# Patient Record
Sex: MALE | Race: BLACK OR AFRICAN AMERICAN | Employment: OTHER | URBAN - METROPOLITAN AREA
[De-identification: names, ages, dates, MRNs, and addresses within clinical notes are randomized per-mention and may not be internally consistent; named-entity substitution may affect disease eponyms.]

---

## 2019-09-12 ENCOUNTER — HOSPITAL ENCOUNTER (EMERGENCY)
Facility: HOSPITAL | Age: 69
Discharge: HOME | End: 2019-09-12
Attending: EMERGENCY MEDICINE
Payer: MEDICARE

## 2019-09-12 ENCOUNTER — APPOINTMENT (OUTPATIENT)
Dept: CT IMAGING | Facility: HOSPITAL | Age: 69
End: 2019-09-12
Attending: EMERGENCY MEDICINE
Payer: MEDICARE

## 2019-09-12 VITALS
SYSTOLIC BLOOD PRESSURE: 178 MMHG | OXYGEN SATURATION: 98 % | TEMPERATURE: 98 F | RESPIRATION RATE: 20 BRPM | HEART RATE: 60 BPM | BODY MASS INDEX: 32.47 KG/M2 | HEIGHT: 77 IN | WEIGHT: 275 LBS | DIASTOLIC BLOOD PRESSURE: 78 MMHG

## 2019-09-12 DIAGNOSIS — N20.0 KIDNEY STONE: Primary | ICD-10-CM

## 2019-09-12 LAB
ALBUMIN SERPL-MCNC: 3.4 G/DL (ref 3.4–5)
ALBUMIN/GLOB SERPL: 0.8 {RATIO} (ref 1–2)
ALP LIVER SERPL-CCNC: 81 U/L (ref 45–117)
ALT SERPL-CCNC: 19 U/L (ref 16–61)
ANION GAP SERPL CALC-SCNC: 7 MMOL/L (ref 0–18)
AST SERPL-CCNC: 14 U/L (ref 15–37)
BASOPHILS # BLD AUTO: 0.04 X10(3) UL (ref 0–0.2)
BASOPHILS NFR BLD AUTO: 0.7 %
BILIRUB SERPL-MCNC: 0.4 MG/DL (ref 0.1–2)
BILIRUB UR QL: NEGATIVE
BUN BLD-MCNC: 44 MG/DL (ref 7–18)
BUN/CREAT SERPL: 12.2 (ref 10–20)
CALCIUM BLD-MCNC: 8.8 MG/DL (ref 8.5–10.1)
CHLORIDE SERPL-SCNC: 113 MMOL/L (ref 98–112)
CLARITY UR: CLEAR
CO2 SERPL-SCNC: 22 MMOL/L (ref 21–32)
COLOR UR: YELLOW
CREAT BLD-MCNC: 3.6 MG/DL (ref 0.7–1.3)
DEPRECATED RDW RBC AUTO: 42.7 FL (ref 35.1–46.3)
EOSINOPHIL # BLD AUTO: 0.13 X10(3) UL (ref 0–0.7)
EOSINOPHIL NFR BLD AUTO: 2.2 %
ERYTHROCYTE [DISTWIDTH] IN BLOOD BY AUTOMATED COUNT: 13.9 % (ref 11–15)
GLOBULIN PLAS-MCNC: 4.3 G/DL (ref 2.8–4.4)
GLUCOSE BLD-MCNC: 162 MG/DL (ref 70–99)
GLUCOSE UR-MCNC: 50 MG/DL
HCT VFR BLD AUTO: 33.2 % (ref 39–53)
HGB BLD-MCNC: 10.7 G/DL (ref 13–17.5)
IMM GRANULOCYTES # BLD AUTO: 0.04 X10(3) UL (ref 0–1)
IMM GRANULOCYTES NFR BLD: 0.7 %
KETONES UR-MCNC: NEGATIVE MG/DL
LEUKOCYTE ESTERASE UR QL STRIP.AUTO: NEGATIVE
LYMPHOCYTES # BLD AUTO: 1.68 X10(3) UL (ref 1–4)
LYMPHOCYTES NFR BLD AUTO: 27.9 %
M PROTEIN MFR SERPL ELPH: 7.7 G/DL (ref 6.4–8.2)
MCH RBC QN AUTO: 27.5 PG (ref 26–34)
MCHC RBC AUTO-ENTMCNC: 32.2 G/DL (ref 31–37)
MCV RBC AUTO: 85.3 FL (ref 80–100)
MONOCYTES # BLD AUTO: 0.49 X10(3) UL (ref 0.1–1)
MONOCYTES NFR BLD AUTO: 8.1 %
NEUTROPHILS # BLD AUTO: 3.65 X10 (3) UL (ref 1.5–7.7)
NEUTROPHILS # BLD AUTO: 3.65 X10(3) UL (ref 1.5–7.7)
NEUTROPHILS NFR BLD AUTO: 60.4 %
NITRITE UR QL STRIP.AUTO: NEGATIVE
OSMOLALITY SERPL CALC.SUM OF ELEC: 309 MOSM/KG (ref 275–295)
PH UR: 5 [PH] (ref 5–8)
PLATELET # BLD AUTO: 228 10(3)UL (ref 150–450)
POTASSIUM SERPL-SCNC: 4.7 MMOL/L (ref 3.5–5.1)
PROT UR-MCNC: 100 MG/DL
RBC # BLD AUTO: 3.89 X10(6)UL (ref 3.8–5.8)
RBC #/AREA URNS AUTO: 1 /HPF
SODIUM SERPL-SCNC: 142 MMOL/L (ref 136–145)
SP GR UR STRIP: 1.01 (ref 1–1.03)
UROBILINOGEN UR STRIP-ACNC: <2
VIT C UR-MCNC: NEGATIVE MG/DL
WBC # BLD AUTO: 6 X10(3) UL (ref 4–11)
WBC #/AREA URNS AUTO: 1 /HPF

## 2019-09-12 PROCEDURE — 85025 COMPLETE CBC W/AUTO DIFF WBC: CPT | Performed by: EMERGENCY MEDICINE

## 2019-09-12 PROCEDURE — 74176 CT ABD & PELVIS W/O CONTRAST: CPT | Performed by: EMERGENCY MEDICINE

## 2019-09-12 PROCEDURE — 96361 HYDRATE IV INFUSION ADD-ON: CPT

## 2019-09-12 PROCEDURE — 80053 COMPREHEN METABOLIC PANEL: CPT | Performed by: EMERGENCY MEDICINE

## 2019-09-12 PROCEDURE — 81001 URINALYSIS AUTO W/SCOPE: CPT | Performed by: EMERGENCY MEDICINE

## 2019-09-12 PROCEDURE — 96374 THER/PROPH/DIAG INJ IV PUSH: CPT

## 2019-09-12 PROCEDURE — 99284 EMERGENCY DEPT VISIT MOD MDM: CPT

## 2019-09-12 PROCEDURE — 96375 TX/PRO/DX INJ NEW DRUG ADDON: CPT

## 2019-09-12 RX ORDER — ONDANSETRON 4 MG/1
4 TABLET, ORALLY DISINTEGRATING ORAL EVERY 4 HOURS PRN
Qty: 10 TABLET | Refills: 0 | Status: SHIPPED | OUTPATIENT
Start: 2019-09-12 | End: 2019-09-19

## 2019-09-12 RX ORDER — HYDROCODONE BITARTRATE AND ACETAMINOPHEN 5; 325 MG/1; MG/1
1 TABLET ORAL EVERY 6 HOURS PRN
Qty: 20 TABLET | Refills: 0 | Status: SHIPPED | OUTPATIENT
Start: 2019-09-12 | End: 2019-09-19

## 2019-09-12 RX ORDER — MORPHINE SULFATE 4 MG/ML
4 INJECTION, SOLUTION INTRAMUSCULAR; INTRAVENOUS ONCE
Status: COMPLETED | OUTPATIENT
Start: 2019-09-12 | End: 2019-09-12

## 2019-09-12 RX ORDER — KETOROLAC TROMETHAMINE 15 MG/ML
15 INJECTION, SOLUTION INTRAMUSCULAR; INTRAVENOUS ONCE
Status: COMPLETED | OUTPATIENT
Start: 2019-09-12 | End: 2019-09-12

## 2019-09-12 RX ORDER — TAMSULOSIN HYDROCHLORIDE 0.4 MG/1
0.4 CAPSULE ORAL DAILY
Qty: 7 CAPSULE | Refills: 0 | Status: SHIPPED | OUTPATIENT
Start: 2019-09-12 | End: 2019-09-19

## 2019-09-12 RX ORDER — ONDANSETRON 2 MG/ML
4 INJECTION INTRAMUSCULAR; INTRAVENOUS ONCE
Status: COMPLETED | OUTPATIENT
Start: 2019-09-12 | End: 2019-09-12

## 2019-09-12 NOTE — ED PROVIDER NOTES
Patient Seen in: [facility] AND [facility] Emergency Department    History   Patient presents with:  Flank Pain    Stated Complaint: flank pain     HPI    32-year-old male with past medical history significant for diabetes, high blood pressure, chronic kidney di pulses  Pulmonary/Chest: CTA b/l with no rales, wheezes. No chest wall tenderness  Abdominal: Nontender. Nondistended. Soft. Bowel sounds are normal.   Back: Positive left CVA tenderness to percussion  :   Musculoskeletal: Normal range of motion.  No de suggested at the left ureterovesical junction with resultant left-sided hydroureteronephrosis and asymmetric left renal edema. 2. Mild left urothelial thickening is appreciated.  There is moderate left perinephric and periureteric fat stranding, which may reassessment of your blood pressure. Medications Prescribed:  Current Discharge Medication List    START taking these medications    HYDROcodone-acetaminophen 5-325 MG Oral Tab  Take 1 tablet by mouth every 6 (six) hours as needed for Pain.   Qty: 20 tab

## 2019-09-12 NOTE — ED NOTES
Emani Melgar woke up with left flank/lower back pain and nausea. He rates pain 10/10 at this time. History of previous kidney stones and states this feels similar.

## 2020-01-30 ENCOUNTER — HOSPITAL ENCOUNTER (OUTPATIENT)
Dept: GENERAL RADIOLOGY | Facility: HOSPITAL | Age: 70
Discharge: HOME | End: 2020-01-30
Attending: INTERNAL MEDICINE
Payer: MEDICARE

## 2020-01-30 ENCOUNTER — OFFICE VISIT (OUTPATIENT)
Dept: INTERNAL MEDICINE CLINIC | Facility: CLINIC | Age: 70
End: 2020-01-30
Payer: MEDICARE

## 2020-01-30 VITALS
TEMPERATURE: 98 F | HEIGHT: 77 IN | BODY MASS INDEX: 32.12 KG/M2 | OXYGEN SATURATION: 99 % | WEIGHT: 272 LBS | DIASTOLIC BLOOD PRESSURE: 76 MMHG | SYSTOLIC BLOOD PRESSURE: 164 MMHG | RESPIRATION RATE: 20 BRPM | HEART RATE: 98 BPM

## 2020-01-30 DIAGNOSIS — E11.22 TYPE 2 DIABETES MELLITUS WITH STAGE 3 CHRONIC KIDNEY DISEASE, WITHOUT LONG-TERM CURRENT USE OF INSULIN (HCC): ICD-10-CM

## 2020-01-30 DIAGNOSIS — R10.9 ACUTE FLANK PAIN: Primary | ICD-10-CM

## 2020-01-30 DIAGNOSIS — R10.9 ACUTE FLANK PAIN: ICD-10-CM

## 2020-01-30 DIAGNOSIS — N18.30 TYPE 2 DIABETES MELLITUS WITH STAGE 3 CHRONIC KIDNEY DISEASE, WITHOUT LONG-TERM CURRENT USE OF INSULIN (HCC): ICD-10-CM

## 2020-01-30 DIAGNOSIS — Z87.442 HISTORY OF KIDNEY STONES: ICD-10-CM

## 2020-01-30 DIAGNOSIS — N18.30 STAGE 3 CHRONIC KIDNEY DISEASE (HCC): ICD-10-CM

## 2020-01-30 DIAGNOSIS — I10 ESSENTIAL HYPERTENSION: ICD-10-CM

## 2020-01-30 PROCEDURE — 74018 RADEX ABDOMEN 1 VIEW: CPT | Performed by: INTERNAL MEDICINE

## 2020-01-30 PROCEDURE — 99204 OFFICE O/P NEW MOD 45 MIN: CPT | Performed by: INTERNAL MEDICINE

## 2020-01-30 RX ORDER — MELATONIN
DAILY
COMMUNITY
Start: 2019-11-12

## 2020-01-30 RX ORDER — AMLODIPINE BESYLATE AND BENAZEPRIL HYDROCHLORIDE 10; 40 MG/1; MG/1
1 CAPSULE ORAL
COMMUNITY
Start: 2019-11-12 | End: 2020-02-17

## 2020-01-30 NOTE — PROGRESS NOTES
HPI:    Patient ID: Minnie Desai is a 81year old male.     HPI  Patient comes in today for first time to establish care and also complaining of left flank pain but he says today he feels much better the pain is gone but it for last few days or so he was i tablet every day by oral route. • amLODIPine Besy-Benazepril HCl 10-40 MG Oral Cap Take 1 capsule by mouth.      • SITagliptin-metFORMIN HCl -1000 MG Oral Tablet 24 Hr Janumet  mg-1,000 mg tablet,extended release     • Vitamin B-12 1000 MCG stones every few months  History of kidney stones more fluids as above  Essential hypertension elevated patient will restart taking his medication at home will see him for Medicare annual  Type 2 diabetes mellitus with stage 3 chronic kidney disease, witho

## 2020-02-03 ENCOUNTER — OFFICE VISIT (OUTPATIENT)
Dept: INTERNAL MEDICINE CLINIC | Facility: CLINIC | Age: 70
End: 2020-02-03
Payer: MEDICARE

## 2020-02-03 VITALS
HEIGHT: 77 IN | WEIGHT: 271 LBS | DIASTOLIC BLOOD PRESSURE: 72 MMHG | SYSTOLIC BLOOD PRESSURE: 181 MMHG | HEART RATE: 79 BPM | OXYGEN SATURATION: 98 % | BODY MASS INDEX: 32 KG/M2 | RESPIRATION RATE: 18 BRPM | TEMPERATURE: 98 F

## 2020-02-03 DIAGNOSIS — Z00.00 ENCOUNTER FOR ANNUAL HEALTH EXAMINATION: ICD-10-CM

## 2020-02-03 DIAGNOSIS — Z23 NEED FOR VACCINATION: ICD-10-CM

## 2020-02-03 DIAGNOSIS — N18.30 STAGE 3 CHRONIC KIDNEY DISEASE (HCC): ICD-10-CM

## 2020-02-03 DIAGNOSIS — I10 ESSENTIAL HYPERTENSION: ICD-10-CM

## 2020-02-03 DIAGNOSIS — Z13.6 SCREENING FOR CARDIOVASCULAR CONDITION: ICD-10-CM

## 2020-02-03 DIAGNOSIS — Z12.5 ENCOUNTER FOR SCREENING FOR MALIGNANT NEOPLASM OF PROSTATE: ICD-10-CM

## 2020-02-03 DIAGNOSIS — Z00.00 ENCOUNTER FOR MEDICARE ANNUAL WELLNESS EXAM: ICD-10-CM

## 2020-02-03 DIAGNOSIS — R94.39 ABNORMAL STRESS TEST: ICD-10-CM

## 2020-02-03 DIAGNOSIS — Z11.59 NEED FOR HEPATITIS C SCREENING TEST: ICD-10-CM

## 2020-02-03 DIAGNOSIS — Z00.00 MEDICARE ANNUAL WELLNESS VISIT, SUBSEQUENT: Primary | ICD-10-CM

## 2020-02-03 DIAGNOSIS — Z12.11 SCREENING FOR COLON CANCER: ICD-10-CM

## 2020-02-03 DIAGNOSIS — M89.9 BONE DISORDER: ICD-10-CM

## 2020-02-03 DIAGNOSIS — Z00.00 PREVENTATIVE HEALTH CARE: ICD-10-CM

## 2020-02-03 DIAGNOSIS — E11.22 TYPE 2 DIABETES MELLITUS WITH STAGE 3 CHRONIC KIDNEY DISEASE, WITHOUT LONG-TERM CURRENT USE OF INSULIN (HCC): ICD-10-CM

## 2020-02-03 DIAGNOSIS — Z11.4 SCREENING FOR HIV (HUMAN IMMUNODEFICIENCY VIRUS): ICD-10-CM

## 2020-02-03 DIAGNOSIS — N18.30 TYPE 2 DIABETES MELLITUS WITH STAGE 3 CHRONIC KIDNEY DISEASE, WITHOUT LONG-TERM CURRENT USE OF INSULIN (HCC): ICD-10-CM

## 2020-02-03 DIAGNOSIS — R06.02 SOB (SHORTNESS OF BREATH) ON EXERTION: ICD-10-CM

## 2020-02-03 DIAGNOSIS — Z13.1 SCREENING FOR DIABETES MELLITUS (DM): ICD-10-CM

## 2020-02-03 DIAGNOSIS — Z23 FLU VACCINE NEED: ICD-10-CM

## 2020-02-03 DIAGNOSIS — Z87.442 HISTORY OF KIDNEY STONES: ICD-10-CM

## 2020-02-03 LAB
ALBUMIN SERPL-MCNC: 3.5 G/DL (ref 3.4–5)
ALBUMIN/GLOB SERPL: 0.8 {RATIO} (ref 1–2)
ALP LIVER SERPL-CCNC: 86 U/L (ref 45–117)
ALT SERPL-CCNC: 21 U/L (ref 16–61)
ANION GAP SERPL CALC-SCNC: 9 MMOL/L (ref 0–18)
AST SERPL-CCNC: 20 U/L (ref 15–37)
BASOPHILS # BLD AUTO: 0.05 X10(3) UL (ref 0–0.2)
BASOPHILS NFR BLD AUTO: 0.8 %
BILIRUB SERPL-MCNC: 0.4 MG/DL (ref 0.1–2)
BUN BLD-MCNC: 62 MG/DL (ref 7–18)
BUN/CREAT SERPL: 11.8 (ref 10–20)
CALCIUM BLD-MCNC: 9.1 MG/DL (ref 8.5–10.1)
CHLORIDE SERPL-SCNC: 111 MMOL/L (ref 98–112)
CHOLEST SMN-MCNC: 198 MG/DL (ref ?–200)
CO2 SERPL-SCNC: 23 MMOL/L (ref 21–32)
COMPLEXED PSA SERPL-MCNC: 0.74 NG/ML (ref ?–4)
CREAT BLD-MCNC: 5.24 MG/DL (ref 0.7–1.3)
CREAT UR-SCNC: 179 MG/DL
DEPRECATED RDW RBC AUTO: 40.4 FL (ref 35.1–46.3)
EOSINOPHIL # BLD AUTO: 0.17 X10(3) UL (ref 0–0.7)
EOSINOPHIL NFR BLD AUTO: 2.7 %
ERYTHROCYTE [DISTWIDTH] IN BLOOD BY AUTOMATED COUNT: 13.2 % (ref 11–15)
EST. AVERAGE GLUCOSE BLD GHB EST-MCNC: 171 MG/DL (ref 68–126)
GLOBULIN PLAS-MCNC: 4.3 G/DL (ref 2.8–4.4)
GLUCOSE BLD-MCNC: 122 MG/DL (ref 70–99)
HBA1C MFR BLD HPLC: 7.6 % (ref ?–5.7)
HCT VFR BLD AUTO: 34.9 % (ref 39–53)
HCV AB SERPL QL IA: NONREACTIVE
HDLC SERPL-MCNC: 46 MG/DL (ref 40–59)
HGB BLD-MCNC: 11.1 G/DL (ref 13–17.5)
IMM GRANULOCYTES # BLD AUTO: 0.02 X10(3) UL (ref 0–1)
IMM GRANULOCYTES NFR BLD: 0.3 %
LDLC SERPL CALC-MCNC: 120 MG/DL (ref ?–100)
LYMPHOCYTES # BLD AUTO: 1.81 X10(3) UL (ref 1–4)
LYMPHOCYTES NFR BLD AUTO: 28.3 %
M PROTEIN MFR SERPL ELPH: 7.8 G/DL (ref 6.4–8.2)
MCH RBC QN AUTO: 26.7 PG (ref 26–34)
MCHC RBC AUTO-ENTMCNC: 31.8 G/DL (ref 31–37)
MCV RBC AUTO: 84.1 FL (ref 80–100)
MICROALBUMIN UR-MCNC: 238 MG/DL
MICROALBUMIN/CREAT 24H UR-RTO: 1329.6 UG/MG (ref ?–30)
MONOCYTES # BLD AUTO: 0.48 X10(3) UL (ref 0.1–1)
MONOCYTES NFR BLD AUTO: 7.5 %
NEUTROPHILS # BLD AUTO: 3.86 X10 (3) UL (ref 1.5–7.7)
NEUTROPHILS # BLD AUTO: 3.86 X10(3) UL (ref 1.5–7.7)
NEUTROPHILS NFR BLD AUTO: 60.4 %
NONHDLC SERPL-MCNC: 152 MG/DL (ref ?–130)
OSMOLALITY SERPL CALC.SUM OF ELEC: 315 MOSM/KG (ref 275–295)
PATIENT FASTING Y/N/NP: YES
PATIENT FASTING Y/N/NP: YES
PLATELET # BLD AUTO: 251 10(3)UL (ref 150–450)
POTASSIUM SERPL-SCNC: 5 MMOL/L (ref 3.5–5.1)
RBC # BLD AUTO: 4.15 X10(6)UL (ref 3.8–5.8)
SODIUM SERPL-SCNC: 143 MMOL/L (ref 136–145)
TRIGL SERPL-MCNC: 161 MG/DL (ref 30–149)
TSI SER-ACNC: 0.88 MIU/ML (ref 0.36–3.74)
VLDLC SERPL CALC-MCNC: 32 MG/DL (ref 0–30)
WBC # BLD AUTO: 6.4 X10(3) UL (ref 4–11)

## 2020-02-03 PROCEDURE — G0439 PPPS, SUBSEQ VISIT: HCPCS | Performed by: INTERNAL MEDICINE

## 2020-02-03 PROCEDURE — 36415 COLL VENOUS BLD VENIPUNCTURE: CPT | Performed by: INTERNAL MEDICINE

## 2020-02-03 PROCEDURE — 90662 IIV NO PRSV INCREASED AG IM: CPT | Performed by: INTERNAL MEDICINE

## 2020-02-03 PROCEDURE — G0008 ADMIN INFLUENZA VIRUS VAC: HCPCS | Performed by: INTERNAL MEDICINE

## 2020-02-03 RX ORDER — METOPROLOL SUCCINATE 50 MG/1
50 TABLET, EXTENDED RELEASE ORAL DAILY
Qty: 90 TABLET | Refills: 1 | Status: SHIPPED | OUTPATIENT
Start: 2020-02-03 | End: 2020-02-17

## 2020-02-04 ENCOUNTER — TELEPHONE (OUTPATIENT)
Dept: INTERNAL MEDICINE CLINIC | Facility: CLINIC | Age: 70
End: 2020-02-04

## 2020-02-04 NOTE — TELEPHONE ENCOUNTER
Nephrology RN: Patient needing to see nephrology for a creatinine of 5. Scheduling indicates soonest available appointment is 2-25-20. Can you please see if patient can be seen sooner.

## 2020-02-04 NOTE — PATIENT INSTRUCTIONS
Luisito Diop's SCREENING SCHEDULE   Tests on this list are recommended by your physician but may not be covered, or covered at this frequency, by your insurer. Please check with your insurance carrier before scheduling to verify coverage.     ADAL results found for this or any previous visit. No flowsheet data found. Fecal Occult Blood   Covered Annually No results found for: FOB, OCCULTSTOOL No flowsheet data found.      Barium Enema-   uncomfortable but covered  Covered but uncomfortable   Glau this or any previous visit. This may be covered with your pharmacy  prescription benefits     Recommended Websites for Advanced Directives    [email or web address]  An information packet, including necessary form from

## 2020-02-04 NOTE — PROGRESS NOTES
HPI:   Josselyn Bruner is a 81year old male who presents for a Medicare Subsequent Annual Wellness visit (Pt already had Initial Annual Wellness).     Patient comes in for Medicare annual physical was recently seen for first time needed refill on his med screening   Mayelin Lunch was screened for Alcohol abuse and had a score of 0 so is at low risk.      Patient Care Team: Patient Care Team:  Jo-Ann Henson MD as PCP - General (Internal Medicine)    Patient Active Problem List:     History of kidney ston EXAM:   BP (!) 191/72 (BP Location: Left arm, Patient Position: Sitting, Cuff Size: large)   Pulse 79   Temp 97.5 °F (36.4 °C) (Oral)   Resp 18   Ht 6' 5\" (1.956 m)   Wt 271 lb (122.9 kg)   SpO2 98%   BMI 32.14 kg/m²   Estimated body mass index is 32. Head: Normocephalic and atraumatic.    Right Ear: Tympanic membrane and ear canal normal.   Left Ear: Tympanic membrane and ear canal normal.   Nose: Nose normal.   Mouth/Throat: Oropharynx is clear and moist.   Eyes: Pupils are equal, round, and reactive IM  -     GASTRO - INTERNAL  -     PSA SCREEN; Future  -     LIPID PANEL; Future  -     HCV ANTIBODY; Future  -     HIV AG AB COMBO; Future  -     COMP METABOLIC PANEL (14);  Future  -     HEMOGLOBIN A1C; Future  -     TSH W REFLEX TO FREE T4; Future  - doctor  -     FLU VACC HIGH DOSE PRSV FREE  -     PNEUMOCOCCAL VACC, 13 MARIA ALEJANDRA IM  -     GASTRO - INTERNAL  -     PSA SCREEN; Future  -     LIPID PANEL; Future  -     HCV ANTIBODY; Future  -     HIV AG AB COMBO; Future  -     COMP METABOLIC PANEL (14);  Future RATIO, RANDOM URINE; Future        Screening for colon cancer  -     FLU VACC HIGH DOSE PRSV FREE  -     PNEUMOCOCCAL VACC, 13 MARIA ALEJANDRA IM  -     GASTRO - INTERNAL  -     PSA SCREEN; Future  -     LIPID PANEL; Future  -     HCV ANTIBODY;  Future  -     HIV AG AB medically necessary Electrocardiogram date    Colorectal Cancer Screening      Colonoscopy Screen every 10 years Colonoscopy due on 04/08/2002 Update Health Maintenance if applicable    Flex Sigmoidoscopy Screen every 10 years No results found for this or 09/12/2019 4.7    No flowsheet data found. Creatinine  Annually Creatinine (mg/dL)   Date Value   09/12/2019 3.60 (H)    No flowsheet data found. Drug Serum Conc  Annually No results found for: DIGOXIN, DIG, VALP No flowsheet data found.        Diab

## 2020-02-05 NOTE — TELEPHONE ENCOUNTER
Spoke to nephrology office to schedule sooner appt. 2/19/20 @10am w/ Dr Stephanie Moser, this is the soonest appt they had. Took appt. Spoke to patient to inform him that appt was moved up a bit to the 19th @10am w/ Dr Stephanie Moser.  Pt states understanding, toni

## 2020-02-05 NOTE — TELEPHONE ENCOUNTER
Contacted pt and offered appt with Dr. Governor Mckinney for Friday 2/7/20. Pt politely declined and states Fridays are the only days he cannot come in.  He thanked me for the call to get him in sooner and to let him know if there's anything else that opens up that's n

## 2020-02-05 NOTE — TELEPHONE ENCOUNTER
Dr. [facility], would you be able to see this consult on Friday 2/7/2020 at 4:20 PM or Monday 2/10/2020 at 4:20 PM?     Component      Latest Ref Rng & Units 2/3/2020 9/12/2019   BUN      7 - 18 mg/dL 62 (H) 44 (H)   CREATININE      0.70 - 1.30 mg/dL 5.24 (H) 3

## 2020-02-10 ENCOUNTER — OFFICE VISIT (OUTPATIENT)
Dept: NEPHROLOGY | Facility: CLINIC | Age: 70
End: 2020-02-10
Payer: MEDICARE

## 2020-02-10 VITALS
HEART RATE: 64 BPM | BODY MASS INDEX: 32.35 KG/M2 | DIASTOLIC BLOOD PRESSURE: 81 MMHG | HEIGHT: 77 IN | SYSTOLIC BLOOD PRESSURE: 158 MMHG | WEIGHT: 274 LBS

## 2020-02-10 DIAGNOSIS — N18.30 STAGE 3 CHRONIC KIDNEY DISEASE (HCC): ICD-10-CM

## 2020-02-10 DIAGNOSIS — R06.02 SOB (SHORTNESS OF BREATH) ON EXERTION: Primary | ICD-10-CM

## 2020-02-10 DIAGNOSIS — E11.22 TYPE 2 DIABETES MELLITUS WITH STAGE 3 CHRONIC KIDNEY DISEASE, WITHOUT LONG-TERM CURRENT USE OF INSULIN (HCC): ICD-10-CM

## 2020-02-10 DIAGNOSIS — I10 ESSENTIAL HYPERTENSION: ICD-10-CM

## 2020-02-10 DIAGNOSIS — N18.30 TYPE 2 DIABETES MELLITUS WITH STAGE 3 CHRONIC KIDNEY DISEASE, WITHOUT LONG-TERM CURRENT USE OF INSULIN (HCC): ICD-10-CM

## 2020-02-10 PROCEDURE — 99205 OFFICE O/P NEW HI 60 MIN: CPT | Performed by: INTERNAL MEDICINE

## 2020-02-10 PROCEDURE — G0463 HOSPITAL OUTPT CLINIC VISIT: HCPCS | Performed by: INTERNAL MEDICINE

## 2020-02-10 RX ORDER — AMLODIPINE BESYLATE 10 MG/1
10 TABLET ORAL DAILY
Qty: 30 TABLET | Refills: 3 | Status: SHIPPED | OUTPATIENT
Start: 2020-02-10 | End: 2021-03-24

## 2020-02-10 NOTE — PATIENT INSTRUCTIONS
DO ULTRASOUND OF KIDNEYS THIS WEEK    STOP LISINOPRIL AMLODIPINE    TAKE AMLODIPINE ONLY 10MG AT NIGHT    STOP METFORMIN    MONITOR SUGARS,,BLOOD PRESSURE    PLEASE DO LABS ON Thursday    SEE ME NEXT Monday    NICE TO MEET YOU Cynthia Shepard.

## 2020-02-13 ENCOUNTER — TELEPHONE (OUTPATIENT)
Dept: PULMONOLOGY | Facility: CLINIC | Age: 70
End: 2020-02-13

## 2020-02-13 ENCOUNTER — HOSPITAL ENCOUNTER (OUTPATIENT)
Dept: ULTRASOUND IMAGING | Facility: HOSPITAL | Age: 70
Discharge: HOME | End: 2020-02-13
Attending: INTERNAL MEDICINE
Payer: MEDICARE

## 2020-02-13 DIAGNOSIS — E11.22 TYPE 2 DIABETES MELLITUS WITH STAGE 3 CHRONIC KIDNEY DISEASE, WITHOUT LONG-TERM CURRENT USE OF INSULIN (HCC): ICD-10-CM

## 2020-02-13 DIAGNOSIS — I10 ESSENTIAL HYPERTENSION: ICD-10-CM

## 2020-02-13 DIAGNOSIS — N18.30 TYPE 2 DIABETES MELLITUS WITH STAGE 3 CHRONIC KIDNEY DISEASE, WITHOUT LONG-TERM CURRENT USE OF INSULIN (HCC): ICD-10-CM

## 2020-02-13 DIAGNOSIS — R06.02 SOB (SHORTNESS OF BREATH) ON EXERTION: ICD-10-CM

## 2020-02-13 DIAGNOSIS — N18.30 STAGE 3 CHRONIC KIDNEY DISEASE (HCC): ICD-10-CM

## 2020-02-13 PROCEDURE — 76770 US EXAM ABDO BACK WALL COMP: CPT | Performed by: INTERNAL MEDICINE

## 2020-02-14 ENCOUNTER — LAB ENCOUNTER (OUTPATIENT)
Dept: LAB | Facility: HOSPITAL | Age: 70
End: 2020-02-14
Attending: INTERNAL MEDICINE
Payer: MEDICARE

## 2020-02-14 DIAGNOSIS — R06.02 SOB (SHORTNESS OF BREATH) ON EXERTION: ICD-10-CM

## 2020-02-14 DIAGNOSIS — N18.30 STAGE 3 CHRONIC KIDNEY DISEASE (HCC): ICD-10-CM

## 2020-02-14 DIAGNOSIS — N18.30 TYPE 2 DIABETES MELLITUS WITH STAGE 3 CHRONIC KIDNEY DISEASE, WITHOUT LONG-TERM CURRENT USE OF INSULIN (HCC): ICD-10-CM

## 2020-02-14 DIAGNOSIS — E11.22 TYPE 2 DIABETES MELLITUS WITH STAGE 3 CHRONIC KIDNEY DISEASE, WITHOUT LONG-TERM CURRENT USE OF INSULIN (HCC): ICD-10-CM

## 2020-02-14 DIAGNOSIS — I10 ESSENTIAL HYPERTENSION: ICD-10-CM

## 2020-02-14 LAB
ALBUMIN SERPL-MCNC: 3.3 G/DL (ref 3.4–5)
ANION GAP SERPL CALC-SCNC: 5 MMOL/L (ref 0–18)
BACTERIA UR QL AUTO: NEGATIVE /HPF
BILIRUB UR QL: NEGATIVE
BUN BLD-MCNC: 45 MG/DL (ref 7–18)
BUN/CREAT SERPL: 10.5 (ref 10–20)
CALCIUM BLD-MCNC: 8.4 MG/DL (ref 8.5–10.1)
CHLORIDE SERPL-SCNC: 113 MMOL/L (ref 98–112)
CLARITY UR: CLEAR
CO2 SERPL-SCNC: 23 MMOL/L (ref 21–32)
COLOR UR: YELLOW
CREAT BLD-MCNC: 4.27 MG/DL (ref 0.7–1.3)
GLUCOSE BLD-MCNC: 195 MG/DL (ref 70–99)
GLUCOSE UR-MCNC: 50 MG/DL
HGB UR QL STRIP.AUTO: NEGATIVE
IRON SATURATION: 16 % (ref 20–50)
IRON SERPL-MCNC: 45 UG/DL (ref 65–175)
KETONES UR-MCNC: NEGATIVE MG/DL
LEUKOCYTE ESTERASE UR QL STRIP.AUTO: NEGATIVE
NITRITE UR QL STRIP.AUTO: NEGATIVE
OSMOLALITY SERPL CALC.SUM OF ELEC: 309 MOSM/KG (ref 275–295)
PH UR: 5 [PH] (ref 5–8)
PHOSPHATE SERPL-MCNC: 4.2 MG/DL (ref 2.5–4.9)
POTASSIUM SERPL-SCNC: 4.8 MMOL/L (ref 3.5–5.1)
PROT UR-MCNC: 100 MG/DL
PROT UR-MCNC: 219.5 MG/DL
PTH-INTACT SERPL-MCNC: 243 PG/ML (ref 18.5–88)
RBC #/AREA URNS AUTO: <1 /HPF
SODIUM SERPL-SCNC: 141 MMOL/L (ref 136–145)
SP GR UR STRIP: 1.01 (ref 1–1.03)
TOTAL IRON BINDING CAPACITY: 279 UG/DL (ref 240–450)
TRANSFERRIN SERPL-MCNC: 187 MG/DL (ref 200–360)
UROBILINOGEN UR STRIP-ACNC: <2
WBC #/AREA URNS AUTO: 2 /HPF

## 2020-02-14 PROCEDURE — 84165 PROTEIN E-PHORESIS SERUM: CPT

## 2020-02-14 PROCEDURE — 83540 ASSAY OF IRON: CPT

## 2020-02-14 PROCEDURE — 83970 ASSAY OF PARATHORMONE: CPT

## 2020-02-14 PROCEDURE — 36415 COLL VENOUS BLD VENIPUNCTURE: CPT

## 2020-02-14 PROCEDURE — 84166 PROTEIN E-PHORESIS/URINE/CSF: CPT

## 2020-02-14 PROCEDURE — 86335 IMMUNFIX E-PHORSIS/URINE/CSF: CPT

## 2020-02-14 PROCEDURE — 84466 ASSAY OF TRANSFERRIN: CPT

## 2020-02-14 PROCEDURE — 81001 URINALYSIS AUTO W/SCOPE: CPT

## 2020-02-14 PROCEDURE — 80069 RENAL FUNCTION PANEL: CPT

## 2020-02-17 ENCOUNTER — OFFICE VISIT (OUTPATIENT)
Dept: NEPHROLOGY | Facility: CLINIC | Age: 70
End: 2020-02-17
Payer: MEDICARE

## 2020-02-17 VITALS
BODY MASS INDEX: 32.82 KG/M2 | SYSTOLIC BLOOD PRESSURE: 164 MMHG | HEIGHT: 77 IN | DIASTOLIC BLOOD PRESSURE: 74 MMHG | HEART RATE: 60 BPM | WEIGHT: 278 LBS

## 2020-02-17 DIAGNOSIS — N18.30 TYPE 2 DIABETES MELLITUS WITH STAGE 3 CHRONIC KIDNEY DISEASE, WITHOUT LONG-TERM CURRENT USE OF INSULIN (HCC): Primary | ICD-10-CM

## 2020-02-17 DIAGNOSIS — Z87.442 HISTORY OF KIDNEY STONES: ICD-10-CM

## 2020-02-17 DIAGNOSIS — I10 ESSENTIAL HYPERTENSION: ICD-10-CM

## 2020-02-17 DIAGNOSIS — E11.22 TYPE 2 DIABETES MELLITUS WITH STAGE 3 CHRONIC KIDNEY DISEASE, WITHOUT LONG-TERM CURRENT USE OF INSULIN (HCC): Primary | ICD-10-CM

## 2020-02-17 DIAGNOSIS — R06.02 SOB (SHORTNESS OF BREATH) ON EXERTION: ICD-10-CM

## 2020-02-17 DIAGNOSIS — N18.30 STAGE 3 CHRONIC KIDNEY DISEASE (HCC): ICD-10-CM

## 2020-02-17 PROCEDURE — G0463 HOSPITAL OUTPT CLINIC VISIT: HCPCS | Performed by: INTERNAL MEDICINE

## 2020-02-17 PROCEDURE — 99214 OFFICE O/P EST MOD 30 MIN: CPT | Performed by: INTERNAL MEDICINE

## 2020-02-17 RX ORDER — GLIMEPIRIDE 2 MG/1
2 TABLET ORAL
Qty: 1 TABLET | Refills: 2 | Status: SHIPPED | OUTPATIENT
Start: 2020-02-17 | End: 2020-02-18

## 2020-02-17 RX ORDER — METOPROLOL SUCCINATE 50 MG/1
50 TABLET, EXTENDED RELEASE ORAL DAILY
Qty: 90 TABLET | Refills: 3 | Status: SHIPPED | OUTPATIENT
Start: 2020-02-17 | End: 2020-05-17

## 2020-02-18 ENCOUNTER — TELEPHONE (OUTPATIENT)
Dept: NEPHROLOGY | Facility: CLINIC | Age: 70
End: 2020-02-18

## 2020-02-18 LAB
ALBUMIN SERPL ELPH-MCNC: 3.72 G/DL (ref 3.75–5.21)
ALBUMIN/GLOB SERPL: 1.17 {RATIO} (ref 1–2)
ALPHA1 GLOB SERPL ELPH-MCNC: 0.21 G/DL (ref 0.19–0.46)
ALPHA2 GLOB SERPL ELPH-MCNC: 0.81 G/DL (ref 0.48–1.05)
B-GLOBULIN SERPL ELPH-MCNC: 0.69 G/DL (ref 0.68–1.23)
GAMMA GLOB SERPL ELPH-MCNC: 1.47 G/DL (ref 0.62–1.7)
M PROTEIN MFR SERPL ELPH: 6.9 G/DL (ref 6.4–8.2)

## 2020-02-18 RX ORDER — GLIMEPIRIDE 2 MG/1
2 TABLET ORAL
Qty: 30 TABLET | Refills: 2 | Status: SHIPPED | OUTPATIENT
Start: 2020-02-18 | End: 2020-03-19

## 2020-02-18 NOTE — PROGRESS NOTES
Dear Juliocesar Berg  I saw Jurgen Malagon in follow up today  As you know I saw him about a week ago you were concerned because his creatinine had skyrocketed to over 5.   He has diabetes and hypertension has not really been checking the much lately but has been checking h for his shortness of breath most likely needs an echo and/or stress test    Thank you very much,  Reuben Adames

## 2020-02-18 NOTE — PATIENT INSTRUCTIONS
See cardiologist     Call [address]  To make appt    Add glimipride 2mg each am     Take sitagliptin in evening    For bp  Metoprolol in am    Amlodipine in eveing      Check blood sugar and blood pressure before breakfast and dinner and marked down

## 2020-02-18 NOTE — TELEPHONE ENCOUNTER
Pharmacy calling to clarify instructions for medication. Pharmacy states they received a script stating one tablet of medication.  Please call [phone]        Current Outpatient Medications:     •  glimepiride 2 MG Oral Tab, Take 1 tablet (2 mg total) b

## 2020-02-20 ENCOUNTER — TELEPHONE (OUTPATIENT)
Dept: NEPHROLOGY | Facility: CLINIC | Age: 70
End: 2020-02-20

## 2020-02-21 NOTE — TELEPHONE ENCOUNTER
Keith Villanueva  Please note my consult and follow-up appointment with . Job Rodriguez has been ruled out he is off his ACE inhibitor  Creatinine is coming down he will see cardiology for shortness of breath and we will keep a very close eye on things he will

## 2020-02-21 NOTE — PROGRESS NOTES
Dear Maddison Talbert   thanks for the referral of Eliecer Lakesha is a complicated [id number] -American male who has a longstanding history of hypertension.   He also has diabetes he says his sugars run in the 1 30-1 80 range she says his blood pressu cell  Microalbumin 1.3 g    Ultrasound the kidney right kidney 10 cm left kidney 9.7 cm increased echogenicity  No obstruction small calculus in the left kidney nonobstructing    Impression and plan #1 significantly worsening renal function no obstruction

## 2020-03-02 ENCOUNTER — APPOINTMENT (OUTPATIENT)
Dept: GENERAL RADIOLOGY | Facility: HOSPITAL | Age: 70
DRG: 287 | End: 2020-03-02
Payer: MEDICARE

## 2020-03-02 ENCOUNTER — OFFICE VISIT (OUTPATIENT)
Dept: INTERNAL MEDICINE CLINIC | Facility: CLINIC | Age: 70
End: 2020-03-02
Payer: MEDICARE

## 2020-03-02 ENCOUNTER — HOSPITAL ENCOUNTER (INPATIENT)
Facility: HOSPITAL | Age: 70
LOS: 2 days | Discharge: HOME | DRG: 287 | End: 2020-03-05
Attending: EMERGENCY MEDICINE | Admitting: INTERNAL MEDICINE
Payer: MEDICARE

## 2020-03-02 VITALS
TEMPERATURE: 98 F | HEART RATE: 87 BPM | RESPIRATION RATE: 19 BRPM | OXYGEN SATURATION: 99 % | DIASTOLIC BLOOD PRESSURE: 80 MMHG | BODY MASS INDEX: 33.18 KG/M2 | HEIGHT: 77 IN | SYSTOLIC BLOOD PRESSURE: 154 MMHG | WEIGHT: 281 LBS

## 2020-03-02 DIAGNOSIS — R06.02 SOB (SHORTNESS OF BREATH) ON EXERTION: Primary | ICD-10-CM

## 2020-03-02 DIAGNOSIS — R94.39 ABNORMAL STRESS TEST: ICD-10-CM

## 2020-03-02 DIAGNOSIS — I10 ESSENTIAL HYPERTENSION: ICD-10-CM

## 2020-03-02 DIAGNOSIS — N18.9 CHRONIC KIDNEY DISEASE, UNSPECIFIED CKD STAGE: ICD-10-CM

## 2020-03-02 DIAGNOSIS — R07.9 ACUTE CHEST PAIN: ICD-10-CM

## 2020-03-02 DIAGNOSIS — R06.00 DYSPNEA ON EXERTION: Primary | ICD-10-CM

## 2020-03-02 PROBLEM — R06.09 DYSPNEA ON EXERTION: Status: ACTIVE | Noted: 2020-03-02

## 2020-03-02 LAB
ANION GAP SERPL CALC-SCNC: 8 MMOL/L (ref 0–18)
BASOPHILS # BLD AUTO: 0.06 X10(3) UL (ref 0–0.2)
BASOPHILS NFR BLD AUTO: 0.8 %
BUN BLD-MCNC: 58 MG/DL (ref 7–18)
BUN/CREAT SERPL: 12.1 (ref 10–20)
CALCIUM BLD-MCNC: 9.1 MG/DL (ref 8.5–10.1)
CHLORIDE SERPL-SCNC: 113 MMOL/L (ref 98–112)
CO2 SERPL-SCNC: 20 MMOL/L (ref 21–32)
CREAT BLD-MCNC: 4.78 MG/DL (ref 0.7–1.3)
DEPRECATED RDW RBC AUTO: 42.1 FL (ref 35.1–46.3)
EOSINOPHIL # BLD AUTO: 0.28 X10(3) UL (ref 0–0.7)
EOSINOPHIL NFR BLD AUTO: 3.8 %
ERYTHROCYTE [DISTWIDTH] IN BLOOD BY AUTOMATED COUNT: 13.7 % (ref 11–15)
GLUCOSE BLD-MCNC: 93 MG/DL (ref 70–99)
HCT VFR BLD AUTO: 31.8 % (ref 39–53)
HGB BLD-MCNC: 10.5 G/DL (ref 13–17.5)
IMM GRANULOCYTES # BLD AUTO: 0.03 X10(3) UL (ref 0–1)
IMM GRANULOCYTES NFR BLD: 0.4 %
LYMPHOCYTES # BLD AUTO: 2.92 X10(3) UL (ref 1–4)
LYMPHOCYTES NFR BLD AUTO: 39.8 %
MCH RBC QN AUTO: 27.9 PG (ref 26–34)
MCHC RBC AUTO-ENTMCNC: 33 G/DL (ref 31–37)
MCV RBC AUTO: 84.4 FL (ref 80–100)
MONOCYTES # BLD AUTO: 0.72 X10(3) UL (ref 0.1–1)
MONOCYTES NFR BLD AUTO: 9.8 %
NEUTROPHILS # BLD AUTO: 3.33 X10 (3) UL (ref 1.5–7.7)
NEUTROPHILS # BLD AUTO: 3.33 X10(3) UL (ref 1.5–7.7)
NEUTROPHILS NFR BLD AUTO: 45.4 %
OSMOLALITY SERPL CALC.SUM OF ELEC: 308 MOSM/KG (ref 275–295)
PLATELET # BLD AUTO: 259 10(3)UL (ref 150–450)
POTASSIUM SERPL-SCNC: 4.5 MMOL/L (ref 3.5–5.1)
RBC # BLD AUTO: 3.77 X10(6)UL (ref 3.8–5.8)
SODIUM SERPL-SCNC: 141 MMOL/L (ref 136–145)
TROPONIN I SERPL-MCNC: <0.045 NG/ML (ref ?–0.04)
WBC # BLD AUTO: 7.3 X10(3) UL (ref 4–11)

## 2020-03-02 PROCEDURE — 71045 X-RAY EXAM CHEST 1 VIEW: CPT | Performed by: EMERGENCY MEDICINE

## 2020-03-02 PROCEDURE — 99214 OFFICE O/P EST MOD 30 MIN: CPT | Performed by: INTERNAL MEDICINE

## 2020-03-03 ENCOUNTER — APPOINTMENT (OUTPATIENT)
Dept: CV DIAGNOSTICS | Facility: HOSPITAL | Age: 70
DRG: 287 | End: 2020-03-03
Attending: INTERNAL MEDICINE
Payer: MEDICARE

## 2020-03-03 PROBLEM — N18.9 CHRONIC KIDNEY DISEASE, UNSPECIFIED CKD STAGE: Status: ACTIVE | Noted: 2020-03-03

## 2020-03-03 PROBLEM — N18.4 TYPE 2 DIABETES MELLITUS WITH STAGE 4 CHRONIC KIDNEY DISEASE, WITHOUT LONG-TERM CURRENT USE OF INSULIN (HCC): Status: ACTIVE | Noted: 2020-01-30

## 2020-03-03 PROBLEM — E11.22 TYPE 2 DIABETES MELLITUS WITH STAGE 3 CHRONIC KIDNEY DISEASE, WITHOUT LONG-TERM CURRENT USE OF INSULIN (HCC): Status: RESOLVED | Noted: 2020-01-30 | Resolved: 2020-03-03

## 2020-03-03 PROBLEM — E11.22 TYPE 2 DIABETES MELLITUS WITH STAGE 4 CHRONIC KIDNEY DISEASE, WITHOUT LONG-TERM CURRENT USE OF INSULIN (HCC): Status: ACTIVE | Noted: 2020-01-30

## 2020-03-03 PROBLEM — R07.9 ACUTE CHEST PAIN: Status: ACTIVE | Noted: 2020-03-03

## 2020-03-03 PROBLEM — N18.5 CKD (CHRONIC KIDNEY DISEASE) STAGE 5, GFR LESS THAN 15 ML/MIN (HCC): Status: ACTIVE | Noted: 2020-03-03

## 2020-03-03 PROBLEM — N18.4 STAGE 4 CHRONIC KIDNEY DISEASE (HCC): Status: ACTIVE | Noted: 2020-01-30

## 2020-03-03 PROBLEM — N18.30 TYPE 2 DIABETES MELLITUS WITH STAGE 3 CHRONIC KIDNEY DISEASE, WITHOUT LONG-TERM CURRENT USE OF INSULIN (HCC): Status: RESOLVED | Noted: 2020-01-30 | Resolved: 2020-03-03

## 2020-03-03 LAB
ANION GAP SERPL CALC-SCNC: 6 MMOL/L (ref 0–18)
BACTERIA UR QL AUTO: NEGATIVE /HPF
BASOPHILS # BLD AUTO: 0.03 X10(3) UL (ref 0–0.2)
BASOPHILS NFR BLD AUTO: 0.5 %
BILIRUB UR QL: NEGATIVE
BUN BLD-MCNC: 55 MG/DL (ref 7–18)
BUN/CREAT SERPL: 12.1 (ref 10–20)
CALCIUM BLD-MCNC: 9 MG/DL (ref 8.5–10.1)
CHLORIDE SERPL-SCNC: 113 MMOL/L (ref 98–112)
CHOLEST SERPL-MCNC: 174 MG/DL
CHOLEST SMN-MCNC: 174 MG/DL (ref ?–200)
CHOLEST/HDLC SERPL: NORMAL {RATIO}
CLARITY UR: CLEAR
CO2 SERPL-SCNC: 22 MMOL/L (ref 21–32)
COLOR UR: YELLOW
CREAT BLD-MCNC: 4.54 MG/DL (ref 0.7–1.3)
CREAT UR-SCNC: 90.1 MG/DL
DEPRECATED HBV CORE AB SER IA-ACNC: 212.9 NG/ML (ref 30–530)
DEPRECATED RDW RBC AUTO: 40.8 FL (ref 35.1–46.3)
EOSINOPHIL # BLD AUTO: 0.22 X10(3) UL (ref 0–0.7)
EOSINOPHIL NFR BLD AUTO: 3.9 %
ERYTHROCYTE [DISTWIDTH] IN BLOOD BY AUTOMATED COUNT: 13.7 % (ref 11–15)
GLUCOSE BLD-MCNC: 93 MG/DL (ref 70–99)
GLUCOSE BLDC GLUCOMTR-MCNC: 115 MG/DL (ref 70–99)
GLUCOSE BLDC GLUCOMTR-MCNC: 118 MG/DL (ref 70–99)
GLUCOSE BLDC GLUCOMTR-MCNC: 152 MG/DL (ref 70–99)
GLUCOSE BLDC GLUCOMTR-MCNC: 155 MG/DL (ref 70–99)
GLUCOSE BLDC GLUCOMTR-MCNC: 86 MG/DL (ref 70–99)
GLUCOSE UR-MCNC: 50 MG/DL
HCT VFR BLD AUTO: 29 % (ref 39–53)
HDLC SERPL-MCNC: 48 MG/DL
HDLC SERPL-MCNC: 48 MG/DL (ref 40–59)
HGB BLD-MCNC: 9.6 G/DL (ref 13–17.5)
HGB UR QL STRIP.AUTO: NEGATIVE
IMM GRANULOCYTES # BLD AUTO: 0.03 X10(3) UL (ref 0–1)
IMM GRANULOCYTES NFR BLD: 0.5 %
IRON SATURATION: 18 % (ref 20–50)
IRON SERPL-MCNC: 49 UG/DL (ref 65–175)
KETONES UR-MCNC: NEGATIVE MG/DL
LDLC SERPL CALC-MCNC: 101 MG/DL
LDLC SERPL CALC-MCNC: 101 MG/DL (ref ?–100)
LENGTH OF FAST TIME PATIENT: NORMAL H
LEUKOCYTE ESTERASE UR QL STRIP.AUTO: NEGATIVE
LYMPHOCYTES # BLD AUTO: 2.33 X10(3) UL (ref 1–4)
LYMPHOCYTES NFR BLD AUTO: 40.9 %
MCH RBC QN AUTO: 27.4 PG (ref 26–34)
MCHC RBC AUTO-ENTMCNC: 33.1 G/DL (ref 31–37)
MCV RBC AUTO: 82.9 FL (ref 80–100)
MICROALBUMIN UR-MCNC: 123 MG/DL
MICROALBUMIN/CREAT 24H UR-RTO: 1365.1 UG/MG (ref ?–30)
MONOCYTES # BLD AUTO: 0.64 X10(3) UL (ref 0.1–1)
MONOCYTES NFR BLD AUTO: 11.2 %
NEUTROPHILS # BLD AUTO: 2.44 X10 (3) UL (ref 1.5–7.7)
NEUTROPHILS # BLD AUTO: 2.44 X10(3) UL (ref 1.5–7.7)
NEUTROPHILS NFR BLD AUTO: 43 %
NITRITE UR QL STRIP.AUTO: NEGATIVE
NONHDLC SERPL-MCNC: 126 MG/DL
NONHDLC SERPL-MCNC: 126 MG/DL (ref ?–130)
OSMOLALITY SERPL CALC.SUM OF ELEC: 307 MOSM/KG (ref 275–295)
PH UR: 5 [PH] (ref 5–8)
PLATELET # BLD AUTO: 221 10(3)UL (ref 150–450)
POTASSIUM SERPL-SCNC: 4.7 MMOL/L (ref 3.5–5.1)
PROT UR-MCNC: 100 MG/DL
RBC # BLD AUTO: 3.5 X10(6)UL (ref 3.8–5.8)
RBC #/AREA URNS AUTO: 1 /HPF
SODIUM SERPL-SCNC: 141 MMOL/L (ref 136–145)
SP GR UR STRIP: 1.01 (ref 1–1.03)
TOTAL IRON BINDING CAPACITY: 274 UG/DL (ref 240–450)
TRANSFERRIN SERPL-MCNC: 184 MG/DL (ref 200–360)
TRIGL SERPL-MCNC: 123 MG/DL
TRIGL SERPL-MCNC: 123 MG/DL (ref 30–149)
UROBILINOGEN UR STRIP-ACNC: <2
VLDLC SERPL CALC-MCNC: 25 MG/DL
VLDLC SERPL CALC-MCNC: 25 MG/DL (ref 0–30)
WBC # BLD AUTO: 5.7 X10(3) UL (ref 4–11)
WBC #/AREA URNS AUTO: 1 /HPF

## 2020-03-03 PROCEDURE — 99222 1ST HOSP IP/OBS MODERATE 55: CPT | Performed by: INTERNAL MEDICINE

## 2020-03-03 PROCEDURE — 99223 1ST HOSP IP/OBS HIGH 75: CPT | Performed by: INTERNAL MEDICINE

## 2020-03-03 PROCEDURE — 93306 TTE W/DOPPLER COMPLETE: CPT | Performed by: INTERNAL MEDICINE

## 2020-03-03 RX ORDER — GLIMEPIRIDE 4 MG/1
2 TABLET ORAL
Status: DISCONTINUED | OUTPATIENT
Start: 2020-03-03 | End: 2020-03-03

## 2020-03-03 RX ORDER — AMLODIPINE BESYLATE 10 MG/1
10 TABLET ORAL DAILY
Status: DISCONTINUED | OUTPATIENT
Start: 2020-03-03 | End: 2020-03-03

## 2020-03-03 RX ORDER — SODIUM CHLORIDE 9 MG/ML
INJECTION, SOLUTION INTRAVENOUS
Status: ACTIVE | OUTPATIENT
Start: 2020-03-04 | End: 2020-03-04

## 2020-03-03 RX ORDER — CHOLECALCIFEROL (VITAMIN D3) 125 MCG
1000 CAPSULE ORAL DAILY
Status: DISCONTINUED | OUTPATIENT
Start: 2020-03-03 | End: 2020-03-05

## 2020-03-03 RX ORDER — ACETAMINOPHEN 325 MG/1
650 TABLET ORAL EVERY 6 HOURS PRN
Status: DISCONTINUED | OUTPATIENT
Start: 2020-03-03 | End: 2020-03-05

## 2020-03-03 RX ORDER — SODIUM CHLORIDE 9 MG/ML
INJECTION, SOLUTION INTRAVENOUS CONTINUOUS
Status: DISCONTINUED | OUTPATIENT
Start: 2020-03-03 | End: 2020-03-05

## 2020-03-03 RX ORDER — HYDRALAZINE HYDROCHLORIDE 20 MG/ML
10 INJECTION INTRAMUSCULAR; INTRAVENOUS EVERY 6 HOURS PRN
Status: DISCONTINUED | OUTPATIENT
Start: 2020-03-03 | End: 2020-03-05

## 2020-03-03 RX ORDER — AMLODIPINE BESYLATE 10 MG/1
10 TABLET ORAL NIGHTLY
Status: DISCONTINUED | OUTPATIENT
Start: 2020-03-03 | End: 2020-03-05

## 2020-03-03 RX ORDER — HEPARIN SODIUM 5000 [USP'U]/ML
5000 INJECTION, SOLUTION INTRAVENOUS; SUBCUTANEOUS EVERY 8 HOURS SCHEDULED
Status: DISCONTINUED | OUTPATIENT
Start: 2020-03-03 | End: 2020-03-05

## 2020-03-03 RX ORDER — ATORVASTATIN CALCIUM 20 MG/1
20 TABLET, FILM COATED ORAL NIGHTLY
Status: DISCONTINUED | OUTPATIENT
Start: 2020-03-03 | End: 2020-03-05

## 2020-03-03 RX ORDER — ASPIRIN 81 MG/1
81 TABLET, CHEWABLE ORAL DAILY
Status: DISCONTINUED | OUTPATIENT
Start: 2020-03-03 | End: 2020-03-05

## 2020-03-03 RX ORDER — CHLORHEXIDINE GLUCONATE 4 G/100ML
30 SOLUTION TOPICAL
Status: COMPLETED | OUTPATIENT
Start: 2020-03-04 | End: 2020-03-04

## 2020-03-03 RX ORDER — METOPROLOL SUCCINATE 50 MG/1
50 TABLET, EXTENDED RELEASE ORAL DAILY
Status: DISCONTINUED | OUTPATIENT
Start: 2020-03-03 | End: 2020-03-05

## 2020-03-03 RX ORDER — DEXTROSE MONOHYDRATE 25 G/50ML
50 INJECTION, SOLUTION INTRAVENOUS
Status: DISCONTINUED | OUTPATIENT
Start: 2020-03-03 | End: 2020-03-05

## 2020-03-03 RX ORDER — ASPIRIN 81 MG/1
324 TABLET, CHEWABLE ORAL ONCE
Status: COMPLETED | OUTPATIENT
Start: 2020-03-03 | End: 2020-03-04

## 2020-03-03 NOTE — PROGRESS NOTES
[facility] [place]    Cardiology Progress Note  [facility]    Adrien Victor Patient Status:  Inpatient    1950 MRN [id number]   Location [facility] 3W/SW Attending Kashmir Harrison MD   Hosp Day # 0 PCP Rich amLODIPine Besylate  10 mg Oral Nightly       Continuous Infusions:     Results:     Lab Results   Component Value Date    WBC 5.7 03/03/2020    HGB 9.6 (L) 03/03/2020    HCT 29.0 (L) 03/03/2020    .0 03/03/2020    CREATSERUM 4.54 (H) 03/03/2020 testing from [facility] which was about 5 months ago. He had a normal echo. He a nuclear stress test with a small reversible inferior defect. Never saw a cardiologist.  His major symptoms are exertional dyspnea.     He does need cardiac catheterizat

## 2020-03-03 NOTE — PLAN OF CARE
Discussed left heart cardiac catheterization procedure with the patient and his wife. The risks and benefits of the procedure were explained to the patient.   This includes but is not limited to a 1-3% risk of stroke, death, heart attack, coronary dissectio

## 2020-03-03 NOTE — ED INITIAL ASSESSMENT (HPI)
Patient aox3 ambulatory to ed pt co intermittent chest pain with sob x few months patient went to pcp for follow up visit and was told to come to ed patient rate pain 5/10 at this time.  Sob especially with exertion

## 2020-03-03 NOTE — CONSULTS
[facility]    Report of Consultation    Cooper Woodward Patient Status:  Inpatient    1950 MRN [id number]   Location [facility] 3W/SW Attending Juan Amezcua MD   Hosp Day # 0 PCP Shahab Barba MD     Date of Admission:  3/ liquid 15 g, 15 g, Oral, Q15 Min PRN **OR** Glucose-Vitamin C (DEX-4) chewable tab 4 tablet, 4 tablet, Oral, Q15 Min PRN **OR** dextrose 50 % injection 50 mL, 50 mL, Intravenous, Q15 Min PRN **OR** glucose (DEX4) oral liquid 30 g, 30 g, Oral, Q15 Min PRN *

## 2020-03-03 NOTE — ED PROVIDER NOTES
Patient Seen in: [facility] AND [facility] Emergency Department      History   Patient presents with:  Dyspnea VICTORIANO SOB    Stated Complaint: chest pain    HPI    47-year-old male with past medical history significant for diabetes, high blood pressure, chronic kid Constitutional: AAOx3, well nourished, NAD  Head: Normocephalic and atraumatic. Ears: TM's clear b/l  Nose: Nose normal.   Mouth/Throat: MMM, post OP clear with no exudates  Eyes: Conjunctivae and EOM are normal. PERRLA  Neck: Normal range of motion.  [place]anyon Model Rhythm  Reading: Sinus bradycardia with occasional PVCs, no acute ST changes, normal axis and intervals              Xr Chest Ap Portable  (cpt=71045)    Result Date: 3/2/2020  CONCLUSION:   Negative for radiographically evident acute intrathoracic process Dyspnea on exertion R06.09 3/2/2020 Unknown

## 2020-03-03 NOTE — PROGRESS NOTES
HPI:    Patient ID: Melanie Watson is a 81year old male.     HPI    Patient comes in today for follow-up last time he was seen about a month ago for first time for annual physical we discussed his abnormal stress test which was done several months ago and tablet,delayed release   Take 1 tablet every day by oral route.      • Vitamin B-12 1000 MCG Oral Tab        Allergies:No Known Allergies    HISTORY:  Past Medical History:   Diagnosis Date   • Diabetes ([facility] [address]    • Essential hypertension       No past surgic away  Essential hypertension-we will monitor in hospital  Abnormal stress test-patient recently had stress test which was abnormal he was supposed to follow-up but he never did with high risk factors abnormal and above symptoms will be sent to ER and proba

## 2020-03-03 NOTE — PLAN OF CARE
Problem: Diabetes/Glucose Control  Goal: Glucose maintained within prescribed range  Description  INTERVENTIONS:  - Monitor Blood Glucose as ordered  - Assess for signs and symptoms of hyperglycemia and hypoglycemia  - Administer ordered medications to m on oxygen saturation or ABGs  - Provide Smoking Cessation handout, if applicable  - Encourage broncho-pulmonary hygiene including cough, deep breathe, Incentive Spirometry  - Assess the need for suctioning and perform as needed  - Assess and instruct to re

## 2020-03-03 NOTE — H&P
[facility] - [facility]    History and Physical    Chikis Carissa Patient Status:  Inpatient    1950 MRN [id number]   Location [facility] 3W/SW Attending Yariel Alfred MD   Hosp Day # 0 PCP Dorian Stanley MD     Date:  3/3/2020  Date of A of Systems:     Constitutional: Positive for fatigue. HENT: Negative. Eyes: Negative. Respiratory: Positive for shortness of breath. Cardiovascular: Positive for chest pain. Gastrointestinal: Negative. Endocrine: Negative.     Genitourinary: Bert Peralta MD on 3/02/2020 at 8:22 PM          Ekg 12-lead    Result Date: 3/2/2020  ECG Report  Interpretation  --------------------------       Assessment/Plan:     Dyspnea on exertion-this is been getting worse lately as per patient even with minimal exer

## 2020-03-03 NOTE — PROGRESS NOTES
Kaleb Rodriguez and Brenton Hurt discussed timing of cardiac catheterization. Plan would be to hydrate overnight and schedule case for tomorrow. I've discussed with the patient and he would like to have his wife here when he decides.  She's expected after 4 PM today

## 2020-03-03 NOTE — CONSULTS
[facility]  Report of Consultation    Governor Lanes Patient Status:  Emergency    1950 MRN [id number]   Location [address] Attending Robbin Solitario MD   Hosp Day # 0 PCP Pietro Kaye MD     [facility] for [facility] SERVICES, [facility] ([facility]) : 278 lb      Telemetry: SR  General: Alert and oriented in no apparent distress. HEENT: EOMI, no scleral icterus, mucosa moist  Neck: Supple, carotids 2+   Cardiac: Regular rate and rhythm  Lungs: Clear  Abdomen: Soft, non-tender.    Extremities: Without

## 2020-03-04 ENCOUNTER — APPOINTMENT (OUTPATIENT)
Dept: INTERVENTIONAL RADIOLOGY/VASCULAR | Facility: HOSPITAL | Age: 70
DRG: 287 | End: 2020-03-04
Attending: NURSE PRACTITIONER
Payer: MEDICARE

## 2020-03-04 LAB
ALBUMIN SERPL-MCNC: 2.9 G/DL
ALBUMIN SERPL-MCNC: 2.9 G/DL (ref 3.4–5)
ALBUMIN/GLOB SERPL: 0.8 {RATIO} (ref 1–2)
ALBUMIN/GLOB SERPL: NORMAL {RATIO}
ALP LIVER SERPL-CCNC: 65 U/L (ref 45–117)
ALP SERPL-CCNC: 65 U/L
ALT SERPL-CCNC: 18 U/L
ALT SERPL-CCNC: 18 U/L (ref 16–61)
ANION GAP SERPL CALC-SCNC: 7 MMOL/L
ANION GAP SERPL CALC-SCNC: 7 MMOL/L (ref 0–18)
AST SERPL-CCNC: 14 U/L
AST SERPL-CCNC: 14 U/L (ref 15–37)
BASOPHILS # BLD AUTO: 0.05 X10(3) UL (ref 0–0.2)
BASOPHILS NFR BLD AUTO: 0.9 %
BILIRUB SERPL-MCNC: 0.3 MG/DL (ref 0.1–2)
BILIRUB SERPL-MCNC: NORMAL MG/DL
BUN BLD-MCNC: 56 MG/DL (ref 7–18)
BUN SERPL-MCNC: 56 MG/DL
BUN/CREAT SERPL: 13.3 (ref 10–20)
BUN/CREAT SERPL: NORMAL
CALCIUM BLD-MCNC: 8.7 MG/DL (ref 8.5–10.1)
CALCIUM SERPL-MCNC: 8.7 MG/DL
CHLORIDE SERPL-SCNC: 116 MMOL/L
CHLORIDE SERPL-SCNC: 116 MMOL/L (ref 98–112)
CO2 SERPL-SCNC: 18 MMOL/L
CO2 SERPL-SCNC: 18 MMOL/L (ref 21–32)
CREAT BLD-MCNC: 4.2 MG/DL (ref 0.7–1.3)
CREAT SERPL-MCNC: 4.2 MG/DL
DEPRECATED RDW RBC AUTO: 41.3 FL (ref 35.1–46.3)
EOSINOPHIL # BLD AUTO: 0.22 X10(3) UL (ref 0–0.7)
EOSINOPHIL NFR BLD AUTO: 4.1 %
ERYTHROCYTE [DISTWIDTH] IN BLOOD BY AUTOMATED COUNT: 13.8 % (ref 11–15)
GLOBULIN PLAS-MCNC: 3.6 G/DL (ref 2.8–4.4)
GLOBULIN SER-MCNC: 3.6 G/DL
GLUCOSE BLD-MCNC: 98 MG/DL (ref 70–99)
GLUCOSE BLDC GLUCOMTR-MCNC: 100 MG/DL (ref 70–99)
GLUCOSE BLDC GLUCOMTR-MCNC: 136 MG/DL (ref 70–99)
GLUCOSE BLDC GLUCOMTR-MCNC: 137 MG/DL (ref 70–99)
GLUCOSE BLDC GLUCOMTR-MCNC: 87 MG/DL (ref 70–99)
GLUCOSE SERPL-MCNC: 98 MG/DL
HAV IGM SER QL: 2.1 MG/DL (ref 1.6–2.6)
HCT VFR BLD AUTO: 29.3 % (ref 39–53)
HGB BLD-MCNC: 9.7 G/DL (ref 13–17.5)
IMM GRANULOCYTES # BLD AUTO: 0.03 X10(3) UL (ref 0–1)
IMM GRANULOCYTES NFR BLD: 0.6 %
LENGTH OF FAST TIME PATIENT: NORMAL H
LYMPHOCYTES # BLD AUTO: 2.29 X10(3) UL (ref 1–4)
LYMPHOCYTES NFR BLD AUTO: 42.9 %
M PROTEIN MFR SERPL ELPH: 6.5 G/DL (ref 6.4–8.2)
MCH RBC QN AUTO: 27.6 PG (ref 26–34)
MCHC RBC AUTO-ENTMCNC: 33.1 G/DL (ref 31–37)
MCV RBC AUTO: 83.2 FL (ref 80–100)
MONOCYTES # BLD AUTO: 0.64 X10(3) UL (ref 0.1–1)
MONOCYTES NFR BLD AUTO: 12 %
NEUTROPHILS # BLD AUTO: 2.11 X10 (3) UL (ref 1.5–7.7)
NEUTROPHILS # BLD AUTO: 2.11 X10(3) UL (ref 1.5–7.7)
NEUTROPHILS NFR BLD AUTO: 39.5 %
OSMOLALITY SERPL CALC.SUM OF ELEC: 307 MOSM/KG (ref 275–295)
PHOSPHATE SERPL-MCNC: 4.4 MG/DL (ref 2.5–4.9)
PLATELET # BLD AUTO: 180 10(3)UL (ref 150–450)
POTASSIUM SERPL-SCNC: 4.7 MMOL/L
POTASSIUM SERPL-SCNC: 4.7 MMOL/L (ref 3.5–5.1)
PROT SERPL-MCNC: 6.5 G/DL
RBC # BLD AUTO: 3.52 X10(6)UL (ref 3.8–5.8)
SODIUM SERPL-SCNC: 141 MMOL/L
SODIUM SERPL-SCNC: 141 MMOL/L (ref 136–145)
WBC # BLD AUTO: 5.3 X10(3) UL (ref 4–11)

## 2020-03-04 PROCEDURE — B2111ZZ FLUOROSCOPY OF MULTIPLE CORONARY ARTERIES USING LOW OSMOLAR CONTRAST: ICD-10-PCS | Performed by: INTERNAL MEDICINE

## 2020-03-04 PROCEDURE — 4A023N7 MEASUREMENT OF CARDIAC SAMPLING AND PRESSURE, LEFT HEART, PERCUTANEOUS APPROACH: ICD-10-PCS | Performed by: INTERNAL MEDICINE

## 2020-03-04 PROCEDURE — 99232 SBSQ HOSP IP/OBS MODERATE 35: CPT | Performed by: INTERNAL MEDICINE

## 2020-03-04 PROCEDURE — 99233 SBSQ HOSP IP/OBS HIGH 50: CPT | Performed by: INTERNAL MEDICINE

## 2020-03-04 RX ORDER — NITROGLYCERIN 20 MG/100ML
INJECTION INTRAVENOUS
Status: COMPLETED
Start: 2020-03-04 | End: 2020-03-04

## 2020-03-04 RX ORDER — BUPIVACAINE HYDROCHLORIDE AND EPINEPHRINE 5; 5 MG/ML; UG/ML
INJECTION, SOLUTION EPIDURAL; INTRACAUDAL; PERINEURAL
Status: COMPLETED
Start: 2020-03-04 | End: 2020-03-04

## 2020-03-04 RX ORDER — MIDAZOLAM HYDROCHLORIDE 1 MG/ML
INJECTION INTRAMUSCULAR; INTRAVENOUS
Status: COMPLETED
Start: 2020-03-04 | End: 2020-03-04

## 2020-03-04 RX ORDER — HEPARIN SODIUM 1000 [USP'U]/ML
INJECTION, SOLUTION INTRAVENOUS; SUBCUTANEOUS
Status: DISCONTINUED
Start: 2020-03-04 | End: 2020-03-04 | Stop reason: WASHOUT

## 2020-03-04 RX ORDER — LIDOCAINE HYDROCHLORIDE 20 MG/ML
INJECTION, SOLUTION EPIDURAL; INFILTRATION; INTRACAUDAL; PERINEURAL
Status: COMPLETED
Start: 2020-03-04 | End: 2020-03-04

## 2020-03-04 NOTE — PROCEDURES
Preop: Exertional dyspnea and chest pressure. Abnormal nuclear stress test  Postop: Non-exertional symptoms. Normal coronaries. Procedure: Left heart cath. Coronary angiogram.  No LV angiogram.  Angio-Seal to RFA. Approximately 50 cc of dye used.     Radha Rash

## 2020-03-04 NOTE — PROGRESS NOTES
Gloria Barboza  [id number]  3/4/2020    Post procedure/ recovery hand-off report given to [facility]. Patient's vital signs stable, access site dry and intact, no signs and symptoms of bleeding/ hematoma.     Renetta Arriaga RN

## 2020-03-04 NOTE — PROGRESS NOTES
[facility]    Progress Note    Governor Lanes Patient Status:  Inpatient    1950 MRN [id number]   Location [facility] 3W/SW Attending Zulema Tang MD   Hosp Day # 1 PCP Pietro Kaye MD        Subjective:     Constitution long-term current use of insulin (HCC)-we will get endocrine on board to to adjust medication we will see what best would be for him          Stage 4 chronic kidney disease ([facility] [address] above has seen renal as outpatient we will get renal on board will monitor

## 2020-03-04 NOTE — PROGRESS NOTES
[facility]  Nephrology Daily Progress Note    Ekta Miller  [id number]  81year old      HPI:   Ekta Miller is a 81year old male. Overall feeling well. Awaiting cath.   No CP.       ROS:     Constitutional:  Negative for decreased age    Labs:  Lab Results   Component Value Date    WBC 5.3 03/04/2020    HGB 9.7 03/04/2020    HCT 29.3 03/04/2020    .0 03/04/2020    CREATSERUM 4.20 03/04/2020    BUN 56 03/04/2020     03/04/2020    K 4.7 03/04/2020     03/04/2020 liquid 15 g, 15 g, Oral, Q15 Min PRN **OR** Glucose-Vitamin C (DEX-4) chewable tab 4 tablet, 4 tablet, Oral, Q15 Min PRN **OR** dextrose 50 % injection 50 mL, 50 mL, Intravenous, Q15 Min PRN **OR** glucose (DEX4) oral liquid 30 g, 30 g, Oral, Q15 Min PRN * no chest pain, no cough, and no shortness of breath.

## 2020-03-04 NOTE — PROGRESS NOTES
Endocrine Note    Reviewed BG levels from the past 24 hours which remain normal.  Please continue current correction factor. Will sign off and please call back with questions.

## 2020-03-05 VITALS
HEIGHT: 77 IN | WEIGHT: 274.63 LBS | TEMPERATURE: 99 F | BODY MASS INDEX: 32.43 KG/M2 | HEART RATE: 60 BPM | SYSTOLIC BLOOD PRESSURE: 159 MMHG | DIASTOLIC BLOOD PRESSURE: 69 MMHG | OXYGEN SATURATION: 97 % | RESPIRATION RATE: 16 BRPM

## 2020-03-05 LAB
ABSOLUTE IMMATURE GRANULOCYTES (OFFPRE24): NORMAL
ALBUMIN SERPL-MCNC: 2.9 G/DL (ref 3.4–5)
ANION GAP SERPL CALC-SCNC: 8 MMOL/L (ref 0–18)
BASO+EOS+MONOS # BLD: NORMAL 10*3/UL
BASO+EOS+MONOS NFR BLD: NORMAL %
BASOPHILS # BLD AUTO: 0.04 X10(3) UL (ref 0–0.2)
BASOPHILS # BLD: NORMAL 10*3/UL
BASOPHILS NFR BLD AUTO: 0.7 %
BASOPHILS NFR BLD: NORMAL %
BUN BLD-MCNC: 54 MG/DL (ref 7–18)
BUN/CREAT SERPL: 13.8 (ref 10–20)
CALCIUM BLD-MCNC: 8.6 MG/DL (ref 8.5–10.1)
CHLORIDE SERPL-SCNC: 113 MMOL/L (ref 98–112)
CO2 SERPL-SCNC: 18 MMOL/L (ref 21–32)
CREAT BLD-MCNC: 3.91 MG/DL (ref 0.7–1.3)
DEPRECATED RDW RBC AUTO: 41.1 FL (ref 35.1–46.3)
DIFFERENTIAL METHOD BLD: NORMAL
EOSINOPHIL # BLD AUTO: 0.21 X10(3) UL (ref 0–0.7)
EOSINOPHIL # BLD: NORMAL 10*3/UL
EOSINOPHIL NFR BLD AUTO: 3.7 %
EOSINOPHIL NFR BLD: NORMAL %
ERYTHROCYTE [DISTWIDTH] IN BLOOD BY AUTOMATED COUNT: 13.7 % (ref 11–15)
ERYTHROCYTE [DISTWIDTH] IN BLOOD: NORMAL %
GLUCOSE BLD-MCNC: 113 MG/DL (ref 70–99)
GLUCOSE BLDC GLUCOMTR-MCNC: 132 MG/DL (ref 70–99)
HAV IGM SER QL: 2.1 MG/DL (ref 1.6–2.6)
HCT VFR BLD AUTO: 28.4 % (ref 39–53)
HCT VFR BLD CALC: 28.4 %
HEMOCCULT STL QL: NEGATIVE
HEMOCCULT STL QL: NEGATIVE
HGB BLD-MCNC: 9.4 G/DL
HGB BLD-MCNC: 9.4 G/DL (ref 13–17.5)
IMM GRANULOCYTES # BLD AUTO: 0.03 X10(3) UL (ref 0–1)
IMM GRANULOCYTES NFR BLD: 0.5 %
IMMATURE GRANULOCYTES (OFFPRE25): NORMAL
LYMPHOCYTES # BLD AUTO: 1.74 X10(3) UL (ref 1–4)
LYMPHOCYTES # BLD: NORMAL 10*3/UL
LYMPHOCYTES NFR BLD AUTO: 31 %
LYMPHOCYTES NFR BLD: NORMAL %
MCH RBC QN AUTO: 27.6 PG
MCH RBC QN AUTO: 27.6 PG (ref 26–34)
MCHC RBC AUTO-ENTMCNC: 33.1 G/DL
MCHC RBC AUTO-ENTMCNC: 33.1 G/DL (ref 31–37)
MCV RBC AUTO: 83.5 FL
MCV RBC AUTO: 83.5 FL (ref 80–100)
MONOCYTES # BLD AUTO: 0.61 X10(3) UL (ref 0.1–1)
MONOCYTES # BLD: NORMAL 10*3/UL
MONOCYTES NFR BLD AUTO: 10.9 %
MONOCYTES NFR BLD: NORMAL %
MPV (OFFPRE2): NORMAL
NEUTROPHILS # BLD AUTO: 2.98 X10 (3) UL (ref 1.5–7.7)
NEUTROPHILS # BLD AUTO: 2.98 X10(3) UL (ref 1.5–7.7)
NEUTROPHILS # BLD: NORMAL 10*3/UL
NEUTROPHILS NFR BLD AUTO: 53.2 %
NEUTROPHILS NFR BLD: NORMAL %
NRBC BLD MANUAL-RTO: NORMAL %
OSMOLALITY SERPL CALC.SUM OF ELEC: 304 MOSM/KG (ref 275–295)
PHOSPHATE SERPL-MCNC: 4.1 MG/DL (ref 2.5–4.9)
PLAT MORPH BLD: NORMAL
PLATELET # BLD AUTO: 228 10(3)UL (ref 150–450)
PLATELET # BLD: 228 10*3/UL
POTASSIUM SERPL-SCNC: 4.9 MMOL/L (ref 3.5–5.1)
RBC # BLD AUTO: 3.4 X10(6)UL (ref 3.8–5.8)
RBC # BLD: 3.4 10*6/UL
RBC MORPH BLD: NORMAL
SODIUM SERPL-SCNC: 139 MMOL/L (ref 136–145)
WBC # BLD AUTO: 5.6 X10(3) UL (ref 4–11)
WBC # BLD: 5.6 10*3/UL
WBC MORPH BLD: NORMAL

## 2020-03-05 PROCEDURE — 99239 HOSP IP/OBS DSCHRG MGMT >30: CPT | Performed by: INTERNAL MEDICINE

## 2020-03-05 RX ORDER — ATORVASTATIN CALCIUM 20 MG/1
20 TABLET, FILM COATED ORAL NIGHTLY
Qty: 90 TABLET | Refills: 1 | Status: SHIPPED | OUTPATIENT
Start: 2020-03-05

## 2020-03-05 NOTE — PROGRESS NOTES
[facility]    Progress Note    Sameer Morin Patient Status:  Inpatient    1950 MRN [id number]   Location [facility] 3W/SW Attending Karina Desir MD   Hosp Day # 2 PCP Shahbaz Tuttle MD       Assessment and Plan:       1.  C B-12  1,000 mcg Oral Daily   • Heparin Sodium (Porcine)  5,000 Units Subcutaneous Q8H [address]   • Insulin Aspart Pen  1-5 Units Subcutaneous TID CC   • atorvastatin  20 mg Oral Nightly   • amLODIPine Besylate  10 mg Oral Nightly       Results:     Recent Labs

## 2020-03-05 NOTE — DISCHARGE SUMMARY
DISCHARGE SUMMARY     Brayan Myers Patient Status:  Inpatient    1950 MRN [id number]   Location [facility] 3W/SW Attending Landy Woods MD   Hosp Day # 2 PCP Amie Villalta MD     Date of Admission: 3/2/2020  Date of Discharge: 3/5/2020 disease, without long-term current use of insulin (HCC)-we will DC on glimepiride for now we will follow sugars at home when he sees me in office we will decide if we need to switch to something different.       Stage 4 chronic kidney disease (HCC) stable total) by mouth daily with breakfast.    Metoprolol Succinate ER (TOPROL XL) 50 MG Oral Tablet 24 Hr  Take 1 tablet (50 mg total) by mouth daily. amLODIPine Besylate 10 MG Oral Tab  Take 1 tablet (10 mg total) by mouth daily.     aspirin 81 MG Oral Tab

## 2020-03-06 ENCOUNTER — PATIENT OUTREACH (OUTPATIENT)
Dept: CASE MANAGEMENT | Age: 70
End: 2020-03-06

## 2020-03-06 DIAGNOSIS — Z02.9 ENCOUNTERS FOR ADMINISTRATIVE PURPOSE: ICD-10-CM

## 2020-03-06 DIAGNOSIS — R06.00 DYSPNEA ON EXERTION: ICD-10-CM

## 2020-03-06 PROCEDURE — 1111F DSCHRG MED/CURRENT MED MERGE: CPT

## 2020-03-06 NOTE — PROGRESS NOTES
Initial Post Discharge Follow Up   Discharge Date: 3/5/20  Contact Date: 3/6/2020    Consent Verification:  Assessment Completed With: Patient  HIPAA Verified?   Yes    Discharge Dx:   Dyspnea on exertion       General:   • How have you been since your d yes  o Do you have any questions about your new medication? No  • Did you  your discharge medications when you left the hospital? Yes  • May I go over your medications with you to make sure we are not missing anything? yes  • Are there any reasons th

## 2020-03-16 ENCOUNTER — TELEPHONE (OUTPATIENT)
Dept: CARDIOLOGY | Age: 70
End: 2020-03-16

## 2020-03-16 ENCOUNTER — OFFICE VISIT (OUTPATIENT)
Dept: INTERNAL MEDICINE CLINIC | Facility: CLINIC | Age: 70
End: 2020-03-16
Payer: MEDICARE

## 2020-03-16 VITALS
BODY MASS INDEX: 33.77 KG/M2 | OXYGEN SATURATION: 98 % | HEART RATE: 67 BPM | HEIGHT: 77 IN | WEIGHT: 286 LBS | TEMPERATURE: 98 F | RESPIRATION RATE: 17 BRPM | DIASTOLIC BLOOD PRESSURE: 74 MMHG | SYSTOLIC BLOOD PRESSURE: 154 MMHG

## 2020-03-16 DIAGNOSIS — N18.5 CKD (CHRONIC KIDNEY DISEASE) STAGE 5, GFR LESS THAN 15 ML/MIN (HCC): ICD-10-CM

## 2020-03-16 DIAGNOSIS — R94.39 ABNORMAL STRESS TEST: ICD-10-CM

## 2020-03-16 DIAGNOSIS — Z09 HOSPITAL DISCHARGE FOLLOW-UP: Primary | ICD-10-CM

## 2020-03-16 DIAGNOSIS — R06.00 DYSPNEA ON EXERTION: ICD-10-CM

## 2020-03-16 DIAGNOSIS — N18.4 TYPE 2 DIABETES MELLITUS WITH STAGE 4 CHRONIC KIDNEY DISEASE, WITHOUT LONG-TERM CURRENT USE OF INSULIN (HCC): ICD-10-CM

## 2020-03-16 DIAGNOSIS — I10 ESSENTIAL HYPERTENSION: ICD-10-CM

## 2020-03-16 DIAGNOSIS — D50.9 IRON DEFICIENCY ANEMIA, UNSPECIFIED IRON DEFICIENCY ANEMIA TYPE: ICD-10-CM

## 2020-03-16 DIAGNOSIS — E11.22 TYPE 2 DIABETES MELLITUS WITH STAGE 4 CHRONIC KIDNEY DISEASE, WITHOUT LONG-TERM CURRENT USE OF INSULIN (HCC): ICD-10-CM

## 2020-03-16 PROBLEM — N20.0 CALCULUS OF KIDNEY: Status: ACTIVE | Noted: 2020-03-16

## 2020-03-16 PROBLEM — N18.30 CHRONIC KIDNEY DISEASE, STAGE 3 (MODERATE): Status: ACTIVE | Noted: 2020-03-16

## 2020-03-16 PROCEDURE — 99495 TRANSJ CARE MGMT MOD F2F 14D: CPT | Performed by: INTERNAL MEDICINE

## 2020-03-16 RX ORDER — AMLODIPINE AND BENAZEPRIL HYDROCHLORIDE 10; 40 MG/1; MG/1
1 CAPSULE ORAL DAILY
COMMUNITY
Start: 2019-11-12 | End: 2020-03-17

## 2020-03-16 RX ORDER — GLIMEPIRIDE 2 MG/1
2 TABLET ORAL DAILY
COMMUNITY
Start: 2020-02-18 | End: 2020-03-19

## 2020-03-16 RX ORDER — HYDRALAZINE HYDROCHLORIDE 25 MG/1
25 TABLET, FILM COATED ORAL 3 TIMES DAILY
COMMUNITY
End: 2020-03-16

## 2020-03-16 RX ORDER — LANOLIN ALCOHOL/MO/W.PET/CERES
1 CREAM (GRAM) TOPICAL DAILY
COMMUNITY
Start: 2019-11-12

## 2020-03-16 RX ORDER — ATORVASTATIN CALCIUM 20 MG/1
20 TABLET, FILM COATED ORAL DAILY
COMMUNITY
Start: 2020-03-05

## 2020-03-16 RX ORDER — HYDRALAZINE HYDROCHLORIDE 25 MG/1
25 TABLET, FILM COATED ORAL 3 TIMES DAILY
COMMUNITY

## 2020-03-16 RX ORDER — CYCLOBENZAPRINE HCL 10 MG
10 TABLET ORAL DAILY
COMMUNITY

## 2020-03-16 RX ORDER — METOPROLOL SUCCINATE 50 MG/1
50 TABLET, EXTENDED RELEASE ORAL DAILY
COMMUNITY
Start: 2020-02-17 | End: 2020-05-17

## 2020-03-16 RX ORDER — HYDRALAZINE HYDROCHLORIDE 25 MG/1
25 TABLET, FILM COATED ORAL 3 TIMES DAILY
Qty: 90 TABLET | Refills: 2 | Status: SHIPPED | OUTPATIENT
Start: 2020-03-16 | End: 2020-05-18

## 2020-03-16 RX ORDER — AMLODIPINE BESYLATE 10 MG/1
10 TABLET ORAL DAILY
COMMUNITY
Start: 2020-03-12

## 2020-03-16 RX ORDER — ALPRAZOLAM 0.5 MG/1
0.5 TABLET ORAL DAILY
COMMUNITY

## 2020-03-16 NOTE — PROGRESS NOTES
HPI:    Patient ID: Melanie Watson is a 81year old male. HPI  Pt comes in for follow up after he was admitted to [facility] with exe dyspnea and abn stress test. Pt underwent card cath which was nl. Pt feels ok but still with sob with exertion.   Was found to Allergies:  Lidocaine               ANAPHYLAXIS    HISTORY:  Past Medical History:   Diagnosis Date   • Diabetes ([facility] [address]    • Essential hypertension    • Type 2 diabetes mellitus with stage 3 chronic kidney disease, without long-term current use of in hypertension- better but still high, pt never got the Hydralazine 25 tid prescribed from [facility] will refill will watch diet   Ckd (chronic kidney disease) stage 5, gfr less than 15 ml/min (hcc)- stable, follow wit renal   Dyspnea on exertion- could be the [employer]

## 2020-03-17 ENCOUNTER — OFFICE VISIT (OUTPATIENT)
Dept: CARDIOLOGY | Age: 70
End: 2020-03-17

## 2020-03-17 VITALS — WEIGHT: 279 LBS | HEIGHT: 77 IN | BODY MASS INDEX: 32.94 KG/M2

## 2020-03-17 DIAGNOSIS — R06.09 DYSPNEA ON EFFORT: ICD-10-CM

## 2020-03-17 DIAGNOSIS — I10 ESSENTIAL HYPERTENSION: Primary | ICD-10-CM

## 2020-03-17 PROBLEM — N18.5 CKD (CHRONIC KIDNEY DISEASE) STAGE 5, GFR LESS THAN 15 ML/MIN (CMD): Status: ACTIVE | Noted: 2020-03-03

## 2020-03-17 PROCEDURE — 99443 TELEPHONE E&M BY PHYSICIAN EST PT NOT ORIG PREV 7 DAYS 21-30 MIN: CPT | Performed by: NURSE PRACTITIONER

## 2020-03-17 SDOH — HEALTH STABILITY: MENTAL HEALTH: HOW OFTEN DO YOU HAVE A DRINK CONTAINING ALCOHOL?: NEVER

## 2020-03-17 ASSESSMENT — PATIENT HEALTH QUESTIONNAIRE - PHQ9
SUM OF ALL RESPONSES TO PHQ9 QUESTIONS 1 AND 2: 0
SUM OF ALL RESPONSES TO PHQ9 QUESTIONS 1 AND 2: 0
1. LITTLE INTEREST OR PLEASURE IN DOING THINGS: NOT AT ALL
2. FEELING DOWN, DEPRESSED OR HOPELESS: NOT AT ALL

## 2020-03-28 ENCOUNTER — NURSE TRIAGE (OUTPATIENT)
Dept: INTERNAL MEDICINE CLINIC | Facility: CLINIC | Age: 70
End: 2020-03-28

## 2020-03-28 DIAGNOSIS — R42 DIZZY SPELLS: ICD-10-CM

## 2020-03-28 DIAGNOSIS — R06.02 SOB (SHORTNESS OF BREATH): ICD-10-CM

## 2020-03-28 DIAGNOSIS — R07.9 CHEST PAIN, UNSPECIFIED TYPE: ICD-10-CM

## 2020-03-28 DIAGNOSIS — R00.2 PALPITATIONS: ICD-10-CM

## 2020-03-28 PROCEDURE — G2012 BRIEF CHECK IN BY MD/QHP: HCPCS | Performed by: INTERNAL MEDICINE

## 2020-03-28 NOTE — TELEPHONE ENCOUNTER
Dr Aleyda Dooley, returned call, relayed the triage nurses note, it was recommended that patient speak with Dr Justin Quintero next week and to go to ER for chestpain, arm/jaw pain, dyspnea, dyspnea

## 2020-03-28 NOTE — TELEPHONE ENCOUNTER
Action Requested: Summary for Provider     []  Critical Lab, Recommendations Needed  [] Need Additional Advice  []   FYI    []   Need Orders  [] Need Medications Sent to Pharmacy  []  Other     SUMMARY: Dr Mello Come for Dr Carmen Croft, please advise.     Farrah Gibbs

## 2020-03-30 NOTE — TELEPHONE ENCOUNTER
Patient is agreeable to virtual telephone office visit. Patient states yesterday forgot to take morning medication and patient had no  Racing heart rate, of shortness of breath, or chest pain. Asking if is could be one of the medications ?

## 2020-03-30 NOTE — TELEPHONE ENCOUNTER
Virtual/Telephone Check-In    Saira Queen verbally consents to a [employer] on 03/30/20. Patient understands and accepts financial responsibility for any deductible, co-insurance and/or co-pays associated with this service.

## 2020-05-18 ENCOUNTER — OFFICE VISIT (OUTPATIENT)
Dept: INTERNAL MEDICINE CLINIC | Facility: CLINIC | Age: 70
End: 2020-05-18
Payer: MEDICARE

## 2020-05-18 DIAGNOSIS — N18.4 TYPE 2 DIABETES MELLITUS WITH STAGE 4 CHRONIC KIDNEY DISEASE, WITHOUT LONG-TERM CURRENT USE OF INSULIN (HCC): ICD-10-CM

## 2020-05-18 DIAGNOSIS — N18.4 STAGE 4 CHRONIC KIDNEY DISEASE (HCC): Primary | ICD-10-CM

## 2020-05-18 DIAGNOSIS — R06.00 DYSPNEA ON EXERTION: ICD-10-CM

## 2020-05-18 DIAGNOSIS — I10 ESSENTIAL HYPERTENSION: ICD-10-CM

## 2020-05-18 DIAGNOSIS — E11.22 TYPE 2 DIABETES MELLITUS WITH STAGE 4 CHRONIC KIDNEY DISEASE, WITHOUT LONG-TERM CURRENT USE OF INSULIN (HCC): ICD-10-CM

## 2020-05-18 DIAGNOSIS — D50.9 IRON DEFICIENCY ANEMIA, UNSPECIFIED IRON DEFICIENCY ANEMIA TYPE: ICD-10-CM

## 2020-05-18 PROCEDURE — 36415 COLL VENOUS BLD VENIPUNCTURE: CPT | Performed by: INTERNAL MEDICINE

## 2020-05-18 PROCEDURE — 99214 OFFICE O/P EST MOD 30 MIN: CPT | Performed by: INTERNAL MEDICINE

## 2020-05-18 RX ORDER — HYDRALAZINE HYDROCHLORIDE 50 MG/1
50 TABLET, FILM COATED ORAL 3 TIMES DAILY
Qty: 90 TABLET | Refills: 1 | Status: ON HOLD | OUTPATIENT
Start: 2020-05-18 | End: 2020-11-17

## 2020-05-18 RX ORDER — GLIMEPIRIDE 2 MG/1
2 TABLET ORAL DAILY
COMMUNITY
Start: 2020-02-18 | End: 2020-07-08

## 2020-05-18 RX ORDER — METOPROLOL SUCCINATE 50 MG/1
50 TABLET, EXTENDED RELEASE ORAL DAILY
COMMUNITY
Start: 2020-02-17

## 2020-05-23 VITALS
OXYGEN SATURATION: 97 % | WEIGHT: 286 LBS | HEART RATE: 60 BPM | HEIGHT: 77 IN | DIASTOLIC BLOOD PRESSURE: 73 MMHG | TEMPERATURE: 98 F | BODY MASS INDEX: 33.77 KG/M2 | RESPIRATION RATE: 16 BRPM | SYSTOLIC BLOOD PRESSURE: 128 MMHG

## 2020-05-27 ENCOUNTER — HOSPITAL ENCOUNTER (OUTPATIENT)
Dept: CT IMAGING | Facility: HOSPITAL | Age: 70
Discharge: HOME | End: 2020-05-27
Attending: INTERNAL MEDICINE
Payer: MEDICARE

## 2020-05-27 DIAGNOSIS — R06.00 DYSPNEA ON EXERTION: ICD-10-CM

## 2020-05-27 PROCEDURE — 71250 CT THORAX DX C-: CPT | Performed by: INTERNAL MEDICINE

## 2020-06-18 ENCOUNTER — TELEPHONE (OUTPATIENT)
Dept: INTERNAL MEDICINE CLINIC | Facility: CLINIC | Age: 70
End: 2020-06-18

## 2020-06-18 NOTE — TELEPHONE ENCOUNTER
Called patient, no answer. Left VM to call us back to confirm what test he was looking for. I have an Thyroid US, gave him central scheduling # to set up that appt.  If it wasn't for this, should give us a call back to let us know what kind of test it was f

## 2020-06-18 NOTE — TELEPHONE ENCOUNTER
pt. states that he is supposed to get a test done and that the nurse was going to call him with the date and time when to go, but it has been several weeks now, and still no call with the info. ,

## 2020-07-08 RX ORDER — GLIMEPIRIDE 2 MG/1
TABLET ORAL
Qty: 90 TABLET | Refills: 0 | Status: SHIPPED | OUTPATIENT
Start: 2020-07-08

## 2020-07-08 NOTE — TELEPHONE ENCOUNTER
Dr. Karly Torres please advise if you will refill this medication. It seems Dr. Jamee Henry would like you to continue to address diabetic medications. Thank you.

## 2020-07-14 NOTE — TELEPHONE ENCOUNTER
Per patient he is not having problems breathing per note below. Patient was confused about testing he is supposed to be getting done. Advised him that per the last results from his CT Dr. Rosa Morataya had ordered him an US of the thyroid.  Patient thought the only

## 2020-07-14 NOTE — TELEPHONE ENCOUNTER
Patient states his breathing has gotten worse and needs to complete his test as soon as possible.  Patient states they have been giving him the run around and still has not been able to schedule test. Patient could not specify what test he is asking for, pl

## 2020-07-24 ENCOUNTER — NURSE TRIAGE (OUTPATIENT)
Dept: INTERNAL MEDICINE CLINIC | Facility: CLINIC | Age: 70
End: 2020-07-24

## 2020-07-24 ENCOUNTER — HOSPITAL ENCOUNTER (OUTPATIENT)
Dept: ULTRASOUND IMAGING | Facility: HOSPITAL | Age: 70
Discharge: HOME | End: 2020-07-24
Attending: INTERNAL MEDICINE | Admitting: INTERNAL MEDICINE
Payer: MEDICARE

## 2020-07-24 ENCOUNTER — OFFICE VISIT (OUTPATIENT)
Dept: INTERNAL MEDICINE CLINIC | Facility: CLINIC | Age: 70
End: 2020-07-24
Payer: MEDICARE

## 2020-07-24 VITALS
TEMPERATURE: 99 F | HEART RATE: 78 BPM | OXYGEN SATURATION: 97 % | RESPIRATION RATE: 18 BRPM | DIASTOLIC BLOOD PRESSURE: 74 MMHG | WEIGHT: 286 LBS | BODY MASS INDEX: 33.77 KG/M2 | SYSTOLIC BLOOD PRESSURE: 130 MMHG | HEIGHT: 77 IN

## 2020-07-24 DIAGNOSIS — E04.1 THYROID NODULE: ICD-10-CM

## 2020-07-24 DIAGNOSIS — M10.9 GOUT OF BIG TOE: ICD-10-CM

## 2020-07-24 DIAGNOSIS — L08.9 LEFT FOOT INFECTION: Primary | ICD-10-CM

## 2020-07-24 LAB — URATE SERPL-MCNC: 8.9 MG/DL (ref 3.5–7.2)

## 2020-07-24 PROCEDURE — 99214 OFFICE O/P EST MOD 30 MIN: CPT | Performed by: INTERNAL MEDICINE

## 2020-07-24 PROCEDURE — 76536 US EXAM OF HEAD AND NECK: CPT | Performed by: INTERNAL MEDICINE

## 2020-07-24 PROCEDURE — 36415 COLL VENOUS BLD VENIPUNCTURE: CPT | Performed by: INTERNAL MEDICINE

## 2020-07-24 RX ORDER — SULFAMETHOXAZOLE AND TRIMETHOPRIM 800; 160 MG/1; MG/1
1 TABLET ORAL 2 TIMES DAILY
Qty: 14 TABLET | Refills: 0 | Status: SHIPPED | OUTPATIENT
Start: 2020-07-24 | End: 2020-07-31

## 2020-07-24 RX ORDER — COLCHICINE 0.6 MG/1
TABLET ORAL
Qty: 30 TABLET | Refills: 0 | Status: SHIPPED | OUTPATIENT
Start: 2020-07-24 | End: 2021-06-25

## 2020-07-24 NOTE — TELEPHONE ENCOUNTER
Action Requested: Summary for Provider     []  Critical Lab, Recommendations Needed  [] Need Additional Advice  [x]   FYI    []   Need Orders  [] Need Medications Sent to Pharmacy  []  Other     SUMMARY:   Spoke with pt,  verified, pt c/o left foot pain

## 2020-07-24 NOTE — PROGRESS NOTES
HPI:    Patient ID: Fuentes Garza is a 78year old male.     HPI    Review of Systems      Current Outpatient Medications   Medication Sig Dispense Refill   • Sulfamethoxazole-TMP -160 MG Oral Tab per tablet Take 1 tablet by mouth 2 (two) times [facility] & Referrals:  None        #[id number]

## 2020-07-25 NOTE — PROGRESS NOTES
Spoke with patient (verified name and ), advised Dr Chapo Maciel note and verbalized understanding. Lab generated.     As Suspected uric acid level is high he has gout continue with treatment given once the flareup is gone he should start taking allopurinol

## 2020-07-26 NOTE — PROGRESS NOTES
HPI:    Patient ID: Aric Chatterjee is a 78year old male. HPI    Comes in today with complaint of left foot pain and small wound he had a pedicure and all the skin was scraped and since then he has had this lesion wound. It is tender.   Patient also wi • Vitamin B-12 1000 MCG Oral Tab daily. • allopurinol 100 MG Oral Tab Take 1 tablet (100 mg total) by mouth daily.  30 tablet 2     Allergies:  Lidocaine               ANAPHYLAXIS    HISTORY:  Past Medical History:   Diagnosis Date   • Diabetes ([facility]. for gout let us know if not better    Orders Placed This Encounter      Uric Acid, Serum [E]      Meds This Visit:  Requested Prescriptions     Signed Prescriptions Disp Refills   • Sulfamethoxazole-TMP -160 MG Oral Tab per tablet 14 tablet 0     Sig

## 2020-07-27 ENCOUNTER — TELEPHONE (OUTPATIENT)
Dept: INTERNAL MEDICINE CLINIC | Facility: CLINIC | Age: 70
End: 2020-07-27

## 2020-07-28 ENCOUNTER — APPOINTMENT (OUTPATIENT)
Dept: LAB | Facility: HOSPITAL | Age: 70
End: 2020-07-28
Attending: OTOLARYNGOLOGY
Payer: MEDICARE

## 2020-07-28 ENCOUNTER — OFFICE VISIT (OUTPATIENT)
Dept: OTOLARYNGOLOGY | Facility: CLINIC | Age: 70
End: 2020-07-28
Payer: MEDICARE

## 2020-07-28 VITALS
DIASTOLIC BLOOD PRESSURE: 70 MMHG | WEIGHT: 189 LBS | BODY MASS INDEX: 22.32 KG/M2 | HEIGHT: 77 IN | SYSTOLIC BLOOD PRESSURE: 138 MMHG

## 2020-07-28 DIAGNOSIS — E04.1 THYROID NODULE: ICD-10-CM

## 2020-07-28 DIAGNOSIS — Z01.812 PRE-PROCEDURE LAB EXAM: ICD-10-CM

## 2020-07-28 DIAGNOSIS — Z01.812 PRE-PROCEDURE LAB EXAM: Primary | ICD-10-CM

## 2020-07-28 DIAGNOSIS — E21.3 HYPERPARATHYROIDISM (HCC): ICD-10-CM

## 2020-07-28 LAB
T3 SERPL-MCNC: 67 NG/DL (ref 60–181)
TSI SER-ACNC: 1.03 MIU/ML (ref 0.36–3.74)

## 2020-07-28 PROCEDURE — 84443 ASSAY THYROID STIM HORMONE: CPT

## 2020-07-28 PROCEDURE — 86769 SARS-COV-2 COVID-19 ANTIBODY: CPT

## 2020-07-28 PROCEDURE — 36415 COLL VENOUS BLD VENIPUNCTURE: CPT

## 2020-07-28 PROCEDURE — 84480 ASSAY TRIIODOTHYRONINE (T3): CPT

## 2020-07-28 PROCEDURE — G0463 HOSPITAL OUTPT CLINIC VISIT: HCPCS | Performed by: OTOLARYNGOLOGY

## 2020-07-28 PROCEDURE — 99203 OFFICE O/P NEW LOW 30 MIN: CPT | Performed by: OTOLARYNGOLOGY

## 2020-07-28 NOTE — PROGRESS NOTES
Dickson Nolen is a 78year old male.  Patient presents with:  Consult: pt presents with a growth on Thyroid, per pt having trouble breathing, heart palpatation     HPI:   For the last several months he has been experiencing problems with extreme fatigue a Tobacco Use      Smoking status: Never Smoker      Smokeless tobacco: Never Used    Alcohol use: Not on file    Drug use: Not on file       REVIEW OF SYSTEMS:   GENERAL HEALTH: feels well otherwise  GENERAL : denies fever, chills, sweats, weight loss, weig test.  I have also recommended that we check thyroid function studies to see if he may be abnormal with his blood test with his thyroid. This could be contributing to his fatigue.   Will consider further evaluation based upon the test results  - TSH W REFL

## 2020-07-29 LAB — SARS-COV-2 IGG SERPLBLD QL IA.RAPID: NEGATIVE

## 2020-08-24 ENCOUNTER — OFFICE VISIT (OUTPATIENT)
Dept: INTERNAL MEDICINE CLINIC | Facility: CLINIC | Age: 70
End: 2020-08-24
Payer: MEDICARE

## 2020-08-24 ENCOUNTER — NURSE TRIAGE (OUTPATIENT)
Dept: INTERNAL MEDICINE CLINIC | Facility: CLINIC | Age: 70
End: 2020-08-24

## 2020-08-24 VITALS
WEIGHT: 278 LBS | HEIGHT: 77 IN | OXYGEN SATURATION: 98 % | BODY MASS INDEX: 32.82 KG/M2 | HEART RATE: 78 BPM | RESPIRATION RATE: 18 BRPM | DIASTOLIC BLOOD PRESSURE: 60 MMHG | SYSTOLIC BLOOD PRESSURE: 132 MMHG | TEMPERATURE: 97 F

## 2020-08-24 DIAGNOSIS — N18.4 STAGE 4 CHRONIC KIDNEY DISEASE (HCC): ICD-10-CM

## 2020-08-24 DIAGNOSIS — E11.22 TYPE 2 DIABETES MELLITUS WITH STAGE 4 CHRONIC KIDNEY DISEASE, WITHOUT LONG-TERM CURRENT USE OF INSULIN (HCC): ICD-10-CM

## 2020-08-24 DIAGNOSIS — R06.02 SOB (SHORTNESS OF BREATH): ICD-10-CM

## 2020-08-24 DIAGNOSIS — N18.4 TYPE 2 DIABETES MELLITUS WITH STAGE 4 CHRONIC KIDNEY DISEASE, WITHOUT LONG-TERM CURRENT USE OF INSULIN (HCC): ICD-10-CM

## 2020-08-24 DIAGNOSIS — B35.1 ONYCHOMYCOSIS: ICD-10-CM

## 2020-08-24 DIAGNOSIS — R53.83 OTHER FATIGUE: ICD-10-CM

## 2020-08-24 DIAGNOSIS — R06.00 EXERTIONAL DYSPNEA: ICD-10-CM

## 2020-08-24 DIAGNOSIS — L98.9 SKIN LESION OF FOOT: Primary | ICD-10-CM

## 2020-08-24 LAB
ANION GAP SERPL CALC-SCNC: 6 MMOL/L (ref 0–18)
BASOPHILS # BLD AUTO: 0.05 X10(3) UL (ref 0–0.2)
BASOPHILS NFR BLD AUTO: 0.8 %
BUN BLD-MCNC: 69 MG/DL (ref 7–18)
BUN/CREAT SERPL: 12.8 (ref 10–20)
CALCIUM BLD-MCNC: 9 MG/DL (ref 8.5–10.1)
CHLORIDE SERPL-SCNC: 113 MMOL/L (ref 98–112)
CO2 SERPL-SCNC: 21 MMOL/L (ref 21–32)
CREAT BLD-MCNC: 5.4 MG/DL (ref 0.7–1.3)
DEPRECATED RDW RBC AUTO: 42.2 FL (ref 35.1–46.3)
EOSINOPHIL # BLD AUTO: 0.19 X10(3) UL (ref 0–0.7)
EOSINOPHIL NFR BLD AUTO: 3.2 %
ERYTHROCYTE [DISTWIDTH] IN BLOOD BY AUTOMATED COUNT: 13.5 % (ref 11–15)
EST. AVERAGE GLUCOSE BLD GHB EST-MCNC: 143 MG/DL (ref 68–126)
GLUCOSE BLD-MCNC: 155 MG/DL (ref 70–99)
HBA1C MFR BLD HPLC: 6.6 % (ref ?–5.7)
HCT VFR BLD AUTO: 32.5 % (ref 39–53)
HGB BLD-MCNC: 10.5 G/DL (ref 13–17.5)
IMM GRANULOCYTES # BLD AUTO: 0.02 X10(3) UL (ref 0–1)
IMM GRANULOCYTES NFR BLD: 0.3 %
LYMPHOCYTES # BLD AUTO: 2.42 X10(3) UL (ref 1–4)
LYMPHOCYTES NFR BLD AUTO: 40.5 %
MCH RBC QN AUTO: 28.1 PG (ref 26–34)
MCHC RBC AUTO-ENTMCNC: 32.3 G/DL (ref 31–37)
MCV RBC AUTO: 86.9 FL (ref 80–100)
MONOCYTES # BLD AUTO: 0.47 X10(3) UL (ref 0.1–1)
MONOCYTES NFR BLD AUTO: 7.9 %
NEUTROPHILS # BLD AUTO: 2.82 X10 (3) UL (ref 1.5–7.7)
NEUTROPHILS # BLD AUTO: 2.82 X10(3) UL (ref 1.5–7.7)
NEUTROPHILS NFR BLD AUTO: 47.3 %
OSMOLALITY SERPL CALC.SUM OF ELEC: 313 MOSM/KG (ref 275–295)
PATIENT FASTING Y/N/NP: YES
PLATELET # BLD AUTO: 237 10(3)UL (ref 150–450)
POTASSIUM SERPL-SCNC: 5.1 MMOL/L (ref 3.5–5.1)
RBC # BLD AUTO: 3.74 X10(6)UL (ref 3.8–5.8)
SODIUM SERPL-SCNC: 140 MMOL/L (ref 136–145)
VIT B12 SERPL-MCNC: 664 PG/ML (ref 193–986)
WBC # BLD AUTO: 6 X10(3) UL (ref 4–11)

## 2020-08-24 PROCEDURE — 36415 COLL VENOUS BLD VENIPUNCTURE: CPT | Performed by: INTERNAL MEDICINE

## 2020-08-24 PROCEDURE — 99214 OFFICE O/P EST MOD 30 MIN: CPT | Performed by: INTERNAL MEDICINE

## 2020-08-24 NOTE — TELEPHONE ENCOUNTER
Action Requested: Summary for Provider     []  Critical Lab, Recommendations Needed  [] Need Additional Advice  []   FYI    []   Need Orders  [] Need Medications Sent to Pharmacy  []  Other     SUMMARY: patient c/o L foot pain, wound did not heal, crusted

## 2020-08-24 NOTE — PROGRESS NOTES
HPI:    Patient ID: Justo Almanza is a 78year old male. HPI  Patient comes and for follow-up was seen about a month ago for left foot wound said he healed but now he is got eschar daily and is painful.   Patient continues to feel fatigued and short of B-12 1000 MCG Oral Tab daily.          Allergies:  Lidocaine               ANAPHYLAXIS    HISTORY:  Past Medical History:   Diagnosis Date   • [facility] ([facility]    • Essential hypertension    • Type 2 diabetes mellitus with stage 3 chronic kidney disease, with pulmonology also  Other fatigue as above will follow labs CBC B12 electrolytes  Stage 4 chronic kidney disease (hcc) follows with renal referral given  Type 2 diabetes mellitus with stage 4 chronic kidney disease, without long-term current use of insulin (

## 2020-08-27 ENCOUNTER — TELEPHONE (OUTPATIENT)
Dept: NEPHROLOGY | Facility: CLINIC | Age: 70
End: 2020-08-27

## 2020-08-27 NOTE — TELEPHONE ENCOUNTER
Encounter routed to Dr. Rosanna Bryant to advise. There are no double time slots available for Consult. You have openings 9/4 4:40, 9/10 6:20, 9/11 4:40. Please advise.

## 2020-08-27 NOTE — TELEPHONE ENCOUNTER
Pt states per Dr. Ruggiero(PCP) he needs to be seen as soon as possible due to kidney function.  Please call [phone]

## 2020-08-28 NOTE — TELEPHONE ENCOUNTER
Appointment booked. Left message on voicemail and requested a call back to confirm that message was received.

## 2020-09-03 ENCOUNTER — OFFICE VISIT (OUTPATIENT)
Dept: PODIATRY CLINIC | Facility: CLINIC | Age: 70
End: 2020-09-03
Payer: MEDICARE

## 2020-09-03 VITALS — WEIGHT: 278 LBS | HEIGHT: 77 IN | BODY MASS INDEX: 32.82 KG/M2

## 2020-09-03 DIAGNOSIS — E11.22 TYPE 2 DIABETES MELLITUS WITH STAGE 4 CHRONIC KIDNEY DISEASE, WITHOUT LONG-TERM CURRENT USE OF INSULIN (HCC): ICD-10-CM

## 2020-09-03 DIAGNOSIS — N18.4 TYPE 2 DIABETES MELLITUS WITH STAGE 4 CHRONIC KIDNEY DISEASE, WITHOUT LONG-TERM CURRENT USE OF INSULIN (HCC): ICD-10-CM

## 2020-09-03 DIAGNOSIS — B35.1 ONYCHOMYCOSIS: Primary | ICD-10-CM

## 2020-09-03 DIAGNOSIS — L97.529 ULCER OF LEFT FOOT, UNSPECIFIED ULCER STAGE (HCC): ICD-10-CM

## 2020-09-03 PROCEDURE — 11721 DEBRIDE NAIL 6 OR MORE: CPT | Performed by: PODIATRIST

## 2020-09-03 NOTE — PROGRESS NOTES
Dann Oneill is a 78year old male. Patient presents with:  Consult: skin lesion on left foot x 2 months. Last A1c 8/24/2020: 6.6. BS this AM states \"not high\". completed abx lesion looks better, but now has a crust around it.   no pain, states tender History reviewed. No pertinent surgical history. History reviewed. No pertinent family history.    Social History    Socioeconomic History      Marital status:       Spouse name: Not on file      Number of children: Not on file      Years of educ ointment and a gauze he will cleanse it once daily and apply mupirocin ointment and gauze.   A specimen was taken of his toenail to confirm fungus so that we can decide on the appropriate treatment for that tooToday using a nail nippers were trimmed and damon

## 2020-09-04 ENCOUNTER — OFFICE VISIT (OUTPATIENT)
Dept: NEPHROLOGY | Facility: CLINIC | Age: 70
End: 2020-09-04
Payer: MEDICARE

## 2020-09-04 VITALS
HEART RATE: 66 BPM | TEMPERATURE: 97 F | HEIGHT: 77 IN | DIASTOLIC BLOOD PRESSURE: 75 MMHG | BODY MASS INDEX: 25.9 KG/M2 | SYSTOLIC BLOOD PRESSURE: 163 MMHG | WEIGHT: 219.38 LBS

## 2020-09-04 DIAGNOSIS — I10 ESSENTIAL HYPERTENSION: ICD-10-CM

## 2020-09-04 DIAGNOSIS — N18.4 TYPE 2 DIABETES MELLITUS WITH STAGE 4 CHRONIC KIDNEY DISEASE, WITHOUT LONG-TERM CURRENT USE OF INSULIN (HCC): Primary | ICD-10-CM

## 2020-09-04 DIAGNOSIS — N18.5 CKD (CHRONIC KIDNEY DISEASE) STAGE 5, GFR LESS THAN 15 ML/MIN (HCC): ICD-10-CM

## 2020-09-04 DIAGNOSIS — E11.22 TYPE 2 DIABETES MELLITUS WITH STAGE 4 CHRONIC KIDNEY DISEASE, WITHOUT LONG-TERM CURRENT USE OF INSULIN (HCC): Primary | ICD-10-CM

## 2020-09-04 PROCEDURE — G0463 HOSPITAL OUTPT CLINIC VISIT: HCPCS | Performed by: INTERNAL MEDICINE

## 2020-09-04 PROCEDURE — 99214 OFFICE O/P EST MOD 30 MIN: CPT | Performed by: INTERNAL MEDICINE

## 2020-09-04 NOTE — PATIENT INSTRUCTIONS
Stay on your same medications your recent blood pressures have been good    When you come back please bring in a list of your medicines    Please do labs in about 4 weeks and see me after that    Make sure you get your flu shot in late September    We will

## 2020-09-09 ENCOUNTER — TELEPHONE (OUTPATIENT)
Dept: NEPHROLOGY | Facility: CLINIC | Age: 70
End: 2020-09-09

## 2020-09-09 NOTE — TELEPHONE ENCOUNTER
Keith munoz  Please see today's note on Jose Lise he will need dialysis in the near future  ThanksTeto

## 2020-09-09 NOTE — PROGRESS NOTES
Dear Vinicio Aguilera    Progress Note     Chikis Ferrer    Was here to see me in the office today it is been a long time since I seen him he has CKD stage IVb and is close to CKD stage V his blood pressure is high at 163/75 he does not really know his medications discharge and vision loss  Cardiovascular:  Negative for chest pain, sob  Respiratory:  Negative for cough, dyspnea and wheezing  Gastrointestinal: Poor appetite  Genitourinary:  Negative for dysuria and hematuria  Endocrine:  Negative for abnormal sleep p that  He did recently have a cardiac cath  In March which came out fine and his pulse ox on room air today was 97%  He does have a large thyroid nodule 5 x 2 cm I recommended a biopsy of this  It is affecting his respirations I doubt it is cancer  Last A1c

## 2020-09-11 ENCOUNTER — TELEPHONE (OUTPATIENT)
Dept: PODIATRY CLINIC | Facility: CLINIC | Age: 70
End: 2020-09-11

## 2020-09-11 DIAGNOSIS — B35.1 ONYCHOMYCOSIS: Primary | ICD-10-CM

## 2020-09-11 NOTE — TELEPHONE ENCOUNTER
----- Message from Peewee Mcdonough DPM sent at 9/9/2020  3:50 PM CDT -----  Results reviewed. Please inform patient that fungal culture results are positive and we should proceed with Lamisil tablet therapy.   If the patient agrees we have to do a hepat

## 2020-09-11 NOTE — TELEPHONE ENCOUNTER
Pt notified per Dr. Artemio Gill results notes and orders. Pt would like to proceed and will get labs done.

## 2020-09-24 ENCOUNTER — OFFICE VISIT (OUTPATIENT)
Dept: PODIATRY CLINIC | Facility: CLINIC | Age: 70
End: 2020-09-24
Payer: MEDICARE

## 2020-09-24 DIAGNOSIS — N18.4 TYPE 2 DIABETES MELLITUS WITH STAGE 4 CHRONIC KIDNEY DISEASE, WITHOUT LONG-TERM CURRENT USE OF INSULIN (HCC): ICD-10-CM

## 2020-09-24 DIAGNOSIS — E11.22 TYPE 2 DIABETES MELLITUS WITH STAGE 4 CHRONIC KIDNEY DISEASE, WITHOUT LONG-TERM CURRENT USE OF INSULIN (HCC): ICD-10-CM

## 2020-09-24 DIAGNOSIS — L97.529 ULCER OF LEFT FOOT, UNSPECIFIED ULCER STAGE (HCC): Primary | ICD-10-CM

## 2020-09-24 PROCEDURE — G0463 HOSPITAL OUTPT CLINIC VISIT: HCPCS | Performed by: PODIATRIST

## 2020-09-24 PROCEDURE — 99213 OFFICE O/P EST LOW 20 MIN: CPT | Performed by: PODIATRIST

## 2020-09-28 NOTE — PROGRESS NOTES
Justo Almanza is a 78year old male. Patient presents with:  Ulcer: left foot        HPI:   Patient returns the clinic today for checkup on the ulceration lateral left foot says he feels much better is doing well.   At today's visit reviewed nurse's histo below  GENERAL HEALTH: feels well otherwise  SKIN: Refer to exam below  RESPIRATORY: denies shortness of breath with exertion  CARDIOVASCULAR: denies chest pain on exertion  GI: denies abdominal pain and denies heartburn  NEURO: denies headaches    EXAM:

## 2020-10-06 ENCOUNTER — LAB ENCOUNTER (OUTPATIENT)
Dept: LAB | Facility: HOSPITAL | Age: 70
End: 2020-10-06
Attending: INTERNAL MEDICINE
Payer: MEDICARE

## 2020-10-06 DIAGNOSIS — N18.5 CKD (CHRONIC KIDNEY DISEASE) STAGE 5, GFR LESS THAN 15 ML/MIN (HCC): ICD-10-CM

## 2020-10-06 DIAGNOSIS — M10.9 GOUT OF BIG TOE: ICD-10-CM

## 2020-10-06 DIAGNOSIS — B35.1 ONYCHOMYCOSIS: ICD-10-CM

## 2020-10-06 DIAGNOSIS — N18.4 TYPE 2 DIABETES MELLITUS WITH STAGE 4 CHRONIC KIDNEY DISEASE, WITHOUT LONG-TERM CURRENT USE OF INSULIN (HCC): ICD-10-CM

## 2020-10-06 DIAGNOSIS — E11.22 TYPE 2 DIABETES MELLITUS WITH STAGE 4 CHRONIC KIDNEY DISEASE, WITHOUT LONG-TERM CURRENT USE OF INSULIN (HCC): ICD-10-CM

## 2020-10-06 PROCEDURE — 84466 ASSAY OF TRANSFERRIN: CPT

## 2020-10-06 PROCEDURE — 82570 ASSAY OF URINE CREATININE: CPT

## 2020-10-06 PROCEDURE — 83970 ASSAY OF PARATHORMONE: CPT

## 2020-10-06 PROCEDURE — 81001 URINALYSIS AUTO W/SCOPE: CPT

## 2020-10-06 PROCEDURE — 80069 RENAL FUNCTION PANEL: CPT

## 2020-10-06 PROCEDURE — 80076 HEPATIC FUNCTION PANEL: CPT

## 2020-10-06 PROCEDURE — 36415 COLL VENOUS BLD VENIPUNCTURE: CPT

## 2020-10-06 PROCEDURE — 82043 UR ALBUMIN QUANTITATIVE: CPT

## 2020-10-06 PROCEDURE — 85025 COMPLETE CBC W/AUTO DIFF WBC: CPT

## 2020-10-06 PROCEDURE — 84550 ASSAY OF BLOOD/URIC ACID: CPT

## 2020-10-06 PROCEDURE — 83540 ASSAY OF IRON: CPT

## 2020-10-07 ENCOUNTER — TELEPHONE (OUTPATIENT)
Dept: INTERNAL MEDICINE CLINIC | Facility: CLINIC | Age: 70
End: 2020-10-07

## 2020-10-07 NOTE — TELEPHONE ENCOUNTER
Patient states he talked to a nurse earlier today about a medication change, but was driving at the time and couldn't remember which medication. Advised patient of of notes below and patient verbalized understanding.      Omari Mark, [address]

## 2020-10-12 ENCOUNTER — TELEPHONE (OUTPATIENT)
Dept: PODIATRY CLINIC | Facility: CLINIC | Age: 70
End: 2020-10-12

## 2020-10-12 RX ORDER — TERBINAFINE HYDROCHLORIDE 250 MG/1
250 TABLET ORAL DAILY
Qty: 45 TABLET | Refills: 0 | Status: SHIPPED | OUTPATIENT
Start: 2020-10-12

## 2020-10-12 NOTE — TELEPHONE ENCOUNTER
----- Message from Chary Beal DPM sent at 10/8/2020 11:33 AM CDT -----  Hepatic function panel is normal please call in the following prescription:   Lamisil 250 mg tablets  Dispense 45, no refill  Instructions take 1 tablet daily p.o.   Then please

## 2020-10-12 NOTE — TELEPHONE ENCOUNTER
Reviewed results per Dr. Tanya Velazquez orders. Informed PT of new rx for Lamisil and confirmed pharmacy of [facility] in [place]. Scheduled appointment for 12/10 at 4:15. PT voiced understanding.

## 2020-11-03 ENCOUNTER — OFFICE VISIT (OUTPATIENT)
Dept: PULMONOLOGY | Facility: CLINIC | Age: 70
End: 2020-11-03
Payer: MEDICARE

## 2020-11-03 VITALS
HEIGHT: 77 IN | OXYGEN SATURATION: 97 % | RESPIRATION RATE: 18 BRPM | HEART RATE: 66 BPM | BODY MASS INDEX: 33.77 KG/M2 | WEIGHT: 286 LBS | SYSTOLIC BLOOD PRESSURE: 185 MMHG | TEMPERATURE: 98 F | DIASTOLIC BLOOD PRESSURE: 84 MMHG

## 2020-11-03 DIAGNOSIS — R06.00 DYSPNEA ON EXERTION: Primary | ICD-10-CM

## 2020-11-03 PROCEDURE — 99204 OFFICE O/P NEW MOD 45 MIN: CPT | Performed by: INTERNAL MEDICINE

## 2020-11-03 PROCEDURE — G0463 HOSPITAL OUTPT CLINIC VISIT: HCPCS | Performed by: INTERNAL MEDICINE

## 2020-11-03 NOTE — H&P
Referring Physician  Edson Villatoro MD     Chief Complaint  Dyspnea    History of Present Illness  Patient is a 61-year-old male who presents to pulmonary clinic for initial visit. Admits to worsening dyspnea with exertion over the course last 6 months.   [place] Disp: 90 tablet, Rfl: 0    •  Metoprolol Succinate ER 50 MG Oral Tablet 24 Hr, Take 50 mg by mouth daily. , Disp: , Rfl:     •  hydrALAzine HCl 50 MG Oral Tab, Take 1 tablet (50 mg total) by mouth 3 (three) times daily. , Disp: 90 tablet, Rfl: 1    •  atorva

## 2020-11-11 ENCOUNTER — HOSPITAL ENCOUNTER (INPATIENT)
Facility: HOSPITAL | Age: 70
LOS: 5 days | Discharge: HOME | DRG: 674 | End: 2020-11-17
Attending: EMERGENCY MEDICINE | Admitting: INTERNAL MEDICINE
Payer: MEDICARE

## 2020-11-11 DIAGNOSIS — N18.5 STAGE 5 CHRONIC KIDNEY DISEASE NOT ON CHRONIC DIALYSIS (HCC): ICD-10-CM

## 2020-11-11 DIAGNOSIS — I16.0 HYPERTENSIVE URGENCY: Primary | ICD-10-CM

## 2020-11-11 RX ORDER — METOPROLOL TARTRATE 50 MG/1
50 TABLET, FILM COATED ORAL ONCE
Status: DISCONTINUED | OUTPATIENT
Start: 2020-11-11 | End: 2020-11-11

## 2020-11-11 RX ORDER — ONDANSETRON 2 MG/ML
4 INJECTION INTRAMUSCULAR; INTRAVENOUS ONCE
Status: DISCONTINUED | OUTPATIENT
Start: 2020-11-11 | End: 2020-11-11

## 2020-11-11 RX ORDER — HYDRALAZINE HYDROCHLORIDE 20 MG/ML
10 INJECTION INTRAMUSCULAR; INTRAVENOUS ONCE
Status: COMPLETED | OUTPATIENT
Start: 2020-11-11 | End: 2020-11-11

## 2020-11-12 ENCOUNTER — APPOINTMENT (OUTPATIENT)
Dept: CT IMAGING | Facility: HOSPITAL | Age: 70
DRG: 674 | End: 2020-11-12
Attending: EMERGENCY MEDICINE
Payer: MEDICARE

## 2020-11-12 ENCOUNTER — APPOINTMENT (OUTPATIENT)
Dept: GENERAL RADIOLOGY | Facility: HOSPITAL | Age: 70
DRG: 674 | End: 2020-11-12
Attending: EMERGENCY MEDICINE
Payer: MEDICARE

## 2020-11-12 ENCOUNTER — APPOINTMENT (OUTPATIENT)
Dept: CV DIAGNOSTICS | Facility: HOSPITAL | Age: 70
DRG: 674 | End: 2020-11-12
Attending: INTERNAL MEDICINE
Payer: MEDICARE

## 2020-11-12 PROBLEM — N18.5 STAGE 5 CHRONIC KIDNEY DISEASE NOT ON CHRONIC DIALYSIS (HCC): Status: ACTIVE | Noted: 2020-11-12

## 2020-11-12 PROBLEM — R79.89 ELEVATED TROPONIN: Status: ACTIVE | Noted: 2020-11-12

## 2020-11-12 PROBLEM — I16.0 HYPERTENSIVE URGENCY: Status: ACTIVE | Noted: 2020-11-12

## 2020-11-12 PROBLEM — R77.8 ELEVATED TROPONIN: Status: ACTIVE | Noted: 2020-11-12

## 2020-11-12 PROCEDURE — 71045 X-RAY EXAM CHEST 1 VIEW: CPT | Performed by: EMERGENCY MEDICINE

## 2020-11-12 PROCEDURE — 70450 CT HEAD/BRAIN W/O DYE: CPT | Performed by: EMERGENCY MEDICINE

## 2020-11-12 PROCEDURE — 99222 1ST HOSP IP/OBS MODERATE 55: CPT | Performed by: INTERNAL MEDICINE

## 2020-11-12 PROCEDURE — 99223 1ST HOSP IP/OBS HIGH 75: CPT | Performed by: INTERNAL MEDICINE

## 2020-11-12 PROCEDURE — 93306 TTE W/DOPPLER COMPLETE: CPT | Performed by: INTERNAL MEDICINE

## 2020-11-12 RX ORDER — DEXTROSE MONOHYDRATE 25 G/50ML
50 INJECTION, SOLUTION INTRAVENOUS
Status: DISCONTINUED | OUTPATIENT
Start: 2020-11-12 | End: 2020-11-17

## 2020-11-12 RX ORDER — ALBUMIN (HUMAN) 12.5 G/50ML
100 SOLUTION INTRAVENOUS AS NEEDED
Status: DISCONTINUED | OUTPATIENT
Start: 2020-11-12 | End: 2020-11-16

## 2020-11-12 RX ORDER — ONDANSETRON 2 MG/ML
4 INJECTION INTRAMUSCULAR; INTRAVENOUS ONCE
Status: COMPLETED | OUTPATIENT
Start: 2020-11-12 | End: 2020-11-12

## 2020-11-12 RX ORDER — HYDRALAZINE HYDROCHLORIDE 50 MG/1
50 TABLET, FILM COATED ORAL 3 TIMES DAILY
Status: DISCONTINUED | OUTPATIENT
Start: 2020-11-12 | End: 2020-11-13

## 2020-11-12 RX ORDER — ACETAMINOPHEN 325 MG/1
650 TABLET ORAL EVERY 8 HOURS PRN
Status: DISCONTINUED | OUTPATIENT
Start: 2020-11-12 | End: 2020-11-17

## 2020-11-12 RX ORDER — MORPHINE SULFATE 4 MG/ML
4 INJECTION, SOLUTION INTRAMUSCULAR; INTRAVENOUS ONCE
Status: COMPLETED | OUTPATIENT
Start: 2020-11-12 | End: 2020-11-12

## 2020-11-12 RX ORDER — ONDANSETRON 2 MG/ML
4 INJECTION INTRAMUSCULAR; INTRAVENOUS EVERY 8 HOURS PRN
Status: DISCONTINUED | OUTPATIENT
Start: 2020-11-12 | End: 2020-11-17

## 2020-11-12 RX ORDER — ALLOPURINOL 100 MG/1
100 TABLET ORAL DAILY
Status: DISCONTINUED | OUTPATIENT
Start: 2020-11-12 | End: 2020-11-17

## 2020-11-12 RX ORDER — ATORVASTATIN CALCIUM 20 MG/1
20 TABLET, FILM COATED ORAL NIGHTLY
Status: DISCONTINUED | OUTPATIENT
Start: 2020-11-12 | End: 2020-11-17

## 2020-11-12 RX ORDER — ZOLPIDEM TARTRATE 5 MG/1
5 TABLET ORAL NIGHTLY PRN
Status: DISCONTINUED | OUTPATIENT
Start: 2020-11-12 | End: 2020-11-17

## 2020-11-12 RX ORDER — HYDROCODONE BITARTRATE AND ACETAMINOPHEN 5; 325 MG/1; MG/1
1 TABLET ORAL EVERY 6 HOURS PRN
Status: DISCONTINUED | OUTPATIENT
Start: 2020-11-12 | End: 2020-11-17

## 2020-11-12 RX ORDER — ACETAMINOPHEN 325 MG/1
650 TABLET ORAL ONCE
Status: COMPLETED | OUTPATIENT
Start: 2020-11-12 | End: 2020-11-12

## 2020-11-12 RX ORDER — HYDRALAZINE HYDROCHLORIDE 20 MG/ML
10 INJECTION INTRAMUSCULAR; INTRAVENOUS EVERY 6 HOURS PRN
Status: DISCONTINUED | OUTPATIENT
Start: 2020-11-12 | End: 2020-11-17

## 2020-11-12 RX ORDER — HEPARIN SODIUM 1000 [USP'U]/ML
1.5 INJECTION, SOLUTION INTRAVENOUS; SUBCUTANEOUS ONCE
Status: DISCONTINUED | OUTPATIENT
Start: 2020-11-12 | End: 2020-11-16

## 2020-11-12 RX ORDER — AMLODIPINE BESYLATE 10 MG/1
10 TABLET ORAL DAILY
Status: DISCONTINUED | OUTPATIENT
Start: 2020-11-12 | End: 2020-11-17

## 2020-11-12 RX ORDER — ASPIRIN 81 MG/1
81 TABLET ORAL DAILY
Status: DISCONTINUED | OUTPATIENT
Start: 2020-11-12 | End: 2020-11-17

## 2020-11-12 RX ORDER — HEPARIN SODIUM 5000 [USP'U]/ML
5000 INJECTION, SOLUTION INTRAVENOUS; SUBCUTANEOUS EVERY 8 HOURS SCHEDULED
Status: DISCONTINUED | OUTPATIENT
Start: 2020-11-12 | End: 2020-11-17

## 2020-11-12 RX ORDER — METOPROLOL SUCCINATE 50 MG/1
50 TABLET, EXTENDED RELEASE ORAL DAILY
Status: DISCONTINUED | OUTPATIENT
Start: 2020-11-12 | End: 2020-11-17

## 2020-11-12 NOTE — PLAN OF CARE
Patient aox4 resting in bed, up & ambulatory, patient BP elevated given hydrazine, tyenol given for headache. Patient had emesis episode, zofran ordered. BP improved w/ morning meds and prn.    Problem: Patient Centered Care  Goal: Patient preferences are i

## 2020-11-12 NOTE — CONSULTS
Cardiology (consult dictated). Assessment:  1. Elevated troponins. Low flat rise. No chest pain or acute EKG abnormalities. Had virtually normal coronaries at cath March, 2020. This is not an acute coronary syndrome.   We will obtain an echocardiogr

## 2020-11-12 NOTE — ED PROVIDER NOTES
Patient Seen in: [facility] AND [facility] Emergency Department      History   Patient presents with:  Hypertension    Stated Complaint:     HPI    61-year-old male with history of hypertension, diabetes, and chronic kidney disease stage V presents with complain agitation    ED Course     Labs Reviewed   BASIC METABOLIC PANEL (8) - Abnormal; Notable for the following components:       Result Value    Glucose 161 (*)     Chloride 113 (*)     BUN 64 (*)     Creatinine 5.83 (*)     Calculated Osmolality 322 (*)     G Lab and CT results noted. Patient's blood pressure has improved post hydralazine. He remains hypertensive. Headache initially improved but has worsened again. Will give additional pain medications.   Will admit to telemetry for treatment of hypertensi

## 2020-11-12 NOTE — H&P
[facility]    History and Physical    Ghislaine Rodriguez Patient Status:  Inpatient    1950 MRN [id number]   Location [facility] 3W/SW Attending Lindsey Dumont MD   Hosp Day # 0 PCP Laila Castro MD     Date:  2020  Date of GLIMEPIRIDE 2 MG Oral Tab, Take 1 tablet by mouth once daily with breakfast    •  Metoprolol Succinate ER 50 MG Oral Tablet 24 Hr, Take 50 mg by mouth daily. •  hydrALAzine HCl 50 MG Oral Tab, Take 1 tablet (50 mg total) by mouth 3 (three) times daily. BUN 64 (H) 11/11/2020     11/11/2020    K 4.4 11/11/2020     (H) 11/11/2020    CO2 25.0 11/11/2020     (H) 11/11/2020    CA 8.7 11/11/2020    ALB 3.3 (L) 10/06/2020    ALB 3.3 (L) 10/06/2020    ALKPHO 95 10/06/2020    BILT 0.3 10/06/2020 Elevated troponin- most likely due to heart strain from elevated BP, no cp, had a cath on 03/2020 was normal, cards on board will follow if any new recommendations           **Certification      PHYSICIAN Certification of Need for Inpatient [facility]

## 2020-11-12 NOTE — ED NOTES
Orders for admission, patient is aware of plan and ready to go upstairs. Any questions, please call ED LUCERO Reed  at extension [phone].    Type of COVID test sent:Rapid  COVID Suspicion level: Negative    Titratable drug(s) infusing:N/A  Rate:    LOC at time

## 2020-11-12 NOTE — ED INITIAL ASSESSMENT (HPI)
Pt c/o headache starting at about 5pm today. Hypertension at home 252/108. Night time amlodipine 10mg taken at 30min pta.

## 2020-11-12 NOTE — CONSULTS
[facility]    PATIENT'S NAME: Ada Rey   ATTENDING PHYSICIAN: Rock Lobo MD   CONSULTING PHYSICIAN: Thelma Riley.  Selene Wright MD   PATIENT ACCOUNT#:   [id number]    LOCATION:  [address] [place] [address] Road #:   [id number]       DATE OF BIRTH:  0 brisk without bruits. No thyromegaly or adenopathy. LUNGS:  Clear bilaterally. HEART:  S1, S2 are normal.  S4 present. No S3, murmur, rub, or click. ABDOMEN:  Benign. EXTREMITIES:  Warm and dry. Good pulses. No edema.   No calf, thigh, or popliteal

## 2020-11-12 NOTE — PLAN OF CARE
Patient admitted to floor for HTN and chronic kidney disease. Blood pressure 192/88, alert and oriented X 4, RA, denies chest pain and shortness of breath.  Will continue to monitor    Problem: Patient Centered Care  Goal: Patient preferences are identified

## 2020-11-13 ENCOUNTER — APPOINTMENT (OUTPATIENT)
Dept: INTERVENTIONAL RADIOLOGY/VASCULAR | Facility: HOSPITAL | Age: 70
DRG: 674 | End: 2020-11-13
Attending: INTERNAL MEDICINE
Payer: MEDICARE

## 2020-11-13 PROCEDURE — 0JH63XZ INSERTION OF TUNNELED VASCULAR ACCESS DEVICE INTO CHEST SUBCUTANEOUS TISSUE AND FASCIA, PERCUTANEOUS APPROACH: ICD-10-PCS | Performed by: RADIOLOGY

## 2020-11-13 PROCEDURE — 99232 SBSQ HOSP IP/OBS MODERATE 35: CPT | Performed by: INTERNAL MEDICINE

## 2020-11-13 PROCEDURE — 99233 SBSQ HOSP IP/OBS HIGH 50: CPT | Performed by: INTERNAL MEDICINE

## 2020-11-13 PROCEDURE — 5A1D70Z PERFORMANCE OF URINARY FILTRATION, INTERMITTENT, LESS THAN 6 HOURS PER DAY: ICD-10-PCS | Performed by: INTERNAL MEDICINE

## 2020-11-13 PROCEDURE — 02HV33Z INSERTION OF INFUSION DEVICE INTO SUPERIOR VENA CAVA, PERCUTANEOUS APPROACH: ICD-10-PCS | Performed by: RADIOLOGY

## 2020-11-13 PROCEDURE — 99232 SBSQ HOSP IP/OBS MODERATE 35: CPT | Performed by: NURSE PRACTITIONER

## 2020-11-13 RX ORDER — HEPARIN SODIUM 1000 [USP'U]/ML
1.5 INJECTION, SOLUTION INTRAVENOUS; SUBCUTANEOUS ONCE
Status: DISCONTINUED | OUTPATIENT
Start: 2020-11-13 | End: 2020-11-16

## 2020-11-13 RX ORDER — CEFAZOLIN SODIUM/WATER 2 G/20 ML
SYRINGE (ML) INTRAVENOUS
Status: COMPLETED
Start: 2020-11-13 | End: 2020-11-13

## 2020-11-13 RX ORDER — ALBUMIN (HUMAN) 12.5 G/50ML
100 SOLUTION INTRAVENOUS AS NEEDED
Status: DISCONTINUED | OUTPATIENT
Start: 2020-11-13 | End: 2020-11-16

## 2020-11-13 RX ORDER — POLYETHYLENE GLYCOL 3350 17 G/17G
17 POWDER, FOR SOLUTION ORAL DAILY PRN
Status: DISCONTINUED | OUTPATIENT
Start: 2020-11-13 | End: 2020-11-17

## 2020-11-13 RX ORDER — SEVELAMER CARBONATE 800 MG/1
800 TABLET, FILM COATED ORAL
Status: DISCONTINUED | OUTPATIENT
Start: 2020-11-13 | End: 2020-11-17

## 2020-11-13 RX ORDER — HYDRALAZINE HYDROCHLORIDE 100 MG/1
100 TABLET, FILM COATED ORAL 3 TIMES DAILY
Status: DISCONTINUED | OUTPATIENT
Start: 2020-11-13 | End: 2020-11-13

## 2020-11-13 RX ORDER — HEPARIN SODIUM 1000 [USP'U]/ML
INJECTION, SOLUTION INTRAVENOUS; SUBCUTANEOUS
Status: COMPLETED
Start: 2020-11-13 | End: 2020-11-13

## 2020-11-13 RX ORDER — TETRACAINE HCL 10 MG/ML
20 INJECTION SUBARACHNOID ONCE
Status: DISCONTINUED | OUTPATIENT
Start: 2020-11-13 | End: 2020-11-17

## 2020-11-13 RX ORDER — BISACODYL 10 MG
10 SUPPOSITORY, RECTAL RECTAL
Status: DISCONTINUED | OUTPATIENT
Start: 2020-11-13 | End: 2020-11-17

## 2020-11-13 RX ORDER — LISINOPRIL 20 MG/1
20 TABLET ORAL DAILY
Status: DISCONTINUED | OUTPATIENT
Start: 2020-11-13 | End: 2020-11-17

## 2020-11-13 RX ORDER — LIDOCAINE HYDROCHLORIDE 20 MG/ML
INJECTION, SOLUTION EPIDURAL; INFILTRATION; INTRACAUDAL; PERINEURAL
Status: COMPLETED
Start: 2020-11-13 | End: 2020-11-13

## 2020-11-13 RX ORDER — MIDAZOLAM HYDROCHLORIDE 1 MG/ML
INJECTION INTRAMUSCULAR; INTRAVENOUS
Status: COMPLETED
Start: 2020-11-13 | End: 2020-11-13

## 2020-11-13 NOTE — CONSULTS
[facility]    PATIENT'S NAME: Lesley Howard   ATTENDING PHYSICIAN: Ruben Shetty MD   CONSULTING PHYSICIAN: Izabela Delatorre.  Rohit Jerry MD   PATIENT ACCOUNT#:   [id number]    LOCATION:  [address] [place] [address] #:   [id number]       DATE OF BIRTH: appears healthy. He is somewhat tired but awake and alert, knew who I was. VITAL SIGNS:  Blood pressure is high at 164/85. HEENT:  Pupils equal and reactive. Sclerae anicteric. NECK:  Supple. LUNGS:  Clear. No rhonchi, wheezing, or rales.   HEART: blood pressure, his heart rate is slow so I do not want to increase his metoprolol. Hydralazine is already pretty well dosed. I do not want to give him an ACE inhibitor at this point.   We will increase his hydralazine at home, it was ordered 50 three bisi

## 2020-11-13 NOTE — PROGRESS NOTES
[facility]    Progress Note    Eulah Labor Patient Status:  Inpatient    1950 MRN [id number]   Location [facility] 3W/SW Attending Sydnee Eid MD   Hosp Day # 1 PCP Margeret Claude, MD        Subjective:     Constitution K 4.9 11/13/2020     (H) 11/13/2020    CO2 23.0 11/13/2020     (H) 11/13/2020    CA 8.5 11/13/2020    ALB 3.0 (L) 11/13/2020    ALKPHO 75 11/13/2020    BILT 0.3 11/13/2020    TP 7.0 11/13/2020    AST 11 (L) 11/13/2020    ALT 18 11/13/2020

## 2020-11-13 NOTE — PLAN OF CARE
Patient is a/ox4. RA. Self care. Tunneled cath placed today. First dialysis session today. Accucheck ACHS. Will continue to monitor.     Problem: Patient Centered Care  Goal: Patient preferences are identified and integrated in the patient's plan of care  D glucose within target range  - Assess barriers to adequate nutritional intake and initiate nutrition consult as needed  - Instruct patient on self management of diabetes  Outcome: Progressing

## 2020-11-13 NOTE — BRIEF PROCEDURE NOTE
[facility]  Procedure Note    Sohail Fernandez Patient Status:  Inpatient    1950 MRN [id number]   Location [facility] 3W/SW Attending George Townsend MD   Hosp Day # 1 PCP Darshan Navarro MD     Procedure: Permacath    Pre-Pr

## 2020-11-13 NOTE — PLAN OF CARE
Patient is new to dialysis and will start treatment today. [facility] notified, alert and oriented X4, up and ambulatory, and home meds for BP continued.      Problem: Patient Centered Care  Goal: Patient preferences are identified and integrated in the desmond Progressing  11/13/2020 0231 by Zoe Conway, RN  Outcome: Progressing  Goal: Patient/Family Short Term Goal  Description: Patient's Short Term Goal: Blood pressure under control    Interventions:   - Dialysis, take medication, follow up with doctors or

## 2020-11-13 NOTE — PROGRESS NOTES
[facility] - [facility]    Progress Note    Eulah Labor Patient Status:  Inpatient    1950 MRN [id number]   Location [facility] Attending Sydnee Eid MD   Hosp Day # 1 PCP Margeret Claude, MD        Subjective: ([id number])    Result Date: 11/12/2020  CONCLUSION:  1. No acute intracranial process. 2.  There are mild microvascular white matter ischemic changes, likely related to long-standing hypertension and/or diabetes.  3.  Atherosclerosis in the anterior and poster

## 2020-11-14 PROCEDURE — 99232 SBSQ HOSP IP/OBS MODERATE 35: CPT | Performed by: INTERNAL MEDICINE

## 2020-11-14 PROCEDURE — 99233 SBSQ HOSP IP/OBS HIGH 50: CPT | Performed by: INTERNAL MEDICINE

## 2020-11-14 NOTE — PROGRESS NOTES
[facility]    Progress Note    Ana Maria Casillas Patient Status:  Inpatient    1950 MRN [id number]   Location [facility] 3W/SW Attending Vaughn Farmer MD   Hosp Day # 1 PCP Kamari Winters MD       Subjective:   Ana Maria Casillas i unusual rashes present, no abnormal bruising noted  Back/Spine: no abnormalities noted  Musculoskeletal: full ROM all extremities good strength  no deformities  Extremities: no edema, cyanosis  Neurological:  Grossly normal    Results:     Laboratory Data: 10:38 AM              ASSESSMENT/PLAN:   Assessment       ESRD going to work getting set up for dialysis at [facility] may be able to go to home dialysis eventually dialyzed today [facility] dialysis for 24 hours did fine we will plan again for Saturday hopeful

## 2020-11-14 NOTE — PROGRESS NOTES
[facility]    Progress Note    Saira Queen Patient Status:  Inpatient    1950 MRN [id number]   Location [facility] 3W/SW Attending Delwin Claude, MD   Hosp Day # 2 PCP Domenica Alvarez MD        Subjective:     Constitution WBC 7.0 11/14/2020    HGB 10.1 (L) 11/14/2020    HCT 31.0 (L) 11/14/2020    .0 11/14/2020    CREATSERUM 4.85 (H) 11/14/2020    BUN 53 (H) 11/14/2020     11/14/2020    K 4.5 11/14/2020     11/14/2020    CO2 26.0 11/14/2020     (

## 2020-11-14 NOTE — PROGRESS NOTES
[facility]    Progress Note      Subjective:      Tolerated dialysis well - due for HD today     Feels much better and more energetic and clear today      Review of Systems:     Constitutional: negative for fatigue, fevers and weight loss Intravenous, Daily    •  tetracaine injection 1%, 20 mL, Injection, Once    •  bisacodyl (DULCOLAX) rectal suppository 10 mg, 10 mg, Rectal, Daily PRN    •  PEG 3350 (MIRALAX) powder packet 17 g, 17 g, Oral, Daily PRN    •  lisinopril tab 20 mg, 20 mg, Ora Intracatheter, Once    •  Albumin Human (ALBUMINAR) 25 % solution 100 mL, 100 mL, Intravenous, PRN         Results:     Recent Labs   Lab 11/11/20  2310 11/13/20  0602 11/14/20  0550   RBC 3.47* 3.44* 3.59*   HGB 9.8* 9.6* 10.1*   HCT 30.2* 30.3* 31.0*   M

## 2020-11-15 PROCEDURE — 99232 SBSQ HOSP IP/OBS MODERATE 35: CPT | Performed by: INTERNAL MEDICINE

## 2020-11-15 NOTE — PROGRESS NOTES
[facility]    Progress Note    Eldonjosh  Patient Status:  Inpatient    1950 MRN [id number]   Location [facility] 3W/SW Attending Landy Woods MD   Hosp Day # 3 PCP Amie Villalta MD        Subjective:     Constitution 7.0 11/14/2020    HGB 10.1 (L) 11/14/2020    HCT 31.0 (L) 11/14/2020    .0 11/14/2020    CREATSERUM 4.85 (H) 11/14/2020    BUN 53 (H) 11/14/2020     11/14/2020    K 4.5 11/14/2020     11/14/2020    CO2 26.0 11/14/2020     (H) 11/1

## 2020-11-15 NOTE — PROGRESS NOTES
[facility] - [facility]    Progress Note      Subjective:      Tolerated dialysis well - no sob or chest pain       Review of Systems:     Constitutional: negative for fatigue, fevers and weight loss  Eyes: negative for irritation, redness and visual d chloride 0.9% 250 mL IVPB, 300 mg, Intravenous, Daily    •  tetracaine injection 1%, 20 mL, Injection, Once    •  bisacodyl (DULCOLAX) rectal suppository 10 mg, 10 mg, Rectal, Daily PRN    •  PEG 3350 (MIRALAX) powder packet 17 g, 17 g, Oral, Daily PRN injection 1,500 Units, 1.5 mL, Intracatheter, Once    •  Albumin Human (ALBUMINAR) 25 % solution 100 mL, 100 mL, Intravenous, PRN         Results:     Recent Labs   Lab 11/11/20  2310 11/13/20  0602 11/14/20  0550   RBC 3.47* 3.44* 3.59*   HGB 9.8* 9.6* 10

## 2020-11-15 NOTE — PLAN OF CARE
BP stable without prn BP meds. Norco for pain. Miralax given for constipation. HD 2L removed. Plan for home today or tomorrow.      Problem: Patient Centered Care  Goal: Patient preferences are identified and integrated in the patient's plan of care  Nighat to maintain glucose within target range  - Assess barriers to adequate nutritional intake and initiate nutrition consult as needed  - Instruct patient on self management of diabetes  Outcome: Progressing     Problem: CARDIOVASCULAR - ADULT  Goal: Maintains

## 2020-11-15 NOTE — PLAN OF CARE
Patient alert and oriented. For lunch patient described to another RN feeling dizzy and was shaking. The RN thought she heard him request to check his blood pressure but he wanted her to check his blood sugar as he felt it was low.  This RN was not notified interventions  Outcome: Progressing  Goal: Patient/Family Short Term Goal  Description: Patient's Short Term Goal: fix kidneys    Interventions:   - dialysis, follow MD orders, med admin  - See additional Care Plan goals for specific interventions  Outcome

## 2020-11-15 NOTE — CM/SW NOTE
CM received MDO for Outpt dialysis. CM sent referrals via [facility]idin. Will need dialysis flowsheets and acceptance and chair time. Randall Mendez.  Greg Wallace RN, BSN  Nurse   [phone]

## 2020-11-16 PROCEDURE — 99232 SBSQ HOSP IP/OBS MODERATE 35: CPT | Performed by: INTERNAL MEDICINE

## 2020-11-16 PROCEDURE — 99233 SBSQ HOSP IP/OBS HIGH 50: CPT | Performed by: INTERNAL MEDICINE

## 2020-11-16 RX ORDER — HEPARIN SODIUM 1000 [USP'U]/ML
1.5 INJECTION, SOLUTION INTRAVENOUS; SUBCUTANEOUS ONCE
Status: COMPLETED | OUTPATIENT
Start: 2020-11-16 | End: 2020-11-17

## 2020-11-16 RX ORDER — ALBUMIN (HUMAN) 12.5 G/50ML
100 SOLUTION INTRAVENOUS AS NEEDED
Status: DISCONTINUED | OUTPATIENT
Start: 2020-11-16 | End: 2020-11-17

## 2020-11-16 NOTE — PROGRESS NOTES
[facility]    Progress Note    Dann Oneill Patient Status:  Inpatient    1950 MRN [id number]   Location [facility] 3W/SW Attending Fredo Celis MD   Hosp Day # 4 PCP Lakeshia Izaguirre MD        Subjective:     Constitution recommendations              Results:     Lab Results   Component Value Date    WBC 6.7 11/16/2020    HGB 9.9 (L) 11/16/2020    HCT 29.7 (L) 11/16/2020    .0 11/16/2020    CREATSERUM 5.74 (H) 11/16/2020    BUN 60 (H) 11/16/2020     11/16/2020

## 2020-11-16 NOTE — PLAN OF CARE
Problem: Patient Centered Care  Goal: Patient preferences are identified and integrated in the patient's plan of care  Description: Interventions:  - What would you like us to know as we care for you?  I live at home with my family.  - Provide timely, com needed  - Instruct patient on self management of diabetes  Outcome: Progressing     Problem: CARDIOVASCULAR - ADULT  Goal: Maintains optimal cardiac output and hemodynamic stability  Description: INTERVENTIONS:  - Monitor vital signs, rhythm, and trends  -

## 2020-11-16 NOTE — CM/SW NOTE
MDO received for discharge planning and setting up for Out patient dialysis. Referrals sent in [facility]. Choices were provided to patient   US  Renal in [facility] has been reserved.    Patient to receive one additional treatment, tomorrow 11/17/ 20 and di

## 2020-11-17 VITALS
RESPIRATION RATE: 18 BRPM | WEIGHT: 304.31 LBS | TEMPERATURE: 99 F | SYSTOLIC BLOOD PRESSURE: 124 MMHG | OXYGEN SATURATION: 98 % | DIASTOLIC BLOOD PRESSURE: 76 MMHG | HEART RATE: 60 BPM | BODY MASS INDEX: 36 KG/M2

## 2020-11-17 PROBLEM — R79.89 ELEVATED TROPONIN: Status: RESOLVED | Noted: 2020-11-12 | Resolved: 2020-11-17

## 2020-11-17 PROBLEM — R77.8 ELEVATED TROPONIN: Status: RESOLVED | Noted: 2020-11-12 | Resolved: 2020-11-17

## 2020-11-17 PROBLEM — I16.0 HYPERTENSIVE URGENCY: Status: RESOLVED | Noted: 2020-11-12 | Resolved: 2020-11-17

## 2020-11-17 PROCEDURE — 99239 HOSP IP/OBS DSCHRG MGMT >30: CPT | Performed by: INTERNAL MEDICINE

## 2020-11-17 PROCEDURE — 99232 SBSQ HOSP IP/OBS MODERATE 35: CPT | Performed by: INTERNAL MEDICINE

## 2020-11-17 RX ORDER — ERGOCALCIFEROL 1.25 MG/1
50000 CAPSULE ORAL ONCE
Status: COMPLETED | OUTPATIENT
Start: 2020-11-17 | End: 2020-11-17

## 2020-11-17 RX ORDER — SEVELAMER CARBONATE 800 MG/1
800 TABLET, FILM COATED ORAL
Qty: 90 TABLET | Refills: 2 | Status: SHIPPED | OUTPATIENT
Start: 2020-11-17

## 2020-11-17 RX ORDER — LISINOPRIL 20 MG/1
20 TABLET ORAL DAILY
Qty: 30 TABLET | Refills: 2 | Status: SHIPPED | OUTPATIENT
Start: 2020-11-17

## 2020-11-17 NOTE — DISCHARGE SUMMARY
DISCHARGE SUMMARY     Eulah Labor Patient Status:  Inpatient    1950 MRN [id number]   Location [facility] 3W/SW Attending Sydnee Eid MD   Hosp Day # 5 PCP Margeret Claude, MD     Date of Admission: 2020  Date of Discharge: 20 clear to auscultation bilaterally  Chest wall: no tenderness  Cardiovascular: S1, S2 normal, no murmur, click, rub or gallop, regular rate and rhythm  Abdominal: soft, non-tender; bowel sounds normal; no masses,  no organomegaly  Extremities: extremities n Vitamin B-12 1000 MCG Tabs  Commonly known as: VITAMIN B12      daily.    Refills: 0            Discharge Plan:  Discharge Condition: Good    Current Discharge Medication List    Home Meds - Unchanged    Terbinafine HCl 250 MG Oral Tab  Take 1 tablet (250

## 2020-11-17 NOTE — PROGRESS NOTES
[place] [facility] - [facility]    Progress Note      Subjective:      Tolerated dialysis well - no sob or chest pain       Review of Systems:     Constitutional: negative for fatigue, fevers and weight loss  Eyes: negative for irritation, redness and visual d 2,000 Units, Oral, Daily    •  tetracaine injection 1%, 20 mL, Injection, Once    •  bisacodyl (DULCOLAX) rectal suppository 10 mg, 10 mg, Rectal, Daily PRN    •  PEG 3350 (MIRALAX) powder packet 17 g, 17 g, Oral, Daily PRN    •  lisinopril tab 20 mg, 20 m 216.0 205.0     Recent Labs   Lab 11/13/20  0602 11/14/20  0550 11/16/20  0542 11/17/20  0653   * 127* 118* 133*   BUN 66* 53* 60* 63*   CREATSERUM 5.69* 4.85* 5.74* 5.50*   GFRAA 11* 13* 11* 11*   GFRNAA 9* 11* 9* 10*   CA 8.5 8.8 9.0 8.8   ALB 3.0

## 2020-11-17 NOTE — PROGRESS NOTES
[facility]    Progress Note    Marybeth Risk Patient Status:  Inpatient    1950 MRN [id number]   Location [facility] 3W/SW Attending Anastasiya Blackburn MD   Hosp Day # 4 PCP Melissa Barriga MD       Subjective:   Marybeth Risk i present, no abnormal bruising noted  Back/Spine: no abnormalities noted  Musculoskeletal: full ROM all extremities good strength  no deformities  Extremities: no edema, cyanosis  Neurological:  Grossly normal    Results:     Laboratory Data:  Lab Results

## 2020-11-17 NOTE — DISCHARGE PLANNING
Patient was provided with discharge instructions, education, and follow up information. Prescriptions were already sent electronically to patient's pharmacy.  Patient verbalizes understanding of follow up information, specifically medication prescribed, mos

## 2020-11-18 ENCOUNTER — TELEPHONE (OUTPATIENT)
Dept: INTERNAL MEDICINE CLINIC | Facility: CLINIC | Age: 70
End: 2020-11-18

## 2020-11-18 NOTE — TELEPHONE ENCOUNTER
Dr. Ruma Montana  For Lina Clinton  pt stated that he mentioned to Lina Clinton yesterday about his breathing as he feels sob and was  inform  to call if not better.  Pt stated that he is ok if he is sitting but if he walks he starts to get really sob

## 2020-11-18 NOTE — TELEPHONE ENCOUNTER
SARAHI Medina  Pt was called back and informed of  message below that he should go to ER since he is having sob on exertion.  Pt stated that he talked to Mirna Candelario yesterday and the sob is not new it has been going on even when he was at the [address]

## 2020-11-19 ENCOUNTER — PATIENT OUTREACH (OUTPATIENT)
Dept: CASE MANAGEMENT | Age: 70
End: 2020-11-19

## 2020-11-19 ENCOUNTER — OFFICE VISIT (OUTPATIENT)
Dept: INTERNAL MEDICINE CLINIC | Facility: CLINIC | Age: 70
End: 2020-11-19
Payer: MEDICARE

## 2020-11-19 VITALS
HEIGHT: 77 IN | OXYGEN SATURATION: 96 % | HEART RATE: 89 BPM | TEMPERATURE: 98 F | DIASTOLIC BLOOD PRESSURE: 58 MMHG | WEIGHT: 277 LBS | SYSTOLIC BLOOD PRESSURE: 108 MMHG | BODY MASS INDEX: 32.71 KG/M2 | RESPIRATION RATE: 18 BRPM

## 2020-11-19 DIAGNOSIS — N18.6 ESRD (END STAGE RENAL DISEASE) ON DIALYSIS (HCC): ICD-10-CM

## 2020-11-19 DIAGNOSIS — R06.00 EXERTIONAL DYSPNEA: ICD-10-CM

## 2020-11-19 DIAGNOSIS — E11.22 TYPE 2 DIABETES MELLITUS WITH STAGE 4 CHRONIC KIDNEY DISEASE, WITHOUT LONG-TERM CURRENT USE OF INSULIN (HCC): ICD-10-CM

## 2020-11-19 DIAGNOSIS — R53.83 OTHER FATIGUE: Primary | ICD-10-CM

## 2020-11-19 DIAGNOSIS — N18.4 TYPE 2 DIABETES MELLITUS WITH STAGE 4 CHRONIC KIDNEY DISEASE, WITHOUT LONG-TERM CURRENT USE OF INSULIN (HCC): ICD-10-CM

## 2020-11-19 DIAGNOSIS — Z02.9 ENCOUNTERS FOR UNSPECIFIED ADMINISTRATIVE PURPOSE: ICD-10-CM

## 2020-11-19 DIAGNOSIS — Z99.2 ESRD (END STAGE RENAL DISEASE) ON DIALYSIS (HCC): ICD-10-CM

## 2020-11-19 DIAGNOSIS — R14.0 BLOATING: ICD-10-CM

## 2020-11-19 PROCEDURE — 1111F DSCHRG MED/CURRENT MED MERGE: CPT | Performed by: INTERNAL MEDICINE

## 2020-11-19 PROCEDURE — 99214 OFFICE O/P EST MOD 30 MIN: CPT | Performed by: INTERNAL MEDICINE

## 2020-11-19 NOTE — TELEPHONE ENCOUNTER
I spoke with pt he will come to see me in office tomorrow, also I told him if the sob gets worst or he has chest pain then he should go to ER, pt agreed

## 2020-11-19 NOTE — PROGRESS NOTES
Initial Post Discharge Follow Up   Discharge Date: 11/17/20  Contact Date: 11/19/2020    Consent Verification:  Assessment Completed With: Patient  HIPAA Verified?   Yes    Discharge Dx:      Hypertensive urgency [I16.0]  Stage 5 chronic kidney disease n Hr Take 50 mg by mouth daily. • atorvastatin 20 MG Oral Tab Take 1 tablet (20 mg total) by mouth nightly. 90 tablet 1   • amLODIPine Besylate 10 MG Oral Tab Take 1 tablet (10 mg total) by mouth daily. 30 tablet 3   • aspirin 81 MG Oral Tab daily.

## 2020-11-19 NOTE — PROGRESS NOTES
HPI:    Patient ID: Chikis Ferrer is a 78year old male.     HPI  Comes in today for follow-up after he was seen in the hospital and started on dialysis he continues to complain of exertional dyspnea and fatigue with any exertion in the past we worked [facility] daily for 4 more days 30 tablet 0   • GLIMEPIRIDE 2 MG Oral Tab Take 1 tablet by mouth once daily with breakfast 90 tablet 0   • Metoprolol Succinate ER 50 MG Oral Tablet 24 Hr Take 50 mg by mouth daily.      • atorvastatin 20 MG Oral Tab Take 1 tablet (20 edema no pleural effusion  Bloating this could be gastroparesis due to diabetes will order gastric emptying study encouraged to eat small frequent meals set of 3 big meals  Type 2 diabetes mellitus with stage 4 chronic kidney disease, without long-term cur

## 2020-11-20 ENCOUNTER — TELEPHONE (OUTPATIENT)
Dept: NEPHROLOGY | Facility: CLINIC | Age: 70
End: 2020-11-20

## 2020-11-20 NOTE — TELEPHONE ENCOUNTER
Patient states that on AVS from hospital, it says he will begin dialysis 11/20 at 2:15 at [address] renal [facility]. States he hasn't been able to get ahold of anyone there to confirm.      Spoke with US renal [facility] and confirmed patient is on schedule for 2

## 2020-12-11 ENCOUNTER — LAB ENCOUNTER (OUTPATIENT)
Dept: LAB | Facility: HOSPITAL | Age: 70
End: 2020-12-11
Attending: INTERNAL MEDICINE
Payer: MEDICARE

## 2020-12-11 DIAGNOSIS — R06.00 DYSPNEA ON EXERTION: ICD-10-CM

## 2020-12-14 ENCOUNTER — HOSPITAL ENCOUNTER (OUTPATIENT)
Dept: RESPIRATORY THERAPY | Facility: HOSPITAL | Age: 70
Discharge: HOME | End: 2020-12-14
Attending: INTERNAL MEDICINE
Payer: MEDICARE

## 2020-12-14 DIAGNOSIS — R06.00 DYSPNEA ON EXERTION: ICD-10-CM

## 2020-12-14 PROCEDURE — 94060 EVALUATION OF WHEEZING: CPT | Performed by: INTERNAL MEDICINE

## 2020-12-14 PROCEDURE — 94729 DIFFUSING CAPACITY: CPT | Performed by: INTERNAL MEDICINE

## 2020-12-14 PROCEDURE — 94726 PLETHYSMOGRAPHY LUNG VOLUMES: CPT | Performed by: INTERNAL MEDICINE

## 2020-12-14 NOTE — PROCEDURES
[facility]    Patient's Name Chikis Ferrer MRN [id number]    1950 Pulmonologist Miguel A Cortez MD   Location [facility] PCP Dorian Stanley MD     IMPRESSION:    The PFTs are Abnormal.    There is no airway

## 2020-12-29 ENCOUNTER — OFFICE VISIT (OUTPATIENT)
Dept: INTERNAL MEDICINE CLINIC | Facility: CLINIC | Age: 70
End: 2020-12-29
Payer: MEDICARE

## 2020-12-29 VITALS
DIASTOLIC BLOOD PRESSURE: 66 MMHG | WEIGHT: 276 LBS | SYSTOLIC BLOOD PRESSURE: 142 MMHG | HEIGHT: 77 IN | HEART RATE: 77 BPM | RESPIRATION RATE: 17 BRPM | OXYGEN SATURATION: 99 % | BODY MASS INDEX: 32.59 KG/M2 | TEMPERATURE: 98 F

## 2020-12-29 DIAGNOSIS — I10 ESSENTIAL HYPERTENSION: ICD-10-CM

## 2020-12-29 DIAGNOSIS — R06.02 SOB (SHORTNESS OF BREATH) ON EXERTION: ICD-10-CM

## 2020-12-29 DIAGNOSIS — E11.22 TYPE 2 DIABETES MELLITUS WITH STAGE 4 CHRONIC KIDNEY DISEASE, WITHOUT LONG-TERM CURRENT USE OF INSULIN (HCC): Primary | ICD-10-CM

## 2020-12-29 DIAGNOSIS — N18.4 TYPE 2 DIABETES MELLITUS WITH STAGE 4 CHRONIC KIDNEY DISEASE, WITHOUT LONG-TERM CURRENT USE OF INSULIN (HCC): Primary | ICD-10-CM

## 2020-12-29 DIAGNOSIS — Z99.2 ESRD (END STAGE RENAL DISEASE) ON DIALYSIS (HCC): ICD-10-CM

## 2020-12-29 DIAGNOSIS — N18.6 ESRD (END STAGE RENAL DISEASE) ON DIALYSIS (HCC): ICD-10-CM

## 2020-12-29 LAB
EST. AVERAGE GLUCOSE BLD GHB EST-MCNC: 151 MG/DL (ref 68–126)
HBA1C MFR BLD HPLC: 6.9 % (ref ?–5.7)

## 2020-12-29 PROCEDURE — 90732 PPSV23 VACC 2 YRS+ SUBQ/IM: CPT | Performed by: INTERNAL MEDICINE

## 2020-12-29 PROCEDURE — G0009 ADMIN PNEUMOCOCCAL VACCINE: HCPCS | Performed by: INTERNAL MEDICINE

## 2020-12-29 PROCEDURE — 99214 OFFICE O/P EST MOD 30 MIN: CPT | Performed by: INTERNAL MEDICINE

## 2020-12-29 PROCEDURE — 36415 COLL VENOUS BLD VENIPUNCTURE: CPT | Performed by: INTERNAL MEDICINE

## 2020-12-29 NOTE — PROGRESS NOTES
HPI:    Patient ID: Je Love is a 78year old male.     HPI  Patient comes in for follow-up overall is doing okay denies any new complaints continues to be short of breath with exertion so far work-up is all been negative patient had pulmonary functi Tablet 24 Hr Take 50 mg by mouth daily. • atorvastatin 20 MG Oral Tab Take 1 tablet (20 mg total) by mouth nightly. 90 tablet 1   • amLODIPine Besylate 10 MG Oral Tab Take 1 tablet (10 mg total) by mouth daily.  30 tablet 3   • aspirin 81 MG Oral Tab da Encounter      Hemoglobin A1C [E]      PNEUMOCOCCAL IMM, 23      Meds This Visit:  Requested Prescriptions      No prescriptions requested or ordered in this encounter       Imaging & Referrals:  OPHTHALMOLOGY - INTERNAL  PNEUMOCOCCAL IMM (PNEUMOVAX)

## 2021-01-04 ENCOUNTER — TELEPHONE (OUTPATIENT)
Dept: NEPHROLOGY | Facility: CLINIC | Age: 71
End: 2021-01-04

## 2021-02-01 DIAGNOSIS — Z23 NEED FOR VACCINATION: ICD-10-CM

## 2021-03-24 RX ORDER — AMLODIPINE BESYLATE 10 MG/1
TABLET ORAL
Qty: 30 TABLET | Refills: 0 | Status: SHIPPED | OUTPATIENT
Start: 2021-03-24 | End: 2021-06-25

## 2021-03-31 ENCOUNTER — OFFICE VISIT (OUTPATIENT)
Dept: INTERNAL MEDICINE CLINIC | Facility: CLINIC | Age: 71
End: 2021-03-31
Payer: MEDICARE

## 2021-03-31 VITALS
WEIGHT: 275 LBS | OXYGEN SATURATION: 99 % | HEART RATE: 72 BPM | DIASTOLIC BLOOD PRESSURE: 68 MMHG | BODY MASS INDEX: 32.47 KG/M2 | RESPIRATION RATE: 17 BRPM | TEMPERATURE: 98 F | SYSTOLIC BLOOD PRESSURE: 130 MMHG | HEIGHT: 77 IN

## 2021-03-31 DIAGNOSIS — M10.9 ACUTE GOUT OF RIGHT ANKLE, UNSPECIFIED CAUSE: Primary | ICD-10-CM

## 2021-03-31 DIAGNOSIS — Z53.20 COLONOSCOPY REFUSED: ICD-10-CM

## 2021-03-31 LAB — URATE SERPL-MCNC: 2.6 MG/DL

## 2021-03-31 PROCEDURE — 36415 COLL VENOUS BLD VENIPUNCTURE: CPT | Performed by: INTERNAL MEDICINE

## 2021-03-31 PROCEDURE — 99213 OFFICE O/P EST LOW 20 MIN: CPT | Performed by: INTERNAL MEDICINE

## 2021-03-31 RX ORDER — ALLOPURINOL 100 MG/1
100 TABLET ORAL DAILY
Qty: 30 TABLET | Refills: 2 | Status: SHIPPED | OUTPATIENT
Start: 2021-03-31

## 2021-03-31 RX ORDER — PREDNISONE 10 MG/1
10 TABLET ORAL 2 TIMES DAILY
Qty: 10 TABLET | Refills: 0 | Status: SHIPPED | OUTPATIENT
Start: 2021-03-31 | End: 2021-04-05

## 2021-03-31 NOTE — PROGRESS NOTES
HPI/Subjective:     Patient ID: Saurabh Andrew is a 78year old male. HPI  Comes in today with complaint of right ankle pain tenderness swelling thinks his gout flaring up. He has not had a flareup for some time now. Patient not taking allopurinol. mouth daily.  45 tablet 0   • mupirocin 2 % External Ointment Apply once daily and cover with band aid 1 Tube 1   • GLIMEPIRIDE 2 MG Oral Tab Take 1 tablet by mouth once daily with breakfast 90 tablet 0   • Metoprolol Succinate ER 50 MG Oral Tablet 24 Hr Ta Cervical back: Normal range of motion and neck supple. Comments: Rt ankle    Skin:     General: Skin is warm and dry. Neurological:      Mental Status: He is alert and oriented to person, place, and time.       Deep Tendon Reflexes: Reflexes are norm

## 2021-04-16 ENCOUNTER — MED REC SCAN ONLY (OUTPATIENT)
Dept: INTERNAL MEDICINE CLINIC | Facility: CLINIC | Age: 71
End: 2021-04-16

## 2021-04-19 ENCOUNTER — OFFICE VISIT (OUTPATIENT)
Dept: INTERNAL MEDICINE CLINIC | Facility: CLINIC | Age: 71
End: 2021-04-19
Payer: MEDICARE

## 2021-04-19 VITALS
WEIGHT: 276 LBS | TEMPERATURE: 98 F | HEIGHT: 77 IN | HEART RATE: 76 BPM | OXYGEN SATURATION: 99 % | RESPIRATION RATE: 18 BRPM | BODY MASS INDEX: 32.59 KG/M2 | SYSTOLIC BLOOD PRESSURE: 132 MMHG | DIASTOLIC BLOOD PRESSURE: 72 MMHG

## 2021-04-19 DIAGNOSIS — Z01.818 PRE-OP EXAM: Primary | ICD-10-CM

## 2021-04-19 PROCEDURE — 93000 ELECTROCARDIOGRAM COMPLETE: CPT | Performed by: INTERNAL MEDICINE

## 2021-04-19 PROCEDURE — 99214 OFFICE O/P EST MOD 30 MIN: CPT | Performed by: INTERNAL MEDICINE

## 2021-04-19 PROCEDURE — 36415 COLL VENOUS BLD VENIPUNCTURE: CPT | Performed by: INTERNAL MEDICINE

## 2021-04-19 NOTE — PROGRESS NOTES
HPI/Subjective:   Patient ID: Claudia Brooks is a 73year old male. HPI  Patient comes in today for preop clearance he is having a fistula done on the left. Right now he is using permacath.   Overall doing okay continues to complain of soreness of senthil ANAPHYLAXIS    Objective:   Physical Exam  Vitals and nursing note reviewed. Constitutional:       Appearance: He is well-developed. HENT:      Head: Normocephalic and atraumatic.       Right Ear: External ear normal.      Left Ear: External ear nor

## 2021-04-20 ENCOUNTER — TELEPHONE (OUTPATIENT)
Dept: INTERNAL MEDICINE CLINIC | Facility: CLINIC | Age: 71
End: 2021-04-20

## 2021-06-24 RX ORDER — HYDRALAZINE HYDROCHLORIDE 25 MG/1
TABLET, FILM COATED ORAL
Qty: 90 TABLET | Refills: 0 | Status: SHIPPED | OUTPATIENT
Start: 2021-06-24

## 2021-06-25 RX ORDER — AMLODIPINE BESYLATE 10 MG/1
10 TABLET ORAL DAILY
Qty: 30 TABLET | Refills: 0 | Status: SHIPPED | OUTPATIENT
Start: 2021-06-25

## 2021-06-25 NOTE — TELEPHONE ENCOUNTER
Pharmacy states they sent a fax over yesterday for a refill on this med and is checking the status.    Medication Quantity Refills Start End   AMLODIPINE BESYLATE 10 MG Oral Tab 30 tablet 0 3/24/2021    Sig:   Take 1 tablet by mouth once daily     Route:

## 2021-07-16 ENCOUNTER — PATIENT MESSAGE (OUTPATIENT)
Dept: NEPHROLOGY | Facility: CLINIC | Age: 71
End: 2021-07-16

## 2021-07-19 ENCOUNTER — TELEPHONE (OUTPATIENT)
Dept: NEPHROLOGY | Facility: CLINIC | Age: 71
End: 2021-07-19

## 2021-07-19 RX ORDER — CLONAZEPAM 0.5 MG/1
0.5 TABLET ORAL 2 TIMES DAILY PRN
Qty: 30 TABLET | Refills: 2 | Status: SHIPPED | OUTPATIENT
Start: 2021-07-19

## 2021-07-19 RX ORDER — CLONAZEPAM 0.5 MG/1
0.5 TABLET, ORALLY DISINTEGRATING ORAL NIGHTLY PRN
Qty: 30 TABLET | Refills: 2 | Status: SHIPPED | OUTPATIENT
Start: 2021-07-19

## 2021-07-19 NOTE — TELEPHONE ENCOUNTER
Dr. Steve Ludwig, pt requesting sleeping pill. Do not see one on med list. [id number] Anuradha Jain to order? LOV 9/4/20. RTC in 1 month. No F/U scheduled.

## 2021-07-19 NOTE — TELEPHONE ENCOUNTER
From: Melanie Watson  To: Ryan Anderson MD  Sent: 7/16/2021 7:16 PM CDT  Subject: Prescription Question    Just a reminder to send a sleeping pill to my pharmacist [facility]

## 2021-07-19 NOTE — TELEPHONE ENCOUNTER
Spoke to Pharmacist. Does Dr. Tammy Hoffman want dissolvable tablet or just regular tablet. If regular Pharmacist wants a new prescription.

## 2021-08-30 ENCOUNTER — TELEPHONE (OUTPATIENT)
Dept: NEPHROLOGY | Facility: CLINIC | Age: 71
End: 2021-08-30

## 2021-08-30 RX ORDER — CLONAZEPAM 1 MG/1
1 TABLET ORAL NIGHTLY PRN
Qty: 30 TABLET | Refills: 2 | Status: SHIPPED | OUTPATIENT
Start: 2021-08-30

## 2021-08-30 RX ORDER — CLONAZEPAM 0.5 MG/1
0.5 TABLET, ORALLY DISINTEGRATING ORAL NIGHTLY PRN
Qty: 30 TABLET | Refills: 2 | OUTPATIENT
Start: 2021-08-30

## 2021-11-10 ENCOUNTER — OFFICE VISIT (OUTPATIENT)
Dept: INTERNAL MEDICINE CLINIC | Facility: CLINIC | Age: 71
End: 2021-11-10
Payer: MEDICARE

## 2021-11-10 VITALS
DIASTOLIC BLOOD PRESSURE: 56 MMHG | RESPIRATION RATE: 17 BRPM | TEMPERATURE: 98 F | OXYGEN SATURATION: 97 % | SYSTOLIC BLOOD PRESSURE: 118 MMHG | BODY MASS INDEX: 31.64 KG/M2 | HEART RATE: 62 BPM | HEIGHT: 77 IN | WEIGHT: 268 LBS

## 2021-11-10 DIAGNOSIS — Z00.00 PREVENTATIVE HEALTH CARE: ICD-10-CM

## 2021-11-10 DIAGNOSIS — N18.6 ESRD (END STAGE RENAL DISEASE) ON DIALYSIS (HCC): ICD-10-CM

## 2021-11-10 DIAGNOSIS — R10.9 ACUTE LEFT FLANK PAIN: ICD-10-CM

## 2021-11-10 DIAGNOSIS — I10 ESSENTIAL HYPERTENSION: ICD-10-CM

## 2021-11-10 DIAGNOSIS — N18.4 TYPE 2 DIABETES MELLITUS WITH STAGE 4 CHRONIC KIDNEY DISEASE, WITHOUT LONG-TERM CURRENT USE OF INSULIN (HCC): Primary | ICD-10-CM

## 2021-11-10 DIAGNOSIS — Z99.2 ESRD (END STAGE RENAL DISEASE) ON DIALYSIS (HCC): ICD-10-CM

## 2021-11-10 DIAGNOSIS — E11.22 TYPE 2 DIABETES MELLITUS WITH STAGE 4 CHRONIC KIDNEY DISEASE, WITHOUT LONG-TERM CURRENT USE OF INSULIN (HCC): Primary | ICD-10-CM

## 2021-11-10 DIAGNOSIS — Z87.442 HISTORY OF KIDNEY STONES: ICD-10-CM

## 2021-11-10 PROCEDURE — 99214 OFFICE O/P EST MOD 30 MIN: CPT | Performed by: INTERNAL MEDICINE

## 2021-11-10 PROCEDURE — 36415 COLL VENOUS BLD VENIPUNCTURE: CPT | Performed by: INTERNAL MEDICINE

## 2021-11-13 NOTE — PROGRESS NOTES
Subjective:     Patient ID: Alexx Culp is a 73year old male.     HPI  Patient comes in for follow-up for left flank pain for few days patient concerned because she has had kidney stones before no fever no chills also some left shoulder pain is on and atorvastatin 20 MG Oral Tab Take 1 tablet (20 mg total) by mouth nightly. 90 tablet 1   • aspirin 81 MG Oral Tab daily. • Vitamin B-12 1000 MCG Oral Tab daily.          Allergies:  Lidocaine               ANAPHYLAXIS    Past Medical History:   Diagnos current treatment watch diet take medication  ESRD (end stage renal disease) on dialysis [facility]) continue with hemodialysis   Acute left flank pain CT scan to rule out kidney stone  History of kidney stones as above  Preventative health care with GI for scre

## 2021-11-24 ENCOUNTER — PATIENT OUTREACH (OUTPATIENT)
Dept: CASE MANAGEMENT | Age: 71
End: 2021-11-24

## 2021-12-01 ENCOUNTER — HOSPITAL ENCOUNTER (OUTPATIENT)
Dept: CT IMAGING | Facility: HOSPITAL | Age: 71
Discharge: HOME | End: 2021-12-01
Attending: INTERNAL MEDICINE
Payer: MEDICARE

## 2021-12-01 DIAGNOSIS — Z87.442 HISTORY OF KIDNEY STONES: ICD-10-CM

## 2021-12-01 DIAGNOSIS — R10.9 ACUTE LEFT FLANK PAIN: ICD-10-CM

## 2021-12-01 DIAGNOSIS — R10.9 FLANK PAIN: Primary | ICD-10-CM

## 2021-12-01 PROCEDURE — 74176 CT ABD & PELVIS W/O CONTRAST: CPT | Performed by: INTERNAL MEDICINE

## 2022-03-08 RX ORDER — HYDRALAZINE HYDROCHLORIDE 25 MG/1
TABLET, FILM COATED ORAL
Qty: 90 TABLET | Refills: 0 | Status: SHIPPED | OUTPATIENT
Start: 2022-03-08

## 2022-03-09 RX ORDER — CLONAZEPAM 0.5 MG/1
TABLET ORAL
Qty: 30 TABLET | Refills: 0 | Status: SHIPPED | OUTPATIENT
Start: 2022-03-09

## 2022-03-09 RX ORDER — AMLODIPINE BESYLATE 10 MG/1
TABLET ORAL
Qty: 30 TABLET | Refills: 0 | Status: SHIPPED | OUTPATIENT
Start: 2022-03-09

## 2022-03-23 ENCOUNTER — PATIENT MESSAGE (OUTPATIENT)
Dept: NEPHROLOGY | Facility: CLINIC | Age: 72
End: 2022-03-23

## 2022-03-23 NOTE — TELEPHONE ENCOUNTER
From: Merlene Vu  To: Main Marsh MD  Sent: 3/23/2022 10:22 AM CDT  Subject: Sleeping    I'm still not sleeping in my dialysis sessions the medicine you give me does not work can you up the dosage or try something new (clonazepam 5mg)

## 2022-03-30 RX ORDER — CLONAZEPAM 1 MG/1
1 TABLET ORAL NIGHTLY PRN
Qty: 30 TABLET | Refills: 2 | Status: SHIPPED | OUTPATIENT
Start: 2022-03-30

## 2022-06-07 NOTE — TELEPHONE ENCOUNTER
Please review; protocol failed/ no protocol.     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 associated with type 2 diabetes mellitus [facility])    Ophthalmology - [facility], [address] [place] [address]    Office Visit    6 months ago Type 2 diabetes mellitus with stage 4 chronic kidney disease, without long-term current use of insulin [facility])    Josue Anguiano, Artelia Olszewski, MD    Office Visit    1 year ago Pre-op exam    Valery Junior MD    Office Visit    1 year ago Acute gout of right ankle, unspecified cause    Valery Junior MD    Office Visit    1 year ago Type 2 diabetes mellitus with stage 4 chronic kidney disease, without long-term current use of insulin [facility])    [facility], [facility], [address], Jarad Rajan MD    Office Visit

## 2022-06-08 RX ORDER — HYDRALAZINE HYDROCHLORIDE 25 MG/1
25 TABLET, FILM COATED ORAL 3 TIMES DAILY
Qty: 90 TABLET | Refills: 1 | Status: SHIPPED | OUTPATIENT
Start: 2022-06-08

## 2022-06-20 ENCOUNTER — OFFICE VISIT (OUTPATIENT)
Dept: INTERNAL MEDICINE CLINIC | Facility: CLINIC | Age: 72
End: 2022-06-20
Payer: MEDICARE

## 2022-06-20 DIAGNOSIS — Z12.5 ENCOUNTER FOR SCREENING FOR MALIGNANT NEOPLASM OF PROSTATE: ICD-10-CM

## 2022-06-20 DIAGNOSIS — B35.1 ONYCHOMYCOSIS: ICD-10-CM

## 2022-06-20 DIAGNOSIS — D63.1 ANEMIA DUE TO CHRONIC KIDNEY DISEASE, ON CHRONIC DIALYSIS (HCC): ICD-10-CM

## 2022-06-20 DIAGNOSIS — Z99.2 ANEMIA DUE TO CHRONIC KIDNEY DISEASE, ON CHRONIC DIALYSIS (HCC): ICD-10-CM

## 2022-06-20 DIAGNOSIS — N18.4 TYPE 2 DIABETES MELLITUS WITH STAGE 4 CHRONIC KIDNEY DISEASE, WITHOUT LONG-TERM CURRENT USE OF INSULIN (HCC): ICD-10-CM

## 2022-06-20 DIAGNOSIS — N25.81 SECONDARY HYPERPARATHYROIDISM (HCC): ICD-10-CM

## 2022-06-20 DIAGNOSIS — N18.6 ESRD (END STAGE RENAL DISEASE) ON DIALYSIS (HCC): ICD-10-CM

## 2022-06-20 DIAGNOSIS — Z87.442 HISTORY OF KIDNEY STONES: ICD-10-CM

## 2022-06-20 DIAGNOSIS — E11.22 TYPE 2 DIABETES MELLITUS WITH STAGE 4 CHRONIC KIDNEY DISEASE, WITHOUT LONG-TERM CURRENT USE OF INSULIN (HCC): ICD-10-CM

## 2022-06-20 DIAGNOSIS — Z00.00 ENCOUNTER FOR ANNUAL HEALTH EXAMINATION: ICD-10-CM

## 2022-06-20 DIAGNOSIS — Z99.2 ESRD (END STAGE RENAL DISEASE) ON DIALYSIS (HCC): ICD-10-CM

## 2022-06-20 DIAGNOSIS — Z00.00 MEDICARE ANNUAL WELLNESS VISIT, SUBSEQUENT: Primary | ICD-10-CM

## 2022-06-20 DIAGNOSIS — N18.6 ANEMIA DUE TO CHRONIC KIDNEY DISEASE, ON CHRONIC DIALYSIS (HCC): ICD-10-CM

## 2022-06-20 DIAGNOSIS — Z00.00 PREVENTATIVE HEALTH CARE: ICD-10-CM

## 2022-06-20 DIAGNOSIS — I10 ESSENTIAL HYPERTENSION: ICD-10-CM

## 2022-06-20 DIAGNOSIS — R06.00 DYSPNEA ON EXERTION: ICD-10-CM

## 2022-06-20 RX ORDER — CLONAZEPAM 1 MG/1
1 TABLET ORAL NIGHTLY PRN
Qty: 30 TABLET | Refills: 2 | Status: SHIPPED | OUTPATIENT
Start: 2022-06-20

## 2022-06-20 RX ORDER — SEVELAMER CARBONATE 800 MG/1
800 TABLET, FILM COATED ORAL
Qty: 90 TABLET | Refills: 2 | Status: SHIPPED | OUTPATIENT
Start: 2022-06-20

## 2022-06-20 RX ORDER — MECLIZINE HYDROCHLORIDE 25 MG/1
25 TABLET ORAL 3 TIMES DAILY PRN
Qty: 30 TABLET | Refills: 0 | Status: SHIPPED | OUTPATIENT
Start: 2022-06-20

## 2022-06-20 RX ORDER — METOPROLOL SUCCINATE 50 MG/1
50 TABLET, EXTENDED RELEASE ORAL DAILY
Qty: 90 TABLET | Refills: 1 | Status: SHIPPED | OUTPATIENT
Start: 2022-06-20

## 2022-06-20 RX ORDER — ATORVASTATIN CALCIUM 20 MG/1
20 TABLET, FILM COATED ORAL NIGHTLY
Qty: 90 TABLET | Refills: 1 | Status: SHIPPED | OUTPATIENT
Start: 2022-06-20

## 2022-06-20 RX ORDER — LISINOPRIL 20 MG/1
20 TABLET ORAL DAILY
Qty: 30 TABLET | Refills: 2 | Status: SHIPPED | OUTPATIENT
Start: 2022-06-20

## 2022-06-22 ENCOUNTER — APPOINTMENT (OUTPATIENT)
Dept: CT IMAGING | Facility: HOSPITAL | Age: 72
End: 2022-06-22
Payer: MEDICARE

## 2022-06-22 ENCOUNTER — HOSPITAL ENCOUNTER (EMERGENCY)
Facility: HOSPITAL | Age: 72
Discharge: HOME | End: 2022-06-22
Payer: MEDICARE

## 2022-06-22 ENCOUNTER — APPOINTMENT (OUTPATIENT)
Dept: GENERAL RADIOLOGY | Facility: HOSPITAL | Age: 72
End: 2022-06-22
Attending: EMERGENCY MEDICINE
Payer: MEDICARE

## 2022-06-22 ENCOUNTER — APPOINTMENT (OUTPATIENT)
Dept: CT IMAGING | Facility: HOSPITAL | Age: 72
End: 2022-06-22
Attending: EMERGENCY MEDICINE
Payer: MEDICARE

## 2022-06-22 VITALS
HEART RATE: 60 BPM | TEMPERATURE: 98 F | RESPIRATION RATE: 21 BRPM | OXYGEN SATURATION: 95 % | SYSTOLIC BLOOD PRESSURE: 173 MMHG | DIASTOLIC BLOOD PRESSURE: 74 MMHG

## 2022-06-22 DIAGNOSIS — S09.93XA DENTAL TRAUMA, INITIAL ENCOUNTER: ICD-10-CM

## 2022-06-22 DIAGNOSIS — S00.81XA ABRASION OF FACE, INITIAL ENCOUNTER: ICD-10-CM
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
## 2022-06-22 LAB — GLUCOSE BLDC GLUCOMTR-MCNC: 185 MG/DL (ref 70–99)

## 2022-06-22 PROCEDURE — 72125 CT NECK SPINE W/O DYE: CPT

## 2022-06-22 PROCEDURE — 71101 X-RAY EXAM UNILAT RIBS/CHEST: CPT | Performed by: EMERGENCY MEDICINE

## 2022-06-22 PROCEDURE — 99284 EMERGENCY DEPT VISIT MOD MDM: CPT

## 2022-06-22 PROCEDURE — 93005 ELECTROCARDIOGRAM TRACING: CPT

## 2022-06-22 PROCEDURE — 90471 IMMUNIZATION ADMIN: CPT

## 2022-06-22 PROCEDURE — 70486 CT MAXILLOFACIAL W/O DYE: CPT | Performed by: EMERGENCY MEDICINE

## 2022-06-22 PROCEDURE — 82962 GLUCOSE BLOOD TEST: CPT

## 2022-06-22 PROCEDURE — 93010 ELECTROCARDIOGRAM REPORT: CPT | Performed by: INTERNAL MEDICINE

## 2022-06-22 PROCEDURE — 70450 CT HEAD/BRAIN W/O DYE: CPT

## 2022-06-22 RX ORDER — AMOXICILLIN AND CLAVULANATE POTASSIUM 875; 125 MG/1; MG/1
875 TABLET, FILM COATED ORAL ONCE
Status: COMPLETED | OUTPATIENT
Start: 2022-06-22 | End: 2022-06-22

## 2022-06-22 RX ORDER — AMOXICILLIN AND CLAVULANATE POTASSIUM 875; 125 MG/1; MG/1
1 TABLET, FILM COATED ORAL 2 TIMES DAILY
Qty: 10 TABLET | Refills: 0 | Status: SHIPPED | OUTPATIENT
Start: 2022-06-22 | End: 2022-06-27

## 2022-06-22 RX ORDER — ONDANSETRON 4 MG/1
4 TABLET, ORALLY DISINTEGRATING ORAL ONCE
Status: COMPLETED | OUTPATIENT
Start: 2022-06-22 | End: 2022-06-22

## 2022-06-22 RX ORDER — CHLORHEXIDINE GLUCONATE 0.12 MG/ML
15 RINSE ORAL 2 TIMES DAILY
Qty: 210 ML | Refills: 0 | Status: SHIPPED | OUTPATIENT
Start: 2022-06-22 | End: 2022-06-29

## 2022-06-27 VITALS
RESPIRATION RATE: 18 BRPM | TEMPERATURE: 98 F | HEART RATE: 78 BPM | BODY MASS INDEX: 30.58 KG/M2 | OXYGEN SATURATION: 98 % | HEIGHT: 77 IN | WEIGHT: 259 LBS | DIASTOLIC BLOOD PRESSURE: 78 MMHG | SYSTOLIC BLOOD PRESSURE: 138 MMHG

## 2022-06-27 PROBLEM — N18.5 STAGE 5 CHRONIC KIDNEY DISEASE NOT ON CHRONIC DIALYSIS (HCC): Status: RESOLVED | Noted: 2020-11-12 | Resolved: 2022-06-27

## 2022-06-27 PROBLEM — R07.9 ACUTE CHEST PAIN: Status: RESOLVED | Noted: 2020-03-03 | Resolved: 2022-06-27

## 2022-06-27 PROBLEM — R94.39 ABNORMAL STRESS TEST: Status: RESOLVED | Noted: 2020-02-03 | Resolved: 2022-06-27

## 2022-06-27 PROBLEM — N20.0 CALCULUS OF KIDNEY: Status: RESOLVED | Noted: 2020-03-16 | Resolved: 2022-06-27

## 2022-06-27 PROBLEM — R06.02 SOB (SHORTNESS OF BREATH) ON EXERTION: Status: RESOLVED | Noted: 2020-02-03 | Resolved: 2022-06-27

## 2022-06-27 PROBLEM — N18.30 CHRONIC KIDNEY DISEASE, STAGE 3 (MODERATE): Status: RESOLVED | Noted: 2020-03-16 | Resolved: 2022-06-27

## 2022-06-27 PROBLEM — N18.4 STAGE 4 CHRONIC KIDNEY DISEASE (HCC): Status: RESOLVED | Noted: 2020-01-30 | Resolved: 2022-06-27

## 2022-06-27 PROBLEM — L97.529 ULCER OF LEFT FOOT, UNSPECIFIED ULCER STAGE (HCC): Status: ACTIVE | Noted: 2022-06-27

## 2022-06-27 PROBLEM — L97.529 ULCER OF LEFT FOOT, UNSPECIFIED ULCER STAGE (HCC): Status: RESOLVED | Noted: 2022-06-27 | Resolved: 2022-06-27

## 2022-06-27 PROBLEM — N18.5 CKD (CHRONIC KIDNEY DISEASE) STAGE 5, GFR LESS THAN 15 ML/MIN (HCC): Status: RESOLVED | Noted: 2020-03-03 | Resolved: 2022-06-27

## 2022-10-04 ENCOUNTER — OFFICE VISIT (OUTPATIENT)
Dept: INTERNAL MEDICINE CLINIC | Facility: CLINIC | Age: 72
End: 2022-10-04
Payer: MEDICARE

## 2022-10-04 VITALS
DIASTOLIC BLOOD PRESSURE: 73 MMHG | WEIGHT: 253 LBS | HEIGHT: 77 IN | BODY MASS INDEX: 29.87 KG/M2 | SYSTOLIC BLOOD PRESSURE: 172 MMHG | HEART RATE: 84 BPM | RESPIRATION RATE: 16 BRPM

## 2022-10-04 DIAGNOSIS — R63.4 WEIGHT LOSS: ICD-10-CM

## 2022-10-04 DIAGNOSIS — R19.8 ABDOMINAL FULLNESS: ICD-10-CM

## 2022-10-04 DIAGNOSIS — R10.32 LEFT LOWER QUADRANT PAIN: Primary | ICD-10-CM

## 2022-10-04 DIAGNOSIS — R19.7 DIARRHEA, UNSPECIFIED TYPE: ICD-10-CM

## 2022-10-04 PROCEDURE — 1126F AMNT PAIN NOTED NONE PRSNT: CPT | Performed by: NURSE PRACTITIONER

## 2022-10-04 PROCEDURE — 99214 OFFICE O/P EST MOD 30 MIN: CPT | Performed by: NURSE PRACTITIONER

## 2022-10-04 RX ORDER — PANTOPRAZOLE SODIUM 40 MG/1
40 TABLET, DELAYED RELEASE ORAL
Qty: 30 TABLET | Refills: 0 | Status: SHIPPED | OUTPATIENT
Start: 2022-10-04

## 2022-10-04 RX ORDER — AMLODIPINE BESYLATE 10 MG/1
10 TABLET ORAL DAILY
COMMUNITY
Start: 2022-09-16

## 2022-10-04 RX ORDER — GLIPIZIDE 10 MG/1
TABLET ORAL
COMMUNITY

## 2022-10-04 RX ORDER — HYDRALAZINE HYDROCHLORIDE 25 MG/1
25 TABLET, FILM COATED ORAL 3 TIMES DAILY
COMMUNITY
Start: 2022-07-09

## 2022-10-07 ENCOUNTER — LAB ENCOUNTER (OUTPATIENT)
Dept: LAB | Age: 72
End: 2022-10-07
Attending: NURSE PRACTITIONER
Payer: MEDICARE

## 2022-10-07 DIAGNOSIS — R10.32 LEFT LOWER QUADRANT PAIN: ICD-10-CM

## 2022-10-07 DIAGNOSIS — Z00.00 PREVENTATIVE HEALTH CARE: ICD-10-CM

## 2022-10-07 DIAGNOSIS — D63.1 ANEMIA DUE TO CHRONIC KIDNEY DISEASE, ON CHRONIC DIALYSIS (HCC): ICD-10-CM

## 2022-10-07 DIAGNOSIS — Z99.2 ESRD (END STAGE RENAL DISEASE) ON DIALYSIS (HCC): ICD-10-CM

## 2022-10-07 DIAGNOSIS — E11.22 TYPE 2 DIABETES MELLITUS WITH STAGE 4 CHRONIC KIDNEY DISEASE, WITHOUT LONG-TERM CURRENT USE OF INSULIN (HCC): ICD-10-CM

## 2022-10-07 DIAGNOSIS — I10 ESSENTIAL HYPERTENSION: ICD-10-CM

## 2022-10-07 DIAGNOSIS — Z87.442 HISTORY OF KIDNEY STONES: ICD-10-CM

## 2022-10-07 DIAGNOSIS — N18.4 TYPE 2 DIABETES MELLITUS WITH STAGE 4 CHRONIC KIDNEY DISEASE, WITHOUT LONG-TERM CURRENT USE OF INSULIN (HCC): ICD-10-CM

## 2022-10-07 DIAGNOSIS — Z00.00 MEDICARE ANNUAL WELLNESS VISIT, SUBSEQUENT: ICD-10-CM

## 2022-10-07 DIAGNOSIS — N18.6 ESRD (END STAGE RENAL DISEASE) ON DIALYSIS (HCC): ICD-10-CM

## 2022-10-07 DIAGNOSIS — B35.1 ONYCHOMYCOSIS: ICD-10-CM

## 2022-10-07 DIAGNOSIS — Z99.2 ANEMIA DUE TO CHRONIC KIDNEY DISEASE, ON CHRONIC DIALYSIS (HCC): ICD-10-CM

## 2022-10-07 DIAGNOSIS — Z12.5 ENCOUNTER FOR SCREENING FOR MALIGNANT NEOPLASM OF PROSTATE: ICD-10-CM

## 2022-10-07 DIAGNOSIS — N25.81 SECONDARY HYPERPARATHYROIDISM (HCC): ICD-10-CM

## 2022-10-07 DIAGNOSIS — N18.6 ANEMIA DUE TO CHRONIC KIDNEY DISEASE, ON CHRONIC DIALYSIS (HCC): ICD-10-CM

## 2022-10-07 LAB
ALBUMIN SERPL-MCNC: 3.6 G/DL (ref 3.4–5)
ALBUMIN/GLOB SERPL: 1 {RATIO} (ref 1–2)
ALP LIVER SERPL-CCNC: 83 U/L
ALT SERPL-CCNC: 29 U/L
ANION GAP SERPL CALC-SCNC: 8 MMOL/L (ref 0–18)
AST SERPL-CCNC: 17 U/L (ref 15–37)
BASOPHILS # BLD AUTO: 0.03 X10(3) UL (ref 0–0.2)
BASOPHILS NFR BLD AUTO: 0.8 %
BILIRUB SERPL-MCNC: 0.8 MG/DL (ref 0.1–2)
BUN BLD-MCNC: 13 MG/DL (ref 7–18)
BUN/CREAT SERPL: 3.2 (ref 10–20)
CALCIUM BLD-MCNC: 9.3 MG/DL (ref 8.5–10.1)
CHLORIDE SERPL-SCNC: 96 MMOL/L (ref 98–112)
CHOLEST SERPL-MCNC: 181 MG/DL (ref ?–200)
CO2 SERPL-SCNC: 36 MMOL/L (ref 21–32)
COMPLEXED PSA SERPL-MCNC: 0.71 NG/ML (ref ?–4)
CREAT BLD-MCNC: 4.05 MG/DL
DEPRECATED RDW RBC AUTO: 43.9 FL (ref 35.1–46.3)
EOSINOPHIL # BLD AUTO: 0.12 X10(3) UL (ref 0–0.7)
EOSINOPHIL NFR BLD AUTO: 3 %
ERYTHROCYTE [DISTWIDTH] IN BLOOD BY AUTOMATED COUNT: 13.6 % (ref 11–15)
EST. AVERAGE GLUCOSE BLD GHB EST-MCNC: 146 MG/DL (ref 68–126)
FASTING PATIENT LIPID ANSWER: YES
FASTING STATUS PATIENT QL REPORTED: YES
GFR SERPLBLD BASED ON 1.73 SQ M-ARVRAT: 15 ML/MIN/1.73M2 (ref 60–?)
GLOBULIN PLAS-MCNC: 3.7 G/DL (ref 2.8–4.4)
GLUCOSE BLD-MCNC: 134 MG/DL (ref 70–99)
HBA1C MFR BLD: 6.7 % (ref ?–5.7)
HCT VFR BLD AUTO: 32.6 %
HDLC SERPL-MCNC: 50 MG/DL (ref 40–59)
HGB BLD-MCNC: 10.7 G/DL
IMM GRANULOCYTES # BLD AUTO: 0.01 X10(3) UL (ref 0–1)
IMM GRANULOCYTES NFR BLD: 0.3 %
LDLC SERPL CALC-MCNC: 107 MG/DL (ref ?–100)
LIPASE SERPL-CCNC: 291 U/L (ref 73–393)
LYMPHOCYTES # BLD AUTO: 1.74 X10(3) UL (ref 1–4)
LYMPHOCYTES NFR BLD AUTO: 44.1 %
MCH RBC QN AUTO: 28.7 PG (ref 26–34)
MCHC RBC AUTO-ENTMCNC: 32.8 G/DL (ref 31–37)
MCV RBC AUTO: 87.4 FL
MONOCYTES # BLD AUTO: 0.35 X10(3) UL (ref 0.1–1)
MONOCYTES NFR BLD AUTO: 8.9 %
NEUTROPHILS # BLD AUTO: 1.7 X10 (3) UL (ref 1.5–7.7)
NEUTROPHILS # BLD AUTO: 1.7 X10(3) UL (ref 1.5–7.7)
NEUTROPHILS NFR BLD AUTO: 42.9 %
NONHDLC SERPL-MCNC: 131 MG/DL (ref ?–130)
OSMOLALITY SERPL CALC.SUM OF ELEC: 292 MOSM/KG (ref 275–295)
PLATELET # BLD AUTO: 220 10(3)UL (ref 150–450)
POTASSIUM SERPL-SCNC: 3.1 MMOL/L (ref 3.5–5.1)
PROT SERPL-MCNC: 7.3 G/DL (ref 6.4–8.2)
RBC # BLD AUTO: 3.73 X10(6)UL
SODIUM SERPL-SCNC: 140 MMOL/L (ref 136–145)
TRIGL SERPL-MCNC: 133 MG/DL (ref 30–149)
TSI SER-ACNC: 0.77 MIU/ML (ref 0.36–3.74)
VLDLC SERPL CALC-MCNC: 23 MG/DL (ref 0–30)
WBC # BLD AUTO: 4 X10(3) UL (ref 4–11)

## 2022-10-07 PROCEDURE — 83690 ASSAY OF LIPASE: CPT

## 2022-10-07 PROCEDURE — 85025 COMPLETE CBC W/AUTO DIFF WBC: CPT

## 2022-10-07 PROCEDURE — 84443 ASSAY THYROID STIM HORMONE: CPT

## 2022-10-07 PROCEDURE — 36415 COLL VENOUS BLD VENIPUNCTURE: CPT

## 2022-10-07 PROCEDURE — 80061 LIPID PANEL: CPT

## 2022-10-07 PROCEDURE — 83036 HEMOGLOBIN GLYCOSYLATED A1C: CPT

## 2022-10-07 PROCEDURE — 80053 COMPREHEN METABOLIC PANEL: CPT

## 2022-10-10 ENCOUNTER — LAB ENCOUNTER (OUTPATIENT)
Dept: LAB | Age: 72
End: 2022-10-10
Attending: INTERNAL MEDICINE
Payer: MEDICARE

## 2022-10-10 DIAGNOSIS — Z87.442 HISTORY OF KIDNEY STONES: ICD-10-CM

## 2022-10-10 DIAGNOSIS — Z00.00 MEDICARE ANNUAL WELLNESS VISIT, SUBSEQUENT: ICD-10-CM

## 2022-10-10 DIAGNOSIS — B35.1 ONYCHOMYCOSIS: ICD-10-CM

## 2022-10-10 DIAGNOSIS — Z00.00 PREVENTATIVE HEALTH CARE: ICD-10-CM

## 2022-10-10 DIAGNOSIS — N18.6 ANEMIA DUE TO CHRONIC KIDNEY DISEASE, ON CHRONIC DIALYSIS (HCC): ICD-10-CM

## 2022-10-10 DIAGNOSIS — R82.90 URINE FINDINGS ABNORMAL: ICD-10-CM

## 2022-10-10 DIAGNOSIS — N18.6 ESRD (END STAGE RENAL DISEASE) ON DIALYSIS (HCC): ICD-10-CM

## 2022-10-10 DIAGNOSIS — Z99.2 ESRD (END STAGE RENAL DISEASE) ON DIALYSIS (HCC): ICD-10-CM

## 2022-10-10 DIAGNOSIS — D63.1 ANEMIA DUE TO CHRONIC KIDNEY DISEASE, ON CHRONIC DIALYSIS (HCC): ICD-10-CM

## 2022-10-10 DIAGNOSIS — N18.4 TYPE 2 DIABETES MELLITUS WITH STAGE 4 CHRONIC KIDNEY DISEASE, WITHOUT LONG-TERM CURRENT USE OF INSULIN (HCC): ICD-10-CM

## 2022-10-10 DIAGNOSIS — E11.22 TYPE 2 DIABETES MELLITUS WITH STAGE 4 CHRONIC KIDNEY DISEASE, WITHOUT LONG-TERM CURRENT USE OF INSULIN (HCC): ICD-10-CM

## 2022-10-10 DIAGNOSIS — N25.81 SECONDARY HYPERPARATHYROIDISM (HCC): ICD-10-CM

## 2022-10-10 DIAGNOSIS — Z99.2 ANEMIA DUE TO CHRONIC KIDNEY DISEASE, ON CHRONIC DIALYSIS (HCC): ICD-10-CM

## 2022-10-10 DIAGNOSIS — R82.90 URINE FINDINGS ABNORMAL: Primary | ICD-10-CM

## 2022-10-10 DIAGNOSIS — I10 ESSENTIAL HYPERTENSION: ICD-10-CM

## 2022-10-10 LAB
BILIRUB UR QL: NEGATIVE
CLARITY UR: CLEAR
COLOR UR: YELLOW
CREAT UR-SCNC: 89 MG/DL
GLUCOSE UR-MCNC: 150 MG/DL
HGB UR QL STRIP.AUTO: NEGATIVE
KETONES UR-MCNC: NEGATIVE MG/DL
LEUKOCYTE ESTERASE UR QL STRIP.AUTO: NEGATIVE
MICROALBUMIN UR-MCNC: 52.8 MG/DL
MICROALBUMIN/CREAT 24H UR-RTO: 593.3 UG/MG (ref ?–30)
NITRITE UR QL STRIP.AUTO: NEGATIVE
PH UR: 9 [PH] (ref 5–8)
PROT UR-MCNC: >=500 MG/DL
SP GR UR STRIP: 1.01 (ref 1–1.03)
UROBILINOGEN UR STRIP-ACNC: <2
VIT C UR-MCNC: NEGATIVE MG/DL

## 2022-10-10 PROCEDURE — 82570 ASSAY OF URINE CREATININE: CPT

## 2022-10-10 PROCEDURE — 87086 URINE CULTURE/COLONY COUNT: CPT

## 2022-10-10 PROCEDURE — 82043 UR ALBUMIN QUANTITATIVE: CPT

## 2022-10-10 PROCEDURE — 81001 URINALYSIS AUTO W/SCOPE: CPT

## 2022-10-13 ENCOUNTER — HOSPITAL ENCOUNTER (OUTPATIENT)
Dept: CT IMAGING | Facility: HOSPITAL | Age: 72
Discharge: HOME | End: 2022-10-13
Attending: NURSE PRACTITIONER
Payer: MEDICARE

## 2022-10-13 DIAGNOSIS — R10.32 LEFT LOWER QUADRANT PAIN: ICD-10-CM

## 2022-10-13 DIAGNOSIS — R63.4 WEIGHT LOSS: ICD-10-CM

## 2022-10-13 PROCEDURE — 74176 CT ABD & PELVIS W/O CONTRAST: CPT | Performed by: NURSE PRACTITIONER

## 2022-10-14 NOTE — TELEPHONE ENCOUNTER
Please review. Protocol failed/ No protocol.     Requested Prescriptions   Pending Prescriptions Disp Refills    LISINOPRIL 20 MG Oral Tab [Pharmacy Med Name: Lisinopril 20 MG Oral Tablet] 30 tablet 0     Sig: Take 1 tablet by mouth once daily        Hypertensive Medications Protocol Failed - 10/13/2022  9:21 AM        Failed - Last BP reading less than 140/90     BP Readings from Last 1 Encounters:  10/04/22 : (!) 172/73                Failed - EGFRCR or GFRAA > 50     GFR Evaluation  EGFRCR: 15 , resulted on 10/7/2022            Passed - In person appointment in the past 12 or next 3 months       Recent Outpatient Visits              1 week ago Left lower quadrant pain    [facility], [address] [place], APRN    Office Visit    3 months ago Rosendo Willingham annual wellness visit, subsequent    [facility]    Office Visit    10 months ago Mild nonproliferative diabetic retinopathy of right eye with macular edema associated with type 2 diabetes mellitus [facility])    Ophthalmology - Ginna Parker, [address] [place] [address]    Office Visit    11 months ago Type 2 diabetes mellitus with stage 4 chronic kidney disease, without long-term current use of insulin [facility])    [facility], [address] [place], [place] [address]Cherri MD    Office Visit    1 year ago Pre-op exam    [facility], [address] [place], [place] [address]Cherri MD    Office Visit     Future Appointments         Provider Department Appt Notes    In 2 months Jeri Marquez MD [facility], [place], [place] Left lower quadrant pain & Weight loss               Passed - CMP or BMP in past 6 months     Recent Results (from the past 4392 hour(s))   COMP METABOLIC PANEL (14)    Collection Time: 10/07/22 10:25 AM   Result Value Ref Range    Glucose 134 (H) 70 - 99 mg/dL    Sodium 140 136 - 145 mmol/L    Potassium 3.1 (L) 3.5 - 5.1 mmol/L    Chloride 96 (L) 98 - 112 mmol/L CO2 36.0 (H) 21.0 - 32.0 mmol/L    Anion Gap 8 0 - 18 mmol/L    BUN 13 7 - 18 mg/dL    Creatinine 4.05 (H) 0.70 - 1.30 mg/dL    BUN/CREA Ratio 3.2 (L) 10.0 - 20.0    Calcium, Total 9.3 8.5 - 10.1 mg/dL    Calculated Osmolality 292 275 - 295 mOsm/kg    eGFR-Cr 15 (L) >=60 mL/min/1.73m2    ALT 29 16 - 61 U/L    AST 17 15 - 37 U/L    Alkaline Phosphatase 83 45 - 117 U/L    Bilirubin, Total 0.8 0.1 - 2.0 mg/dL    Total Protein 7.3 6.4 - 8.2 g/dL    Albumin 3.6 3.4 - 5.0 g/dL    Globulin  3.7 2.8 - 4.4 g/dL    A/G Ratio 1.0 1.0 - 2.0    Patient Fasting for CMP? Yes      *Note: Due to a large number of results and/or encounters for the requested time period, some results have not been displayed. A complete set of results can be found in Results Review.                  Passed - In person appointment or virtual visit in the past 6 months       Recent Outpatient Visits              1 week ago Left lower quadrant pain    [address] [place] [address], [facility] [employer] Visit    3 months ago Rosendo Willingham annual wellness visit, subsequent    [address] [place] [address], Alisha Evangelista MD    Office Visit    10 months ago Mild nonproliferative diabetic retinopathy of right eye with macular edema associated with type 2 diabetes mellitus [facility])    Ophthalmology - [facility], [address] [place] [address]    Office Visit    11 months ago Type 2 diabetes mellitus with stage 4 chronic kidney disease, without long-term current use of insulin [facility])    [address] [place] [address], [facility], Nieves Bustillo MD    Office Visit    1 year ago Pre-op exam    Nik Fletcher MD    Office Visit     Future Appointments         Provider Department Appt Notes    In 2 months Carol Vogel MD [address] [place] [address], [address], [place] Left lower quadrant pain & Weight loss                     Recent Outpatient Visits              1 week ago Left lower quadrant pain    [address], [facility] Gill, [employer] Visit    3 months ago Rosendo Willingham annual wellness visit, subsequent    [address] Maus, MD    Office Visit    10 months ago Mild nonproliferative diabetic retinopathy of right eye with macular edema associated with type 2 diabetes mellitus [facility])    Ophthalmology - Priya Desir, [address] [place] [address]    Office Visit    11 months ago Type 2 diabetes mellitus with stage 4 chronic kidney disease, without long-term current use of insulin [facility])    [address], [place], Estela Katz MD    Office Visit    1 year ago Pre-op exam    Shay Camacho MD    Office Visit            Future Appointments         Provider Department Appt Notes    In 2 months Rosita Thakkar MD [address], [address], [place] Left lower quadrant pain & Weight loss

## 2022-10-17 RX ORDER — LISINOPRIL 20 MG/1
TABLET ORAL
Qty: 30 TABLET | Refills: 0 | Status: SHIPPED | OUTPATIENT
Start: 2022-10-17

## 2022-10-26 ENCOUNTER — OFFICE VISIT (OUTPATIENT)
Dept: SURGERY | Facility: CLINIC | Age: 72
End: 2022-10-26
Payer: MEDICARE

## 2022-10-26 VITALS — SYSTOLIC BLOOD PRESSURE: 154 MMHG | DIASTOLIC BLOOD PRESSURE: 70 MMHG | HEART RATE: 65 BPM

## 2022-10-26 DIAGNOSIS — N32.89 BLADDER WALL THICKENING: Primary | ICD-10-CM

## 2022-10-26 DIAGNOSIS — N20.0 KIDNEY STONES: ICD-10-CM

## 2022-10-26 DIAGNOSIS — Z12.5 SCREENING PSA (PROSTATE SPECIFIC ANTIGEN): ICD-10-CM

## 2022-10-26 PROCEDURE — 99214 OFFICE O/P EST MOD 30 MIN: CPT | Performed by: NURSE PRACTITIONER

## 2022-11-02 ENCOUNTER — TELEPHONE (OUTPATIENT)
Facility: CLINIC | Age: 72
End: 2022-11-02

## 2022-11-03 NOTE — TELEPHONE ENCOUNTER
I spoke to the pt and we found a sooner appt in OP with Sumit Harris.  Date, time and location verified    FYI: Tony Shukla    Your Appointments    Wednesday November 23, 2022 11:00 AM  Consult with Lelo Lyles, [address] [facility], [facility] ([address]) [facility] [address] [place] [address]  [phone]

## 2022-11-03 NOTE — TELEPHONE ENCOUNTER
Made appt with Tom MENDEZ    Will call pt tomorrow to see if he can make the appt    Your Appointments    Monday December 05, 2022  2:30 PM  Consult with Jose Sanchez, [facility], [address] [place] [address],[address], [facility] ([facility]) [address] [facility],[address]  [phone]

## 2022-11-03 NOTE — TELEPHONE ENCOUNTER
----- Message from SEFERINO Chamberlain sent at 10/24/2022  1:48 PM CDT -----  Would this patient be able to be seen any sooner than 12/14/2022 for possible esophagitis.

## 2022-11-23 ENCOUNTER — TELEPHONE (OUTPATIENT)
Dept: GASTROENTEROLOGY | Facility: CLINIC | Age: 72
End: 2022-11-23

## 2022-11-23 ENCOUNTER — OFFICE VISIT (OUTPATIENT)
Dept: GASTROENTEROLOGY | Facility: CLINIC | Age: 72
End: 2022-11-23
Payer: MEDICARE

## 2022-11-23 VITALS
BODY MASS INDEX: 29.64 KG/M2 | SYSTOLIC BLOOD PRESSURE: 158 MMHG | WEIGHT: 251 LBS | HEART RATE: 69 BPM | HEIGHT: 77 IN | DIASTOLIC BLOOD PRESSURE: 78 MMHG

## 2022-11-23 DIAGNOSIS — R10.31 ABDOMINAL PAIN, RLQ: ICD-10-CM

## 2022-11-23 DIAGNOSIS — R19.4 CHANGE IN BOWEL HABITS: Primary | ICD-10-CM

## 2022-11-23 DIAGNOSIS — R93.89 ABNORMAL CT SCAN: ICD-10-CM

## 2022-11-23 DIAGNOSIS — R19.8 CHANGE IN BOWEL MOVEMENT: Primary | ICD-10-CM

## 2022-11-23 DIAGNOSIS — R19.7 DIARRHEA, UNSPECIFIED TYPE: ICD-10-CM

## 2022-11-23 DIAGNOSIS — D64.9 NORMOCYTIC ANEMIA: ICD-10-CM

## 2022-11-23 DIAGNOSIS — R63.4 WEIGHT LOSS: ICD-10-CM

## 2022-11-23 DIAGNOSIS — K80.20 CALCULUS OF GALLBLADDER WITHOUT CHOLECYSTITIS WITHOUT OBSTRUCTION: ICD-10-CM

## 2022-11-23 DIAGNOSIS — R19.8 STOMACH GROWLING: ICD-10-CM

## 2022-11-23 RX ORDER — POLYETHYLENE GLYCOL 3350, SODIUM CHLORIDE, SODIUM BICARBONATE, POTASSIUM CHLORIDE 420; 11.2; 5.72; 1.48 G/4L; G/4L; G/4L; G/4L
POWDER, FOR SOLUTION ORAL
Qty: 4000 ML | Refills: 0 | Status: SHIPPED | OUTPATIENT
Start: 2022-11-23

## 2022-11-23 RX ORDER — PANTOPRAZOLE SODIUM 40 MG/1
40 TABLET, DELAYED RELEASE ORAL DAILY
Qty: 30 TABLET | Refills: 2 | Status: SHIPPED | OUTPATIENT
Start: 2022-11-23 | End: 2022-12-23

## 2022-11-23 NOTE — TELEPHONE ENCOUNTER
Scheduled for: Colonoscopy [address]34/EGD [address]    Provider Name: Dr Andrew Espino   Date: [place] Lorenzong 1/24/2023  Location: [facility]   Sedation: MAC  Time: 1:30 pm, (pt is aware that Gregg Cabral will call the day before to confirm arrival time)  Prep: split trilyte  Meds/Allergies Reconciled?: reviewed by provider   Diagnosis with codes: Normocytic anemia D64.9, change BM's R19.8, diarrhea R19.7, weight loss R63.4   Was patient informed to call insurance with codes (Y/N):  Yes  Referral sent?:  Yes  [facility] or [address] notified?:  Electronic case request was sent to Gregg Cabral via [employer]. Medication Orders:h old Lisinopril am of procedure  Hold Glimepiride and Glipizide day before and day of procedure   Pt is aware to NOT take iron pills, herbal meds and diet supplements for 7 days before exam. Also to NOT take any form of alcohol, recreational drugs and any forms of ED meds 24 hours before exam.     Misc Orders: Patient was informed that they will need a COVID 19 test prior to their procedure. Patient verbally understood & will await a phone call from [facility] to schedule. Further instructions given by staff:   Instructions given and pt verbalized understanding

## 2023-01-24 ENCOUNTER — TELEPHONE (OUTPATIENT)
Dept: SURGERY | Age: 73
End: 2023-01-24

## 2023-01-24 ENCOUNTER — LAB REQUISITION (OUTPATIENT)
Dept: SURGERY | Age: 73
End: 2023-01-24
Payer: MEDICARE

## 2023-01-24 ENCOUNTER — LAB ENCOUNTER (OUTPATIENT)
Dept: LAB | Facility: HOSPITAL | Age: 73
End: 2023-01-24
Attending: ANESTHESIOLOGY
Payer: MEDICARE

## 2023-01-24 ENCOUNTER — SURGERY CENTER DOCUMENTATION (OUTPATIENT)
Dept: SURGERY | Age: 73
End: 2023-01-24

## 2023-01-24 DIAGNOSIS — R19.8 GASTROINTESTINAL SYMPTOM: ICD-10-CM

## 2023-01-24 DIAGNOSIS — R63.4 ABNORMAL LOSS OF WEIGHT: ICD-10-CM

## 2023-01-24 DIAGNOSIS — R19.7 DIARRHEA, UNSPECIFIED TYPE: ICD-10-CM

## 2023-01-24 DIAGNOSIS — Z01.818 PRE-OP EXAM: Primary | ICD-10-CM

## 2023-01-24 LAB — POTASSIUM SERPL-SCNC: 3.9 MMOL/L (ref 3.5–5.1)

## 2023-01-24 PROCEDURE — 36415 COLL VENOUS BLD VENIPUNCTURE: CPT

## 2023-01-24 PROCEDURE — 43251 EGD REMOVE LESION SNARE: CPT | Performed by: INTERNAL MEDICINE

## 2023-01-24 PROCEDURE — 45380 COLONOSCOPY AND BIOPSY: CPT | Performed by: INTERNAL MEDICINE

## 2023-01-24 PROCEDURE — 88305 TISSUE EXAM BY PATHOLOGIST: CPT | Performed by: INTERNAL MEDICINE

## 2023-01-24 PROCEDURE — 88312 SPECIAL STAINS GROUP 1: CPT | Performed by: INTERNAL MEDICINE

## 2023-01-24 PROCEDURE — 84132 ASSAY OF SERUM POTASSIUM: CPT

## 2023-01-24 NOTE — TELEPHONE ENCOUNTER
FYI -- poor prep incomplete colon but random biopsies taken to rule out microscopic colitis  Probably needs screening colonoscopy

## 2023-01-25 NOTE — TELEPHONE ENCOUNTER
Left message to call back. Phone room-if patient returns call please assist with scheduling with Lavern Kapoor for next available. Thank you.

## 2023-01-25 NOTE — TELEPHONE ENCOUNTER
Riaz Fitch, please advise if would you like patient seen prior to your next available in April and I will offer a Resv 24 slot. Thank you.

## 2023-01-26 NOTE — TELEPHONE ENCOUNTER
Patient contacted and f/u appointment made for first available with Giovanna Lewis on 04/24/2023 at 12:00 pm at [facility]. Patient advised to arrive 15 minutes prior to appointment and address given. Patient voiced understanding.

## 2023-01-26 NOTE — TELEPHONE ENCOUNTER
Patient contacted and f/u appointment made for first available with Felix Rodriguez on 04/24/2023 at 12:00 pm at [facility]. Patient advised to arrive 15 minutes prior to appointment and address given. Patient voiced understanding.

## 2023-01-26 NOTE — TELEPHONE ENCOUNTER
Pt declined scheduling an appt he states he needs a procedure and is requesting to speak to the Rn please call

## 2023-01-27 RX ORDER — AMLODIPINE BESYLATE 10 MG/1
TABLET ORAL
Qty: 60 TABLET | Refills: 0 | Status: SHIPPED | OUTPATIENT
Start: 2023-01-27

## 2023-01-27 NOTE — TELEPHONE ENCOUNTER
Please review. Protocol failed or has no protocol. Call room, please assist patient with making an appointment to receive further refills. Thank you.        Requested Prescriptions   Pending Prescriptions Disp Refills    CLONAZEPAM 1 MG Oral Tab [Pharmacy Med Name: clonazePAM 1 MG Oral Tablet] 30 tablet 0     Sig: TAKE 1 TABLET BY MOUTH NIGHTLY AS NEEDED FOR ANXIETY       There is no refill protocol information for this order       LISINOPRIL 20 MG Oral Tab [Pharmacy Med Name: Lisinopril 20 MG Oral Tablet] 30 tablet 0     Sig: Take 1 tablet by mouth once daily       Hypertensive Medications Protocol Failed - 1/27/2023 10:14 AM        Failed - Last BP reading less than 140/90     BP Readings from Last 1 Encounters:  11/23/22 : 158/78              Failed - In person appointment or virtual visit in the past 6 months     Recent Outpatient Visits              2 months ago Change in bowel habits    [facility], [address], [place], Harris Lester, APRN    Office Visit    3 months ago Bladder wall thickening    [address], [address], [employer], [address], APRN    Office Visit    3 months ago Left lower quadrant pain    Jung Andino, Bridget Douglas, APRN    Office Visit    7 months ago Rosendo Willingham annual wellness visit, subsequent    [address], Christiana Chaudhry MD    Office Visit    1 year ago Mild nonproliferative diabetic retinopathy of right eye with macular edema associated with type 2 diabetes mellitus [facility])    Ophthalmology - [facility], [address] [place] [address]    Office Visit          Future Appointments         Provider Department Appt Notes    In 2 months Elmo Riedel, APRN [facility], [address], [place] Follow up visit per Phillip Oh               Failed - EGFRCR or GFRAA > 50     GFR Evaluation  EGFRCR: 15 , resulted on 10/7/2022          Passed - In person appointment in the past 12 or next 3 months     Recent Outpatient Visits              2 months ago Change in bowel habits    [facility], [address], [address] [facility], APRN    Office Visit    3 months ago Bladder wall thickening    [facility], [address], [place] Dominguez, [address], APRN    Office Visit    3 months ago Left lower quadrant pain    Bridget Mix [place], APRN    Office Visit    7 months ago [facility] annual wellness visit, subsequent    Trish Denis, [address] [place] [address] , Lisa Rice MD    Office Visit    1 year ago Mild nonproliferative diabetic retinopathy of right eye with macular edema associated with type 2 diabetes mellitus [facility])    Ophthalmology - [facility], [address] [place] [address]    Office Visit          Future Appointments         Provider Department Appt Notes    In 2 months Rodolfo Mcguire, [facility], [address], [place] Follow up visit per Shaka Ortega               Passed - CMP or BMP in past 6 months     Recent Results (from the past 4392 hour(s))   COMP METABOLIC PANEL (14)    Collection Time: 10/07/22 10:25 AM   Result Value Ref Range    Glucose 134 (H) 70 - 99 mg/dL    Sodium 140 136 - 145 mmol/L    Potassium 3.1 (L) 3.5 - 5.1 mmol/L    Chloride 96 (L) 98 - 112 mmol/L    CO2 36.0 (H) 21.0 - 32.0 mmol/L    Anion Gap 8 0 - 18 mmol/L    BUN 13 7 - 18 mg/dL    Creatinine 4.05 (H) 0.70 - 1.30 mg/dL    BUN/CREA Ratio 3.2 (L) 10.0 - 20.0    Calcium, Total 9.3 8.5 - 10.1 mg/dL    Calculated Osmolality 292 275 - 295 mOsm/kg    eGFR-Cr 15 (L) >=60 mL/min/1.73m2    ALT 29 16 - 61 U/L    AST 17 15 - 37 U/L    Alkaline Phosphatase 83 45 - 117 U/L    Bilirubin, Total 0.8 0.1 - 2.0 mg/dL    Total Protein 7.3 6.4 - 8.2 g/dL    Albumin 3.6 3.4 - 5.0 g/dL    Globulin  3.7 2.8 - 4.4 g/dL    A/G Ratio 1.0 1.0 - 2.0    Patient Fasting for CMP? Yes      *Note: Due to a large number of results and/or encounters for the requested time period, some results have not been displayed. A complete set of results can be found in Results Review.                  GLIMEPIRIDE 2 MG Oral Tab [Pharmacy Med Name: Glimepiride 2 MG Oral Tablet] 90 tablet 0     Sig: Take 1 tablet by mouth once daily with breakfast       Diabetes Medication Protocol Failed - 1/27/2023 10:14 AM        Failed - In person appointment or virtual visit in the past 6 mos or appointment in next 3 mos     Recent Outpatient Visits              2 months ago Change in bowel habits    Jacob Olivas, Denae, Dariusz Cardenas, APRN    Office Visit    3 months ago Bladder wall thickening    [address], [address], [facility], [address], APRN    Office Visit    3 months ago Left lower quadrant pain    [facility], [facility], APRN    Office Visit    7 months ago Rosendo Willingham annual wellness visit, subsequent    [address], [facility] Elise Medina MD    Office Visit    1 year ago Mild nonproliferative diabetic retinopathy of right eye with macular edema associated with type 2 diabetes mellitus [facility]    Ophthalmology - [facility], [address] [place] [address]    Office Visit          Future Appointments         Provider Department Appt Notes    In 2 months [facility], [address], [employer] Follow up visit per Raysa Sánchez               Failed - EGFRCR or GFRAA > 50     GFR Evaluation  EGFRCR: 15 , resulted on 10/7/2022          Passed - Last A1C < 7.5 and within past 6 months     Lab Results   Component Value Date    A1C 6.7 (H) 10/07/2022             Passed - GFR in the past 12 months             Recent Outpatient Visits              2 months ago Change in bowel habits    [facility], [address], [address] [facility], APRN    Office Visit    3 months ago Bladder wall thickening    [employer], [address], [employer], [employer] Visit    3 months ago Left lower quadrant pain    Royal Bridget Iyer, [place], APRN    Office Visit    7 months ago Rosendo Willingham annual wellness visit, subsequent    [address], [place] Tracee Iverson MD    Office Visit    1 year ago Mild nonproliferative diabetic retinopathy of right eye with macular edema associated with type 2 diabetes mellitus [facility])    Ophthalmology - [facility], [address] [place] [address], [address] [place] [address]    Office Visit            Future Appointments         Provider Department Appt Notes    In 2 months Marin Allison, APRN [address], [address], [place] Follow up visit per Bud Galvan

## 2023-01-28 RX ORDER — LISINOPRIL 20 MG/1
20 TABLET ORAL DAILY
Qty: 90 TABLET | Refills: 0 | Status: SHIPPED | OUTPATIENT
Start: 2023-01-28

## 2023-01-28 RX ORDER — GLIMEPIRIDE 2 MG/1
2 TABLET ORAL
Qty: 90 TABLET | Refills: 0 | Status: SHIPPED | OUTPATIENT
Start: 2023-01-28

## 2023-01-28 RX ORDER — CLONAZEPAM 1 MG/1
TABLET ORAL
Qty: 30 TABLET | Refills: 0 | Status: SHIPPED | OUTPATIENT
Start: 2023-01-28

## 2023-01-30 ENCOUNTER — TELEPHONE (OUTPATIENT)
Dept: INTERNAL MEDICINE CLINIC | Facility: CLINIC | Age: 73
End: 2023-01-30

## 2023-02-06 ENCOUNTER — PATIENT OUTREACH (OUTPATIENT)
Dept: CASE MANAGEMENT | Age: 73
End: 2023-02-06

## 2023-02-09 ENCOUNTER — TELEPHONE (OUTPATIENT)
Facility: CLINIC | Age: 73
End: 2023-02-09

## 2023-02-09 DIAGNOSIS — R63.4 WEIGHT LOSS: Primary | ICD-10-CM

## 2023-02-09 DIAGNOSIS — Z12.11 SCREENING FOR COLON CANCER: ICD-10-CM

## 2023-02-09 NOTE — TELEPHONE ENCOUNTER
Piter Large,    Patient is asking why he needs to have follow up appointment with you in April. He had poor prep with cln done on 1/24/23 and needs to reschedule his cln per Dr. Diana Jeronimo. He is also asking when can he reschedule his cln. Thank you.

## 2023-02-09 NOTE — TELEPHONE ENCOUNTER
Patient was unable to complete CLN in January - patient wants to reschedule. Patient states he was given an appointment with APRN and doesn't know why. Please call. Thank you.

## 2023-02-14 ENCOUNTER — NURSE TRIAGE (OUTPATIENT)
Dept: INTERNAL MEDICINE CLINIC | Facility: CLINIC | Age: 73
End: 2023-02-14

## 2023-02-15 ENCOUNTER — OFFICE VISIT (OUTPATIENT)
Dept: INTERNAL MEDICINE CLINIC | Facility: CLINIC | Age: 73
End: 2023-02-15

## 2023-02-15 ENCOUNTER — LAB ENCOUNTER (OUTPATIENT)
Dept: LAB | Age: 73
End: 2023-02-15
Attending: INTERNAL MEDICINE
Payer: MEDICARE

## 2023-02-15 VITALS
HEART RATE: 70 BPM | BODY MASS INDEX: 28.93 KG/M2 | WEIGHT: 245 LBS | OXYGEN SATURATION: 98 % | SYSTOLIC BLOOD PRESSURE: 134 MMHG | HEIGHT: 77 IN | TEMPERATURE: 98 F | RESPIRATION RATE: 17 BRPM | DIASTOLIC BLOOD PRESSURE: 62 MMHG

## 2023-02-15 DIAGNOSIS — Z99.2 ESRD (END STAGE RENAL DISEASE) ON DIALYSIS (HCC): ICD-10-CM

## 2023-02-15 DIAGNOSIS — M54.6 ACUTE RIGHT-SIDED THORACIC BACK PAIN: ICD-10-CM

## 2023-02-15 DIAGNOSIS — E11.22 TYPE 2 DIABETES MELLITUS WITH STAGE 4 CHRONIC KIDNEY DISEASE, WITHOUT LONG-TERM CURRENT USE OF INSULIN (HCC): ICD-10-CM

## 2023-02-15 DIAGNOSIS — N18.6 ESRD (END STAGE RENAL DISEASE) ON DIALYSIS (HCC): ICD-10-CM

## 2023-02-15 DIAGNOSIS — E11.22 TYPE 2 DIABETES MELLITUS WITH STAGE 4 CHRONIC KIDNEY DISEASE, WITHOUT LONG-TERM CURRENT USE OF INSULIN (HCC): Primary | ICD-10-CM

## 2023-02-15 DIAGNOSIS — R19.7 DIARRHEA, UNSPECIFIED TYPE: ICD-10-CM

## 2023-02-15 DIAGNOSIS — K29.50 CHRONIC GASTRITIS WITHOUT BLEEDING, UNSPECIFIED GASTRITIS TYPE: ICD-10-CM

## 2023-02-15 DIAGNOSIS — N18.4 TYPE 2 DIABETES MELLITUS WITH STAGE 4 CHRONIC KIDNEY DISEASE, WITHOUT LONG-TERM CURRENT USE OF INSULIN (HCC): Primary | ICD-10-CM

## 2023-02-15 DIAGNOSIS — N18.4 TYPE 2 DIABETES MELLITUS WITH STAGE 4 CHRONIC KIDNEY DISEASE, WITHOUT LONG-TERM CURRENT USE OF INSULIN (HCC): ICD-10-CM

## 2023-02-15 LAB
EST. AVERAGE GLUCOSE BLD GHB EST-MCNC: 123 MG/DL (ref 68–126)
HBA1C MFR BLD: 5.9 % (ref ?–5.7)

## 2023-02-15 PROCEDURE — 83036 HEMOGLOBIN GLYCOSYLATED A1C: CPT

## 2023-02-15 PROCEDURE — 36415 COLL VENOUS BLD VENIPUNCTURE: CPT

## 2023-02-15 PROCEDURE — 1126F AMNT PAIN NOTED NONE PRSNT: CPT | Performed by: INTERNAL MEDICINE

## 2023-02-15 PROCEDURE — 99214 OFFICE O/P EST MOD 30 MIN: CPT | Performed by: INTERNAL MEDICINE

## 2023-02-15 RX ORDER — PANTOPRAZOLE SODIUM 40 MG/1
40 TABLET, DELAYED RELEASE ORAL
Qty: 90 TABLET | Refills: 1 | Status: SHIPPED | OUTPATIENT
Start: 2023-02-15

## 2023-02-15 RX ORDER — DICYCLOMINE HYDROCHLORIDE 10 MG/1
10 CAPSULE ORAL 4 TIMES DAILY
Qty: 40 CAPSULE | Refills: 3 | Status: SHIPPED | OUTPATIENT
Start: 2023-02-15 | End: 2023-02-25

## 2023-02-15 RX ORDER — BACLOFEN 5 MG/1
5 TABLET ORAL 2 TIMES DAILY PRN
Qty: 90 TABLET | Refills: 0 | Status: SHIPPED | OUTPATIENT
Start: 2023-02-15 | End: 2023-03-17

## 2023-02-17 NOTE — TELEPHONE ENCOUNTER
Dr. Romana Donahue,    I called the pt to relay message from [place] below. He is asking if he can be seen sooner than April so repeat cln can be done sooner. He is aware that you're on vacation and wants to wait for you. Pt reported ongoing complaints for at least 2 months:    Diarrhea, denies blood in stools, denies abd'l pain/antibiotics/food triggers,  no n/v, he is keeping himself hydrated. He is taking peptobismul as needed    Chest pressure when he eats. 50 lb weight loss     Pt had egd/CLN 1/24/23 with poor prep and cln needs to be repeated. Pt had OV with Dr. Arianna Howard yesterday. Instructed pt to keep himself hydrated, have stool test done(ordered 11/23/22), if condition gets worse, go to ER. Patient verbalized understanding.

## 2023-02-22 NOTE — TELEPHONE ENCOUNTER
Scheduled for:  Colonoscopy [address]  Provider Name: Dr. Todd Singer    Date: 3/28/23   Location:  [facility]  Sedation:  MAC  Time:Approx 11:30AM (pt is aware that [facility] will call)     Prep: Suprep or Trilyte -Buy over the counter dulcolax laxative, and take daily for 3 days prior to drinking the bowel prep.  bowel prep from pharmacy (split suprep or trilyte)   Meds/Allergies Reconciled?:  Physician reviewed     Diagnosis with codes: CRC Screening Z12.11, Weight Loss R63.4   Was patient informed to call insurance with codes (Y/N):  Yes, I confirmed Medicare and BCBS PPO Supp insurance with this patient. Referral sent?:  Yes, Referral was sent at the time of electronic surgical scheduling. [facility] or [address] notified?:  Yes, Electronic case request was sent to [facility] via [employer]. Medication Orders: Continue all medications for procedure except  -Diabetes meds: Hold metformin day of procedure and day before procedure. If patient is on other diabetic medications/insulin please ask primary or endocrine to manage pre-procedure and day of procedure   Misc Orders:  Patient was informed that they may need a COVID 19 test prior to their procedure. Patient verbally understood & will await a phone call from [facility] to schedule. Further instructions given by staff:  I discussed the prep instructions with the patient which he verbally understood and is aware that I will mail the instructions today.

## 2023-02-22 NOTE — TELEPHONE ENCOUNTER
The colonoscopy did not show any obvious reason for the weight loss. It may be from his DMII and CKD    Ok to schedule colonoscopy without being seen but does need to see Shay Guzman NP for weight loss-- can you see when you can add him on? Ok to use MD approval slot. 1. Schedule colonoscopy with MAC [Diagnosis: screening and weight loss]    2. -Buy over the counter dulcolax laxative, and take daily for 3 days prior to drinking the bowel prep.  bowel prep from pharmacy (split suprep or trilyte)    3. Continue all medications for procedure except  -Diabetes meds: Hold metformin day of procedure and day before procedure. If patient is on other diabetic medications/insulin please ask primary or endocrine to manage pre-procedure and day of procedure    ** If MAC @ [facility]/NE:    - NO alcohol, recreational drugs nor erectile dysfunction mediations 24 hours before procedure(s)   - NO herbal supplements or weight loss medications x 7 days prior to the procedure(s)    ** If MAC @ [facility] or IV twilight - continue all medications as prescribed      4. Read all bowel prep instructions carefully    5. AVOID seeds, nuts, popcorn, raw fruits and vegetables (cooked is okay) for 2-3 days before procedure      >>>Please note: if you were prescribed Suprep for the bowel prep and it is too expensive or not covered by insurance, it is okay to substitute Trilyte (or any similar generic prep). This can be done by notifying the pharmacy or calling our office.

## 2023-02-23 RX ORDER — POLYETHYLENE GLYCOL 3350, SODIUM CHLORIDE, SODIUM BICARBONATE, POTASSIUM CHLORIDE 420; 11.2; 5.72; 1.48 G/4L; G/4L; G/4L; G/4L
POWDER, FOR SOLUTION ORAL
Qty: 1 EACH | Refills: 0 | Status: SHIPPED | OUTPATIENT
Start: 2023-02-23

## 2023-02-23 NOTE — TELEPHONE ENCOUNTER
Hi Dr. Frida Concepcion    Can you please send over Prep to [facility] [place] or Trilyte patient is schedule for 3/28/23 at [address]     Thank you

## 2023-02-23 NOTE — TELEPHONE ENCOUNTER
Called patient,  verified. Pt discussed about his cln schedule on 3/38. Pt said he does not take any diabetic medications d/t his blood sugar is low when he takes it, stated his bs is now normal, his PCP is aware. Pt is scheduled for cln 3/28. Instructed pt to take otc dulcolax 1 tablet dailyx 3 days before cln. Informed patient need for appointment with Johnny Gaming to follow up on weight loss. Offered available appointment. Patient confirmed and accepted appointment. Date, time, office location verified. Instructed patient to arrive 15 minutes before appt time. Patient verbalized understanding. Your Appointments    Wednesday March 15, 2023  1:30 PM  Follow Up Visit with SEFERINO [facility], [address], [facility] [address]) [address] [place] [address]  [phone]      appt cancelled in appt desk.

## 2023-03-15 ENCOUNTER — OFFICE VISIT (OUTPATIENT)
Dept: GASTROENTEROLOGY | Facility: CLINIC | Age: 73
End: 2023-03-15

## 2023-03-15 ENCOUNTER — LAB ENCOUNTER (OUTPATIENT)
Dept: LAB | Age: 73
End: 2023-03-15
Attending: NURSE PRACTITIONER
Payer: MEDICARE

## 2023-03-15 VITALS
BODY MASS INDEX: 28.46 KG/M2 | HEART RATE: 80 BPM | HEIGHT: 77 IN | WEIGHT: 241 LBS | SYSTOLIC BLOOD PRESSURE: 149 MMHG | DIASTOLIC BLOOD PRESSURE: 74 MMHG

## 2023-03-15 DIAGNOSIS — R19.4 CHANGE IN BOWEL HABITS: ICD-10-CM

## 2023-03-15 DIAGNOSIS — R19.7 DIARRHEA, UNSPECIFIED TYPE: ICD-10-CM

## 2023-03-15 DIAGNOSIS — R19.06 EPIGASTRIC FULLNESS: ICD-10-CM

## 2023-03-15 DIAGNOSIS — R63.4 WEIGHT LOSS: Primary | ICD-10-CM

## 2023-03-15 LAB
CRYPTOSP AG STL QL IA: NEGATIVE
G LAMBLIA AG STL QL IA: NEGATIVE

## 2023-03-15 PROCEDURE — 99215 OFFICE O/P EST HI 40 MIN: CPT | Performed by: NURSE PRACTITIONER

## 2023-03-15 PROCEDURE — 87272 CRYPTOSPORIDIUM AG IF: CPT

## 2023-03-15 PROCEDURE — 87329 GIARDIA AG IA: CPT

## 2023-03-15 PROCEDURE — 87493 C DIFF AMPLIFIED PROBE: CPT

## 2023-03-15 PROCEDURE — 87046 STOOL CULTR AEROBIC BACT EA: CPT

## 2023-03-15 PROCEDURE — 87427 SHIGA-LIKE TOXIN AG IA: CPT

## 2023-03-15 PROCEDURE — 1126F AMNT PAIN NOTED NONE PRSNT: CPT | Performed by: NURSE PRACTITIONER

## 2023-03-15 PROCEDURE — 82656 EL-1 FECAL QUAL/SEMIQ: CPT | Performed by: NURSE PRACTITIONER

## 2023-03-15 PROCEDURE — 87045 FECES CULTURE AEROBIC BACT: CPT

## 2023-03-16 LAB — C DIFF TOX B STL QL: NEGATIVE

## 2023-03-17 LAB — PANCREATIC ELASTASE , FECAL: 275 UG/G

## 2023-03-27 ENCOUNTER — HOSPITAL ENCOUNTER (OUTPATIENT)
Dept: CT IMAGING | Facility: HOSPITAL | Age: 73
Discharge: HOME | End: 2023-03-27
Attending: NURSE PRACTITIONER
Payer: MEDICARE

## 2023-03-27 DIAGNOSIS — R19.7 DIARRHEA, UNSPECIFIED TYPE: ICD-10-CM

## 2023-03-27 DIAGNOSIS — R63.4 WEIGHT LOSS: ICD-10-CM

## 2023-03-27 DIAGNOSIS — R19.06 EPIGASTRIC FULLNESS: ICD-10-CM

## 2023-03-27 PROCEDURE — 74177 CT ABD & PELVIS W/CONTRAST: CPT | Performed by: NURSE PRACTITIONER

## 2023-03-27 PROCEDURE — 71260 CT THORAX DX C+: CPT | Performed by: NURSE PRACTITIONER

## 2023-03-28 ENCOUNTER — SURGERY CENTER DOCUMENTATION (OUTPATIENT)
Dept: SURGERY | Age: 73
End: 2023-03-28

## 2023-03-28 ENCOUNTER — LAB REQUISITION (OUTPATIENT)
Dept: LAB | Facility: HOSPITAL | Age: 73
End: 2023-03-28
Payer: MEDICARE

## 2023-03-28 ENCOUNTER — LAB REQUISITION (OUTPATIENT)
Dept: SURGERY | Age: 73
End: 2023-03-28
Payer: MEDICARE

## 2023-03-28 DIAGNOSIS — Z12.11 SPECIAL SCREENING FOR MALIGNANT NEOPLASMS, COLON: ICD-10-CM

## 2023-03-28 DIAGNOSIS — R63.4 ABNORMAL LOSS OF WEIGHT: ICD-10-CM

## 2023-03-28 DIAGNOSIS — Z20.828 CONTACT WITH AND (SUSPECTED) EXPOSURE TO OTHER VIRAL COMMUNICABLE DISEASES: ICD-10-CM

## 2023-03-28 LAB — POTASSIUM SERPL-SCNC: 4.4 MMOL/L (ref 3.5–5.1)

## 2023-03-28 PROCEDURE — 84132 ASSAY OF SERUM POTASSIUM: CPT | Performed by: ANESTHESIOLOGY

## 2023-03-28 PROCEDURE — 88305 TISSUE EXAM BY PATHOLOGIST: CPT | Performed by: INTERNAL MEDICINE

## 2023-04-04 ENCOUNTER — TELEPHONE (OUTPATIENT)
Dept: GASTROENTEROLOGY | Facility: CLINIC | Age: 73
End: 2023-04-04

## 2023-04-05 ENCOUNTER — TELEPHONE (OUTPATIENT)
Facility: CLINIC | Age: 73
End: 2023-04-05

## 2023-04-05 NOTE — TELEPHONE ENCOUNTER
----- Message from Liza Carvalho MD sent at 4/5/2023 10:06 AM CDT -----  GI staff: please place recall for colonoscopy in 5 years

## 2023-04-05 NOTE — TELEPHONE ENCOUNTER
5  year colonoscopy recall entered. Health maintenance updated.     Colonoscopy done on 3/28/23 and next due on 3/28/28

## 2023-04-10 ENCOUNTER — MED REC SCAN ONLY (OUTPATIENT)
Facility: CLINIC | Age: 73
End: 2023-04-10

## 2023-05-02 RX ORDER — CLONAZEPAM 1 MG/1
TABLET ORAL
Qty: 30 TABLET | Refills: 0 | Status: SHIPPED | OUTPATIENT
Start: 2023-05-02

## 2023-05-02 RX ORDER — LISINOPRIL 20 MG/1
TABLET ORAL
Qty: 90 TABLET | Refills: 0 | Status: SHIPPED | OUTPATIENT
Start: 2023-05-02

## 2023-05-02 NOTE — TELEPHONE ENCOUNTER
Protocol failed or has No Protocol, please review  Requested Prescriptions   Pending Prescriptions Disp Refills    LISINOPRIL 20 MG Oral Tab [Pharmacy Med Name: Lisinopril 20 MG Oral Tablet] 90 tablet 0     Sig: Take 1 tablet by mouth once daily       Hypertensive Medications Protocol Failed - 5/1/2023  2:25 PM        Failed - Last BP reading less than 140/90     BP Readings from Last 1 Encounters:  03/15/23 : 149/74                Failed - CMP or BMP in past 6 months     No results found for this or any previous visit (from the past 4392 hour(s)).               Failed - EGFRCR or GFRAA > 50     GFR Evaluation  EGFRCR: 15 , resulted on 10/7/2022            Passed - In person appointment in the past 12 or next 3 months     Recent Outpatient Visits              1 month ago Weight loss    [address] [place] [address], [address] [place] [address], APR    Office Visit    2 months ago Type 2 diabetes mellitus with stage 4 chronic kidney disease, without long-term current use of insulin [facility])    [address], [address] [facility], Neil Conner MD    Office Visit    5 months ago Change in bowel habits    [address] [place] [address] Linda Mcnamara, [employer] Visit    6 months ago Bladder wall thickening    [facility], [address] [place] [address],[address], [employer] APRN    Office Visit    7 months ago Left lower quadrant pain    [facility] [place], [facility]    [employer] - In person appointment or virtual visit in the past 6 months     Recent Outpatient Visits              1 month ago Weight loss    [address] [place] [address] Sharon Philippe APRN    Office Visit    2 months ago Type 2 diabetes mellitus with stage 4 chronic kidney disease, without long-term current use of insulin ([facility] [address]    [facility], [facility] Ibis Villela MD    Office Visit    5 months ago Change in bowel habits    Alexa Kennedy, [facility] Jeancarlos, APRN    Office Visit    6 months ago Bladder wall thickening    [facility], [address], [place], APRN    Office Visit    7 months ago Left lower quadrant pain    [facility], [address] [facility], APRN    Office Visit                        CLONAZEPAM 1 MG Oral Tab [place] Med Name: clonazePAM 1 MG Oral Tablet] 30 tablet 0     Sig: TAKE 1 TABLET BY MOUTH NIGHTLY AS NEEDED FOR ANXIETY       There is no refill protocol information for this order          Recent Outpatient Visits              1 month ago Weight loss    [facility], APRN    Office Visit    2 months ago Type 2 diabetes mellitus with stage 4 chronic kidney disease, without long-term current use of insulin [facility])    Christine Li, [address] [place] [address] , [place], Carmen Avila MD    Office Visit    5 months ago Change in bowel habits    Alexa Kennedy, Neida Philippe [facility], [employer] Visit    6 months ago Bladder wall thickening    [facility], [address], Araceli Apart[place], [facility], APRN    Office Visit    7 months ago Left lower quadrant pain    [address] [facility], APR    Office Visit

## 2023-05-11 ENCOUNTER — HOSPITAL ENCOUNTER (EMERGENCY)
Facility: HOSPITAL | Age: 73
Discharge: HOME | End: 2023-05-11
Attending: EMERGENCY MEDICINE
Payer: MEDICARE

## 2023-05-11 VITALS
WEIGHT: 244 LBS | OXYGEN SATURATION: 99 % | HEART RATE: 62 BPM | HEIGHT: 77 IN | DIASTOLIC BLOOD PRESSURE: 91 MMHG | TEMPERATURE: 98 F | BODY MASS INDEX: 28.81 KG/M2 | SYSTOLIC BLOOD PRESSURE: 157 MMHG | RESPIRATION RATE: 18 BRPM

## 2023-05-11 DIAGNOSIS — K42.9 UMBILICAL HERNIA WITHOUT OBSTRUCTION AND WITHOUT GANGRENE: Primary | ICD-10-CM

## 2023-05-11 PROCEDURE — 99282 EMERGENCY DEPT VISIT SF MDM: CPT

## 2023-05-11 PROCEDURE — 99283 EMERGENCY DEPT VISIT LOW MDM: CPT

## 2023-05-12 NOTE — ED INITIAL ASSESSMENT (HPI)
Patient presents to ER with complaints of abdominal cramping x a couple weeks. Notes today he touched his naval and felt it protruding like a bump, notes that has improved. Tender to palpation.

## 2023-05-30 ENCOUNTER — OFFICE VISIT (OUTPATIENT)
Dept: SURGERY | Facility: CLINIC | Age: 73
End: 2023-05-30

## 2023-05-30 VITALS — HEIGHT: 77 IN | WEIGHT: 244 LBS | BODY MASS INDEX: 28.81 KG/M2

## 2023-05-30 DIAGNOSIS — K42.9 UMBILICAL HERNIA WITHOUT OBSTRUCTION AND WITHOUT GANGRENE: Primary | ICD-10-CM

## 2023-05-30 PROCEDURE — 99203 OFFICE O/P NEW LOW 30 MIN: CPT | Performed by: SURGERY

## 2023-06-21 ENCOUNTER — OFFICE VISIT (OUTPATIENT)
Facility: CLINIC | Age: 73
End: 2023-06-21

## 2023-06-21 VITALS
HEART RATE: 82 BPM | SYSTOLIC BLOOD PRESSURE: 119 MMHG | WEIGHT: 238 LBS | BODY MASS INDEX: 28.1 KG/M2 | HEIGHT: 77 IN | DIASTOLIC BLOOD PRESSURE: 64 MMHG

## 2023-06-21 DIAGNOSIS — R63.4 WEIGHT LOSS: ICD-10-CM

## 2023-06-21 DIAGNOSIS — R19.7 DIARRHEA, UNSPECIFIED TYPE: Primary | ICD-10-CM

## 2023-06-21 PROCEDURE — 99214 OFFICE O/P EST MOD 30 MIN: CPT | Performed by: STUDENT IN AN ORGANIZED HEALTH CARE EDUCATION/TRAINING PROGRAM

## 2023-06-21 PROCEDURE — 1125F AMNT PAIN NOTED PAIN PRSNT: CPT | Performed by: STUDENT IN AN ORGANIZED HEALTH CARE EDUCATION/TRAINING PROGRAM

## 2023-06-21 RX ORDER — DICYCLOMINE HCL 20 MG
20 TABLET ORAL NIGHTLY PRN
Qty: 30 TABLET | Refills: 2 | Status: SHIPPED | OUTPATIENT
Start: 2023-06-21 | End: 2023-09-19

## 2023-06-21 NOTE — PATIENT INSTRUCTIONS
1) Start Imodium (Loperamide) once in the morning and once before bed. 2) Start Dicylomine 20mg in the evening as needed.

## 2023-06-26 ENCOUNTER — HOSPITAL ENCOUNTER (OUTPATIENT)
Dept: ULTRASOUND IMAGING | Facility: HOSPITAL | Age: 73
Discharge: HOME | End: 2023-06-26
Attending: INTERNAL MEDICINE
Payer: MEDICARE

## 2023-06-26 DIAGNOSIS — E04.1 THYROID NODULE: ICD-10-CM

## 2023-06-26 PROCEDURE — 76536 US EXAM OF HEAD AND NECK: CPT | Performed by: INTERNAL MEDICINE

## 2023-07-26 ENCOUNTER — TELEPHONE (OUTPATIENT)
Facility: CLINIC | Age: 73
End: 2023-07-26

## 2023-07-26 NOTE — TELEPHONE ENCOUNTER
1st reminder letter sent out via mail and [employer] for the following:   TSH W REFLEX TO FREE T4 (Order #[id number]) on 6/22/23

## 2023-08-17 ENCOUNTER — HOSPITAL ENCOUNTER (INPATIENT)
Facility: HOSPITAL | Age: 73
LOS: 1 days | Discharge: HOME | DRG: 143 | End: 2023-08-18
Attending: EMERGENCY MEDICINE | Admitting: INTERNAL MEDICINE

## 2023-08-17 ENCOUNTER — HOSPITAL ENCOUNTER (INPATIENT)
Facility: HOSPITAL | Age: 73
LOS: 1 days | Discharge: HOME | End: 2023-08-18
Attending: EMERGENCY MEDICINE | Admitting: INTERNAL MEDICINE

## 2023-08-17 DIAGNOSIS — R22.1 NECK MASS: Primary | ICD-10-CM

## 2023-08-17 LAB
ANION GAP SERPL CALC-SCNC: 7 MMOL/L (ref 0–18)
BASOPHILS # BLD AUTO: 0.03 X10(3) UL (ref 0–0.2)
BASOPHILS NFR BLD AUTO: 0.6 %
BUN BLD-MCNC: 45 MG/DL (ref 7–18)
BUN/CREAT SERPL: 4.5 (ref 10–20)
CALCIUM BLD-MCNC: 8.3 MG/DL (ref 8.5–10.1)
CHLORIDE SERPL-SCNC: 104 MMOL/L (ref 98–112)
CO2 SERPL-SCNC: 31 MMOL/L (ref 21–32)
CREAT BLD-MCNC: 10.1 MG/DL
DEPRECATED RDW RBC AUTO: 48.5 FL (ref 35.1–46.3)
EGFRCR SERPLBLD CKD-EPI 2021: 5 ML/MIN/1.73M2 (ref 60–?)
EOSINOPHIL # BLD AUTO: 0.11 X10(3) UL (ref 0–0.7)
EOSINOPHIL NFR BLD AUTO: 2.1 %
ERYTHROCYTE [DISTWIDTH] IN BLOOD BY AUTOMATED COUNT: 14.9 % (ref 11–15)
GLUCOSE BLD-MCNC: 104 MG/DL (ref 70–99)
HCT VFR BLD AUTO: 34.6 %
HGB BLD-MCNC: 11.3 G/DL
IMM GRANULOCYTES # BLD AUTO: 0.01 X10(3) UL (ref 0–1)
IMM GRANULOCYTES NFR BLD: 0.2 %
LYMPHOCYTES # BLD AUTO: 2.17 X10(3) UL (ref 1–4)
LYMPHOCYTES NFR BLD AUTO: 42.4 %
MCH RBC QN AUTO: 29 PG (ref 26–34)
MCHC RBC AUTO-ENTMCNC: 32.7 G/DL (ref 31–37)
MCV RBC AUTO: 88.7 FL
MONOCYTES # BLD AUTO: 0.56 X10(3) UL (ref 0.1–1)
MONOCYTES NFR BLD AUTO: 10.9 %
NEUTROPHILS # BLD AUTO: 2.24 X10 (3) UL (ref 1.5–7.7)
NEUTROPHILS # BLD AUTO: 2.24 X10(3) UL (ref 1.5–7.7)
NEUTROPHILS NFR BLD AUTO: 43.8 %
OSMOLALITY SERPL CALC.SUM OF ELEC: 306 MOSM/KG (ref 275–295)
PLATELET # BLD AUTO: 188 10(3)UL (ref 150–450)
POTASSIUM SERPL-SCNC: 4.1 MMOL/L (ref 3.5–5.1)
RBC # BLD AUTO: 3.9 X10(6)UL
SODIUM SERPL-SCNC: 142 MMOL/L (ref 136–145)
WBC # BLD AUTO: 5.1 X10(3) UL (ref 4–11)

## 2023-08-18 ENCOUNTER — APPOINTMENT (OUTPATIENT)
Dept: ULTRASOUND IMAGING | Facility: HOSPITAL | Age: 73
End: 2023-08-18
Attending: NURSE PRACTITIONER

## 2023-08-18 ENCOUNTER — APPOINTMENT (OUTPATIENT)
Dept: INTERVENTIONAL RADIOLOGY/VASCULAR | Facility: HOSPITAL | Age: 73
End: 2023-08-18
Attending: NURSE PRACTITIONER

## 2023-08-18 ENCOUNTER — APPOINTMENT (OUTPATIENT)
Dept: CT IMAGING | Facility: HOSPITAL | Age: 73
End: 2023-08-18
Attending: NURSE PRACTITIONER

## 2023-08-18 ENCOUNTER — APPOINTMENT (OUTPATIENT)
Dept: CT IMAGING | Facility: HOSPITAL | Age: 73
DRG: 143 | End: 2023-08-18
Attending: NURSE PRACTITIONER

## 2023-08-18 ENCOUNTER — APPOINTMENT (OUTPATIENT)
Dept: ULTRASOUND IMAGING | Facility: HOSPITAL | Age: 73
DRG: 143 | End: 2023-08-18
Attending: NURSE PRACTITIONER

## 2023-08-18 ENCOUNTER — APPOINTMENT (OUTPATIENT)
Dept: INTERVENTIONAL RADIOLOGY/VASCULAR | Facility: HOSPITAL | Age: 73
DRG: 143 | End: 2023-08-18
Attending: NURSE PRACTITIONER

## 2023-08-18 VITALS
RESPIRATION RATE: 18 BRPM | WEIGHT: 241 LBS | DIASTOLIC BLOOD PRESSURE: 71 MMHG | OXYGEN SATURATION: 98 % | SYSTOLIC BLOOD PRESSURE: 165 MMHG | TEMPERATURE: 98 F | HEART RATE: 57 BPM | BODY MASS INDEX: 29 KG/M2

## 2023-08-18 PROBLEM — R22.1 NECK MASS: Status: ACTIVE | Noted: 2023-08-18

## 2023-08-18 LAB
EST. AVERAGE GLUCOSE BLD GHB EST-MCNC: 120 MG/DL (ref 68–126)
GLUCOSE BLDC GLUCOMTR-MCNC: 109 MG/DL (ref 70–99)
HBA1C MFR BLD: 5.8 % (ref ?–5.7)

## 2023-08-18 PROCEDURE — 99222 1ST HOSP IP/OBS MODERATE 55: CPT | Performed by: SPECIALIST

## 2023-08-18 PROCEDURE — 90935 HEMODIALYSIS ONE EVALUATION: CPT | Performed by: INTERNAL MEDICINE

## 2023-08-18 PROCEDURE — 5A1D70Z PERFORMANCE OF URINARY FILTRATION, INTERMITTENT, LESS THAN 6 HOURS PER DAY: ICD-10-PCS | Performed by: INTERNAL MEDICINE

## 2023-08-18 PROCEDURE — 0WB63ZX EXCISION OF NECK, PERCUTANEOUS APPROACH, DIAGNOSTIC: ICD-10-PCS | Performed by: STUDENT IN AN ORGANIZED HEALTH CARE EDUCATION/TRAINING PROGRAM

## 2023-08-18 PROCEDURE — 70490 CT SOFT TISSUE NECK W/O DYE: CPT | Performed by: NURSE PRACTITIONER

## 2023-08-18 PROCEDURE — 07B13ZX EXCISION OF RIGHT NECK LYMPHATIC, PERCUTANEOUS APPROACH, DIAGNOSTIC: ICD-10-PCS | Performed by: STUDENT IN AN ORGANIZED HEALTH CARE EDUCATION/TRAINING PROGRAM

## 2023-08-18 PROCEDURE — 99236 HOSP IP/OBS SAME DATE HI 85: CPT | Performed by: INTERNAL MEDICINE

## 2023-08-18 PROCEDURE — 99222 1ST HOSP IP/OBS MODERATE 55: CPT | Performed by: INTERNAL MEDICINE

## 2023-08-18 PROCEDURE — 76536 US EXAM OF HEAD AND NECK: CPT | Performed by: NURSE PRACTITIONER

## 2023-08-18 RX ORDER — HEPARIN SODIUM 5000 [USP'U]/ML
5000 INJECTION, SOLUTION INTRAVENOUS; SUBCUTANEOUS EVERY 8 HOURS SCHEDULED
Status: DISCONTINUED | OUTPATIENT
Start: 2023-08-18 | End: 2023-08-18

## 2023-08-18 RX ORDER — AMLODIPINE BESYLATE 10 MG/1
10 TABLET ORAL DAILY
Status: DISCONTINUED | OUTPATIENT
Start: 2023-08-18 | End: 2023-08-18

## 2023-08-18 RX ORDER — AMLODIPINE BESYLATE 10 MG/1
10 TABLET ORAL DAILY
Status: DISCONTINUED | OUTPATIENT
Start: 2023-08-19 | End: 2023-08-18

## 2023-08-18 RX ORDER — HEPARIN SODIUM 1000 [USP'U]/ML
4 INJECTION, SOLUTION INTRAVENOUS; SUBCUTANEOUS
Status: DISCONTINUED | OUTPATIENT
Start: 2023-08-18 | End: 2023-08-18

## 2023-08-18 RX ORDER — SODIUM CHLORIDE 9 MG/ML
INJECTION, SOLUTION INTRAVENOUS CONTINUOUS
Status: DISCONTINUED | OUTPATIENT
Start: 2023-08-18 | End: 2023-08-18

## 2023-08-18 RX ORDER — HYDROCODONE BITARTRATE AND ACETAMINOPHEN 5; 325 MG/1; MG/1
1 TABLET ORAL EVERY 4 HOURS PRN
Status: DISCONTINUED | OUTPATIENT
Start: 2023-08-18 | End: 2023-08-18

## 2023-08-18 RX ORDER — HYDRALAZINE HYDROCHLORIDE 20 MG/ML
10 INJECTION INTRAMUSCULAR; INTRAVENOUS EVERY 4 HOURS PRN
Status: DISCONTINUED | OUTPATIENT
Start: 2023-08-18 | End: 2023-08-18

## 2023-08-18 NOTE — PLAN OF CARE
Problem: Patient Centered Care  Goal: Patient preferences are identified and integrated in the patient's plan of care  Description: Interventions:  - What would you like us to know as we care for you? From home with wife  - Provide timely, complete, and accurate information to patient/family  - Incorporate patient and family knowledge, values, beliefs, and cultural backgrounds into the planning and delivery of care  - Encourage patient/family to participate in care and decision-making at the level they choose  - Honor patient and family perspectives and choices  Outcome: Progressing     Problem: Diabetes/Glucose Control  Goal: Glucose maintained within prescribed range  Description: INTERVENTIONS:  - Monitor Blood Glucose as ordered  - Assess for signs and symptoms of hyperglycemia and hypoglycemia  - Administer ordered medications to maintain glucose within target range  - Assess barriers to adequate nutritional intake and initiate nutrition consult as needed  - Instruct patient on self management of diabetes  Outcome: Progressing     A/o 4. New admission. NPO for biopsy later today. L arm precautions (fistula). Norco for pain. IVF infusing 44cc. Pt to call wife in am to confirm meds he takes at home. Attending made aware about med rec not completed at this time. Call light within reach. Plan of care ongoing.

## 2023-08-18 NOTE — ED INITIAL ASSESSMENT (HPI)
Right upper neck/chin lump. He states the lump locks up when he yawns. Denies drainage or fevers. Started a couple weeks ago. Pain is worse today.

## 2023-08-18 NOTE — PLAN OF CARE
Pt is aox4, ambulating in depended. Voiding wnl. Dialysis and Neck biopsy today. Pain is tolerable. Denies pain meds. Pt plans to d/c home. Disease process discussed with pt. Bed in lowest position, call light and personal possessions within reach. Pt instructed to call for assistance before getting up. Patient cleared by internal medicine. BP improved after Norvasc and Hydralazin prn. MD notified about /71. Going home. IV removed, discharge education provided, patient sent home with all personal belongings, medications, and discharge instructions. Addressed additional questions.

## 2023-08-18 NOTE — PROGRESS NOTES
Med rec unable to complete at this time. Pt does not recall exact medications/dose he takes. Per pt, he will call his wife in the morning to review. MD notified.

## 2023-08-18 NOTE — PROGRESS NOTES
Bedside biopsy done by Dr. Shimon Gallagher, specimens given to cytology. Patient tolerated well all VS WNL for patient. Floor RN Given report.

## 2023-08-18 NOTE — ED PROVIDER NOTES
Discussed with Dr. Pavan Clay, will admit  Discussed with Dr. Daiana Mark, will consult, NPO for biopsy  Page to Dr. Portillo Right covering for Dr. Wilmer Zhong at time of admission  Discussed w Dr. Raj Barahona who will consult        Overall, findings are concerning for malignancy/metastases, such as metastatic thyroid cancer. Two lobular masses along the right aspect of the neck with diffuse internal punctate calcifications. The larger mass measures approximately 2.5 x 2.1 x 4.3 cm and the smaller mass measures approximately 2.1 x 1.6 x 2.8 cm. In addition, there is an enlarged heterogeneous thyroid gland with a likely 2.9 cm nodule with calcifications. Consider further evaluation with an oncologic workup with PET CT, MRI of the head and neck with contrast, and a thyroid ultrasound.

## 2023-08-18 NOTE — CONSULTS
ENT consultation  72year old non smoker with a 2 week history of a right neck mass. Came to the emergency for evaluation as the mass was tender. Got a non contrasted CT with right neck adenopathy and a right thyroid nodule. I have been asked to evaluate. Past medical history:  Hypertension  Anxiety  Leg cramps    Past surgical history:   Non contributory    Medications:  clonazepam, dicyclomine, amlodipine, aspirin  Allergies: none known    Social history: never smoker    Physical exam  Nose; normal  Mouth: abnormal appearance to right pharyngeal tonsil. Larger, irregular and erythematous compared to left  Neck: right neck adenopathy: level 2. Fullness right thyroid    CT: non contrasted: neck adenopathy. Right thyroid nodule. Enlarged right tonsil. No official reading available  Ultrasound of thyroid: right thyroid nodule. No official reading available. 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 Would consider HPV related in this never smoker. Plan: recommend: either FNA of right neck mass. Would benefit from contrasted CT scan to better delineate the tonsil. Would also recommend PET-CT scan. Can schedule for tonsil biopsy as an outpatient.

## 2023-08-18 NOTE — PROGRESS NOTES
US guided right neck mass biopsy today  Discussed procedure, risks/benefits with patient who agrees to proceed.

## 2023-08-18 NOTE — IVS NOTE
Inpatient Throughput Communication:    Called inpatient RN Aishwarya Butt and notified of scheduled procedure. Verified that appropriate consent is signed: Yes  Appropriate Consent Signed:  Yes  Access Site Hair Clipped and skin prepped: Not Applicable  Patient has functional IV site: Yes  Patient received all pre-treatment medications: Not Applicable  Family aware of approximate time of procedure: Not Applicable    Chikis Marina, RN, RN

## 2023-08-21 ENCOUNTER — PATIENT OUTREACH (OUTPATIENT)
Dept: CASE MANAGEMENT | Age: 73
End: 2023-08-21

## 2023-08-21 DIAGNOSIS — N18.4 TYPE 2 DIABETES MELLITUS WITH STAGE 4 CHRONIC KIDNEY DISEASE, WITHOUT LONG-TERM CURRENT USE OF INSULIN (HCC): ICD-10-CM

## 2023-08-21 DIAGNOSIS — Z02.9 ENCOUNTERS FOR UNSPECIFIED ADMINISTRATIVE PURPOSE: Primary | ICD-10-CM

## 2023-08-21 DIAGNOSIS — D63.1 ANEMIA DUE TO CHRONIC KIDNEY DISEASE, ON CHRONIC DIALYSIS (HCC): ICD-10-CM

## 2023-08-21 DIAGNOSIS — N18.6 ANEMIA DUE TO CHRONIC KIDNEY DISEASE, ON CHRONIC DIALYSIS (HCC): ICD-10-CM

## 2023-08-21 DIAGNOSIS — E11.22 TYPE 2 DIABETES MELLITUS WITH STAGE 4 CHRONIC KIDNEY DISEASE, WITHOUT LONG-TERM CURRENT USE OF INSULIN (HCC): ICD-10-CM

## 2023-08-21 DIAGNOSIS — Z99.2 ESRD ON HEMODIALYSIS (HCC): ICD-10-CM

## 2023-08-21 DIAGNOSIS — N18.6 ESRD ON HEMODIALYSIS (HCC): ICD-10-CM

## 2023-08-21 DIAGNOSIS — R22.1 NECK MASS: ICD-10-CM

## 2023-08-21 DIAGNOSIS — Z99.2 ANEMIA DUE TO CHRONIC KIDNEY DISEASE, ON CHRONIC DIALYSIS (HCC): ICD-10-CM

## 2023-08-21 PROCEDURE — 1111F DSCHRG MED/CURRENT MED MERGE: CPT

## 2023-08-21 NOTE — PROGRESS NOTES
Consistent with metastatic tonsil carcinoma. I left a message for the patient to follow up. Will need PET - CT scan. Will also need oncology and radiation therapy involved. Who do you recommend.   Thank you

## 2023-08-21 NOTE — PROGRESS NOTES
NCM placed call to patient for TCM, LM requesting a call back to [phone] with a condition update.     Discharge Dx:     Neck Mass  S/P biopsy

## 2023-08-22 NOTE — PROGRESS NOTES
Initial Post Discharge Follow Up   Discharge Date: 8/18/23  Contact Date: 8/21/2023    Consent Verification:  Assessment Completed With: Patient  HIPAA Verified? 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 Patient states that everything is going good, so far so good. Patient reports he was told he has cancer, and he will be calling to talk with Dr Sarina Gayle, [facility] scheduled a TCM appointment for patient with Dr Sarina Gayle. Patient denies pain states it is just a little tender at the biopsy site, no redness, swelling or drainage from site. Patient denies fever/chills, no sore throat, no cough, no problems swallowing, no shortness of breath, no palpitations, no chest pain, no abdominal pain, no nausea/vomiting. Patient states he is eating his regular diet, no issues. [facility] instructed patient to change positions frequently, walk as tolerated, rest when needed, stay hydrated and continue to take up to ten deep breaths an hour while awake, or if watching television take a deep breath with every commercial. [facility] reviewed discharge instructions, medications, S&S of infection/blood clots with patient, she verbalized understanding of these. Patient denies any further questions or needs at this time. Do you have any pain since discharge?  no  When you were leaving the hospital were your discharge instructions reviewed with you? yes  How well were your discharge instructions explained to you? On a scale of 1-5   1- Very Poor and 5- Very well   Very well  Do you have any questions about your discharge instructions? No  Before leaving the hospital was your diagnoses explained to you? Yes  Do you have any questions about your diagnoses?  No  Are you able to perform normal daily activities of living as you have prior to your hospital stay (dressing, bathing, ambulating to the bathroom, etc)? yes  (NCM) Was patient given a different diet per AVS? no, patient reports he is eating his regular diet. Medications:   Current Outpatient Medications   Medication Sig Dispense Refill    clonazePAM 1 MG Oral Tab Take 1 tablet (1 mg total) by mouth nightly as needed for Anxiety. 30 tablet 0    dicyclomine 20 MG Oral Tab Take 1 tablet (20 mg total) by mouth nightly as needed (as needed for cramping/ab pain). 30 tablet 2    amLODIPine 10 MG Oral Tab Take 1 tablet (10 mg total) by mouth daily. 180 tablet 1    aspirin 81 MG Oral Tab daily.         (NCM)  Were there any medication changes noted on AVS?  no  May I go over your medications with you to make sure we are not missing anything?yes  Are there any reasons that keep you from taking your medication as prescribed? No  Are you having any concerns with constipation? No    Referrals/orders at D/C:  Home Health/Services ordered at D/C? No    DME ordered at D/C? No    SDOH:  Transportation:  Transportation Needs: No Transportation Needs (8/22/2023)      Transportation Needs          Lack of Transportation: No      Financial Strain:  Financial Resource Strain: Low Risk  (8/22/2023)      Financial Resource Strain          Difficulty of Paying Living Expenses: Not on file          Med Affordability: No       DX: specifics:  Monitor for S&S of infection         Needs post D/C:   Now that you are home, are there any needs or concerns you need addressed before your next visit with your PCP?  (DME, meds, disease concerns, Etc): No     Follow up appointments:      PCP TCM or HFU appointment: scheduled at D/C within 7-14 days  no     NCM Reviewed/scheduled/rescheduled PCP TCM or HFU appointment with pt:  Yes, NCM scheduled a TCM appointment on 8/23/2023 at 11:00 am.      Have you made all of your follow up appointments? no    Is there any reason as to why you cannot make your appointments? No     [facility] Reviewed upcoming Specialist Appt with patient     Yes, patient states he is going to call Dr. Shirley Parks today to schedule an appointment. Interventions by [facility]: [place] reviewed medications, discharge instructions, S&S of infection/blood clots. Patient instructed to report any new or worsening symptoms, when to call the doctor and when to call 911. Patient verbalized understanding of these. NCM instructed patient to call PCP with any questions or needs, she states she will. CCM referral placed:  No, patient refused in past.    BOOK BY DATE: 9/3/2023    [x]  Discharge Summary, Discharge medications reviewed/discussed/and reconciled against outpatient medications with patient,  and orders reviewed and discussed. Any changes or updates to medications and or orders sent to PCP.

## 2023-08-23 ENCOUNTER — OFFICE VISIT (OUTPATIENT)
Dept: INTERNAL MEDICINE CLINIC | Facility: CLINIC | Age: 73
End: 2023-08-23

## 2023-08-23 VITALS
HEIGHT: 77 IN | OXYGEN SATURATION: 97 % | TEMPERATURE: 98 F | RESPIRATION RATE: 19 BRPM | HEART RATE: 92 BPM | DIASTOLIC BLOOD PRESSURE: 71 MMHG | BODY MASS INDEX: 29 KG/M2 | SYSTOLIC BLOOD PRESSURE: 142 MMHG

## 2023-08-23 DIAGNOSIS — C09.9 PRIMARY TONSILLAR SQUAMOUS CELL CARCINOMA (HCC): ICD-10-CM

## 2023-08-23 DIAGNOSIS — Z09 HOSPITAL DISCHARGE FOLLOW-UP: Primary | ICD-10-CM

## 2023-08-23 DIAGNOSIS — R11.2 NAUSEA AND VOMITING, UNSPECIFIED VOMITING TYPE: ICD-10-CM

## 2023-08-23 RX ORDER — ONDANSETRON 2 MG/ML
2 INJECTION INTRAMUSCULAR; INTRAVENOUS ONCE
Status: COMPLETED | OUTPATIENT
Start: 2023-08-23 | End: 2023-08-23

## 2023-08-23 RX ADMIN — ONDANSETRON 2 MG: 2 INJECTION INTRAMUSCULAR; INTRAVENOUS at 13:10:00

## 2023-08-24 ENCOUNTER — OFFICE VISIT (OUTPATIENT)
Dept: OTOLARYNGOLOGY | Facility: CLINIC | Age: 73
End: 2023-08-24

## 2023-08-24 ENCOUNTER — TELEPHONE (OUTPATIENT)
Dept: INTERNAL MEDICINE CLINIC | Facility: CLINIC | Age: 73
End: 2023-08-24

## 2023-08-24 VITALS — HEIGHT: 77 IN | WEIGHT: 241 LBS | BODY MASS INDEX: 28.46 KG/M2

## 2023-08-24 DIAGNOSIS — C09.9 PRIMARY CANCER OF TONSIL (HCC): Primary | ICD-10-CM

## 2023-08-24 DIAGNOSIS — C79.89 METASTATIC SQUAMOUS CELL CARCINOMA TO HEAD AND NECK (HCC): ICD-10-CM

## 2023-08-24 PROCEDURE — 88305 TISSUE EXAM BY PATHOLOGIST: CPT | Performed by: SPECIALIST

## 2023-08-24 PROCEDURE — 88342 IMHCHEM/IMCYTCHM 1ST ANTB: CPT | Performed by: SPECIALIST

## 2023-08-24 NOTE — TELEPHONE ENCOUNTER
Per patient he insist to send a message to Dr Judah Donis that his appointment with Dr Brigitte Martinez is not until 09/07/2023. Patient states that is 3 weeks from now but he is on the wait list.  Patient would like to know if that is good enough or do doctor can recommend him to a different doctor who can accommodate him earlier.

## 2023-08-24 NOTE — TELEPHONE ENCOUNTER
Per chart review, patient has not scheduled PET scan or appointment with heme/onc. Contacted patient (name and  of patient verified). He reports ENT appointment was moved up today. Advised patient to schedule test and specialist appointments, [facility] message sent with test and referral details. Future Appointments   Date Time Provider Kitty Merino   2023  2:00 PM Iggy Castellanos MD [address] [facility] OF THE [facility]   2023  2:40 PM Iggy Castellanos MD [address] [facility]        Akira Davison MD  2023  7:27 PM CDT       I called patient no answer left a message on his phone the number to call him again tomorrow in the meantime we will put a referral for oncology and PET scan CT scan as recommended by Dr. Daniel Brunner ENT    Sylvia Castellanos MD  2023  1:49 PM CDT       Consistent with metastatic tonsil carcinoma. I left a message for the patient to follow up. Will need PET - CT scan. Will also need oncology and radiation therapy involved. Who do you recommend. Thank you     Heme/Onc Referral Information:   Order Date: Aug 21, 2023 Epic Order #: [id number]   Referral Type: OP REFERRAL TO [facility] Dx: Tonsillar cancer ([facility] [address] (C09.9)   Signed Referral [place]: This referral is for an ADULT who needs to see a hematologist/oncologist at Health system. Yes  Is this a referral for hematology or oncology?  Oncology  Number of Visits: 5    Provider Address Phone   Kay Eng  [address]  [facility] [phone]

## 2023-08-24 NOTE — PATIENT INSTRUCTIONS
A biopsy was taken of your right tonsil and cauterized. Please eat a soft diet for the next couple of days. I will of course notify you of the results. You do need to get a PET CT scan to better evaluate the extent of your disease. 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

## 2023-08-27 NOTE — PROGRESS NOTES
Subjective:   Patient ID: Merlene Vu is a 72year old male. HPI    Patient comes in today for follow-up after he was admitted to the hospital with lump on the neck biopsy showed squamous cell carcinoma most likely tonsillar ENT had seen him he has a follow-up appointment with ENT and an order for PET scan was ordered. Patient when he came into the office this morning following nauseous he just came down from dialysis and says usually he gets like this we give him  Zofran and he felt better    History/Other:   Review of Systems   Constitutional: Negative. HENT: Negative. Eyes: Negative. Respiratory: Negative. Cardiovascular: Negative. Gastrointestinal:  Positive for nausea. Genitourinary: Negative. Musculoskeletal: Negative. Neck lump   Skin: Negative. Neurological: Negative. Psychiatric/Behavioral: Negative. Current Outpatient Medications   Medication Sig Dispense Refill    clonazePAM 1 MG Oral Tab Take 1 tablet (1 mg total) by mouth nightly as needed for Anxiety. 30 tablet 0    dicyclomine 20 MG Oral Tab Take 1 tablet (20 mg total) by mouth nightly as needed (as needed for cramping/ab pain). 30 tablet 2    amLODIPine 10 MG Oral Tab Take 1 tablet (10 mg total) by mouth daily. 180 tablet 1    aspirin 81 MG Oral Tab daily. Allergies:No Known Allergies    Objective:   Physical Exam  Vitals and nursing note reviewed. Constitutional:       Appearance: He is well-developed. HENT:      Head: Normocephalic and atraumatic. Right Ear: External ear normal.      Left Ear: External ear normal.      Nose: Nose normal.   Eyes:      Conjunctiva/sclera: Conjunctivae normal.      Pupils: Pupils are equal, round, and reactive to light. Cardiovascular:      Rate and Rhythm: Normal rate and regular rhythm. Heart sounds: Normal heart sounds. Pulmonary:      Effort: Pulmonary effort is normal.      Breath sounds: Normal breath sounds.    Abdominal:      General: Bowel sounds are normal.      Palpations: Abdomen is soft. Genitourinary:     Penis: Normal.       Prostate: Normal.      Rectum: Normal.   Musculoskeletal:         General: Normal range of motion. Cervical back: Normal range of motion and neck supple. Comments: Rt side neck lump   Skin:     General: Skin is warm and dry. Neurological:      Mental Status: He is alert and oriented to person, place, and time. Deep Tendon Reflexes: Reflexes are normal and symmetric. Assessment & Plan:   Hospital discharge follow-up  (primary encounter diagnosis) doing okay will follow-up with ENT  Primary tonsillar squamous cell carcinoma ([facility] [address] follow-up with ENT will order PET scan  Nausea and vomiting, unspecified vomiting type postdialysis 1 dose of Zofran given feeling better monitor at home if not better to let us know    No orders of the defined types were placed in this encounter.       Meds This Visit:  Requested Prescriptions      No prescriptions requested or ordered in this encounter       Imaging & Referrals:  None

## 2023-08-27 NOTE — PROGRESS NOTES
Patient is aware of the diagnosis and will call you Monday for next steps which should include the PET CT scan and oncology consultation.  Thank you

## 2023-08-28 ENCOUNTER — TELEPHONE (OUTPATIENT)
Dept: INTERNAL MEDICINE CLINIC | Facility: CLINIC | Age: 73
End: 2023-08-28

## 2023-08-28 ENCOUNTER — TELEPHONE (OUTPATIENT)
Facility: CLINIC | Age: 73
End: 2023-08-28

## 2023-08-28 ENCOUNTER — TELEPHONE (OUTPATIENT)
Dept: HEMATOLOGY/ONCOLOGY | Facility: HOSPITAL | Age: 73
End: 2023-08-28

## 2023-08-28 NOTE — TELEPHONE ENCOUNTER
[facility] calling to verify reason of Xanax  One dose     Informed pharmacist of below, patient  needs to take Xanax before PET scan    Pharmacist  verbalizes understanding

## 2023-08-28 NOTE — TELEPHONE ENCOUNTER
I called the patient and informed. He does not want to be in the tube. The noise is ok. He still would like something while in the tube. His PET scan is for 9/14/23. The patient is asking if he should see he oncologist before than. I instructed him to ask the oncology department when he schedules his appointment. He stated he will.

## 2023-08-28 NOTE — TELEPHONE ENCOUNTER
He can get the test done before seeing the oncologist will give him some Xanax to take right before procedure I will place the order he can take 1 mg of Xanax 30 minutes before procedure pick it up at the pharmacy

## 2023-08-29 RX ORDER — CLONAZEPAM 1 MG/1
1 TABLET ORAL NIGHTLY PRN
Qty: 30 TABLET | Refills: 0 | Status: SHIPPED | OUTPATIENT
Start: 2023-08-29

## 2023-09-05 ENCOUNTER — OFFICE VISIT (OUTPATIENT)
Dept: HEMATOLOGY/ONCOLOGY | Facility: HOSPITAL | Age: 73
End: 2023-09-05
Attending: INTERNAL MEDICINE
Payer: MEDICARE

## 2023-09-05 VITALS
SYSTOLIC BLOOD PRESSURE: 136 MMHG | BODY MASS INDEX: 27.28 KG/M2 | RESPIRATION RATE: 18 BRPM | WEIGHT: 231 LBS | DIASTOLIC BLOOD PRESSURE: 62 MMHG | HEIGHT: 77 IN | TEMPERATURE: 98 F | OXYGEN SATURATION: 97 % | HEART RATE: 65 BPM

## 2023-09-05 DIAGNOSIS — N18.6 ESRD (END STAGE RENAL DISEASE) ON DIALYSIS (HCC): Primary | ICD-10-CM

## 2023-09-05 DIAGNOSIS — Z99.2 ESRD (END STAGE RENAL DISEASE) ON DIALYSIS (HCC): Primary | ICD-10-CM

## 2023-09-05 DIAGNOSIS — C09.9 TONSIL CANCER (HCC): ICD-10-CM

## 2023-09-05 PROCEDURE — 99211 OFF/OP EST MAY X REQ PHY/QHP: CPT

## 2023-09-05 RX ORDER — DIPHENHYDRAMINE HCL 50 MG
50 CAPSULE ORAL ONCE
OUTPATIENT
Start: 2023-09-12

## 2023-09-05 RX ORDER — ACETAMINOPHEN 325 MG/1
650 TABLET ORAL ONCE
OUTPATIENT
Start: 2023-09-12

## 2023-09-05 RX ORDER — ACETAMINOPHEN 325 MG/1
650 TABLET ORAL ONCE
OUTPATIENT
Start: 2023-09-19

## 2023-09-05 RX ORDER — ACETAMINOPHEN 325 MG/1
650 TABLET ORAL ONCE
OUTPATIENT
Start: 2023-10-24

## 2023-09-05 RX ORDER — ACETAMINOPHEN 325 MG/1
650 TABLET ORAL ONCE
OUTPATIENT
Start: 2023-10-17

## 2023-09-05 RX ORDER — DIPHENHYDRAMINE HCL 50 MG
50 CAPSULE ORAL ONCE
OUTPATIENT
Start: 2023-09-26

## 2023-09-05 RX ORDER — ACETAMINOPHEN 325 MG/1
650 TABLET ORAL ONCE
OUTPATIENT
Start: 2023-09-26

## 2023-09-05 RX ORDER — DIPHENHYDRAMINE HCL 50 MG
50 CAPSULE ORAL ONCE
OUTPATIENT
Start: 2023-10-17

## 2023-09-05 RX ORDER — DIPHENHYDRAMINE HCL 50 MG
50 CAPSULE ORAL ONCE
OUTPATIENT
Start: 2023-10-31

## 2023-09-05 RX ORDER — DIPHENHYDRAMINE HCL 50 MG
50 CAPSULE ORAL ONCE
OUTPATIENT
Start: 2023-09-19

## 2023-09-05 RX ORDER — ACETAMINOPHEN 325 MG/1
650 TABLET ORAL ONCE
OUTPATIENT
Start: 2023-10-03

## 2023-09-05 RX ORDER — DIPHENHYDRAMINE HCL 50 MG
50 CAPSULE ORAL ONCE
OUTPATIENT
Start: 2023-10-10

## 2023-09-05 RX ORDER — DIPHENHYDRAMINE HCL 50 MG
50 CAPSULE ORAL ONCE
OUTPATIENT
Start: 2023-10-03

## 2023-09-05 RX ORDER — DIPHENHYDRAMINE HCL 50 MG
50 CAPSULE ORAL ONCE
OUTPATIENT
Start: 2023-10-24

## 2023-09-05 RX ORDER — ACETAMINOPHEN 325 MG/1
650 TABLET ORAL ONCE
OUTPATIENT
Start: 2023-10-31

## 2023-09-05 RX ORDER — ACETAMINOPHEN 325 MG/1
650 TABLET ORAL ONCE
OUTPATIENT
Start: 2023-10-10

## 2023-09-06 NOTE — CONSULTS
[facility]    PATIENT'S NAME: Aris CASTELANZyrra PHYSICIAN: Yoselin Mora. Charlette Montiel MD   PATIENT ACCOUNT #: [de-identified] LOCATION: [address] [place] [address] RECORD #: [id number] YOB: 1950   CONSULTATION DATE: 09/05/2023       [facility] NEW PATIENT CONSULT    REQUESTING PROVIDER:  Maureen Peres MD    REASON FOR CONSULTATION:  Tonsillar cancer. 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 Biopsy 08/24/2023 with Dr. Nori Nuñez. He has a CT of the neck as of 08/18/2023 that shows 2 ipsilateral enlarged lymph nodes, one measuring 4.2 cm and the other 2.9 cm. PET/CT is pending. The patient presents for treatment discussion. He is feeling reasonably well. PAST MEDICAL HISTORY:  End-stage renal disease on hemodialysis, hypertension. MEDICATIONS:  Amlodipine, clonazepam.    ALLERGIES:  No known drug allergies. SOCIAL HISTORY:  Never smoker. Denies any alcohol or illicit drug use. FAMILY MEDICAL HISTORY:  No reported history of any head or neck cancer. REVIEW OF SYSTEMS:  Otherwise, negative x12. PHYSICAL EXAMINATION:  GENERAL:  In no acute distress. VITAL SIGNS:  ECOG performance status is 1. Weight is 104.8 kg. Blood pressure is 136/62, pulse 65,  temperature is 36.8. HEENT:  Moist mucous membranes. NECK:  Supple. LUNGS:  Symmetric expansion. HEART:  Good distal pulses. ABDOMEN:  Soft. EXTREMITIES:  No edema. SKIN:  No visible lesions. NEUROLOGIC:  Moving all extremities. PSYCHIATRIC:  Appropriate mood, appropriate affect. MUSCULOSKELETAL:  No deformity. IMAGING:  CT reviewed as per HPI. Please see above for details. ASSESSMENT AND PLAN:  The patient is a pleasant 79-year-old male being evaluated by Medical Oncology for p16-positive, node-positive right-sided tonsillar cancer.   He has 2 ipsilateral nodes enlarged on CT. PET/CT is pending. If he does not have any evidence of metastatic disease, we discussed concurrent systemic therapy with radiation therapy. He has end-stage renal disease on hemodialysis; therefore, we would not use cisplatin. We discussed using single-agent cetuximab. We will see him back following PET/CT in conjunction with Radiation Oncology. Thank you very much for the consultation request and for allowing us to participate in the care of this delightful patient. Dictated By Jesica Ortega MD  d: 09/05/2023 14:58:41  t: 09/06/2023 01:02:12  Job [id number]  BCM/    cc: MD Erich Jason MD

## 2023-09-12 ENCOUNTER — TELEPHONE (OUTPATIENT)
Dept: HEMATOLOGY/ONCOLOGY | Facility: HOSPITAL | Age: 73
End: 2023-09-12

## 2023-09-14 ENCOUNTER — HOSPITAL ENCOUNTER (OUTPATIENT)
Dept: NUCLEAR MEDICINE | Facility: HOSPITAL | Age: 73
Discharge: HOME | End: 2023-09-14
Attending: INTERNAL MEDICINE
Payer: MEDICARE

## 2023-09-14 DIAGNOSIS — C09.9 TONSILLAR CANCER (HCC): ICD-10-CM

## 2023-09-14 LAB — GLUCOSE BLDC GLUCOMTR-MCNC: 90 MG/DL (ref 70–99)

## 2023-09-14 PROCEDURE — 82962 GLUCOSE BLOOD TEST: CPT

## 2023-09-14 PROCEDURE — 78815 PET IMAGE W/CT SKULL-THIGH: CPT | Performed by: INTERNAL MEDICINE

## 2023-09-19 ENCOUNTER — OFFICE VISIT (OUTPATIENT)
Dept: HEMATOLOGY/ONCOLOGY | Facility: HOSPITAL | Age: 73
End: 2023-09-19
Attending: INTERNAL MEDICINE
Payer: MEDICARE

## 2023-09-19 ENCOUNTER — OFFICE VISIT (OUTPATIENT)
Dept: RADIATION ONCOLOGY | Facility: HOSPITAL | Age: 73
End: 2023-09-19
Attending: RADIOLOGY
Payer: MEDICARE

## 2023-09-19 VITALS
DIASTOLIC BLOOD PRESSURE: 70 MMHG | HEART RATE: 64 BPM | RESPIRATION RATE: 18 BRPM | TEMPERATURE: 97 F | SYSTOLIC BLOOD PRESSURE: 160 MMHG | OXYGEN SATURATION: 98 %

## 2023-09-19 VITALS — BODY MASS INDEX: 28 KG/M2 | WEIGHT: 233.38 LBS

## 2023-09-19 DIAGNOSIS — Z71.9 HEALTH EDUCATION: ICD-10-CM

## 2023-09-19 DIAGNOSIS — C09.9 TONSIL CANCER (HCC): Primary | ICD-10-CM

## 2023-09-19 PROCEDURE — 99214 OFFICE O/P EST MOD 30 MIN: CPT | Performed by: PHYSICIAN ASSISTANT

## 2023-09-19 PROCEDURE — 99212 OFFICE O/P EST SF 10 MIN: CPT

## 2023-09-19 NOTE — ED INITIAL ASSESSMENT (HPI)
Patient fell while working in the yard today. Patient with LOC and hit his head on concrete. Patient with laceration to left eyebrow and missing tooth. Doxepin Counseling:  Patient advised that the medication is sedating and not to drive a car after taking this medication. Patient informed of potential adverse effects including but not limited to dry mouth, urinary retention, and blurry vision.  The patient verbalized understanding of the proper use and possible adverse effects of doxepin.  All of the patient's questions and concerns were addressed.

## 2023-09-22 ENCOUNTER — APPOINTMENT (OUTPATIENT)
Dept: RADIATION ONCOLOGY | Facility: HOSPITAL | Age: 73
End: 2023-09-22
Attending: RADIOLOGY
Payer: MEDICARE

## 2023-10-01 ENCOUNTER — APPOINTMENT (OUTPATIENT)
Dept: RADIATION ONCOLOGY | Facility: HOSPITAL | Age: 73
End: 2023-10-01
Attending: RADIOLOGY
Payer: MEDICARE

## 2023-10-03 ENCOUNTER — APPOINTMENT (OUTPATIENT)
Dept: RADIATION ONCOLOGY | Facility: HOSPITAL | Age: 73
End: 2023-10-03
Attending: RADIOLOGY
Payer: MEDICARE

## 2023-10-03 ENCOUNTER — NURSE ONLY (OUTPATIENT)
Dept: HEMATOLOGY/ONCOLOGY | Facility: HOSPITAL | Age: 73
End: 2023-10-03
Attending: INTERNAL MEDICINE

## 2023-10-03 VITALS
HEART RATE: 59 BPM | OXYGEN SATURATION: 100 % | WEIGHT: 201.63 LBS | DIASTOLIC BLOOD PRESSURE: 77 MMHG | TEMPERATURE: 98 F | SYSTOLIC BLOOD PRESSURE: 177 MMHG | BODY MASS INDEX: 24 KG/M2 | RESPIRATION RATE: 18 BRPM

## 2023-10-03 DIAGNOSIS — C09.9 TONSIL CANCER (HCC): Primary | ICD-10-CM

## 2023-10-03 LAB
ALBUMIN SERPL-MCNC: 3.3 G/DL (ref 3.4–5)
ALBUMIN/GLOB SERPL: 0.8 {RATIO} (ref 1–2)
ALP LIVER SERPL-CCNC: 94 U/L
ALT SERPL-CCNC: 23 U/L
ANION GAP SERPL CALC-SCNC: 8 MMOL/L (ref 0–18)
AST SERPL-CCNC: 17 U/L (ref 15–37)
BASOPHILS # BLD AUTO: 0.04 X10(3) UL (ref 0–0.2)
BASOPHILS NFR BLD AUTO: 0.9 %
BILIRUB SERPL-MCNC: 0.6 MG/DL (ref 0.1–2)
BUN BLD-MCNC: 39 MG/DL (ref 7–18)
BUN/CREAT SERPL: 4.4 (ref 10–20)
CALCIUM BLD-MCNC: 8.2 MG/DL (ref 8.5–10.1)
CHLORIDE SERPL-SCNC: 105 MMOL/L (ref 98–112)
CO2 SERPL-SCNC: 31 MMOL/L (ref 21–32)
CREAT BLD-MCNC: 8.96 MG/DL
DEPRECATED RDW RBC AUTO: 50.4 FL (ref 35.1–46.3)
EGFRCR SERPLBLD CKD-EPI 2021: 6 ML/MIN/1.73M2 (ref 60–?)
EOSINOPHIL # BLD AUTO: 0.12 X10(3) UL (ref 0–0.7)
EOSINOPHIL NFR BLD AUTO: 2.7 %
ERYTHROCYTE [DISTWIDTH] IN BLOOD BY AUTOMATED COUNT: 15.3 % (ref 11–15)
GLOBULIN PLAS-MCNC: 4.1 G/DL (ref 2.8–4.4)
GLUCOSE BLD-MCNC: 110 MG/DL (ref 70–99)
HCT VFR BLD AUTO: 36.2 %
HGB BLD-MCNC: 11.5 G/DL
IMM GRANULOCYTES # BLD AUTO: 0.02 X10(3) UL (ref 0–1)
IMM GRANULOCYTES NFR BLD: 0.5 %
LYMPHOCYTES # BLD AUTO: 1.72 X10(3) UL (ref 1–4)
LYMPHOCYTES NFR BLD AUTO: 38.9 %
MAGNESIUM SERPL-MCNC: 2.3 MG/DL (ref 1.6–2.6)
MCH RBC QN AUTO: 28.5 PG (ref 26–34)
MCHC RBC AUTO-ENTMCNC: 31.8 G/DL (ref 31–37)
MCV RBC AUTO: 89.8 FL
MONOCYTES # BLD AUTO: 0.46 X10(3) UL (ref 0.1–1)
MONOCYTES NFR BLD AUTO: 10.4 %
NEUTROPHILS # BLD AUTO: 2.06 X10 (3) UL (ref 1.5–7.7)
NEUTROPHILS # BLD AUTO: 2.06 X10(3) UL (ref 1.5–7.7)
NEUTROPHILS NFR BLD AUTO: 46.6 %
OSMOLALITY SERPL CALC.SUM OF ELEC: 308 MOSM/KG (ref 275–295)
PLATELET # BLD AUTO: 204 10(3)UL (ref 150–450)
POTASSIUM SERPL-SCNC: 4.4 MMOL/L (ref 3.5–5.1)
PROT SERPL-MCNC: 7.4 G/DL (ref 6.4–8.2)
RBC # BLD AUTO: 4.03 X10(6)UL
SODIUM SERPL-SCNC: 144 MMOL/L (ref 136–145)
WBC # BLD AUTO: 4.4 X10(3) UL (ref 4–11)

## 2023-10-03 PROCEDURE — 77386 HC IMRT COMPLEX: CPT | Performed by: RADIOLOGY

## 2023-10-03 PROCEDURE — 83735 ASSAY OF MAGNESIUM: CPT

## 2023-10-03 PROCEDURE — 80053 COMPREHEN METABOLIC PANEL: CPT

## 2023-10-03 PROCEDURE — 96415 CHEMO IV INFUSION ADDL HR: CPT

## 2023-10-03 PROCEDURE — 96413 CHEMO IV INFUSION 1 HR: CPT

## 2023-10-03 PROCEDURE — 85025 COMPLETE CBC W/AUTO DIFF WBC: CPT

## 2023-10-03 RX ORDER — ACETAMINOPHEN 325 MG/1
650 TABLET ORAL ONCE
Status: COMPLETED | OUTPATIENT
Start: 2023-10-03 | End: 2023-10-03

## 2023-10-03 RX ORDER — DIPHENHYDRAMINE HCL 50 MG
50 CAPSULE ORAL ONCE
Status: COMPLETED | OUTPATIENT
Start: 2023-10-03 | End: 2023-10-03

## 2023-10-03 RX ADMIN — DIPHENHYDRAMINE HCL 50 MG: 50 MG CAPSULE ORAL at 12:33:00

## 2023-10-03 RX ADMIN — ACETAMINOPHEN 650 MG: 325 TABLET ORAL at 12:33:00

## 2023-10-03 NOTE — PROGRESS NOTES
Pt here for C1D1 Erbitux with concurrent RT. Arrives Ambulating independently, accompanied by Self     Labs drawn today, consent noted in chart. Patient with End stage renal disease - fistula in L arm. Dialysis M/W/F. Feeling overall well - no side effects currently. Radiation treatment completed during infusion today. Oral medications included in this regimen:  no    Patient confirms comprehension of cancer treatment schedule:  yes    Pregnancy screening:  Not applicable    Modifications in dose or schedule:  No    Medications appearance and physical integrity checked by RN. Chemotherapy IV pump settings verified by 2 RNs:  no     Frequency of blood return and site check throughout administration: Prior to administration, Prior to each drug, and At completion of therapy     Infusion/treatment outcome:  patient tolerated treatment without incident    1 hr observation / vitals completed after Erbitux - patient without issues for discharge.      Education Record    Learner:  Patient and Spouse  Barriers / Limitations:  None  Method:  Discussion and Printed material  Education / instructions given:  treatment schedule, plan of care, medication side effects, when to reach out to MD  Outcome:  Needs reinforcement, Shows understanding, and Patient given copies of updated medication list    Discharged Home, Ambulating independently, accompanied by:Self and Spouse    Patient/family verbalized understanding of future appointments: by printed AVS

## 2023-10-04 ENCOUNTER — TELEPHONE (OUTPATIENT)
Dept: HEMATOLOGY/ONCOLOGY | Facility: HOSPITAL | Age: 73
End: 2023-10-04

## 2023-10-04 PROCEDURE — 77386 HC IMRT COMPLEX: CPT | Performed by: RADIOLOGY

## 2023-10-04 NOTE — TELEPHONE ENCOUNTER
Toxicities: C1 D1 Cetuximab with RT on 10/3/2023    Shravan Alyssa reports he feels \"good. \" No side effects at this time. I encouraged him to please call the office if he is not feeling well or he has any questions or concerns. He agreed and thanked me for checking on him.

## 2023-10-05 ENCOUNTER — DIETICIAN VISIT (OUTPATIENT)
Dept: NUTRITION | Facility: HOSPITAL | Age: 73
End: 2023-10-05

## 2023-10-05 VITALS — WEIGHT: 236 LBS | BODY MASS INDEX: 28 KG/M2

## 2023-10-05 PROCEDURE — 77386 HC IMRT COMPLEX: CPT | Performed by: RADIOLOGY

## 2023-10-06 PROCEDURE — 77386 HC IMRT COMPLEX: CPT | Performed by: RADIOLOGY

## 2023-10-09 ENCOUNTER — OFFICE VISIT (OUTPATIENT)
Dept: RADIATION ONCOLOGY | Facility: HOSPITAL | Age: 73
End: 2023-10-09
Attending: RADIOLOGY
Payer: MEDICARE

## 2023-10-09 VITALS
OXYGEN SATURATION: 98 % | SYSTOLIC BLOOD PRESSURE: 142 MMHG | HEART RATE: 80 BPM | RESPIRATION RATE: 18 BRPM | WEIGHT: 231 LBS | DIASTOLIC BLOOD PRESSURE: 67 MMHG | TEMPERATURE: 97 F | BODY MASS INDEX: 27 KG/M2

## 2023-10-09 PROCEDURE — 77386 HC IMRT COMPLEX: CPT | Performed by: RADIOLOGY

## 2023-10-10 ENCOUNTER — OFFICE VISIT (OUTPATIENT)
Dept: HEMATOLOGY/ONCOLOGY | Facility: HOSPITAL | Age: 73
End: 2023-10-10
Attending: INTERNAL MEDICINE

## 2023-10-10 VITALS
SYSTOLIC BLOOD PRESSURE: 180 MMHG | BODY MASS INDEX: 27.39 KG/M2 | HEIGHT: 77 IN | TEMPERATURE: 98 F | HEART RATE: 64 BPM | WEIGHT: 232 LBS | RESPIRATION RATE: 18 BRPM | OXYGEN SATURATION: 100 % | DIASTOLIC BLOOD PRESSURE: 64 MMHG

## 2023-10-10 VITALS — DIASTOLIC BLOOD PRESSURE: 64 MMHG | HEART RATE: 62 BPM | RESPIRATION RATE: 18 BRPM | SYSTOLIC BLOOD PRESSURE: 158 MMHG

## 2023-10-10 DIAGNOSIS — C09.9 TONSIL CANCER (HCC): Primary | ICD-10-CM

## 2023-10-10 DIAGNOSIS — Z51.81 MEDICATION MONITORING ENCOUNTER: ICD-10-CM

## 2023-10-10 DIAGNOSIS — R19.7 DIARRHEA, UNSPECIFIED TYPE: ICD-10-CM

## 2023-10-10 DIAGNOSIS — I10 ESSENTIAL HYPERTENSION: ICD-10-CM

## 2023-10-10 LAB — MAGNESIUM SERPL-MCNC: 2.4 MG/DL (ref 1.6–2.6)

## 2023-10-10 PROCEDURE — 83735 ASSAY OF MAGNESIUM: CPT

## 2023-10-10 PROCEDURE — 99215 OFFICE O/P EST HI 40 MIN: CPT | Performed by: PHYSICIAN ASSISTANT

## 2023-10-10 PROCEDURE — 77386 HC IMRT COMPLEX: CPT | Performed by: RADIOLOGY

## 2023-10-10 PROCEDURE — 96413 CHEMO IV INFUSION 1 HR: CPT

## 2023-10-10 RX ORDER — ACETAMINOPHEN 325 MG/1
TABLET ORAL
Status: COMPLETED
Start: 2023-10-10 | End: 2023-10-10

## 2023-10-10 RX ORDER — ACETAMINOPHEN 325 MG/1
650 TABLET ORAL ONCE
Status: COMPLETED | OUTPATIENT
Start: 2023-10-10 | End: 2023-10-10

## 2023-10-10 RX ORDER — DIPHENHYDRAMINE HCL 50 MG
50 CAPSULE ORAL ONCE
Status: COMPLETED | OUTPATIENT
Start: 2023-10-10 | End: 2023-10-10

## 2023-10-10 RX ORDER — DIPHENHYDRAMINE HCL 50 MG
CAPSULE ORAL
Status: COMPLETED
Start: 2023-10-10 | End: 2023-10-10

## 2023-10-10 RX ADMIN — ACETAMINOPHEN 650 MG: 325 TABLET ORAL at 12:35:00

## 2023-10-10 RX ADMIN — DIPHENHYDRAMINE HCL 50 MG: 50 MG CAPSULE ORAL at 12:35:00

## 2023-10-10 NOTE — PROGRESS NOTES
[facility]    PATIENT'S NAME: Aris CASTELAN [employer] PHYSICIAN: Kirsten Bravo. Diana Siddiqui MD   PATIENT ACCOUNT #: [de-identified] LOCATION: [address] [place] [address] RECORD #: [id number] YOB: 1950   CONSULTATION DATE: 09/05/2023           PCP:  Renate Linder MD    Diagnosis:  Tonsillar cancer. 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 Biopsy 08/24/2023 with Dr. Raul Barba. He has a CT of the neck as of 08/18/2023 that shows 2 ipsilateral enlarged lymph nodes, one measuring 4.2 cm and the other 2.9 cm. Interval history:  Presents for C1D8 cetixumab plus radiation. (Radiation initiated on 10/3/23). States he feels ok. Endorses diarrhea that started 3 months ago, at least.  Takes 1 tablet of lopramide daily. Has 4-5 BMs per day. Oliguria baseline due to CRF - on HD. Denies fatigue, headache, anorexia, mouth sores, odynophagia, cough, dyspnea, nausea, constipation, abdominal pain, rash and pruritus. PAST MEDICAL HISTORY:  End-stage renal disease on hemodialysis, hypertension. MEDICATIONS:  Amlodipine, clonazepam.    ALLERGIES:  No known drug allergies. SOCIAL HISTORY:  Never smoker. Denies any alcohol or illicit drug use. FAMILY MEDICAL HISTORY:  No reported history of any head or neck cancer. REVIEW OF SYSTEMS:  Otherwise, negative x12. PHYSICAL EXAMINATION:  ECOG 1  GENERAL:  In no acute distress. HEENT:  Moderately erythema/injection right tonsil, MMM  NECK:  Supple, no LAD, normal AROM  LUNGS:  Symmetric expansion, CTA  HEART:  RRR  ABDOMEN:  Soft, mild tender LLQ, non distended, BS+  EXTREMITIES:  No edema. NEUROLOGIC:  DTRs grossly intact  PSYCHIATRIC:  Appropriate mood, appropriate affect.         ASSESSMENT AND PLAN:  The patient is a pleasant 21-year-old male being evaluated by Medical Oncology for p16-positive, node-positive right-sided tonsillar cancer. He has 2 ipsilateral nodes enlarged on CT. Since no evidence of metastatic disease, we discussed concurrent systemic therapy with radiation therapy. He has end-stage renal disease on hemodialysis; therefore, we would not use cisplatin. We discussed using single-agent cetuximab. Radiation initiated on 10/3/23. Proceed with C1D8 Cetixumab. Reviewed potential cumulative side effects. Essential HTN:  Recheck BP in infusion. Follow-up with PCP for optimal management. CRF:  On HD  Diarrhea:  Baseline. Instructions given to maximize Loperamide use    Call prn.   Follow-up prior to cycle 2

## 2023-10-10 NOTE — PATIENT INSTRUCTIONS
Recheck blood pressure  Proceed with cycle 1 day 8  Loperamide 2 mg. Goal is less than 3 bowel movements per day. Directions: Take 2 tablets at onset of diarrhea, then 1 tablet after each additional loose stool. MAXIMUM is 8 tablets in 24 hours.    Call as needed  Follow-up prior to cycle 2

## 2023-10-10 NOTE — PROGRESS NOTES
Pt here for C1D8 Erbitux. Arrives Ambulating independently, accompanied by Self       Oral medications included in this regimen:  no    Patient confirms comprehension of cancer treatment schedule:  yes    Pregnancy screening:  Not applicable    Modifications in dose or schedule:  No    Medications appearance and physical integrity checked by RN. Chemotherapy IV pump settings verified by 2 RNs:  yes     Frequency of blood return and site check throughout administration: Prior to administration, Prior to each drug, and At completion of therapy     Infusion/treatment outcome:  patient tolerated treatment without incident    Education Record    Learner:  Patient  Barriers / Limitations:  None  Method:  Reinforcement  Education / instructions given:  Reinforce plan of care  Outcome:  Shows understanding    1 hour observation completed post infusion.  Discharged Home, Ambulating independently, accompanied by:Self    Patient/family verbalized understanding of future appointments: by printed AVS

## 2023-10-11 ENCOUNTER — TELEPHONE (OUTPATIENT)
Dept: RADIATION ONCOLOGY | Facility: HOSPITAL | Age: 73
End: 2023-10-11

## 2023-10-11 ENCOUNTER — TELEPHONE (OUTPATIENT)
Dept: GASTROENTEROLOGY | Facility: CLINIC | Age: 73
End: 2023-10-11

## 2023-10-11 ENCOUNTER — OFFICE VISIT (OUTPATIENT)
Dept: INTERNAL MEDICINE CLINIC | Facility: CLINIC | Age: 73
End: 2023-10-11

## 2023-10-11 VITALS
BODY MASS INDEX: 27.51 KG/M2 | DIASTOLIC BLOOD PRESSURE: 64 MMHG | TEMPERATURE: 98 F | SYSTOLIC BLOOD PRESSURE: 150 MMHG | WEIGHT: 233 LBS | RESPIRATION RATE: 19 BRPM | HEIGHT: 77 IN | HEART RATE: 69 BPM | OXYGEN SATURATION: 100 %

## 2023-10-11 DIAGNOSIS — R19.7 DIARRHEA, UNSPECIFIED TYPE: ICD-10-CM

## 2023-10-11 DIAGNOSIS — I10 ESSENTIAL HYPERTENSION: Primary | ICD-10-CM

## 2023-10-11 PROCEDURE — 77386 HC IMRT COMPLEX: CPT | Performed by: RADIOLOGY

## 2023-10-11 PROCEDURE — 1126F AMNT PAIN NOTED NONE PRSNT: CPT | Performed by: INTERNAL MEDICINE

## 2023-10-11 PROCEDURE — 99214 OFFICE O/P EST MOD 30 MIN: CPT | Performed by: INTERNAL MEDICINE

## 2023-10-11 RX ORDER — HYDRALAZINE HYDROCHLORIDE 10 MG/1
10 TABLET, FILM COATED ORAL 2 TIMES DAILY
Qty: 60 TABLET | Refills: 2 | Status: SHIPPED | OUTPATIENT
Start: 2023-10-11

## 2023-10-11 NOTE — TELEPHONE ENCOUNTER
RN called and spoke to pt. Pt scheduled for clinic appt on 11/27/23. Date, time, and location verified with pt. Pt verbalized understanding.     Your Appointments          Monday November 27, 2023 12:00 PM  [facility] [address] [place] [address],[address], [facility]) [address] [place] [address],[address]  [phone]

## 2023-10-11 NOTE — TELEPHONE ENCOUNTER
Patient asking to be seen in clinic sooner  [facility] to schedule with Paula Martinez NP next available

## 2023-10-12 ENCOUNTER — DIETICIAN VISIT (OUTPATIENT)
Dept: NUTRITION | Facility: HOSPITAL | Age: 73
End: 2023-10-12

## 2023-10-12 VITALS — BODY MASS INDEX: 28 KG/M2 | WEIGHT: 233.19 LBS

## 2023-10-12 PROCEDURE — 77386 HC IMRT COMPLEX: CPT | Performed by: RADIOLOGY

## 2023-10-13 PROCEDURE — 77386 HC IMRT COMPLEX: CPT | Performed by: RADIOLOGY

## 2023-10-13 PROCEDURE — 77336 RADIATION PHYSICS CONSULT: CPT | Performed by: RADIOLOGY

## 2023-10-15 NOTE — PROGRESS NOTES
Subjective:     Patient ID: Rex Schmid is a 72year old male. HPI    Patient comes in today for follow-up on elevated blood pressure he is taking his medication otherwise is doing okay with the treatment for his throat cancer. Patient continues to complain of diarrhea seen GI before all work-up was negative but has not followed up    History/Other:   Review of Systems   Constitutional: Negative. Negative for fatigue and fever. HENT: Negative. Negative for congestion. Eyes: Negative. Respiratory: Negative. Negative for cough, shortness of breath and wheezing. Cardiovascular: Negative. Negative for chest pain, palpitations and leg swelling. Gastrointestinal:  Positive for diarrhea. Endocrine: Negative for cold intolerance and heat intolerance. Genitourinary: Negative. Negative for dysuria, flank pain and hematuria. Musculoskeletal: Negative. Negative for arthralgias, back pain and myalgias. Skin: Negative. Neurological: Negative. Negative for dizziness, tremors, syncope, weakness and headaches. Psychiatric/Behavioral: Negative. Negative for agitation, behavioral problems and suicidal ideas. The patient is not nervous/anxious. Current Outpatient Medications   Medication Sig Dispense Refill    hydrALAZINE 10 MG Oral Tab Take 1 tablet (10 mg total) by mouth in the morning and 1 tablet (10 mg total) before bedtime. 60 tablet 2    clonazePAM 1 MG Oral Tab Take 1 tablet (1 mg total) by mouth nightly as needed for Anxiety. 30 tablet 0    amLODIPine 10 MG Oral Tab Take 1 tablet (10 mg total) by mouth daily. 180 tablet 1    aspirin 81 MG Oral Tab daily.          Allergies:No Known Allergies    Past Medical History:   Diagnosis Date    Diabetes ([facility] [address]     ESRD (end stage renal disease) on dialysis ([facility] [address] 2020    Essential hypertension     Tonsillar cancer (HCC)       Past Surgical History:   Procedure Laterality Date    COLONOSCOPY        Family History   Problem Relation Age of Onset    Cancer Sister         lung cancer      Social History:   Social History     Socioeconomic History    Marital status:    Tobacco Use    Smoking status: Never    Smokeless tobacco: Never   Vaping Use    Vaping Use: Never used   Substance and Sexual Activity    Alcohol use: Not Currently    Drug use: Never   Social Determinants of Health  Financial Resource Strain: Low Risk  (8/22/2023)      Financial Resource Strain          Med Affordability: No  Transportation Needs: No Transportation Needs (8/22/2023)      Transportation Needs          Lack of Transportation: No     Objective:   Physical Exam  Vitals and nursing note reviewed. Constitutional:       Appearance: He is well-developed. HENT:      Head: Normocephalic and atraumatic. Right Ear: External ear normal.      Left Ear: External ear normal.      Nose: Nose normal.   Eyes:      Conjunctiva/sclera: Conjunctivae normal.      Pupils: Pupils are equal, round, and reactive to light. Cardiovascular:      Rate and Rhythm: Normal rate and regular rhythm. Heart sounds: Normal heart sounds. Pulmonary:      Effort: Pulmonary effort is normal.      Breath sounds: Normal breath sounds. Abdominal:      General: Bowel sounds are normal.      Palpations: Abdomen is soft. Genitourinary:     Penis: Normal.       Prostate: Normal.      Rectum: Normal.   Musculoskeletal:         General: Normal range of motion. Cervical back: Normal range of motion and neck supple. Skin:     General: Skin is warm and dry. Neurological:      Mental Status: He is alert and oriented to person, place, and time. Deep Tendon Reflexes: Reflexes are normal and symmetric.          Assessment & Plan:   Essential hypertension  (primary encounter diagnosis)-we will add hydralazine take as prescribed watch diet monitor blood pressure follow back in 2 weeks  Diarrhea, unspecified type-refer to GI     No orders of the defined types were placed in this encounter. Meds This Visit:  Requested Prescriptions     Signed Prescriptions Disp Refills    hydrALAZINE 10 MG Oral Tab 60 tablet 2     Sig: Take 1 tablet (10 mg total) by mouth in the morning and 1 tablet (10 mg total) before bedtime.        Imaging & Referrals:  [facility] - INTERNAL

## 2023-10-16 ENCOUNTER — OFFICE VISIT (OUTPATIENT)
Dept: RADIATION ONCOLOGY | Facility: HOSPITAL | Age: 73
End: 2023-10-16
Attending: RADIOLOGY
Payer: MEDICARE

## 2023-10-16 VITALS
WEIGHT: 230.19 LBS | RESPIRATION RATE: 18 BRPM | OXYGEN SATURATION: 97 % | SYSTOLIC BLOOD PRESSURE: 186 MMHG | DIASTOLIC BLOOD PRESSURE: 79 MMHG | HEART RATE: 71 BPM | BODY MASS INDEX: 27 KG/M2 | TEMPERATURE: 97 F

## 2023-10-16 DIAGNOSIS — C09.9 TONSIL CANCER (HCC): Primary | ICD-10-CM

## 2023-10-16 PROCEDURE — 77386 HC IMRT COMPLEX: CPT | Performed by: RADIOLOGY

## 2023-10-17 ENCOUNTER — OFFICE VISIT (OUTPATIENT)
Dept: HEMATOLOGY/ONCOLOGY | Facility: HOSPITAL | Age: 73
End: 2023-10-17
Attending: INTERNAL MEDICINE

## 2023-10-17 VITALS
OXYGEN SATURATION: 98 % | TEMPERATURE: 98 F | RESPIRATION RATE: 18 BRPM | DIASTOLIC BLOOD PRESSURE: 77 MMHG | SYSTOLIC BLOOD PRESSURE: 140 MMHG | HEART RATE: 74 BPM

## 2023-10-17 DIAGNOSIS — C09.9 TONSIL CANCER (HCC): Primary | ICD-10-CM

## 2023-10-17 LAB — MAGNESIUM SERPL-MCNC: 2.3 MG/DL (ref 1.6–2.6)

## 2023-10-17 PROCEDURE — 96413 CHEMO IV INFUSION 1 HR: CPT

## 2023-10-17 PROCEDURE — 83735 ASSAY OF MAGNESIUM: CPT

## 2023-10-17 PROCEDURE — 77386 HC IMRT COMPLEX: CPT | Performed by: RADIOLOGY

## 2023-10-17 RX ORDER — ACETAMINOPHEN 325 MG/1
TABLET ORAL
Status: COMPLETED
Start: 2023-10-17 | End: 2023-10-17

## 2023-10-17 RX ORDER — DIPHENHYDRAMINE HCL 50 MG
50 CAPSULE ORAL ONCE
Status: COMPLETED | OUTPATIENT
Start: 2023-10-17 | End: 2023-10-17

## 2023-10-17 RX ORDER — DIPHENHYDRAMINE HCL 50 MG
CAPSULE ORAL
Status: COMPLETED
Start: 2023-10-17 | End: 2023-10-17

## 2023-10-17 RX ORDER — ACETAMINOPHEN 325 MG/1
650 TABLET ORAL ONCE
Status: COMPLETED | OUTPATIENT
Start: 2023-10-17 | End: 2023-10-17

## 2023-10-17 RX ADMIN — DIPHENHYDRAMINE HCL 50 MG: 50 MG CAPSULE ORAL at 12:45:00

## 2023-10-17 RX ADMIN — ACETAMINOPHEN 650 MG: 325 TABLET ORAL at 12:45:00

## 2023-10-17 NOTE — PROGRESS NOTES
Pt here for C1D15 Erbitux. Arrives Ambulating independently, accompanied by Self from lab Appt. PIV previously placed in the lab- good blood return noted. Oral medications included in this regimen:  no    Patient confirms comprehension of cancer treatment schedule:  yes    Pregnancy screening:  Not applicable    Modifications in dose or schedule:  No    Medications appearance and physical integrity checked by RN. Chemotherapy IV pump settings verified by 2 RNs:  yes     Frequency of blood return and site check throughout administration: Prior to administration, Prior to each drug, and At completion of therapy. Pt observed for 1 Hr post Erbitux infusion. Infusion/treatment outcome:  patient tolerated treatment without incident. PIV removed and gauze/tape applied to site. Education Record    Learner:  Patient  Barriers / Limitations:  None  Method:  Discussion  Education / instructions given:  Plan of care and future appointment's.   Outcome:  Shows understanding    Discharged Home, Ambulating independently, accompanied by:Self    Patient/family verbalized understanding of future appointments: by printed AVS

## 2023-10-18 ENCOUNTER — TELEPHONE (OUTPATIENT)
Dept: RADIATION ONCOLOGY | Facility: HOSPITAL | Age: 73
End: 2023-10-18

## 2023-10-18 PROCEDURE — 77386 HC IMRT COMPLEX: CPT | Performed by: RADIOLOGY

## 2023-10-18 NOTE — TELEPHONE ENCOUNTER
Patient called reporting a sore throat. Tolerating only soft foods today. Ate peanut butter sandwich. Drinking 2 protein drinks each day. Encouraged patient start tyelnol/ibuprofen. Message sent to Dr. Rogerio Amaro and Dr. Milan Cui (covering for Dr. Rogerio Amaro) to see if pain rx can be given. Will call patient back with further recommendations.

## 2023-10-19 ENCOUNTER — DIETICIAN VISIT (OUTPATIENT)
Dept: NUTRITION | Facility: HOSPITAL | Age: 73
End: 2023-10-19

## 2023-10-19 VITALS — BODY MASS INDEX: 27 KG/M2 | WEIGHT: 228.19 LBS

## 2023-10-19 PROCEDURE — 77386 HC IMRT COMPLEX: CPT | Performed by: RADIOLOGY

## 2023-10-20 PROCEDURE — 77386 HC IMRT COMPLEX: CPT | Performed by: RADIOLOGY

## 2023-10-20 PROCEDURE — 77336 RADIATION PHYSICS CONSULT: CPT | Performed by: RADIOLOGY

## 2023-10-23 ENCOUNTER — TELEPHONE (OUTPATIENT)
Dept: HEMATOLOGY/ONCOLOGY | Facility: HOSPITAL | Age: 73
End: 2023-10-23

## 2023-10-23 ENCOUNTER — OFFICE VISIT (OUTPATIENT)
Dept: RADIATION ONCOLOGY | Facility: HOSPITAL | Age: 73
End: 2023-10-23
Attending: RADIOLOGY
Payer: MEDICARE

## 2023-10-23 VITALS
DIASTOLIC BLOOD PRESSURE: 72 MMHG | SYSTOLIC BLOOD PRESSURE: 145 MMHG | HEART RATE: 81 BPM | TEMPERATURE: 98 F | OXYGEN SATURATION: 100 % | RESPIRATION RATE: 18 BRPM | BODY MASS INDEX: 27 KG/M2 | WEIGHT: 224.81 LBS

## 2023-10-23 DIAGNOSIS — C09.9 TONSIL CANCER (HCC): Primary | ICD-10-CM

## 2023-10-23 PROCEDURE — 77386 HC IMRT COMPLEX: CPT | Performed by: RADIOLOGY

## 2023-10-23 RX ORDER — HYDROCODONE BITARTRATE AND ACETAMINOPHEN 5; 325 MG/1; MG/1
1-2 TABLET ORAL EVERY 4 HOURS PRN
Qty: 90 TABLET | Refills: 0 | Status: SHIPPED | OUTPATIENT
Start: 2023-10-23

## 2023-10-23 NOTE — TELEPHONE ENCOUNTER
Please call [facility] pharmacy for clarifictaion on instructions for Hydrocodone/[place].  Thank you.

## 2023-10-24 ENCOUNTER — TELEPHONE (OUTPATIENT)
Dept: HEMATOLOGY/ONCOLOGY | Facility: HOSPITAL | Age: 73
End: 2023-10-24

## 2023-10-24 ENCOUNTER — OFFICE VISIT (OUTPATIENT)
Dept: HEMATOLOGY/ONCOLOGY | Facility: HOSPITAL | Age: 73
End: 2023-10-24
Attending: INTERNAL MEDICINE

## 2023-10-24 VITALS
DIASTOLIC BLOOD PRESSURE: 64 MMHG | HEIGHT: 77 IN | RESPIRATION RATE: 18 BRPM | HEART RATE: 56 BPM | BODY MASS INDEX: 26.51 KG/M2 | OXYGEN SATURATION: 98 % | SYSTOLIC BLOOD PRESSURE: 138 MMHG | TEMPERATURE: 98 F | WEIGHT: 224.5 LBS

## 2023-10-24 VITALS — SYSTOLIC BLOOD PRESSURE: 174 MMHG | DIASTOLIC BLOOD PRESSURE: 69 MMHG

## 2023-10-24 DIAGNOSIS — R13.10 DYSPHAGIA, UNSPECIFIED TYPE: ICD-10-CM

## 2023-10-24 DIAGNOSIS — Z51.11 CHEMOTHERAPY MANAGEMENT, ENCOUNTER FOR: ICD-10-CM

## 2023-10-24 DIAGNOSIS — C09.9 TONSIL CANCER (HCC): Primary | ICD-10-CM

## 2023-10-24 LAB — MAGNESIUM SERPL-MCNC: 2.6 MG/DL (ref 1.6–2.6)

## 2023-10-24 PROCEDURE — 77386 HC IMRT COMPLEX: CPT | Performed by: RADIOLOGY

## 2023-10-24 PROCEDURE — 83735 ASSAY OF MAGNESIUM: CPT

## 2023-10-24 PROCEDURE — 96413 CHEMO IV INFUSION 1 HR: CPT

## 2023-10-24 PROCEDURE — 99215 OFFICE O/P EST HI 40 MIN: CPT | Performed by: INTERNAL MEDICINE

## 2023-10-24 RX ORDER — DIPHENHYDRAMINE HCL 50 MG
CAPSULE ORAL
Status: COMPLETED
Start: 2023-10-24 | End: 2023-10-24

## 2023-10-24 RX ORDER — LIDOCAINE HYDROCHLORIDE 20 MG/ML
5 SOLUTION OROPHARYNGEAL
Qty: 100 ML | Refills: 0 | Status: SHIPPED | OUTPATIENT
Start: 2023-10-24

## 2023-10-24 RX ORDER — MORPHINE SULFATE 100 MG/5ML
10 SOLUTION ORAL
Qty: 240 ML | Refills: 0 | Status: SHIPPED | OUTPATIENT
Start: 2023-10-24

## 2023-10-24 RX ORDER — ACETAMINOPHEN 325 MG/1
650 TABLET ORAL ONCE
Status: COMPLETED | OUTPATIENT
Start: 2023-10-24 | End: 2023-10-24

## 2023-10-24 RX ORDER — SODIUM CHLORIDE 9 MG/ML
INJECTION, SOLUTION INTRAVENOUS CONTINUOUS
Status: CANCELLED
Start: 2023-10-24

## 2023-10-24 RX ORDER — SODIUM CHLORIDE 9 MG/ML
500 INJECTION, SOLUTION INTRAVENOUS CONTINUOUS
Status: ACTIVE | OUTPATIENT
Start: 2023-10-24 | End: 2023-10-24

## 2023-10-24 RX ORDER — DIPHENHYDRAMINE HCL 50 MG
50 CAPSULE ORAL ONCE
Status: COMPLETED | OUTPATIENT
Start: 2023-10-24 | End: 2023-10-24

## 2023-10-24 RX ORDER — ACETAMINOPHEN 325 MG/1
TABLET ORAL
Status: COMPLETED
Start: 2023-10-24 | End: 2023-10-24

## 2023-10-24 RX ADMIN — DIPHENHYDRAMINE HCL 50 MG: 50 MG CAPSULE ORAL at 13:47:00

## 2023-10-24 RX ADMIN — ACETAMINOPHEN 650 MG: 325 TABLET ORAL at 13:48:00

## 2023-10-24 RX ADMIN — SODIUM CHLORIDE 500 ML: 9 INJECTION, SOLUTION INTRAVENOUS at 13:45:00

## 2023-10-24 NOTE — PROGRESS NOTES
Oncology follow up      Diagnosis:  Tonsillar cancer. 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 Biopsy 08/24/2023 with Dr. Manfred Frazier. He has a CT of the neck as of 08/18/2023 that shows 2 ipsilateral enlarged lymph nodes, one measuring 4.2 cm and the other 2.9 cm. Interval history:  Presents for C1D22 cetixumab plus radiation. (Radiation initiated on 10/3/23). Not feeling well. 2 days ago started having terrible dysphagia. Sore throat. Difficulty swallowing. Feels dehydrated. No rash. No diarrhea. Took 2 doses of norco today with no relief of pain. PAST MEDICAL HISTORY:  End-stage renal disease on hemodialysis, hypertension. MEDICATIONS:  Amlodipine, clonazepam.    ALLERGIES:  No known drug allergies. SOCIAL HISTORY:  Never smoker. Denies any alcohol or illicit drug use. FAMILY MEDICAL HISTORY:  No reported history of any head or neck cancer. REVIEW OF SYSTEMS:  Otherwise, negative x12. PHYSICAL EXAMINATION:  ECOG 1  GENERAL:  In no acute distress. HEENT:  G2 mucositis. NECK:  Supple, no LAD, normal AROM  LUNGS:  Symmetric expansion, CTA  HEART:  RRR  ABDOMEN:  Soft, mild tender LLQ, non distended, BS+  EXTREMITIES:  No edema. NEUROLOGIC:  DTRs grossly intact  PSYCHIATRIC:  Appropriate mood, appropriate affect. ASSESSMENT AND PLAN:      72year old  male being evaluated by Medical Oncology for p16-positive, node-positive right-sided tonsillar cancer. He has 2 ipsilateral nodes enlarged on CT. Since no evidence of metastatic disease, we discussed concurrent systemic therapy with radiation therapy. He has end-stage renal disease on hemodialysis; therefore, we would not use cisplatin. We discussed using single-agent cetuximab. Radiation initiated on 10/3/23. Here for labs/tx. Having difficulty swallowing.    Proceed with P5Z97 Cetixumab. Reviewed potential cumulative side effects. Dysphagia. Stop norco. Start roxanol. Start viscous lidocaine. Iv fluids today. Monitor. Essential HTN:  Recheck BP in infusion. Follow-up with PCP for optimal management. CRF:  On HD  Diarrhea:  Baseline. Instructions given to maximize Loperamide use    MDM: high, malignancy, life threatening. Drug therapy requiring intensive monitoring for tox    The patient was seen and examined with PA agree with and edited above proceed with next dose of cetuximab with radiotherapy for tonsillar cancer supportive care for dysphagia mucositis.   On exam no new lymphadenopathy does have oropharyngeal mucositis

## 2023-10-24 NOTE — TELEPHONE ENCOUNTER
[facility], [address]  [address] . [place]  [phone], [phone] is calling to discuss a prescription that was just sent over to [facility].

## 2023-10-24 NOTE — TELEPHONE ENCOUNTER
Returned call to pharmacist. Per Roe Torrez, okay to proceed with filling the liquid morphine. Pharmacist stated that it will take 2 days to get it. Will update pt.

## 2023-10-24 NOTE — PROGRESS NOTES
Pt here for C1D22 Drug(s)erbitux. Arrives Ambulating independently, accompanied by Self     Patient was evaluated today by [facility]    Oral medications included in this regimen:  no    Patient confirms comprehension of cancer treatment schedule:  yes    Pregnancy screening:  Not applicable    Modifications in dose or schedule:  No. Magnesium level 2.6-no coverage needed. Medications appearance and physical integrity checked by RN: yes. Chemotherapy IV pump settings verified by 2 RNs:  No due to targeted therapy IV administration. Frequency of blood return and site check throughout administration: Prior to administration, Prior to each drug, and At completion of therapy     Infusion/treatment outcome:  patient tolerated treatment without incident    Education Record    Learner:  Patient  Barriers / Limitations:  None  Method:  Discussion  Education / instructions given:  Dann Ramirez RN to reinforce medications of roxanol and lidocaine swish and spit.   Outcome:  Shows understanding    Discharged Other stable from  infusion , Ambulating independently, accompanied by:Self    Patient/family verbalized understanding of future appointments: by printed AVS

## 2023-10-25 PROCEDURE — 77386 HC IMRT COMPLEX: CPT | Performed by: RADIOLOGY

## 2023-10-26 ENCOUNTER — DIETICIAN VISIT (OUTPATIENT)
Dept: NUTRITION | Facility: HOSPITAL | Age: 73
End: 2023-10-26

## 2023-10-26 VITALS — WEIGHT: 221.81 LBS | BODY MASS INDEX: 26 KG/M2

## 2023-10-26 PROCEDURE — 77386 HC IMRT COMPLEX: CPT | Performed by: RADIOLOGY

## 2023-10-26 RX ORDER — DEXAMETHASONE 4 MG/1
4 TABLET ORAL 2 TIMES DAILY
Qty: 60 TABLET | Refills: 0 | Status: SHIPPED | OUTPATIENT
Start: 2023-10-26 | End: 2023-11-25

## 2023-10-27 PROCEDURE — 77386 HC IMRT COMPLEX: CPT | Performed by: RADIOLOGY

## 2023-10-27 PROCEDURE — 77336 RADIATION PHYSICS CONSULT: CPT | Performed by: RADIOLOGY

## 2023-10-30 ENCOUNTER — OFFICE VISIT (OUTPATIENT)
Dept: RADIATION ONCOLOGY | Facility: HOSPITAL | Age: 73
End: 2023-10-30
Attending: RADIOLOGY
Payer: MEDICARE

## 2023-10-30 VITALS
HEART RATE: 65 BPM | WEIGHT: 216.63 LBS | TEMPERATURE: 97 F | OXYGEN SATURATION: 99 % | RESPIRATION RATE: 18 BRPM | SYSTOLIC BLOOD PRESSURE: 172 MMHG | DIASTOLIC BLOOD PRESSURE: 55 MMHG | BODY MASS INDEX: 26 KG/M2

## 2023-10-30 DIAGNOSIS — C09.9 TONSIL CANCER (HCC): Primary | ICD-10-CM

## 2023-10-30 PROCEDURE — 77386 HC IMRT COMPLEX: CPT | Performed by: RADIOLOGY

## 2023-10-31 ENCOUNTER — NURSE ONLY (OUTPATIENT)
Dept: HEMATOLOGY/ONCOLOGY | Facility: HOSPITAL | Age: 73
End: 2023-10-31
Attending: INTERNAL MEDICINE
Payer: MEDICARE

## 2023-10-31 VITALS
BODY MASS INDEX: 26 KG/M2 | WEIGHT: 215.31 LBS | SYSTOLIC BLOOD PRESSURE: 167 MMHG | OXYGEN SATURATION: 97 % | RESPIRATION RATE: 18 BRPM | HEART RATE: 56 BPM | TEMPERATURE: 98 F | DIASTOLIC BLOOD PRESSURE: 69 MMHG

## 2023-10-31 DIAGNOSIS — C09.9 TONSIL CANCER (HCC): Primary | ICD-10-CM

## 2023-10-31 DIAGNOSIS — E86.1 HYPOVOLEMIA: ICD-10-CM

## 2023-10-31 LAB — MAGNESIUM SERPL-MCNC: 2.3 MG/DL (ref 1.6–2.6)

## 2023-10-31 PROCEDURE — 83735 ASSAY OF MAGNESIUM: CPT

## 2023-10-31 PROCEDURE — 96413 CHEMO IV INFUSION 1 HR: CPT

## 2023-10-31 PROCEDURE — 77386 HC IMRT COMPLEX: CPT | Performed by: RADIOLOGY

## 2023-10-31 RX ORDER — ACETAMINOPHEN 325 MG/1
650 TABLET ORAL ONCE
Status: COMPLETED | OUTPATIENT
Start: 2023-10-31 | End: 2023-10-31

## 2023-10-31 RX ORDER — DIPHENHYDRAMINE HCL 50 MG
CAPSULE ORAL
Status: COMPLETED
Start: 2023-10-31 | End: 2023-10-31

## 2023-10-31 RX ORDER — ACETAMINOPHEN 325 MG/1
TABLET ORAL
Status: COMPLETED
Start: 2023-10-31 | End: 2023-10-31

## 2023-10-31 RX ORDER — DIPHENHYDRAMINE HCL 50 MG
50 CAPSULE ORAL ONCE
Status: COMPLETED | OUTPATIENT
Start: 2023-10-31 | End: 2023-10-31

## 2023-10-31 RX ADMIN — ACETAMINOPHEN 650 MG: 325 TABLET ORAL at 09:38:00

## 2023-10-31 RX ADMIN — DIPHENHYDRAMINE HCL 50 MG: 50 MG CAPSULE ORAL at 09:37:00

## 2023-10-31 NOTE — PROGRESS NOTES
Pt here for C1D29 Drug(s)ERBITUX. Arrives Ambulating independently, accompanied by Self     Patient was evaluated today by Treatment Nurse. Oral medications included in this regimen:  no    Patient confirms comprehension of cancer treatment schedule:  yes    Pregnancy screening:  Not applicable    Modifications in dose or schedule:  No    Medications appearance and physical integrity checked by RN: yes. Chemotherapy IV pump settings verified by 2 RNs:  No due to targeted therapy IV administration.   Frequency of blood return and site check throughout administration: Prior to administration and At completion of therapy     Infusion/treatment outcome:  patient tolerated treatment without incident    Education Record    Learner:  Patient  Barriers / Limitations:  None  Method:  Discussion  Education / instructions given:  N/A  Outcome:  Shows understanding    Discharged Home, Ambulating independently, accompanied by:Self    Patient/family verbalized understanding of future appointments: by [facility]

## 2023-11-01 ENCOUNTER — APPOINTMENT (OUTPATIENT)
Dept: RADIATION ONCOLOGY | Facility: HOSPITAL | Age: 73
End: 2023-11-01
Attending: RADIOLOGY
Payer: MEDICARE

## 2023-11-01 PROCEDURE — 77386 HC IMRT COMPLEX: CPT | Performed by: RADIOLOGY

## 2023-11-02 ENCOUNTER — DIETICIAN VISIT (OUTPATIENT)
Dept: NUTRITION | Facility: HOSPITAL | Age: 73
End: 2023-11-02

## 2023-11-02 ENCOUNTER — TELEPHONE (OUTPATIENT)
Facility: CLINIC | Age: 73
End: 2023-11-02

## 2023-11-02 VITALS — BODY MASS INDEX: 25 KG/M2 | WEIGHT: 214.19 LBS

## 2023-11-02 DIAGNOSIS — C09.9 TONSIL CANCER (HCC): Primary | ICD-10-CM

## 2023-11-02 PROCEDURE — 77386 HC IMRT COMPLEX: CPT | Performed by: RADIOLOGY

## 2023-11-02 NOTE — TELEPHONE ENCOUNTER
Jenelle requesting patient be seen for feeding tube eval.  [facility] offered 12/13/2023 but states that is too far out. Please call. Thank you.

## 2023-11-02 NOTE — TELEPHONE ENCOUNTER
Dr Nagi Santoyo,    Pt had appt with Dr Gareth Wong on 10/30/2023 & saw the dietician today    Hx of tonsil cancer    Pt has lost 22 lbs    Can we schedule this pt directly for a g-tube?

## 2023-11-03 ENCOUNTER — OFFICE VISIT (OUTPATIENT)
Dept: HEMATOLOGY/ONCOLOGY | Facility: HOSPITAL | Age: 73
End: 2023-11-03
Attending: INTERNAL MEDICINE
Payer: MEDICARE

## 2023-11-03 VITALS
RESPIRATION RATE: 18 BRPM | WEIGHT: 210.31 LBS | SYSTOLIC BLOOD PRESSURE: 167 MMHG | BODY MASS INDEX: 24.83 KG/M2 | OXYGEN SATURATION: 98 % | HEIGHT: 77.01 IN | HEART RATE: 69 BPM | TEMPERATURE: 99 F | DIASTOLIC BLOOD PRESSURE: 71 MMHG

## 2023-11-03 DIAGNOSIS — E86.1 HYPOVOLEMIA: Primary | ICD-10-CM

## 2023-11-03 PROCEDURE — 96360 HYDRATION IV INFUSION INIT: CPT

## 2023-11-03 PROCEDURE — 77336 RADIATION PHYSICS CONSULT: CPT | Performed by: RADIOLOGY

## 2023-11-03 PROCEDURE — 77386 HC IMRT COMPLEX: CPT | Performed by: RADIOLOGY

## 2023-11-03 NOTE — PROGRESS NOTES
Pt here for hydration. Ordering MD: shaun    Pt reports minimal po intake due to throat discomfort. RT working on G-tube placement. PIV placed, 500ml 0.9NS given as ordered with some relief of symptoms. VS per flowsheet. Order received to give another 500ml 0.9ns. Fluid given and pt reports feeling much better. Pt tolerated infusion without difficulty or complaint.

## 2023-11-06 ENCOUNTER — OFFICE VISIT (OUTPATIENT)
Dept: RADIATION ONCOLOGY | Facility: HOSPITAL | Age: 73
End: 2023-11-06
Attending: RADIOLOGY
Payer: MEDICARE

## 2023-11-06 ENCOUNTER — NURSE ONLY (OUTPATIENT)
Dept: HEMATOLOGY/ONCOLOGY | Facility: HOSPITAL | Age: 73
End: 2023-11-06
Attending: INTERNAL MEDICINE
Payer: MEDICARE

## 2023-11-06 ENCOUNTER — HOSPITAL ENCOUNTER (INPATIENT)
Facility: HOSPITAL | Age: 73
LOS: 8 days | Discharge: HOME | End: 2023-11-14
Attending: EMERGENCY MEDICINE | Admitting: INTERNAL MEDICINE
Payer: MEDICARE

## 2023-11-06 ENCOUNTER — APPOINTMENT (OUTPATIENT)
Dept: PICC SERVICES | Facility: HOSPITAL | Age: 73
End: 2023-11-06
Attending: EMERGENCY MEDICINE
Payer: MEDICARE

## 2023-11-06 VITALS
RESPIRATION RATE: 18 BRPM | OXYGEN SATURATION: 98 % | SYSTOLIC BLOOD PRESSURE: 148 MMHG | TEMPERATURE: 97 F | WEIGHT: 206.81 LBS | DIASTOLIC BLOOD PRESSURE: 74 MMHG | BODY MASS INDEX: 25 KG/M2 | HEART RATE: 102 BPM

## 2023-11-06 DIAGNOSIS — R19.7 DIARRHEA, UNSPECIFIED TYPE: ICD-10-CM

## 2023-11-06 DIAGNOSIS — T66.XXXA RADIATION ESOPHAGITIS: Primary | ICD-10-CM

## 2023-11-06 DIAGNOSIS — C09.9 TONSIL CANCER (HCC): Primary | ICD-10-CM

## 2023-11-06 DIAGNOSIS — K20.80 RADIATION ESOPHAGITIS: Primary | ICD-10-CM

## 2023-11-06 PROBLEM — K12.30 MUCOSITIS: Status: ACTIVE | Noted: 2023-11-06

## 2023-11-06 PROBLEM — R13.10 DYSPHAGIA: Status: ACTIVE | Noted: 2023-11-06

## 2023-11-06 PROBLEM — E86.0 DEHYDRATION: Status: ACTIVE | Noted: 2023-11-06

## 2023-11-06 LAB
ALBUMIN SERPL-MCNC: 4.3 G/DL (ref 3.2–4.8)
ALP LIVER SERPL-CCNC: 100 U/L
ALT SERPL-CCNC: 13 U/L
ANION GAP SERPL CALC-SCNC: 9 MMOL/L (ref 0–18)
AST SERPL-CCNC: 18 U/L (ref ?–34)
BASOPHILS # BLD AUTO: 0.01 X10(3) UL (ref 0–0.2)
BASOPHILS NFR BLD AUTO: 0.2 %
BILIRUB DIRECT SERPL-MCNC: 0.4 MG/DL (ref ?–0.3)
BILIRUB SERPL-MCNC: 0.8 MG/DL (ref 0.2–1.1)
BUN BLD-MCNC: 30 MG/DL (ref 9–23)
BUN/CREAT SERPL: 5.7 (ref 10–20)
CALCIUM BLD-MCNC: 9 MG/DL (ref 8.7–10.4)
CHLORIDE SERPL-SCNC: 97 MMOL/L (ref 98–112)
CO2 SERPL-SCNC: 35 MMOL/L (ref 21–32)
CREAT BLD-MCNC: 5.26 MG/DL
DEPRECATED RDW RBC AUTO: 50.7 FL (ref 35.1–46.3)
EGFRCR SERPLBLD CKD-EPI 2021: 11 ML/MIN/1.73M2 (ref 60–?)
EOSINOPHIL # BLD AUTO: 0 X10(3) UL (ref 0–0.7)
EOSINOPHIL NFR BLD AUTO: 0 %
ERYTHROCYTE [DISTWIDTH] IN BLOOD BY AUTOMATED COUNT: 15.3 % (ref 11–15)
GLUCOSE BLD-MCNC: 128 MG/DL (ref 70–99)
HCT VFR BLD AUTO: 33.4 %
HGB BLD-MCNC: 10.7 G/DL
IMM GRANULOCYTES # BLD AUTO: 0.04 X10(3) UL (ref 0–1)
IMM GRANULOCYTES NFR BLD: 0.6 %
LYMPHOCYTES # BLD AUTO: 0.14 X10(3) UL (ref 1–4)
LYMPHOCYTES NFR BLD AUTO: 2.2 %
MCH RBC QN AUTO: 29.1 PG (ref 26–34)
MCHC RBC AUTO-ENTMCNC: 32 G/DL (ref 31–37)
MCV RBC AUTO: 90.8 FL
MONOCYTES # BLD AUTO: 0.2 X10(3) UL (ref 0.1–1)
MONOCYTES NFR BLD AUTO: 3.2 %
NEUTROPHILS # BLD AUTO: 5.95 X10 (3) UL (ref 1.5–7.7)
NEUTROPHILS # BLD AUTO: 5.95 X10(3) UL (ref 1.5–7.7)
NEUTROPHILS NFR BLD AUTO: 93.8 %
OSMOLALITY SERPL CALC.SUM OF ELEC: 300 MOSM/KG (ref 275–295)
PLATELET # BLD AUTO: 162 10(3)UL (ref 150–450)
POTASSIUM SERPL-SCNC: 3.8 MMOL/L (ref 3.5–5.1)
PROT SERPL-MCNC: 7.6 G/DL (ref 5.7–8.2)
RBC # BLD AUTO: 3.68 X10(6)UL
SODIUM SERPL-SCNC: 141 MMOL/L (ref 136–145)
WBC # BLD AUTO: 6.3 X10(3) UL (ref 4–11)

## 2023-11-06 PROCEDURE — 99222 1ST HOSP IP/OBS MODERATE 55: CPT | Performed by: INTERNAL MEDICINE

## 2023-11-06 PROCEDURE — 77386 HC IMRT COMPLEX: CPT | Performed by: RADIOLOGY

## 2023-11-06 RX ORDER — SODIUM CHLORIDE 9 MG/ML
1000 INJECTION, SOLUTION INTRAVENOUS ONCE
Status: COMPLETED | OUTPATIENT
Start: 2023-11-06 | End: 2023-11-06

## 2023-11-06 RX ORDER — HYDRALAZINE HYDROCHLORIDE 20 MG/ML
10 INJECTION INTRAMUSCULAR; INTRAVENOUS EVERY 6 HOURS PRN
Status: DISCONTINUED | OUTPATIENT
Start: 2023-11-06 | End: 2023-11-14

## 2023-11-06 RX ORDER — HEPARIN SODIUM 5000 [USP'U]/ML
5000 INJECTION, SOLUTION INTRAVENOUS; SUBCUTANEOUS EVERY 8 HOURS SCHEDULED
Status: DISCONTINUED | OUTPATIENT
Start: 2023-11-06 | End: 2023-11-14

## 2023-11-06 NOTE — ED INITIAL ASSESSMENT (HPI)
Patient  to ED with complaint of anorexia, and weight loss >15lbs. Endorses extreme weakness. Patient is AXOX4.

## 2023-11-06 NOTE — TELEPHONE ENCOUNTER
If the patient is not eating/losing a drastic amount of weight he should present to the ER for further evaluation and consideration of PEG tube placement. I have not seen him before for failure to thrive so unclear how severe it is.

## 2023-11-06 NOTE — ED QUICK NOTES
Orders for admission, patient is aware of plan and ready to go upstairs. LOC: AOX3, increasing weakness over the past few weeks with decreased appetite and weight loss. Denies falls. Ambulatory without assistance. FLUIDS: n/a    ACCESS:20g PIV placed with US    Any questions, please call ED RN Romeo Jesus  at extension [phone].

## 2023-11-06 NOTE — TELEPHONE ENCOUNTER
Received call from from nurse at Dr. Jeannie Person office. Pt currently in office and RN calling about inquiring about PEG placement; pt not eating or drinking currently and rapidly losing weight. During call, MD stated pt is agreeable to going to the ER (failure to thrive?) and will very likely be admitted and get PEG placed while in-patient.

## 2023-11-07 ENCOUNTER — ANESTHESIA EVENT (OUTPATIENT)
Dept: ENDOSCOPY | Facility: HOSPITAL | Age: 73
End: 2023-11-07
Payer: MEDICARE

## 2023-11-07 ENCOUNTER — APPOINTMENT (OUTPATIENT)
Dept: HEMATOLOGY/ONCOLOGY | Facility: HOSPITAL | Age: 73
End: 2023-11-07
Attending: INTERNAL MEDICINE
Payer: MEDICARE

## 2023-11-07 ENCOUNTER — ANESTHESIA (OUTPATIENT)
Dept: ENDOSCOPY | Facility: HOSPITAL | Age: 73
End: 2023-11-07
Payer: MEDICARE

## 2023-11-07 LAB
GLUCOSE BLDC GLUCOMTR-MCNC: 117 MG/DL (ref 70–99)
GLUCOSE BLDC GLUCOMTR-MCNC: 120 MG/DL (ref 70–99)
POTASSIUM SERPL-SCNC: 3.4 MMOL/L (ref 3.5–5.1)

## 2023-11-07 PROCEDURE — 99232 SBSQ HOSP IP/OBS MODERATE 35: CPT | Performed by: INTERNAL MEDICINE

## 2023-11-07 PROCEDURE — 3E0G76Z INTRODUCTION OF NUTRITIONAL SUBSTANCE INTO UPPER GI, VIA NATURAL OR ARTIFICIAL OPENING: ICD-10-PCS | Performed by: INTERNAL MEDICINE

## 2023-11-07 PROCEDURE — 43246 EGD PLACE GASTROSTOMY TUBE: CPT | Performed by: INTERNAL MEDICINE

## 2023-11-07 PROCEDURE — 0DH63UZ INSERTION OF FEEDING DEVICE INTO STOMACH, PERCUTANEOUS APPROACH: ICD-10-PCS | Performed by: INTERNAL MEDICINE

## 2023-11-07 RX ORDER — SODIUM CHLORIDE 9 MG/ML
INJECTION, SOLUTION INTRAVENOUS CONTINUOUS
Status: DISCONTINUED | OUTPATIENT
Start: 2023-11-07 | End: 2023-11-08

## 2023-11-07 RX ORDER — LIDOCAINE HYDROCHLORIDE 10 MG/ML
INJECTION, SOLUTION EPIDURAL; INFILTRATION; INTRACAUDAL; PERINEURAL AS NEEDED
Status: DISCONTINUED | OUTPATIENT
Start: 2023-11-07 | End: 2023-11-07 | Stop reason: SURG

## 2023-11-07 RX ORDER — ONDANSETRON 2 MG/ML
INJECTION INTRAMUSCULAR; INTRAVENOUS AS NEEDED
Status: DISCONTINUED | OUTPATIENT
Start: 2023-11-07 | End: 2023-11-07 | Stop reason: SURG

## 2023-11-07 RX ORDER — HYDRALAZINE HYDROCHLORIDE 20 MG/ML
10 INJECTION INTRAMUSCULAR; INTRAVENOUS ONCE
Status: COMPLETED | OUTPATIENT
Start: 2023-11-07 | End: 2023-11-07

## 2023-11-07 RX ORDER — HYDROMORPHONE HYDROCHLORIDE 1 MG/ML
0.5 INJECTION, SOLUTION INTRAMUSCULAR; INTRAVENOUS; SUBCUTANEOUS ONCE
Status: COMPLETED | OUTPATIENT
Start: 2023-11-07 | End: 2023-11-07

## 2023-11-07 RX ORDER — NICOTINE POLACRILEX 4 MG
30 LOZENGE BUCCAL
Status: DISCONTINUED | OUTPATIENT
Start: 2023-11-07 | End: 2023-11-14

## 2023-11-07 RX ORDER — MORPHINE SULFATE 2 MG/ML
2 INJECTION, SOLUTION INTRAMUSCULAR; INTRAVENOUS EVERY 6 HOURS PRN
Status: DISCONTINUED | OUTPATIENT
Start: 2023-11-07 | End: 2023-11-14

## 2023-11-07 RX ORDER — ACETAMINOPHEN 325 MG/1
650 TABLET ORAL EVERY 8 HOURS PRN
Status: DISCONTINUED | OUTPATIENT
Start: 2023-11-07 | End: 2023-11-14

## 2023-11-07 RX ORDER — SODIUM CHLORIDE, SODIUM LACTATE, POTASSIUM CHLORIDE, CALCIUM CHLORIDE 600; 310; 30; 20 MG/100ML; MG/100ML; MG/100ML; MG/100ML
INJECTION, SOLUTION INTRAVENOUS CONTINUOUS
Status: DISCONTINUED | OUTPATIENT
Start: 2023-11-07 | End: 2023-11-08

## 2023-11-07 RX ORDER — CEFAZOLIN SODIUM/WATER 2 G/20 ML
2 SYRINGE (ML) INTRAVENOUS
Status: COMPLETED | OUTPATIENT
Start: 2023-11-07 | End: 2023-11-07

## 2023-11-07 RX ORDER — NICOTINE POLACRILEX 4 MG
15 LOZENGE BUCCAL
Status: DISCONTINUED | OUTPATIENT
Start: 2023-11-07 | End: 2023-11-14

## 2023-11-07 RX ORDER — NALOXONE HYDROCHLORIDE 0.4 MG/ML
0.08 INJECTION, SOLUTION INTRAMUSCULAR; INTRAVENOUS; SUBCUTANEOUS ONCE AS NEEDED
Status: ACTIVE | OUTPATIENT
Start: 2023-11-07 | End: 2023-11-07

## 2023-11-07 RX ORDER — DEXTROSE MONOHYDRATE 25 G/50ML
50 INJECTION, SOLUTION INTRAVENOUS
Status: DISCONTINUED | OUTPATIENT
Start: 2023-11-07 | End: 2023-11-14

## 2023-11-07 RX ADMIN — ONDANSETRON 4 MG: 2 INJECTION INTRAMUSCULAR; INTRAVENOUS at 15:33:00

## 2023-11-07 RX ADMIN — CEFAZOLIN SODIUM/WATER 2 G: 2 G/20 ML SYRINGE (ML) INTRAVENOUS at 15:05:00

## 2023-11-07 RX ADMIN — LIDOCAINE HYDROCHLORIDE 50 MG: 10 INJECTION, SOLUTION EPIDURAL; INFILTRATION; INTRACAUDAL; PERINEURAL at 14:58:00

## 2023-11-07 RX ADMIN — HYDRALAZINE HYDROCHLORIDE 5 MG: 20 INJECTION INTRAMUSCULAR; INTRAVENOUS at 15:11:00

## 2023-11-07 RX ADMIN — SODIUM CHLORIDE: 9 INJECTION, SOLUTION INTRAVENOUS at 15:19:00

## 2023-11-07 RX ADMIN — HYDRALAZINE HYDROCHLORIDE 5 MG: 20 INJECTION INTRAMUSCULAR; INTRAVENOUS at 15:05:00

## 2023-11-07 RX ADMIN — SODIUM CHLORIDE: 9 INJECTION, SOLUTION INTRAVENOUS at 14:58:00

## 2023-11-07 NOTE — PLAN OF CARE
Received pt from ED. NPO. Denies any pain. Dialysis pt, has dialysis Monday, Wednesday and Friday, per pt dialysis was done today. Fistula to left forearm- left arm precautions, ID band placed. On IV 0.9 NS per MD orders. Per pt still produces urine. PRN hydralazine given due to hypertension, per pt he always has blood pressure and hasn't been taking his bp meds due to not able to tolerate anything by mouth due to mouth sores. Plan for EGD tomorrow, possible placement of peg tube. Problem: Patient Centered Care  Goal: Patient preferences are identified and integrated in the patient's plan of care  Description: Interventions:  - What would you like us to know as we care for you?  Home with wife  - Provide timely, complete, and accurate information to patient/family  - Incorporate patient and family knowledge, values, beliefs, and cultural backgrounds into the planning and delivery of care  - Encourage patient/family to participate in care and decision-making at the level they choose  - Honor patient and family perspectives and choices  Outcome: Progressing     Problem: Patient/Family Goals  Goal: Patient/Family Long Term Goal  Description: Patient's Long Term Goal:     Interventions:  - See additional Care Plan goals for specific interventions  Outcome: Progressing  Goal: Patient/Family Short Term Goal  Description: Patient's Short Term Goal:     Interventions:   - See additional Care Plan goals for specific interventions  Outcome: Progressing

## 2023-11-07 NOTE — OPERATIVE REPORT
ESOPHAGOGASTRODUODENOSCOPY (EGD) WITH PERCUTANEOUS ENDOSCOPIC GASTROSTOMY PLACEMENT (PEG)    [facility] Watson Chikis     1950 Age 72year old   PCP Chance Calixto MD Endoscopist Diana Avalos MD     Date of procedure: 23    Procedure: EGD w/PEG    Pre-operative diagnosis: dysphagia    Post-operative diagnosis: see impression    Medications:  MAC     Pre-procedural antibiotics: ancef 2g     Complications: none    Procedure:  Informed consent was obtained from the patient after the risks of the procedure were discussed, including but not limited to bleeding, perforation, aspiration, infection, possibility of a missed lesion or death. After discussions of the risks/benefits and alternatives to this procedure, as well as the planned sedation, the patient was placed in the left lateral decubitus position and begun on continuous blood pressure pulse oximetry and EKG monitoring and this was maintained throughout the procedure. Once an adequate level of sedation was obtained a bite block was placed. Then the lubricated tip of the [id number] diagnostic video upper endoscope was inserted and advanced using direct visualization into the posterior pharynx and ultimately into the esophagus. Percutaneous endoscopic gastrostomy: In a darkened room, the abdominal wall was transilluminated and a site was selected. Indentation of the gastric wall by external finger pressure was demonstrated in a circular fashion. The skin was cleansed with chlorhexadine and betadine and anesthetized with xylocaine with a finder needle. The finder needle was visualized endoscopically and simultaneously bubbles were noted in the aspirating syringe. A small 10mm incision was made in the abdominal wall using a surgical blade. A 25-gauge needle with cannula was inserted through the abdominal wall and into the gastric lumen.  A guidewire was passed through the cannula, was caught by the snare passed through the endoscope and brought out through the mouth. A PEG tube was inserted over the guidewire and advanced through the abdominal wall. The PEG tube was noted at 3.5 cm at the skin with the tube pulled taught. A satisfactory final position was confirmed endoscopically. The gastrostomy tube was secured with the outer flange positioned which was positioned at 4 cm. Complications: None    Findings:    Oropharynx: Notable ulcerations and edema in the posterior oropharynx     Esophagus: Ulcerations in the proximal esophagus causing edema and subtle luminal narrowing. No focal stricture or mass. 2. Stomach: The stomach distended normally. Normal rugal folds were seen. The pylorus was patent. The gastric mucosa appeared with scattered erythema. Retroflexion revealed a normal fundus and a non-patulous cardia. 3. Duodenum: Mild erythema of duodenal bulb. Normal appearing second portion. Bilious effluent. Impression:  -Successful placement of a 20F Gastrostomy tube. 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   -Okay to use G-tube immediately for medications/flushes. To ensure patency, flush the tube with 100ml of water q 8 hours. -Monitor for signs of GI bleeding.     Specimens:  none

## 2023-11-07 NOTE — INTERVAL H&P NOTE
Pre-op Diagnosis: Failure to thrive, dysphagia    The above referenced H&P was reviewed by Kim Martínez MD on 11/7/2023, the patient was examined and no significant changes have occurred in the patient's condition since the H&P was performed. I discussed with the patient and/or legal representative the potential benefits, risks and side effects of this procedure; the likelihood of the patient achieving goals; and potential problems that might occur during recuperation. I discussed reasonable alternatives to the procedure, including risks, benefits and side effects related to the alternatives and risks related to not receiving this procedure. We will proceed with procedure as planned.

## 2023-11-08 LAB
ANION GAP SERPL CALC-SCNC: 8 MMOL/L (ref 0–18)
BUN BLD-MCNC: 56 MG/DL (ref 9–23)
BUN/CREAT SERPL: 7 (ref 10–20)
CALCIUM BLD-MCNC: 8.4 MG/DL (ref 8.7–10.4)
CHLORIDE SERPL-SCNC: 110 MMOL/L (ref 98–112)
CO2 SERPL-SCNC: 28 MMOL/L (ref 21–32)
CREAT BLD-MCNC: 7.98 MG/DL
EGFRCR SERPLBLD CKD-EPI 2021: 7 ML/MIN/1.73M2 (ref 60–?)
GLUCOSE BLD-MCNC: 122 MG/DL (ref 70–99)
GLUCOSE BLDC GLUCOMTR-MCNC: 110 MG/DL (ref 70–99)
GLUCOSE BLDC GLUCOMTR-MCNC: 113 MG/DL (ref 70–99)
GLUCOSE BLDC GLUCOMTR-MCNC: 121 MG/DL (ref 70–99)
GLUCOSE BLDC GLUCOMTR-MCNC: 138 MG/DL (ref 70–99)
HBV SURFACE AG SER-ACNC: <0.1 [IU]/L
HBV SURFACE AG SERPL QL IA: NONREACTIVE
MAGNESIUM SERPL-MCNC: 2.1 MG/DL (ref 1.6–2.6)
OSMOLALITY SERPL CALC.SUM OF ELEC: 319 MOSM/KG (ref 275–295)
PHOSPHATE SERPL-MCNC: 5.6 MG/DL (ref 2.4–5.1)
POTASSIUM SERPL-SCNC: 3.8 MMOL/L (ref 3.5–5.1)
SODIUM SERPL-SCNC: 146 MMOL/L (ref 136–145)

## 2023-11-08 PROCEDURE — 99232 SBSQ HOSP IP/OBS MODERATE 35: CPT | Performed by: INTERNAL MEDICINE

## 2023-11-08 PROCEDURE — 99223 1ST HOSP IP/OBS HIGH 75: CPT | Performed by: INTERNAL MEDICINE

## 2023-11-08 PROCEDURE — 5A1D70Z PERFORMANCE OF URINARY FILTRATION, INTERMITTENT, LESS THAN 6 HOURS PER DAY: ICD-10-PCS | Performed by: INTERNAL MEDICINE

## 2023-11-08 PROCEDURE — 99233 SBSQ HOSP IP/OBS HIGH 50: CPT | Performed by: INTERNAL MEDICINE

## 2023-11-08 RX ORDER — DIPHENHYDRAMINE HYDROCHLORIDE 50 MG/ML
25 INJECTION INTRAMUSCULAR; INTRAVENOUS EVERY 4 HOURS PRN
Status: DISCONTINUED | OUTPATIENT
Start: 2023-11-08 | End: 2023-11-14

## 2023-11-08 RX ORDER — ALBUMIN (HUMAN) 12.5 G/50ML
100 SOLUTION INTRAVENOUS AS NEEDED
Status: DISCONTINUED | OUTPATIENT
Start: 2023-11-08 | End: 2023-11-14

## 2023-11-08 RX ORDER — SODIUM BICARBONATE 650 MG/1
325 TABLET ORAL AS NEEDED
Status: DISCONTINUED | OUTPATIENT
Start: 2023-11-08 | End: 2023-11-14

## 2023-11-08 NOTE — PLAN OF CARE
Pt A&O x 4. Voiding via urinal and npo- can use tube for meds/flushes. . Pt denies pain. Heparin given for dvt prophylaxis, remains connected to tele. Pt hasn't complained of mouth burning overnight. Iv fluids infusing. Plan is to start tube feeds today. Call light within reach, bed low, locked.   Problem: Patient Centered Care  Goal: Patient preferences are identified and integrated in the patient's plan of care  Description: Interventions:  - What would you like us to know as we care for you?   - Provide timely, complete, and accurate information to patient/family  - Incorporate patient and family knowledge, values, beliefs, and cultural backgrounds into the planning and delivery of care  - Encourage patient/family to participate in care and decision-making at the level they choose  - Honor patient and family perspectives and choices  Outcome: Progressing     Problem: Patient/Family Goals  Goal: Patient/Family Long Term Goal  Description: Patient's Long Term Goal:     Interventions:  -   - See additional Care Plan goals for specific interventions  Outcome: Progressing  Goal: Patient/Family Short Term Goal  Description: Patient's Short Term Goal:     Interventions:   -   - See additional Care Plan goals for specific interventions  Outcome: Progressing

## 2023-11-08 NOTE — PLAN OF CARE
NPO, morphine for pain control, EGD/peg tube placement today,will start tube feeding tomorrow,  per md will order dialysis tmw, monitoring blood pressure. Patient and wife updated on care plan.     Problem: Patient Centered Care  Goal: Patient preferences are identified and integrated in the patient's plan of care  Description: Interventions:  - What would you like us to know as we care for you? -  - Provide timely, complete, and accurate information to patient/family  - Incorporate patient and family knowledge, values, beliefs, and cultural backgrounds into the planning and delivery of care  - Encourage patient/family to participate in care and decision-making at the level they choose  - Honor patient and family perspectives and choices  Outcome: Progressing     Problem: Patient/Family Goals  Goal: Patient/Family Long Term Goal  Description: Patient's Long Term Goal: -    Interventions:  - -  - See additional Care Plan goals for specific interventions  Outcome: Progressing  Goal: Patient/Family Short Term Goal  Description: Patient's Short Term Goal: -    Interventions:   - -  - See additional Care Plan goals for specific interventions  Outcome: Progressing

## 2023-11-08 NOTE — PLAN OF CARE
Patient is alert and oriented. Remote tele in place. Hemodialysis today, 3L removed. IV morphine given for pain after back from HD. Tube feeds initiated via PEG tube. Safety precautions in place.      Problem: Patient Centered Care  Goal: Patient preferences are identified and integrated in the patient's plan of care  Description: Interventions:  - What would you like us to know as we care for you?   - Provide timely, complete, and accurate information to patient/family  - Incorporate patient and family knowledge, values, beliefs, and cultural backgrounds into the planning and delivery of care  - Encourage patient/family to participate in care and decision-making at the level they choose  - Honor patient and family perspectives and choices  Outcome: Progressing     Problem: Patient/Family Goals  Goal: Patient/Family Long Term Goal  Description: Patient's Long Term Goal:     Interventions:  -   - See additional Care Plan goals for specific interventions  Outcome: Progressing  Goal: Patient/Family Short Term Goal  Description: Patient's Short Term Goal:     Interventions:   -   - See additional Care Plan goals for specific interventions  Outcome: Progressing

## 2023-11-09 LAB
ALBUMIN SERPL-MCNC: 3.3 G/DL (ref 3.2–4.8)
ANION GAP SERPL CALC-SCNC: 10 MMOL/L (ref 0–18)
BUN BLD-MCNC: 40 MG/DL (ref 9–23)
BUN/CREAT SERPL: 6.7 (ref 10–20)
CALCIUM BLD-MCNC: 8.9 MG/DL (ref 8.7–10.4)
CHLORIDE SERPL-SCNC: 102 MMOL/L (ref 98–112)
CO2 SERPL-SCNC: 27 MMOL/L (ref 21–32)
CREAT BLD-MCNC: 5.96 MG/DL
EGFRCR SERPLBLD CKD-EPI 2021: 9 ML/MIN/1.73M2 (ref 60–?)
GLUCOSE BLD-MCNC: 137 MG/DL (ref 70–99)
GLUCOSE BLDC GLUCOMTR-MCNC: 126 MG/DL (ref 70–99)
GLUCOSE BLDC GLUCOMTR-MCNC: 131 MG/DL (ref 70–99)
GLUCOSE BLDC GLUCOMTR-MCNC: 141 MG/DL (ref 70–99)
GLUCOSE BLDC GLUCOMTR-MCNC: 149 MG/DL (ref 70–99)
HBV SURFACE AG SER-ACNC: <0.1 [IU]/L
HBV SURFACE AG SERPL QL IA: NONREACTIVE
MAGNESIUM SERPL-MCNC: 2 MG/DL (ref 1.6–2.6)
OSMOLALITY SERPL CALC.SUM OF ELEC: 300 MOSM/KG (ref 275–295)
PHOSPHATE SERPL-MCNC: 5.2 MG/DL (ref 2.4–5.1)
POTASSIUM SERPL-SCNC: 3.7 MMOL/L (ref 3.5–5.1)
SODIUM SERPL-SCNC: 139 MMOL/L (ref 136–145)

## 2023-11-09 PROCEDURE — 99232 SBSQ HOSP IP/OBS MODERATE 35: CPT | Performed by: INTERNAL MEDICINE

## 2023-11-09 NOTE — DISCHARGE INSTRUCTIONS
[facility] (In[facility])  Will provide all Tube Feedings and supplies    [address]  Phone: [phone]  Fax: [phone]    Tube Feeding Regimen for Home:   Nepro (may substitute formula for generic equivalent) bolus feeds per G-Tube/PEG. Provide 6.5 containers/day (total volume = 1540 ml /day). Advance to 3 bolus feeds per day as tolerated for home (6.5 total containers / day). Provide 2.5 containers at 9 am, and 2 containers at 1PM and 5PM daily with 100 ml water before and after each bolus feed. Feeds will provide 1540 total tube feeding volume, 2772 calories, 125 grams protein, 1724 ml total free water, and 100% RDI's. Orders from hospital stay:  6.5 containers/day. Provide 1 can @ 8am, 1 can @ 11am, 1.5 can @ 2pm, 1.5 can @ 5pm & 1.5 can @8pm.   60mL flush before and after.

## 2023-11-09 NOTE — PLAN OF CARE
Patient alert and oriented. Denies pain, denies nausea. Gtube with tube feeds, increasing per order, tolerating well. Pain with swallowing, dryness to mouth, declining meals, requested biotene. Up with assist. Call light within reach, using appropriately. Problem: PAIN - ADULT  Goal: Verbalizes/displays adequate comfort level or patient's stated pain goal  Description: INTERVENTIONS:  - Encourage pt to monitor pain and request assistance  - Assess pain using appropriate pain scale  - Administer analgesics based on type and severity of pain and evaluate response  - Implement non-pharmacological measures as appropriate and evaluate response  - Consider cultural and social influences on pain and pain management  - Manage/alleviate anxiety  - Utilize distraction and/or relaxation techniques  - Monitor for opioid side effects  - Notify MD/LIP if interventions unsuccessful or patient reports new pain  - Anticipate increased pain with activity and pre-medicate as appropriate  Outcome: Progressing     Problem: RISK FOR INFECTION - ADULT  Goal: Absence of fever/infection during anticipated neutropenic period  Description: INTERVENTIONS  - Monitor WBC  - Administer growth factors as ordered  - Implement neutropenic guidelines  Outcome: Progressing     Problem: SAFETY ADULT - FALL  Goal: Free from fall injury  Description: INTERVENTIONS:  - Assess pt frequently for physical needs  - Identify cognitive and physical deficits and behaviors that affect risk of falls.   - [place] fall precautions as indicated by assessment.  - Educate pt/family on patient safety including physical limitations  - Instruct pt to call for assistance with activity based on assessment  - Modify environment to reduce risk of injury  - Provide assistive devices as appropriate  - Consider OT/PT consult to assist with strengthening/mobility  - Encourage toileting schedule  Outcome: Progressing     Problem: DISCHARGE PLANNING  Goal: Discharge to home or other facility with appropriate resources  Description: INTERVENTIONS:  - Identify barriers to discharge w/pt and caregiver  - Include patient/family/discharge partner in discharge planning  - Arrange for needed discharge resources and transportation as appropriate  - Identify discharge learning needs (meds, wound care, etc)  - Arrange for interpreters to assist at discharge as needed  - Consider post-discharge preferences of patient/family/discharge partner  - Complete POLST form as appropriate  - Assess patient's ability to be responsible for managing their own health  - Refer to Case Management Department for coordinating discharge planning if the patient needs post-hospital services based on physician/LIP order or complex needs related to functional status, cognitive ability or social support system  Outcome: Progressing     Problem: GASTROINTESTINAL - ADULT  Goal: Minimal or absence of nausea and vomiting  Description: INTERVENTIONS:  - Maintain adequate hydration with IV or PO as ordered and tolerated  - Nasogastric tube to low intermittent suction as ordered  - Evaluate effectiveness of ordered antiemetic medications  - Provide nonpharmacologic comfort measures as appropriate  - Advance diet as tolerated, if ordered  - Obtain nutritional consult as needed  - Evaluate fluid balance  Outcome: Progressing  Goal: Maintains adequate nutritional intake (undernourished)  Description: INTERVENTIONS:  - Monitor percentage of each meal consumed  - Identify factors contributing to decreased intake, treat as appropriate  - Assist with meals as needed  - Monitor I&O, WT and lab values  - Obtain nutritional consult as needed  - Optimize oral hygiene and moisture  - Encourage food from home; allow for food preferences  - Enhance eating environment  Outcome: Progressing

## 2023-11-09 NOTE — PLAN OF CARE
Patient is alert and oriented. Room air. Vital signs stable. Remote tele in place. Voiding via urinal. Denies pain. Tube feedings continued through L G-tube. Increased rate per order to 30mL/hr, tolerating well. Denies nausea. Q6 accuchecks. Up with SBA. Call light and personal belongings within reach. Safety precautions in place.      Problem: Patient Centered Care  Goal: Patient preferences are identified and integrated in the patient's plan of care  Description: Interventions:  - Provide timely, complete, and accurate information to patient/family  - Incorporate patient and family knowledge, values, beliefs, and cultural backgrounds into the planning and delivery of care  - Encourage patient/family to participate in care and decision-making at the level they choose  - Honor patient and family perspectives and choices  Outcome: Progressing     Problem: PAIN - ADULT  Goal: Verbalizes/displays adequate comfort level or patient's stated pain goal  Description: INTERVENTIONS:  - Encourage pt to monitor pain and request assistance  - Assess pain using appropriate pain scale  - Administer analgesics based on type and severity of pain and evaluate response  - Implement non-pharmacological measures as appropriate and evaluate response  - Consider cultural and social influences on pain and pain management  - Manage/alleviate anxiety  - Utilize distraction and/or relaxation techniques  - Monitor for opioid side effects  - Notify MD/LIP if interventions unsuccessful or patient reports new pain  - Anticipate increased pain with activity and pre-medicate as appropriate  Outcome: Progressing     Problem: RISK FOR INFECTION - ADULT  Goal: Absence of fever/infection during anticipated neutropenic period  Description: INTERVENTIONS  - Monitor WBC  - Administer growth factors as ordered  - Implement neutropenic guidelines  Outcome: Progressing     Problem: SAFETY ADULT - FALL  Goal: Free from fall injury  Description: INTERVENTIONS:  - Assess pt frequently for physical needs  - Identify cognitive and physical deficits and behaviors that affect risk of falls.   - [place] fall precautions as indicated by assessment.  - Educate pt/family on patient safety including physical limitations  - Instruct pt to call for assistance with activity based on assessment  - Modify environment to reduce risk of injury  - Provide assistive devices as appropriate  - Consider OT/PT consult to assist with strengthening/mobility  - Encourage toileting schedule  Outcome: Progressing     Problem: DISCHARGE PLANNING  Goal: Discharge to home or other facility with appropriate resources  Description: INTERVENTIONS:  - Identify barriers to discharge w/pt and caregiver  - Include patient/family/discharge partner in discharge planning  - Arrange for needed discharge resources and transportation as appropriate  - Identify discharge learning needs (meds, wound care, etc)  - Arrange for interpreters to assist at discharge as needed  - Consider post-discharge preferences of patient/family/discharge partner  - Complete POLST form as appropriate  - Assess patient's ability to be responsible for managing their own health  - Refer to Case Management Department for coordinating discharge planning if the patient needs post-hospital services based on physician/LIP order or complex needs related to functional status, cognitive ability or social support system  Outcome: Progressing     Problem: GASTROINTESTINAL - ADULT  Goal: Minimal or absence of nausea and vomiting  Description: INTERVENTIONS:  - Maintain adequate hydration with IV or PO as ordered and tolerated  - Nasogastric tube to low intermittent suction as ordered  - Evaluate effectiveness of ordered antiemetic medications  - Provide nonpharmacologic comfort measures as appropriate  - Advance diet as tolerated, if ordered  - Obtain nutritional consult as needed  - Evaluate fluid balance  Outcome: Progressing  Goal: Maintains adequate nutritional intake (undernourished)  Description: INTERVENTIONS:  - Monitor percentage of each meal consumed  - Identify factors contributing to decreased intake, treat as appropriate  - Assist with meals as needed  - Monitor I&O, WT and lab values  - Obtain nutritional consult as needed  - Optimize oral hygiene and moisture  - Encourage food from home; allow for food preferences  - Enhance eating environment  Outcome: Progressing

## 2023-11-10 LAB
GLUCOSE BLDC GLUCOMTR-MCNC: 114 MG/DL (ref 70–99)
GLUCOSE BLDC GLUCOMTR-MCNC: 133 MG/DL (ref 70–99)
GLUCOSE BLDC GLUCOMTR-MCNC: 144 MG/DL (ref 70–99)
GLUCOSE BLDC GLUCOMTR-MCNC: 152 MG/DL (ref 70–99)

## 2023-11-10 PROCEDURE — 99223 1ST HOSP IP/OBS HIGH 75: CPT | Performed by: INTERNAL MEDICINE

## 2023-11-10 PROCEDURE — 99232 SBSQ HOSP IP/OBS MODERATE 35: CPT | Performed by: INTERNAL MEDICINE

## 2023-11-10 RX ORDER — DICYCLOMINE HCL 20 MG
20 TABLET ORAL NIGHTLY PRN
Qty: 30 TABLET | Refills: 2 | Status: SHIPPED | OUTPATIENT
Start: 2023-11-10 | End: 2024-02-08

## 2023-11-10 NOTE — PLAN OF CARE
Patient is alert and oriented. Room air. Remote tele in place. Voiding via urinal. Q6 accuchecks. Tube feedings continued through L G-tube. Increased rate per order to 60mL/hr, tolerating well. Tube feeding bag and tubing changed. Denies pain/nausea. Benedryl given prn for sleep aid. PRN hydralazine given for increased BP. Up with SBA. Call light and personal belongings within reach. Safety precautions in place. Plan for HD today, [facility] called.      Problem: Patient Centered Care  Goal: Patient preferences are identified and integrated in the patient's plan of care  Description: Interventions:  - Provide timely, complete, and accurate information to patient/family  - Incorporate patient and family knowledge, values, beliefs, and cultural backgrounds into the planning and delivery of care  - Encourage patient/family to participate in care and decision-making at the level they choose  - Honor patient and family perspectives and choices  Outcome: Progressing     Problem: PAIN - ADULT  Goal: Verbalizes/displays adequate comfort level or patient's stated pain goal  Description: INTERVENTIONS:  - Encourage pt to monitor pain and request assistance  - Assess pain using appropriate pain scale  - Administer analgesics based on type and severity of pain and evaluate response  - Implement non-pharmacological measures as appropriate and evaluate response  - Consider cultural and social influences on pain and pain management  - Manage/alleviate anxiety  - Utilize distraction and/or relaxation techniques  - Monitor for opioid side effects  - Notify MD/LIP if interventions unsuccessful or patient reports new pain  - Anticipate increased pain with activity and pre-medicate as appropriate  Outcome: Progressing     Problem: RISK FOR INFECTION - ADULT  Goal: Absence of fever/infection during anticipated neutropenic period  Description: INTERVENTIONS  - Monitor WBC  - Administer growth factors as ordered  - Implement neutropenic guidelines  Outcome: Progressing     Problem: SAFETY ADULT - FALL  Goal: Free from fall injury  Description: INTERVENTIONS:  - Assess pt frequently for physical needs  - Identify cognitive and physical deficits and behaviors that affect risk of falls.   - [place] fall precautions as indicated by assessment.  - Educate pt/family on patient safety including physical limitations  - Instruct pt to call for assistance with activity based on assessment  - Modify environment to reduce risk of injury  - Provide assistive devices as appropriate  - Consider OT/PT consult to assist with strengthening/mobility  - Encourage toileting schedule  Outcome: Progressing     Problem: DISCHARGE PLANNING  Goal: Discharge to home or other facility with appropriate resources  Description: INTERVENTIONS:  - Identify barriers to discharge w/pt and caregiver  - Include patient/family/discharge partner in discharge planning  - Arrange for needed discharge resources and transportation as appropriate  - Identify discharge learning needs (meds, wound care, etc)  - Arrange for interpreters to assist at discharge as needed  - Consider post-discharge preferences of patient/family/discharge partner  - Complete POLST form as appropriate  - Assess patient's ability to be responsible for managing their own health  - Refer to Case Management Department for coordinating discharge planning if the patient needs post-hospital services based on physician/LIP order or complex needs related to functional status, cognitive ability or social support system  Outcome: Progressing     Problem: GASTROINTESTINAL - ADULT  Goal: Minimal or absence of nausea and vomiting  Description: INTERVENTIONS:  - Maintain adequate hydration with IV or PO as ordered and tolerated  - Nasogastric tube to low intermittent suction as ordered  - Evaluate effectiveness of ordered antiemetic medications  - Provide nonpharmacologic comfort measures as appropriate  - Advance diet as tolerated, if ordered  - Obtain nutritional consult as needed  - Evaluate fluid balance  Outcome: Progressing  Goal: Maintains adequate nutritional intake (undernourished)  Description: INTERVENTIONS:  - Monitor percentage of each meal consumed  - Identify factors contributing to decreased intake, treat as appropriate  - Assist with meals as needed  - Monitor I&O, WT and lab values  - Obtain nutritional consult as needed  - Optimize oral hygiene and moisture  - Encourage food from home; allow for food preferences  - Enhance eating environment  Outcome: Progressing

## 2023-11-10 NOTE — DIETARY NOTE
ADULT NUTRITION UPDATE NOTE    RECOMMENDATIONS TO MD:  EN feeds adjusted to bolus regimen in anticipation for discharge home. See Nutrition Intervention    PATIENT STATUS:   11/10/23 UPDATE: Received message from  that plan is for discharge home today. Feeds advancing to goal continuous rate now. Feeds need adjusting to bolus regimen for discharge. Plan discussed with RN. Will transition to bolus feeds today. Pt sitting up in bed with wife and family at bedside. Pt's spouse works as a CNA and family member at bedside currently cares for father who receives bolus EN feeds at home. Discussed adjustment to bolus feeds with pt/family. Asking appropriate questions. Pt reports he consumes 2 16oz bottles of water and 2 12oz Dr Sarah Espitia daily at home. Pt reports good UO - requesting increase in free water provision. Received ~680 ml Nepro thus far today. Noted persistent hyperphosphatemia - reduced phosphorus EN (renal) formula warranted at this time. 11/10/23 15:43 UPDATE: Per RN, pt could only tolerate 1/2 container at 2:30PM bolus today. Pt going for HD now with plan for bolus feed of 1 container as tolerated following HD. Discharge likely held until tomorrow. Adjusted feeding order for tomorrow. NUTRITION INTERVENTION:     NUTRITION PRESCRIPTION:   Estimated Nutrition needs: --dosing wt of 94 kg - wt taken on 11/7/23  Calories: 2800 calories/day (MSJ REE = 1818 kcal x1. 1(AF)x1. 4(SF) or 30 calories per kg Dosing wt)  Protein: 122-141 g protein/day (1.3-1.5 g protein/kg Dosing wt)  Fluid Needs: 1500 ml + UO      - Diet:       Procedures    Full liquid diet Is Patient on Accuchecks? Yes      - Enteral Nutrition: Nepro (may substitute formula for generic equivalent) bolus feeds per G-Tube/PEG. Adjust feeds to bolus regimen today. Provide 6.5 containers/day (total volume = 1540 ml /day). Received 680 ml Nepro thus far today.  Will provide 1/2 container at 12:30PM, then 1 container at 2:30pm, 1 container at 4:30pm, 1 container at 6:30pm today. Feeding schedule for tomorrow: Provide 1 container @ 8am, 1 container @ 11am, 1.5 containers @ 2pm, 1.5 containers @ 5pm and 1.5 containers @ 8pm.   Increase H2O flush with 60 ml H2O before and after each bolus feeds (to provide 600 ml total H2O from [facility] daily). Goal rate provides 1540 total TF volume, 2772 kcal, 125 grams protein, 1724 ml total free water, and 100% RDI's. Goal rate will meet  99% of estimated energy needs (29 kcal/kg) and 100% of estimated protein needs (1.3 g/kg). *Can advance to 3 bolus feeds per day at home (6.5 total containers / day). Provide 2.5 containers at 9 am, and 2 containers at 1PM and 5PM daily with 100 ml H2O before and after each bolus. *       RD to follow per protocol    [facility] [address], [address] [place] [address], [address] [facility], [address] [facility]

## 2023-11-11 LAB
GLUCOSE BLDC GLUCOMTR-MCNC: 126 MG/DL (ref 70–99)
GLUCOSE BLDC GLUCOMTR-MCNC: 127 MG/DL (ref 70–99)
GLUCOSE BLDC GLUCOMTR-MCNC: 132 MG/DL (ref 70–99)
GLUCOSE BLDC GLUCOMTR-MCNC: 155 MG/DL (ref 70–99)

## 2023-11-11 PROCEDURE — 99232 SBSQ HOSP IP/OBS MODERATE 35: CPT | Performed by: INTERNAL MEDICINE

## 2023-11-11 RX ORDER — LACTULOSE 10 G/15ML
15 SOLUTION ORAL
Status: DISCONTINUED | OUTPATIENT
Start: 2023-11-11 | End: 2023-11-14

## 2023-11-11 RX ORDER — LACTULOSE 10 G/15ML
15 SOLUTION ORAL
Status: DISCONTINUED | OUTPATIENT
Start: 2023-11-11 | End: 2023-11-11

## 2023-11-11 NOTE — PLAN OF CARE
Discontinue continues tube feedings, tolerating small amounts bolus feedings, unable to swallow oral food, monitoring blood sugar, biotene for oral care, voiding freely, no bm, up to chair with assist, dialysis today,   Started teaching with patient and family regarding bolus feedings. Plan to discharge home.     Problem: Patient Centered Care  Goal: Patient preferences are identified and integrated in the patient's plan of care  Description: Interventions:  - What would you like us to know as we care for you? -  - Provide timely, complete, and accurate information to patient/family  - Incorporate patient and family knowledge, values, beliefs, and cultural backgrounds into the planning and delivery of care  - Encourage patient/family to participate in care and decision-making at the level they choose  - Honor patient and family perspectives and choices  Outcome: Progressing     Problem: Patient/Family Goals  Goal: Patient/Family Long Term Goal  Description: Patient's Long Term Goal: -    Interventions:  - -  - See additional Care Plan goals for specific interventions  Outcome: Progressing  Goal: Patient/Family Short Term Goal  Description: Patient's Short Term Goal: -    Interventions:   - -  - See additional Care Plan goals for specific interventions  Outcome: Progressing     Problem: PAIN - ADULT  Goal: Verbalizes/displays adequate comfort level or patient's stated pain goal  Description: INTERVENTIONS:  - Encourage pt to monitor pain and request assistance  - Assess pain using appropriate pain scale  - Administer analgesics based on type and severity of pain and evaluate response  - Implement non-pharmacological measures as appropriate and evaluate response  - Consider cultural and social influences on pain and pain management  - Manage/alleviate anxiety  - Utilize distraction and/or relaxation techniques  - Monitor for opioid side effects  - Notify MD/LIP if interventions unsuccessful or patient reports new pain  - Anticipate increased pain with activity and pre-medicate as appropriate  Outcome: Progressing     Problem: RISK FOR INFECTION - ADULT  Goal: Absence of fever/infection during anticipated neutropenic period  Description: INTERVENTIONS  - Monitor WBC  - Administer growth factors as ordered  - Implement neutropenic guidelines  Outcome: Progressing     Problem: SAFETY ADULT - FALL  Goal: Free from fall injury  Description: INTERVENTIONS:  - Assess pt frequently for physical needs  - Identify cognitive and physical deficits and behaviors that affect risk of falls.   - [place] fall precautions as indicated by assessment.  - Educate pt/family on patient safety including physical limitations  - Instruct pt to call for assistance with activity based on assessment  - Modify environment to reduce risk of injury  - Provide assistive devices as appropriate  - Consider OT/PT consult to assist with strengthening/mobility  - Encourage toileting schedule  Outcome: Progressing     Problem: DISCHARGE PLANNING  Goal: Discharge to home or other facility with appropriate resources  Description: INTERVENTIONS:  - Identify barriers to discharge w/pt and caregiver  - Include patient/family/discharge partner in discharge planning  - Arrange for needed discharge resources and transportation as appropriate  - Identify discharge learning needs (meds, wound care, etc)  - Arrange for interpreters to assist at discharge as needed  - Consider post-discharge preferences of patient/family/discharge partner  - Complete POLST form as appropriate  - Assess patient's ability to be responsible for managing their own health  - Refer to Case Management Department for coordinating discharge planning if the patient needs post-hospital services based on physician/LIP order or complex needs related to functional status, cognitive ability or social support system  Outcome: Progressing     Problem: GASTROINTESTINAL - ADULT  Goal: Minimal or absence of nausea and vomiting  Description: INTERVENTIONS:  - Maintain adequate hydration with IV or PO as ordered and tolerated  - Nasogastric tube to low intermittent suction as ordered  - Evaluate effectiveness of ordered antiemetic medications  - Provide nonpharmacologic comfort measures as appropriate  - Advance diet as tolerated, if ordered  - Obtain nutritional consult as needed  - Evaluate fluid balance  Outcome: Progressing  Goal: Maintains adequate nutritional intake (undernourished)  Description: INTERVENTIONS:  - Monitor percentage of each meal consumed  - Identify factors contributing to decreased intake, treat as appropriate  - Assist with meals as needed  - Monitor I&O, WT and lab values  - Obtain nutritional consult as needed  - Optimize oral hygiene and moisture  - Encourage food from home; allow for food preferences  - Enhance eating environment  Outcome: Progressing

## 2023-11-11 NOTE — PLAN OF CARE
Patient A+Ox4 on room air and remote tele. Resting in bed. No pain at this time. Patient didn't complete full HD session due to nausea; 0 mL removed; MD notified. Patient refused evening bolus feeds due to feeling of \"fullness\" and nausea. MD notified. Patient stated he is okay with resuming continuous tube feeding. Per MD, continous tube feedings resumed starting at 25cc/hr with 30mL flush q 4 hours; To be increased by 10cc q 8 hours. Patient tolerating continuous feedings well. Call light and belongings within reach. Problem: Patient Centered Care  Goal: Patient preferences are identified and integrated in the patient's plan of care  Description: Interventions:  - What would you like us to know as we care for you?  From home with spouse  - Provide timely, complete, and accurate information to patient/family  - Incorporate patient and family knowledge, values, beliefs, and cultural backgrounds into the planning and delivery of care  - Encourage patient/family to participate in care and decision-making at the level they choose  - Honor patient and family perspectives and choices  11/11/2023 0217 by Veronica Valdez RN  Outcome: Progressing  11/11/2023 0215 by Veronica Valdez RN  Outcome: Progressing     Problem: Patient/Family Goals  Goal: Patient/Family Long Term Goal  Description: Patient's Long Term Goal: discharge home    Interventions:  - continue continuous tube feedings  - monitor pain  - give meds as prescribed  - See additional Care Plan goals for specific interventions  11/11/2023 0217 by Veronica Valdez RN  Outcome: Progressing  11/11/2023 0215 by Veronica Valdez RN  Outcome: Progressing  Goal: Patient/Family Short Term Goal  Description: Patient's Short Term Goal: tolerate tube feedings    Interventions:   - advance per order  - monitor tolerance  - See additional Care Plan goals for specific interventions  11/11/2023 0217 by Veronica Valdez RN  Outcome: Progressing  11/11/2023 0215 by Ben Andres RN  Outcome: Progressing     Problem: PAIN - ADULT  Goal: Verbalizes/displays adequate comfort level or patient's stated pain goal  Description: INTERVENTIONS:  - Encourage pt to monitor pain and request assistance  - Assess pain using appropriate pain scale  - Administer analgesics based on type and severity of pain and evaluate response  - Implement non-pharmacological measures as appropriate and evaluate response  - Consider cultural and social influences on pain and pain management  - Manage/alleviate anxiety  - Utilize distraction and/or relaxation techniques  - Monitor for opioid side effects  - Notify MD/LIP if interventions unsuccessful or patient reports new pain  - Anticipate increased pain with activity and pre-medicate as appropriate  11/11/2023 0217 by Ben Andres RN  Outcome: Progressing  11/11/2023 0215 by Ben Andres RN  Outcome: Progressing     Problem: RISK FOR INFECTION - ADULT  Goal: Absence of fever/infection during anticipated neutropenic period  Description: INTERVENTIONS  - Monitor WBC  - Administer growth factors as ordered  - Implement neutropenic guidelines  11/11/2023 0217 by Ben Andres RN  Outcome: Progressing  11/11/2023 0215 by Ben Andres RN  Outcome: Progressing     Problem: SAFETY ADULT - FALL  Goal: Free from fall injury  Description: INTERVENTIONS:  - Assess pt frequently for physical needs  - Identify cognitive and physical deficits and behaviors that affect risk of falls.   - [place] fall precautions as indicated by assessment.  - Educate pt/family on patient safety including physical limitations  - Instruct pt to call for assistance with activity based on assessment  - Modify environment to reduce risk of injury  - Provide assistive devices as appropriate  - Consider OT/PT consult to assist with strengthening/mobility  - Encourage toileting schedule  11/11/2023 0217 by Ben Andres RN  Outcome: Progressing  11/11/2023 0215 by Kary Najera RN  Outcome: Progressing     Problem: DISCHARGE PLANNING  Goal: Discharge to home or other facility with appropriate resources  Description: INTERVENTIONS:  - Identify barriers to discharge w/pt and caregiver  - Include patient/family/discharge partner in discharge planning  - Arrange for needed discharge resources and transportation as appropriate  - Identify discharge learning needs (meds, wound care, etc)  - Arrange for interpreters to assist at discharge as needed  - Consider post-discharge preferences of patient/family/discharge partner  - Complete POLST form as appropriate  - Assess patient's ability to be responsible for managing their own health  - Refer to Case Management Department for coordinating discharge planning if the patient needs post-hospital services based on physician/LIP order or complex needs related to functional status, cognitive ability or social support system  11/11/2023 0217 by Kary Najera RN  Outcome: Progressing  11/11/2023 0215 by Kary Najera RN  Outcome: Progressing     Problem: GASTROINTESTINAL - ADULT  Goal: Minimal or absence of nausea and vomiting  Description: INTERVENTIONS:  - Maintain adequate hydration with IV or PO as ordered and tolerated  - Nasogastric tube to low intermittent suction as ordered  - Evaluate effectiveness of ordered antiemetic medications  - Provide nonpharmacologic comfort measures as appropriate  - Advance diet as tolerated, if ordered  - Obtain nutritional consult as needed  - Evaluate fluid balance  11/11/2023 0217 by Kary Najera RN  Outcome: Progressing  11/11/2023 0215 by Kary Najera RN  Outcome: Progressing  Goal: Maintains adequate nutritional intake (undernourished)  Description: INTERVENTIONS:  - Monitor percentage of each meal consumed  - Identify factors contributing to decreased intake, treat as appropriate  - Assist with meals as needed  - Monitor I&O, WT and lab values  - Obtain nutritional consult as needed  - Optimize oral hygiene and moisture  - Encourage food from home; allow for food preferences  - Enhance eating environment  11/11/2023 0217 by Tawnya Claude, RN  Outcome: Progressing  11/11/2023 0215 by Tawnya Claude, RN  Outcome: Progressing

## 2023-11-12 ENCOUNTER — APPOINTMENT (OUTPATIENT)
Dept: GENERAL RADIOLOGY | Facility: HOSPITAL | Age: 73
End: 2023-11-12
Attending: INTERNAL MEDICINE
Payer: MEDICARE

## 2023-11-12 PROBLEM — K59.00 CONSTIPATION: Status: ACTIVE | Noted: 2023-11-12

## 2023-11-12 LAB
GLUCOSE BLDC GLUCOMTR-MCNC: 124 MG/DL (ref 70–99)
GLUCOSE BLDC GLUCOMTR-MCNC: 134 MG/DL (ref 70–99)
GLUCOSE BLDC GLUCOMTR-MCNC: 142 MG/DL (ref 70–99)
GLUCOSE BLDC GLUCOMTR-MCNC: 151 MG/DL (ref 70–99)

## 2023-11-12 PROCEDURE — 99232 SBSQ HOSP IP/OBS MODERATE 35: CPT | Performed by: INTERNAL MEDICINE

## 2023-11-12 PROCEDURE — 74018 RADEX ABDOMEN 1 VIEW: CPT | Performed by: INTERNAL MEDICINE

## 2023-11-12 RX ORDER — LACTULOSE 10 G/15ML
15 SOLUTION ORAL ONCE
Status: DISCONTINUED | OUTPATIENT
Start: 2023-11-12 | End: 2023-11-14

## 2023-11-12 RX ORDER — LABETALOL HYDROCHLORIDE 5 MG/ML
10 INJECTION, SOLUTION INTRAVENOUS ONCE
Status: COMPLETED | OUTPATIENT
Start: 2023-11-12 | End: 2023-11-12

## 2023-11-12 RX ORDER — DEXTROSE AND SODIUM CHLORIDE 5; .45 G/100ML; G/100ML
INJECTION, SOLUTION INTRAVENOUS CONTINUOUS
Status: DISCONTINUED | OUTPATIENT
Start: 2023-11-12 | End: 2023-11-13

## 2023-11-12 NOTE — PLAN OF CARE
Patient A+Ox4 on tele. No pain at this time. Tube feedings running at 55cc/hr; tolerating well. No BM yet. Call light and belongings within reach. Reinforced call light teaching. Plan to go home Monday. Problem: Patient Centered Care  Goal: Patient preferences are identified and integrated in the patient's plan of care  Description: Interventions:  - What would you like us to know as we care for you?  From home with spouse  - Provide timely, complete, and accurate information to patient/family  - Incorporate patient and family knowledge, values, beliefs, and cultural backgrounds into the planning and delivery of care  - Encourage patient/family to participate in care and decision-making at the level they choose  - Honor patient and family perspectives and choices  Outcome: Progressing     Problem: Patient/Family Goals  Goal: Patient/Family Long Term Goal  Description: Patient's Long Term Goal: discharge home    Interventions:  - continue continuous tube feedings  - monitor pain  - give meds as prescribed  - See additional Care Plan goals for specific interventions  Outcome: Progressing  Goal: Patient/Family Short Term Goal  Description: Patient's Short Term Goal: tolerate tube feedings    Interventions:   - advance per order  - monitor tolerance  - See additional Care Plan goals for specific interventions  Outcome: Progressing     Problem: PAIN - ADULT  Goal: Verbalizes/displays adequate comfort level or patient's stated pain goal  Description: INTERVENTIONS:  - Encourage pt to monitor pain and request assistance  - Assess pain using appropriate pain scale  - Administer analgesics based on type and severity of pain and evaluate response  - Implement non-pharmacological measures as appropriate and evaluate response  - Consider cultural and social influences on pain and pain management  - Manage/alleviate anxiety  - Utilize distraction and/or relaxation techniques  - Monitor for opioid side effects  - Notify MD/LIP if interventions unsuccessful or patient reports new pain  - Anticipate increased pain with activity and pre-medicate as appropriate  Outcome: Progressing     Problem: RISK FOR INFECTION - ADULT  Goal: Absence of fever/infection during anticipated neutropenic period  Description: INTERVENTIONS  - Monitor WBC  - Administer growth factors as ordered  - Implement neutropenic guidelines  Outcome: Progressing     Problem: SAFETY ADULT - FALL  Goal: Free from fall injury  Description: INTERVENTIONS:  - Assess pt frequently for physical needs  - Identify cognitive and physical deficits and behaviors that affect risk of falls.   - [place] fall precautions as indicated by assessment.  - Educate pt/family on patient safety including physical limitations  - Instruct pt to call for assistance with activity based on assessment  - Modify environment to reduce risk of injury  - Provide assistive devices as appropriate  - Consider OT/PT consult to assist with strengthening/mobility  - Encourage toileting schedule  Outcome: Progressing     Problem: DISCHARGE PLANNING  Goal: Discharge to home or other facility with appropriate resources  Description: INTERVENTIONS:  - Identify barriers to discharge w/pt and caregiver  - Include patient/family/discharge partner in discharge planning  - Arrange for needed discharge resources and transportation as appropriate  - Identify discharge learning needs (meds, wound care, etc)  - Arrange for interpreters to assist at discharge as needed  - Consider post-discharge preferences of patient/family/discharge partner  - Complete POLST form as appropriate  - Assess patient's ability to be responsible for managing their own health  - Refer to Case Management Department for coordinating discharge planning if the patient needs post-hospital services based on physician/LIP order or complex needs related to functional status, cognitive ability or social support system  Outcome: Progressing     Problem: GASTROINTESTINAL - ADULT  Goal: Minimal or absence of nausea and vomiting  Description: INTERVENTIONS:  - Maintain adequate hydration with IV or PO as ordered and tolerated  - Nasogastric tube to low intermittent suction as ordered  - Evaluate effectiveness of ordered antiemetic medications  - Provide nonpharmacologic comfort measures as appropriate  - Advance diet as tolerated, if ordered  - Obtain nutritional consult as needed  - Evaluate fluid balance  Outcome: Progressing  Goal: Maintains adequate nutritional intake (undernourished)  Description: INTERVENTIONS:  - Monitor percentage of each meal consumed  - Identify factors contributing to decreased intake, treat as appropriate  - Assist with meals as needed  - Monitor I&O, WT and lab values  - Obtain nutritional consult as needed  - Optimize oral hygiene and moisture  - Encourage food from home; allow for food preferences  - Enhance eating environment  Outcome: Progressing

## 2023-11-12 NOTE — PLAN OF CARE
Tolerating continuous tubing at 45mls/hr, goal 70mls/hr, monitoring blood sugars, oral pain is getting better, using biotene, , up to chair with standby assist, lactolose given 1x. Plan for discharge home. Patient updated on care plan. Problem: Patient Centered Care  Goal: Patient preferences are identified and integrated in the patient's plan of care  Description: Interventions:  - What would you like us to know as we care for you?  From home with spouse  - Provide timely, complete, and accurate information to patient/family  - Incorporate patient and family knowledge, values, beliefs, and cultural backgrounds into the planning and delivery of care  - Encourage patient/family to participate in care and decision-making at the level they choose  - Honor patient and family perspectives and choices  Outcome: Progressing     Problem: Patient/Family Goals  Goal: Patient/Family Long Term Goal  Description: Patient's Long Term Goal: discharge home    Interventions:  - continue continuous tube feedings  - monitor pain  - give meds as prescribed  - See additional Care Plan goals for specific interventions  Outcome: Progressing  Goal: Patient/Family Short Term Goal  Description: Patient's Short Term Goal: tolerate tube feedings    Interventions:   - advance per order  - monitor tolerance  - See additional Care Plan goals for specific interventions  Outcome: Progressing     Problem: PAIN - ADULT  Goal: Verbalizes/displays adequate comfort level or patient's stated pain goal  Description: INTERVENTIONS:  - Encourage pt to monitor pain and request assistance  - Assess pain using appropriate pain scale  - Administer analgesics based on type and severity of pain and evaluate response  - Implement non-pharmacological measures as appropriate and evaluate response  - Consider cultural and social influences on pain and pain management  - Manage/alleviate anxiety  - Utilize distraction and/or relaxation techniques  - Monitor for opioid side effects  - Notify MD/LIP if interventions unsuccessful or patient reports new pain  - Anticipate increased pain with activity and pre-medicate as appropriate  Outcome: Progressing     Problem: RISK FOR INFECTION - ADULT  Goal: Absence of fever/infection during anticipated neutropenic period  Description: INTERVENTIONS  - Monitor WBC  - Administer growth factors as ordered  - Implement neutropenic guidelines  Outcome: Progressing     Problem: SAFETY ADULT - FALL  Goal: Free from fall injury  Description: INTERVENTIONS:  - Assess pt frequently for physical needs  - Identify cognitive and physical deficits and behaviors that affect risk of falls.   - [place] fall precautions as indicated by assessment.  - Educate pt/family on patient safety including physical limitations  - Instruct pt to call for assistance with activity based on assessment  - Modify environment to reduce risk of injury  - Provide assistive devices as appropriate  - Consider OT/PT consult to assist with strengthening/mobility  - Encourage toileting schedule  Outcome: Progressing     Problem: DISCHARGE PLANNING  Goal: Discharge to home or other facility with appropriate resources  Description: INTERVENTIONS:  - Identify barriers to discharge w/pt and caregiver  - Include patient/family/discharge partner in discharge planning  - Arrange for needed discharge resources and transportation as appropriate  - Identify discharge learning needs (meds, wound care, etc)  - Arrange for interpreters to assist at discharge as needed  - Consider post-discharge preferences of patient/family/discharge partner  - Complete POLST form as appropriate  - Assess patient's ability to be responsible for managing their own health  - Refer to Case Management Department for coordinating discharge planning if the patient needs post-hospital services based on physician/LIP order or complex needs related to functional status, cognitive ability or social support system  Outcome: Progressing     Problem: GASTROINTESTINAL - ADULT  Goal: Minimal or absence of nausea and vomiting  Description: INTERVENTIONS:  - Maintain adequate hydration with IV or PO as ordered and tolerated  - Nasogastric tube to low intermittent suction as ordered  - Evaluate effectiveness of ordered antiemetic medications  - Provide nonpharmacologic comfort measures as appropriate  - Advance diet as tolerated, if ordered  - Obtain nutritional consult as needed  - Evaluate fluid balance  Outcome: Progressing  Goal: Maintains adequate nutritional intake (undernourished)  Description: INTERVENTIONS:  - Monitor percentage of each meal consumed  - Identify factors contributing to decreased intake, treat as appropriate  - Assist with meals as needed  - Monitor I&O, WT and lab values  - Obtain nutritional consult as needed  - Optimize oral hygiene and moisture  - Encourage food from home; allow for food preferences  - Enhance eating environment  Outcome: Progressing

## 2023-11-13 ENCOUNTER — APPOINTMENT (OUTPATIENT)
Dept: RADIATION ONCOLOGY | Facility: HOSPITAL | Age: 73
End: 2023-11-13
Attending: RADIOLOGY
Payer: MEDICARE

## 2023-11-13 LAB
GLUCOSE BLDC GLUCOMTR-MCNC: 132 MG/DL (ref 70–99)
GLUCOSE BLDC GLUCOMTR-MCNC: 134 MG/DL (ref 70–99)
GLUCOSE BLDC GLUCOMTR-MCNC: 141 MG/DL (ref 70–99)
GLUCOSE BLDC GLUCOMTR-MCNC: 157 MG/DL (ref 70–99)

## 2023-11-13 PROCEDURE — 99233 SBSQ HOSP IP/OBS HIGH 50: CPT | Performed by: INTERNAL MEDICINE

## 2023-11-13 PROCEDURE — 99232 SBSQ HOSP IP/OBS MODERATE 35: CPT | Performed by: INTERNAL MEDICINE

## 2023-11-13 RX ORDER — SIMETHICONE 80 MG
80 TABLET,CHEWABLE ORAL EVERY 6 HOURS PRN
Status: DISCONTINUED | OUTPATIENT
Start: 2023-11-13 | End: 2023-11-14

## 2023-11-13 NOTE — PLAN OF CARE
Pt had large loose BM. Requested Tube feeds continue to be paused overnight. MD notified. IVF ordered overnight until TF resumes. Denies nausea/vomiting. Q6 accuchecks. BP elevated. Hydralazine prn given, but BP remained elevated. MD notified. Labetalol given. Denies pain. Voiding. On a FLD - minimal intake. Denies pain. On RA. Remote tele. Ambulating independently. HD catheter in place to left forearm. Plan to receive hemodialysis today. Plan to resume tube feeds today, will go [facility] with [facility] upon medical clearance. Appropriate safety measures in place. Problem: Patient Centered Care  Goal: Patient preferences are identified and integrated in the patient's plan of care  Description: Interventions:  - What would you like us to know as we care for you?  From home with spouse  - Provide timely, complete, and accurate information to patient/family  - Incorporate patient and family knowledge, values, beliefs, and cultural backgrounds into the planning and delivery of care  - Encourage patient/family to participate in care and decision-making at the level they choose  - Honor patient and family perspectives and choices  Outcome: Progressing     Problem: Patient/Family Goals  Goal: Patient/Family Long Term Goal  Description: Patient's Long Term Goal: discharge home    Interventions:  - continue continuous tube feedings  - monitor pain  - give meds as prescribed  - See additional Care Plan goals for specific interventions  Outcome: Progressing  Goal: Patient/Family Short Term Goal  Description: Patient's Short Term Goal: tolerate tube feedings    Interventions:   - advance per order  - monitor tolerance  - See additional Care Plan goals for specific interventions  Outcome: Progressing     Problem: PAIN - ADULT  Goal: Verbalizes/displays adequate comfort level or patient's stated pain goal  Description: INTERVENTIONS:  - Encourage pt to monitor pain and request assistance  - Assess pain using appropriate pain scale  - Administer analgesics based on type and severity of pain and evaluate response  - Implement non-pharmacological measures as appropriate and evaluate response  - Consider cultural and social influences on pain and pain management  - Manage/alleviate anxiety  - Utilize distraction and/or relaxation techniques  - Monitor for opioid side effects  - Notify MD/LIP if interventions unsuccessful or patient reports new pain  - Anticipate increased pain with activity and pre-medicate as appropriate  Outcome: Progressing     Problem: RISK FOR INFECTION - ADULT  Goal: Absence of fever/infection during anticipated neutropenic period  Description: INTERVENTIONS  - Monitor WBC  - Administer growth factors as ordered  - Implement neutropenic guidelines  Outcome: Progressing     Problem: SAFETY ADULT - FALL  Goal: Free from fall injury  Description: INTERVENTIONS:  - Assess pt frequently for physical needs  - Identify cognitive and physical deficits and behaviors that affect risk of falls.   - [place] fall precautions as indicated by assessment.  - Educate pt/family on patient safety including physical limitations  - Instruct pt to call for assistance with activity based on assessment  - Modify environment to reduce risk of injury  - Provide assistive devices as appropriate  - Consider OT/PT consult to assist with strengthening/mobility  - Encourage toileting schedule  Outcome: Progressing     Problem: DISCHARGE PLANNING  Goal: Discharge to home or other facility with appropriate resources  Description: INTERVENTIONS:  - Identify barriers to discharge w/pt and caregiver  - Include patient/family/discharge partner in discharge planning  - Arrange for needed discharge resources and transportation as appropriate  - Identify discharge learning needs (meds, wound care, etc)  - Arrange for interpreters to assist at discharge as needed  - Consider post-discharge preferences of patient/family/discharge partner  - Complete POLST form as appropriate  - Assess patient's ability to be responsible for managing their own health  - Refer to Case Management Department for coordinating discharge planning if the patient needs post-hospital services based on physician/LIP order or complex needs related to functional status, cognitive ability or social support system  Outcome: Progressing     Problem: GASTROINTESTINAL - ADULT  Goal: Minimal or absence of nausea and vomiting  Description: INTERVENTIONS:  - Maintain adequate hydration with IV or PO as ordered and tolerated  - Nasogastric tube to low intermittent suction as ordered  - Evaluate effectiveness of ordered antiemetic medications  - Provide nonpharmacologic comfort measures as appropriate  - Advance diet as tolerated, if ordered  - Obtain nutritional consult as needed  - Evaluate fluid balance  Outcome: Progressing  Goal: Maintains adequate nutritional intake (undernourished)  Description: INTERVENTIONS:  - Monitor percentage of each meal consumed  - Identify factors contributing to decreased intake, treat as appropriate  - Assist with meals as needed  - Monitor I&O, WT and lab values  - Obtain nutritional consult as needed  - Optimize oral hygiene and moisture  - Encourage food from home; allow for food preferences  - Enhance eating environment  Outcome: Progressing

## 2023-11-13 NOTE — PLAN OF CARE
Problem: Patient Centered Care  Goal: Patient preferences are identified and integrated in the patient's plan of care  Description: Interventions:  - What would you like us to know as we care for you?  From home with spouse  - Provide timely, complete, and accurate information to patient/family  - Incorporate patient and family knowledge, values, beliefs, and cultural backgrounds into the planning and delivery of care  - Encourage patient/family to participate in care and decision-making at the level they choose  - Honor patient and family perspectives and choices  Outcome: Progressing     Problem: Patient/Family Goals  Goal: Patient/Family Long Term Goal  Description: Patient's Long Term Goal: discharge home    Interventions:  - continue continuous tube feedings  - monitor pain  - give meds as prescribed  - See additional Care Plan goals for specific interventions  Outcome: Progressing  Goal: Patient/Family Short Term Goal  Description: Patient's Short Term Goal: tolerate tube feedings    Interventions:   - advance per order  - monitor tolerance  - See additional Care Plan goals for specific interventions  Outcome: Progressing     Problem: PAIN - ADULT  Goal: Verbalizes/displays adequate comfort level or patient's stated pain goal  Description: INTERVENTIONS:  - Encourage pt to monitor pain and request assistance  - Assess pain using appropriate pain scale  - Administer analgesics based on type and severity of pain and evaluate response  - Implement non-pharmacological measures as appropriate and evaluate response  - Consider cultural and social influences on pain and pain management  - Manage/alleviate anxiety  - Utilize distraction and/or relaxation techniques  - Monitor for opioid side effects  - Notify MD/LIP if interventions unsuccessful or patient reports new pain  - Anticipate increased pain with activity and pre-medicate as appropriate  Outcome: Progressing     Problem: RISK FOR INFECTION - ADULT  Goal: Absence of fever/infection during anticipated neutropenic period  Description: INTERVENTIONS  - Monitor WBC  - Administer growth factors as ordered  - Implement neutropenic guidelines  Outcome: Progressing     Problem: SAFETY ADULT - FALL  Goal: Free from fall injury  Description: INTERVENTIONS:  - Assess pt frequently for physical needs  - Identify cognitive and physical deficits and behaviors that affect risk of falls.   - [place] fall precautions as indicated by assessment.  - Educate pt/family on patient safety including physical limitations  - Instruct pt to call for assistance with activity based on assessment  - Modify environment to reduce risk of injury  - Provide assistive devices as appropriate  - Consider OT/PT consult to assist with strengthening/mobility  - Encourage toileting schedule  Outcome: Progressing     Problem: DISCHARGE PLANNING  Goal: Discharge to home or other facility with appropriate resources  Description: INTERVENTIONS:  - Identify barriers to discharge w/pt and caregiver  - Include patient/family/discharge partner in discharge planning  - Arrange for needed discharge resources and transportation as appropriate  - Identify discharge learning needs (meds, wound care, etc)  - Arrange for interpreters to assist at discharge as needed  - Consider post-discharge preferences of patient/family/discharge partner  - Complete POLST form as appropriate  - Assess patient's ability to be responsible for managing their own health  - Refer to Case Management Department for coordinating discharge planning if the patient needs post-hospital services based on physician/LIP order or complex needs related to functional status, cognitive ability or social support system  Outcome: Progressing     Problem: GASTROINTESTINAL - ADULT  Goal: Minimal or absence of nausea and vomiting  Description: INTERVENTIONS:  - Maintain adequate hydration with IV or PO as ordered and tolerated  - Nasogastric tube to low intermittent suction as ordered  - Evaluate effectiveness of ordered antiemetic medications  - Provide nonpharmacologic comfort measures as appropriate  - Advance diet as tolerated, if ordered  - Obtain nutritional consult as needed  - Evaluate fluid balance  Outcome: Progressing  Goal: Maintains adequate nutritional intake (undernourished)  Description: INTERVENTIONS:  - Monitor percentage of each meal consumed  - Identify factors contributing to decreased intake, treat as appropriate  - Assist with meals as needed  - Monitor I&O, WT and lab values  - Obtain nutritional consult as needed  - Optimize oral hygiene and moisture  - Encourage food from home; allow for food preferences  - Enhance eating environment  Outcome: Progressing     Pt complaining of increased bloating and abdominal fullness, requesting tube feedings to be paused at 2pm. Per MD, okay to hold for now. KUB completed, showing moderate to large colonic fecal burden. Lactulose given as well as tap water enema with minimal stool, order to give another dose of lactulose tonight. Minimal po intake, sugars have been stable. Plans for dialysis tomorrow. Call light within reach, frequent rounding.

## 2023-11-13 NOTE — PROGRESS NOTES
11/13/23 1246   Clinical Encounter Type   Visited With Patient not available;Health care provider   Routine Visit Introduction   Referral From    Referral To    Sabianism Encounters   Sabianism Needs Pastoral care brochure   Taxonomy   Intended Effects Aligning care plan with patient's values   Methods Offer support   Trigger for Consult   Trigger for Spiritual Care Consult No         Visit summary: Patient sleeping while receiving dialysis during visit.  checked in with dialysis RN who expects patient will be sleeping through process.     Plan:  Spiritual care will continue [facility]  11/13/2023

## 2023-11-14 ENCOUNTER — APPOINTMENT (OUTPATIENT)
Dept: HEMATOLOGY/ONCOLOGY | Facility: HOSPITAL | Age: 73
End: 2023-11-14
Attending: INTERNAL MEDICINE
Payer: MEDICARE

## 2023-11-14 VITALS
RESPIRATION RATE: 16 BRPM | WEIGHT: 207.38 LBS | TEMPERATURE: 98 F | HEART RATE: 80 BPM | SYSTOLIC BLOOD PRESSURE: 142 MMHG | DIASTOLIC BLOOD PRESSURE: 70 MMHG | BODY MASS INDEX: 24.49 KG/M2 | HEIGHT: 77 IN | OXYGEN SATURATION: 98 %

## 2023-11-14 LAB
ALBUMIN SERPL-MCNC: 3.2 G/DL (ref 3.2–4.8)
ANION GAP SERPL CALC-SCNC: 8 MMOL/L (ref 0–18)
BASOPHILS # BLD AUTO: 0.03 X10(3) UL (ref 0–0.2)
BASOPHILS NFR BLD AUTO: 0.6 %
BUN BLD-MCNC: 45 MG/DL (ref 9–23)
BUN/CREAT SERPL: 6.7 (ref 10–20)
CALCIUM BLD-MCNC: 8.3 MG/DL (ref 8.7–10.4)
CHLORIDE SERPL-SCNC: 104 MMOL/L (ref 98–112)
CO2 SERPL-SCNC: 29 MMOL/L (ref 21–32)
CREAT BLD-MCNC: 6.72 MG/DL
DEPRECATED RDW RBC AUTO: 49.6 FL (ref 35.1–46.3)
EGFRCR SERPLBLD CKD-EPI 2021: 8 ML/MIN/1.73M2 (ref 60–?)
EOSINOPHIL # BLD AUTO: 0.1 X10(3) UL (ref 0–0.7)
EOSINOPHIL NFR BLD AUTO: 2 %
ERYTHROCYTE [DISTWIDTH] IN BLOOD BY AUTOMATED COUNT: 15.6 % (ref 11–15)
GLUCOSE BLD-MCNC: 121 MG/DL (ref 70–99)
GLUCOSE BLDC GLUCOMTR-MCNC: 124 MG/DL (ref 70–99)
GLUCOSE BLDC GLUCOMTR-MCNC: 145 MG/DL (ref 70–99)
GLUCOSE BLDC GLUCOMTR-MCNC: 178 MG/DL (ref 70–99)
HCT VFR BLD AUTO: 26.9 %
HGB BLD-MCNC: 9 G/DL
IMM GRANULOCYTES # BLD AUTO: 0.05 X10(3) UL (ref 0–1)
IMM GRANULOCYTES NFR BLD: 1 %
LYMPHOCYTES # BLD AUTO: 0.37 X10(3) UL (ref 1–4)
LYMPHOCYTES NFR BLD AUTO: 7.2 %
MAGNESIUM SERPL-MCNC: 2.2 MG/DL (ref 1.6–2.6)
MCH RBC QN AUTO: 29.4 PG (ref 26–34)
MCHC RBC AUTO-ENTMCNC: 33.5 G/DL (ref 31–37)
MCV RBC AUTO: 87.9 FL
MONOCYTES # BLD AUTO: 0.52 X10(3) UL (ref 0.1–1)
MONOCYTES NFR BLD AUTO: 10.2 %
NEUTROPHILS # BLD AUTO: 4.05 X10 (3) UL (ref 1.5–7.7)
NEUTROPHILS # BLD AUTO: 4.05 X10(3) UL (ref 1.5–7.7)
NEUTROPHILS NFR BLD AUTO: 79 %
OSMOLALITY SERPL CALC.SUM OF ELEC: 305 MOSM/KG (ref 275–295)
PHOSPHATE SERPL-MCNC: 4.3 MG/DL (ref 2.4–5.1)
PLATELET # BLD AUTO: 154 10(3)UL (ref 150–450)
POTASSIUM SERPL-SCNC: 3.7 MMOL/L (ref 3.5–5.1)
RBC # BLD AUTO: 3.06 X10(6)UL
SODIUM SERPL-SCNC: 141 MMOL/L (ref 136–145)
WBC # BLD AUTO: 5.1 X10(3) UL (ref 4–11)

## 2023-11-14 PROCEDURE — 99232 SBSQ HOSP IP/OBS MODERATE 35: CPT | Performed by: INTERNAL MEDICINE

## 2023-11-14 PROCEDURE — 99239 HOSP IP/OBS DSCHRG MGMT >30: CPT | Performed by: INTERNAL MEDICINE

## 2023-11-14 NOTE — PLAN OF CARE
Bobby Dukes is alert/oriented. Vitals stable. Gtube intact, tolerating tube feedings better today. Large BM this morning. No residuals with feedings or nausea. Accuchecks stable. Ambulating independently. Tylenol x1 for mild pain. Heparin for DVT prophylaxis. Voiding adequately, minimal output as he is HD dependent. Cleared for discharge. IV removed. Discharge summary given and explained. Bolus feedings demonstrated and explained to pt and wife prior to discharge. Tube feeding supplies delivered today, formula to be delivered Thursday. 9 bottles Nepro formula sent home with patient until formula is delivered. All follow up and medications reviewed. All questions answered at this time. Problem: Patient Centered Care  Goal: Patient preferences are identified and integrated in the patient's plan of care  Description: Interventions:  - What would you like us to know as we care for you?  From home with spouse  - Provide timely, complete, and accurate information to patient/family  - Incorporate patient and family knowledge, values, beliefs, and cultural backgrounds into the planning and delivery of care  - Encourage patient/family to participate in care and decision-making at the level they choose  - Honor patient and family perspectives and choices  Outcome: Adequate for Discharge     Problem: Patient/Family Goals  Goal: Patient/Family Long Term Goal  Description: Patient's Long Term Goal: discharge home    Interventions:  - continue continuous tube feedings  - monitor pain  - give meds as prescribed  - See additional Care Plan goals for specific interventions  Outcome: Adequate for Discharge  Goal: Patient/Family Short Term Goal  Description: Patient's Short Term Goal: tolerate tube feedings    Interventions:   - advance per order  - monitor tolerance  - See additional Care Plan goals for specific interventions  Outcome: Adequate for Discharge     Problem: PAIN - ADULT  Goal: Verbalizes/displays adequate comfort level or patient's stated pain goal  Description: INTERVENTIONS:  - Encourage pt to monitor pain and request assistance  - Assess pain using appropriate pain scale  - Administer analgesics based on type and severity of pain and evaluate response  - Implement non-pharmacological measures as appropriate and evaluate response  - Consider cultural and social influences on pain and pain management  - Manage/alleviate anxiety  - Utilize distraction and/or relaxation techniques  - Monitor for opioid side effects  - Notify MD/LIP if interventions unsuccessful or patient reports new pain  - Anticipate increased pain with activity and pre-medicate as appropriate  Outcome: Adequate for Discharge     Problem: RISK FOR INFECTION - ADULT  Goal: Absence of fever/infection during anticipated neutropenic period  Description: INTERVENTIONS  - Monitor WBC  - Administer growth factors as ordered  - Implement neutropenic guidelines  Outcome: Adequate for Discharge     Problem: SAFETY ADULT - FALL  Goal: Free from fall injury  Description: INTERVENTIONS:  - Assess pt frequently for physical needs  - Identify cognitive and physical deficits and behaviors that affect risk of falls.   - [place] fall precautions as indicated by assessment.  - Educate pt/family on patient safety including physical limitations  - Instruct pt to call for assistance with activity based on assessment  - Modify environment to reduce risk of injury  - Provide assistive devices as appropriate  - Consider OT/PT consult to assist with strengthening/mobility  - Encourage toileting schedule  Outcome: Adequate for Discharge     Problem: DISCHARGE PLANNING  Goal: Discharge to home or other facility with appropriate resources  Description: INTERVENTIONS:  - Identify barriers to discharge w/pt and caregiver  - Include patient/family/discharge partner in discharge planning  - Arrange for needed discharge resources and transportation as appropriate  - Identify discharge learning needs (meds, wound care, etc)  - Arrange for interpreters to assist at discharge as needed  - Consider post-discharge preferences of patient/family/discharge partner  - Complete POLST form as appropriate  - Assess patient's ability to be responsible for managing their own health  - Refer to Case Management Department for coordinating discharge planning if the patient needs post-hospital services based on physician/LIP order or complex needs related to functional status, cognitive ability or social support system  Outcome: Adequate for Discharge     Problem: GASTROINTESTINAL - ADULT  Goal: Minimal or absence of nausea and vomiting  Description: INTERVENTIONS:  - Maintain adequate hydration with IV or PO as ordered and tolerated  - Nasogastric tube to low intermittent suction as ordered  - Evaluate effectiveness of ordered antiemetic medications  - Provide nonpharmacologic comfort measures as appropriate  - Advance diet as tolerated, if ordered  - Obtain nutritional consult as needed  - Evaluate fluid balance  Outcome: Adequate for Discharge  Goal: Maintains adequate nutritional intake (undernourished)  Description: INTERVENTIONS:  - Monitor percentage of each meal consumed  - Identify factors contributing to decreased intake, treat as appropriate  - Assist with meals as needed  - Monitor I&O, WT and lab values  - Obtain nutritional consult as needed  - Optimize oral hygiene and moisture  - Encourage food from home; allow for food preferences  - Enhance eating environment  Outcome: Adequate for Discharge

## 2023-11-14 NOTE — PLAN OF CARE
Jeremias Galdamez is alert/oriented. HD today - 2L off. Vitals stable. Bolus tube feedings restarted today, tolerating until about an hour after 1400 feed - developed abdominal cramping. Residuals stable. No nausea. MD notified. Simethicone and lactulose given, Enema ordered prn for tonight. Accuchecks stable. Ambulating indepently. IVF stopped once TF began. Heparin for DVT prophylaxis. Voiding, minimal output. Last BM last night. Bed low, locked, all safety measures in place. Problem: Patient Centered Care  Goal: Patient preferences are identified and integrated in the patient's plan of care  Description: Interventions:  - What would you like us to know as we care for you?  From home with spouse  - Provide timely, complete, and accurate information to patient/family  - Incorporate patient and family knowledge, values, beliefs, and cultural backgrounds into the planning and delivery of care  - Encourage patient/family to participate in care and decision-making at the level they choose  - Honor patient and family perspectives and choices  Outcome: Progressing     Problem: Patient/Family Goals  Goal: Patient/Family Long Term Goal  Description: Patient's Long Term Goal: discharge home    Interventions:  - continue continuous tube feedings  - monitor pain  - give meds as prescribed  - See additional Care Plan goals for specific interventions  Outcome: Progressing  Goal: Patient/Family Short Term Goal  Description: Patient's Short Term Goal: tolerate tube feedings    Interventions:   - advance per order  - monitor tolerance  - See additional Care Plan goals for specific interventions  Outcome: Progressing     Problem: PAIN - ADULT  Goal: Verbalizes/displays adequate comfort level or patient's stated pain goal  Description: INTERVENTIONS:  - Encourage pt to monitor pain and request assistance  - Assess pain using appropriate pain scale  - Administer analgesics based on type and severity of pain and evaluate response  - Implement non-pharmacological measures as appropriate and evaluate response  - Consider cultural and social influences on pain and pain management  - Manage/alleviate anxiety  - Utilize distraction and/or relaxation techniques  - Monitor for opioid side effects  - Notify MD/LIP if interventions unsuccessful or patient reports new pain  - Anticipate increased pain with activity and pre-medicate as appropriate  Outcome: Progressing     Problem: RISK FOR INFECTION - ADULT  Goal: Absence of fever/infection during anticipated neutropenic period  Description: INTERVENTIONS  - Monitor WBC  - Administer growth factors as ordered  - Implement neutropenic guidelines  Outcome: Progressing     Problem: SAFETY ADULT - FALL  Goal: Free from fall injury  Description: INTERVENTIONS:  - Assess pt frequently for physical needs  - Identify cognitive and physical deficits and behaviors that affect risk of falls.   - [place] fall precautions as indicated by assessment.  - Educate pt/family on patient safety including physical limitations  - Instruct pt to call for assistance with activity based on assessment  - Modify environment to reduce risk of injury  - Provide assistive devices as appropriate  - Consider OT/PT consult to assist with strengthening/mobility  - Encourage toileting schedule  Outcome: Progressing     Problem: DISCHARGE PLANNING  Goal: Discharge to home or other facility with appropriate resources  Description: INTERVENTIONS:  - Identify barriers to discharge w/pt and caregiver  - Include patient/family/discharge partner in discharge planning  - Arrange for needed discharge resources and transportation as appropriate  - Identify discharge learning needs (meds, wound care, etc)  - Arrange for interpreters to assist at discharge as needed  - Consider post-discharge preferences of patient/family/discharge partner  - Complete POLST form as appropriate  - Assess patient's ability to be responsible for managing their own health  - Refer to Case Management Department for coordinating discharge planning if the patient needs post-hospital services based on physician/LIP order or complex needs related to functional status, cognitive ability or social support system  Outcome: Progressing     Problem: GASTROINTESTINAL - ADULT  Goal: Minimal or absence of nausea and vomiting  Description: INTERVENTIONS:  - Maintain adequate hydration with IV or PO as ordered and tolerated  - Nasogastric tube to low intermittent suction as ordered  - Evaluate effectiveness of ordered antiemetic medications  - Provide nonpharmacologic comfort measures as appropriate  - Advance diet as tolerated, if ordered  - Obtain nutritional consult as needed  - Evaluate fluid balance  Outcome: Progressing  Goal: Maintains adequate nutritional intake (undernourished)  Description: INTERVENTIONS:  - Monitor percentage of each meal consumed  - Identify factors contributing to decreased intake, treat as appropriate  - Assist with meals as needed  - Monitor I&O, WT and lab values  - Obtain nutritional consult as needed  - Optimize oral hygiene and moisture  - Encourage food from home; allow for food preferences  - Enhance eating environment  Outcome: Progressing

## 2023-11-14 NOTE — DIETARY NOTE
Brief Nutrition Note:     Visits with pt yesterday and today. Bolus fdgs initiated yesterday with some GI distress, but suspect multifactorial (constipation, lactulose, etc). Pt feels well enough to go home. Pt has 10 nepro cans at bedside to take home with him but if supply is not delivered in time, pt is ok to supplement with the ensure formula he has at home (pt was drinking this formula PTA). Labs WNL today after HD yesterday. Urged increase bolus fdgs to goal for wt maintenance/regain. Available for questions in the cancer center when pt is getting his treatments.      [address], [address] [facility] Dietitian [id number]

## 2023-11-14 NOTE — PLAN OF CARE
Pt is declining bolus tube feedings. Denies need for laxative/enema. On call Hospitalist informed. Left forearm AV fistula present with audible bruit and thrill. Urine output is diminished. RT extended PIV is saline locked. Benadryl 25mg IVP administered with relief. Call light is within reach. Denies chest pain, SOB or abdominal pain. No nausea reported. Call light is within reach. Bed is in the low and locked position. Problem: Patient Centered Care  Goal: Patient preferences are identified and integrated in the patient's plan of care  Description: Interventions:  - What would you like us to know as we care for you?  From home with spouse  - Provide timely, complete, and accurate information to patient/family  - Incorporate patient and family knowledge, values, beliefs, and cultural backgrounds into the planning and delivery of care  - Encourage patient/family to participate in care and decision-making at the level they choose  - Honor patient and family perspectives and choices  Outcome: Progressing     Problem: Patient/Family Goals  Goal: Patient/Family Long Term Goal  Description: Patient's Long Term Goal: discharge home    Interventions:  - continue continuous tube feedings  - monitor pain  - give meds as prescribed  - See additional Care Plan goals for specific interventions  Outcome: Progressing  Goal: Patient/Family Short Term Goal  Description: Patient's Short Term Goal: tolerate tube feedings    Interventions:   - advance per order  - monitor tolerance  - See additional Care Plan goals for specific interventions  Outcome: Progressing     Problem: PAIN - ADULT  Goal: Verbalizes/displays adequate comfort level or patient's stated pain goal  Description: INTERVENTIONS:  - Encourage pt to monitor pain and request assistance  - Assess pain using appropriate pain scale  - Administer analgesics based on type and severity of pain and evaluate response  - Implement non-pharmacological measures as appropriate and evaluate response  - Consider cultural and social influences on pain and pain management  - Manage/alleviate anxiety  - Utilize distraction and/or relaxation techniques  - Monitor for opioid side effects  - Notify MD/LIP if interventions unsuccessful or patient reports new pain  - Anticipate increased pain with activity and pre-medicate as appropriate  Outcome: Progressing     Problem: RISK FOR INFECTION - ADULT  Goal: Absence of fever/infection during anticipated neutropenic period  Description: INTERVENTIONS  - Monitor WBC  - Administer growth factors as ordered  - Implement neutropenic guidelines  Outcome: Progressing     Problem: SAFETY ADULT - FALL  Goal: Free from fall injury  Description: INTERVENTIONS:  - Assess pt frequently for physical needs  - Identify cognitive and physical deficits and behaviors that affect risk of falls.   - [place] fall precautions as indicated by assessment.  - Educate pt/family on patient safety including physical limitations  - Instruct pt to call for assistance with activity based on assessment  - Modify environment to reduce risk of injury  - Provide assistive devices as appropriate  - Consider OT/PT consult to assist with strengthening/mobility  - Encourage toileting schedule  Outcome: Progressing     Problem: DISCHARGE PLANNING  Goal: Discharge to home or other facility with appropriate resources  Description: INTERVENTIONS:  - Identify barriers to discharge w/pt and caregiver  - Include patient/family/discharge partner in discharge planning  - Arrange for needed discharge resources and transportation as appropriate  - Identify discharge learning needs (meds, wound care, etc)  - Arrange for interpreters to assist at discharge as needed  - Consider post-discharge preferences of patient/family/discharge partner  - Complete POLST form as appropriate  - Assess patient's ability to be responsible for managing their own health  - Refer to Case Management Department for coordinating discharge planning if the patient needs post-hospital services based on physician/LIP order or complex needs related to functional status, cognitive ability or social support system  Outcome: Progressing     Problem: GASTROINTESTINAL - ADULT  Goal: Minimal or absence of nausea and vomiting  Description: INTERVENTIONS:  - Maintain adequate hydration with IV or PO as ordered and tolerated  - Nasogastric tube to low intermittent suction as ordered  - Evaluate effectiveness of ordered antiemetic medications  - Provide nonpharmacologic comfort measures as appropriate  - Advance diet as tolerated, if ordered  - Obtain nutritional consult as needed  - Evaluate fluid balance  Outcome: Progressing  Goal: Maintains adequate nutritional intake (undernourished)  Description: INTERVENTIONS:  - Monitor percentage of each meal consumed  - Identify factors contributing to decreased intake, treat as appropriate  - Assist with meals as needed  - Monitor I&O, WT and lab values  - Obtain nutritional consult as needed  - Optimize oral hygiene and moisture  - Encourage food from home; allow for food preferences  - Enhance eating environment  Outcome: Progressing

## 2023-11-14 NOTE — CM/SW NOTE
11/14/23 1400   Discharge disposition   Expected discharge disposition Home or Self   DME/Infusion Providers [facility]  (TF)     DOTTIE was notified by RN that pt will dc home today. Pt will have a sufficient amt of TF formula to last until his shipment arrives on Thursday. DOTTIE notified Mehreen Haroldo of above.     Lucinda Umana RN    Ext [phone]

## 2023-11-15 ENCOUNTER — TELEPHONE (OUTPATIENT)
Dept: RADIATION ONCOLOGY | Facility: HOSPITAL | Age: 73
End: 2023-11-15

## 2023-11-15 ENCOUNTER — TELEPHONE (OUTPATIENT)
Dept: INTERNAL MEDICINE CLINIC | Facility: CLINIC | Age: 73
End: 2023-11-15

## 2023-11-15 ENCOUNTER — PATIENT OUTREACH (OUTPATIENT)
Dept: CASE MANAGEMENT | Age: 73
End: 2023-11-15

## 2023-11-15 DIAGNOSIS — R13.10 DYSPHAGIA, UNSPECIFIED TYPE: Primary | ICD-10-CM

## 2023-11-15 DIAGNOSIS — Z02.9 ENCOUNTERS FOR UNSPECIFIED ADMINISTRATIVE PURPOSE: ICD-10-CM

## 2023-11-15 PROCEDURE — 1111F DSCHRG MED/CURRENT MED MERGE: CPT

## 2023-11-15 NOTE — PROGRESS NOTES
NCM attempted to reach the patient to complete a TCM/HFU call. Left message to call back. NCM provided direct contact info at [phone].          Procedures during hospitalization:   Egd with peg tube placement    Discharge Diet: NPO - Tube feedings     Discharge Activity: As tolerated    DME/Infusion Providers [facility]  (TF)

## 2023-11-15 NOTE — PROGRESS NOTES
Initial Post Discharge Follow Up   Discharge Date: 11/14/23  Contact Date: 11/15/2023    Consent Verification:  Assessment Completed With: Patient  HIPAA Verified? Yes    Discharge Dx:   dysphagia   11/07/2023 Egd with peg tube placement     General:   How have you been since your discharge from the hospital? Pt feeling better, since hospital discharge--did experience some stomach cramping last night--has since subsided. Pt confirms he did go to his regularly scheduled dialysis this morning. Peg tube in place without pain, redness, drainage or bleeding--NPO except for tube feeds, independent with ADLs. Patient denies fever, chills, nausea, vomiting, diarrhea, intractable abdominal pain,chest pain or shortness of breath at this time. Do you have any pain since discharge? No    How well was your pain managed while in the hospital?   On a scale of 1-5   1- Very Poor and 5- Very well   Very Well  When you were leaving the hospital were your discharge instructions reviewed with you? Yes  How well were your discharge instructions explained to you? On a scale of 1-5   1- Very Poor and 5- Very well   Very Well  Do you have any questions about your discharge instructions? No  Before leaving the hospital was your diagnoses explained to you? Yes  Do you have any questions about your diagnoses? No  Are you able to perform normal daily activities of living as you have prior to your hospital stay (dressing, bathing, ambulating to the bathroom, etc)? yes  (NCM) Was patient given a different diet per AVS? yes  If so, which diet? NPO - Tube feedings   Are there any barriers to following this diet? yes    Medications:   Current Outpatient Medications   Medication Sig Dispense Refill    dicyclomine 20 MG Oral Tab Take 1 tablet (20 mg total) by mouth nightly as needed (as needed for cramping/ab pain). 30 tablet 2    dexamethasone (DECADRON) 4 MG tablet Take 1 tablet (4 mg total) by mouth 2 (two) times daily.  60 tablet 0 hydrALAZINE 10 MG Oral Tab Take 1 tablet (10 mg total) by mouth in the morning and 1 tablet (10 mg total) before bedtime. 60 tablet 2    clonazePAM 1 MG Oral Tab Take 1 tablet (1 mg total) by mouth nightly as needed for Anxiety. 30 tablet 0    amLODIPine 10 MG Oral Tab Take 1 tablet (10 mg total) by mouth daily. 180 tablet 1    aspirin 81 MG Oral Tab daily. Were there any changes to your current medication(s) noted on the AVS? Yes  STOP taking:  Lidocaine Viscous HCl 2 % Soln (XYLOCAINE)  morphINE Sulfate (Concentrate) 20 MG/ML Soln  If so, were these medication changes discussed with you prior to leaving the hospital? Yes  Let's go over your medications together to make sure we are not missing anything. Medications Reviewed  Are there any reasons that keep you from taking your medication as prescribed? No  Are you having any concerns with constipation? No  Did patient receive their flu shot (Sept-March)? No    Discharge medications reviewed/discussed/and reconciled against outpatient medications with patient. Any changes or updates to medications sent to PCP. Patient Acknowledged     Referrals/orders at D/C:  Referrals/orders placed at D/C? no--pt declined  RN--his spouse is RN and familiar with tube feeds  DME ordered at D/C? Yes  What? Tube feeds  From where? [facility] (Infusion)  Will provide all Tube Feedings and supplies  [address], [address]  Phone: [phone]  Fax: [phone]  Tube Feeding Regimen for Home:  Nepro (may substitute formula for generic equivalent) bolus feeds per  G-Tube/PEG. Provide 6.5 containers/day (total volume = 1540 ml /day). Advance to 3 bolus feeds per day as tolerated for home (6.5 total  containers / day). Provide 2.5 containers at 9 am, and 2 containers at 1PM and 5PM daily  with 100 ml water before and after each bolus feed.   Feeds will provide 1540 total tube feeding volume, 2772 calories, 125  grams protein, 1724 ml total free water, and 100% RDI's. Orders from hospital stay:  6.5 containers/day. Provide 1 can @ 8am, 1 can @ 11am, 1.5 can @  2pm, 1.5 can @ 5pm & 1.5 can @8pm.  60mL flush before and after. Have you received your (DME)? yes    Discharge orders, AVS reviewed and discussed with patient. Any changes or updates to orders sent to PCP. Patient Acknowledged      SDOH:   Transportation:   Transportation Needs: No Transportation Needs (11/15/2023)    Transportation Needs     Lack of Transportation: No     Financial Strain:    Financial Resource Strain: Low Risk  (11/15/2023)    Financial Resource Strain     Difficulty of Paying Living Expenses: Not very hard     Med Affordability: No     Follow up appointments: Follow-up Information    Follow up With Specialties Details Why Contact Info   Katie Cesar MD Internal Medicine Call As needed [phone].  [address] [facility] [address] [phone]   Mohit Tobar MD GASTROENTEROLOGY Call As needed 100 [facility] [phone]     Your appointments       Date & Time Appointment Department [facility])    Nov 20, 2023 10:15 AM CST On Treatment Visit with [address] [facility]        Nov 20, 2023 10:30 AM CST On Treatment Visit with Rolo Kern MD [address] [facility]        Nov 20, 2023 10:30 AM CST Lab Visit with Becca Olivera Hematology Oncology [facility]        Nov 20, 2023 11:30 AM CST Medical Oncology Pre Chemo Visit with Dwayne Israel Hematology Oncology [facility]        Nov 21, 2023 10:00 AM CST [facility] INFUSION CHEMOTHERAPY with EM DANA Lackey [facility]        Nov 27, 2023 12:00 PM CST Consult with Seven Hughes, [address], [address] [place] [address] ([place])              345 [facility], [address]  [address] [facility] [address] [address] [facility] Hematology Oncology  [facility]  [address] [place] [address] Road [facility] [id number] [address] [facility]  [address] [facility] [address] [address] [facility] [address]  [phone]            TCC  Was TCC ordered: No    PCP (If no TCC appointment)  Does patient already have a PCP appointment scheduled? No  NCM Attempted to schedule PCP office TCM appointment with patient   If no appointment scheduled: Explain: pt declines--prefers to f/u with specialists only, at this time    Specialist    Does the patient have any other follow up appointment(s) needing to be scheduled? No  Is there any reason as to why you cannot make your appointment(s)? No     Needs post D/C:   Now that you are home, are there any needs or concerns you need addressed before your next visit with your PCP?  (DME, meds, questions, etc.): No    Interventions by NCM:   Spoke with pt-- feeling better, since hospital discharge--did experience some stomach cramping last night--has since subsided. Pt confirms he did go to his regularly scheduled dialysis this morning. Peg tube in place without pain, redness, drainage or bleeding--NPO except for tube feeds, independent with ADLs. Patient denies fever, chills, nausea, vomiting, diarrhea, intractable abdominal pain,chest pain or shortness of breath at this time. Offered TCM appt--pt declines--prefers to f/u with specialists only, at this time. Sent TE to office staff as FYI/TCM protocol. Patient aware when to contact PCP/specialists and when to seek emergency care. No further questions/concerns at this time.     Overall Rating: How would you rate the care you received while in the hospital? excellent    CCM referral placed:    No    BOOK BY DATE: 11/28/2023

## 2023-11-15 NOTE — TELEPHONE ENCOUNTER
Sent as FYI/TCM protocol:    Spoke to patient for TCM today. Offered TCM appt with Dr. Shirlene Altman declines--prefers to f/u with specialists only, at this time. TCM appointment recommended by 11/21/2023, as patient is a High risk for readmission. Please advise.     BOOK BY DATE (last date for TCM): 11/28/2023

## 2023-11-15 NOTE — TELEPHONE ENCOUNTER
Left message on mailbox for pt to call back for Dr. Kenneth Washington response below.     Routed to office staff to f/u with pt in the next few days

## 2023-11-15 NOTE — TELEPHONE ENCOUNTER
LVM for patient to call back to discuss RT treatments. The below is from Dr. Carley Hilton:   Dara Briones- when I d/w him yesterday, I told him I was [address] with him taking the rest of the week off to recover.  If he changes his mind RE resuming XRT, it actually would be good for him to come for repeat CT sim (as his anatomy has changed due to significant weight loss) and we can resume tx next wk Monday or resume after thanksgiving, or pt may just refuse all further tx, thx

## 2023-11-16 ENCOUNTER — APPOINTMENT (OUTPATIENT)
Dept: RADIATION ONCOLOGY | Facility: HOSPITAL | Age: 73
End: 2023-11-16
Attending: RADIOLOGY
Payer: MEDICARE

## 2023-11-16 ENCOUNTER — DIETICIAN VISIT (OUTPATIENT)
Dept: NUTRITION | Facility: HOSPITAL | Age: 73
End: 2023-11-16

## 2023-11-16 VITALS — BODY MASS INDEX: 24 KG/M2 | WEIGHT: 203 LBS

## 2023-11-16 PROCEDURE — 77334 RADIATION TREATMENT AID(S): CPT | Performed by: RADIOLOGY

## 2023-11-17 DIAGNOSIS — Z51.11 CHEMOTHERAPY MANAGEMENT, ENCOUNTER FOR: Primary | ICD-10-CM

## 2023-11-20 ENCOUNTER — APPOINTMENT (OUTPATIENT)
Dept: RADIATION ONCOLOGY | Facility: HOSPITAL | Age: 73
End: 2023-11-20
Attending: RADIOLOGY
Payer: MEDICARE

## 2023-11-20 ENCOUNTER — NURSE ONLY (OUTPATIENT)
Dept: HEMATOLOGY/ONCOLOGY | Facility: HOSPITAL | Age: 73
End: 2023-11-20
Attending: INTERNAL MEDICINE
Payer: MEDICARE

## 2023-11-20 VITALS
SYSTOLIC BLOOD PRESSURE: 116 MMHG | TEMPERATURE: 98 F | WEIGHT: 204 LBS | OXYGEN SATURATION: 100 % | HEIGHT: 77.01 IN | DIASTOLIC BLOOD PRESSURE: 54 MMHG | RESPIRATION RATE: 16 BRPM | HEART RATE: 90 BPM | BODY MASS INDEX: 24.09 KG/M2

## 2023-11-20 DIAGNOSIS — R13.10 DYSPHAGIA, UNSPECIFIED TYPE: ICD-10-CM

## 2023-11-20 DIAGNOSIS — Z51.11 CHEMOTHERAPY MANAGEMENT, ENCOUNTER FOR: ICD-10-CM

## 2023-11-20 DIAGNOSIS — K20.80 RADIATION ESOPHAGITIS: ICD-10-CM

## 2023-11-20 DIAGNOSIS — C09.9 TONSIL CANCER (HCC): Primary | ICD-10-CM

## 2023-11-20 DIAGNOSIS — T66.XXXA RADIATION ESOPHAGITIS: ICD-10-CM

## 2023-11-20 LAB
ALBUMIN SERPL-MCNC: 3.6 G/DL (ref 3.2–4.8)
ALBUMIN/GLOB SERPL: 1.3 {RATIO} (ref 1–2)
ALP LIVER SERPL-CCNC: 128 U/L
ALT SERPL-CCNC: 33 U/L
ANION GAP SERPL CALC-SCNC: 3 MMOL/L (ref 0–18)
AST SERPL-CCNC: 41 U/L (ref ?–34)
BASOPHILS # BLD AUTO: 0.03 X10(3) UL (ref 0–0.2)
BASOPHILS NFR BLD AUTO: 0.6 %
BILIRUB SERPL-MCNC: 0.4 MG/DL (ref 0.2–1.1)
BUN BLD-MCNC: 33 MG/DL (ref 9–23)
BUN/CREAT SERPL: 5.4 (ref 10–20)
CALCIUM BLD-MCNC: 9.2 MG/DL (ref 8.7–10.4)
CHLORIDE SERPL-SCNC: 96 MMOL/L (ref 98–112)
CO2 SERPL-SCNC: 38 MMOL/L (ref 21–32)
CREAT BLD-MCNC: 6.1 MG/DL
DEPRECATED RDW RBC AUTO: 53.1 FL (ref 35.1–46.3)
EGFRCR SERPLBLD CKD-EPI 2021: 9 ML/MIN/1.73M2 (ref 60–?)
EOSINOPHIL # BLD AUTO: 0.1 X10(3) UL (ref 0–0.7)
EOSINOPHIL NFR BLD AUTO: 2.1 %
ERYTHROCYTE [DISTWIDTH] IN BLOOD BY AUTOMATED COUNT: 15.7 % (ref 11–15)
FASTING STATUS PATIENT QL REPORTED: NO
GLOBULIN PLAS-MCNC: 2.8 G/DL (ref 2.8–4.4)
GLUCOSE BLD-MCNC: 198 MG/DL (ref 70–99)
HCT VFR BLD AUTO: 28.8 %
HGB BLD-MCNC: 9.1 G/DL
IMM GRANULOCYTES # BLD AUTO: 0.03 X10(3) UL (ref 0–1)
IMM GRANULOCYTES NFR BLD: 0.6 %
LYMPHOCYTES # BLD AUTO: 0.49 X10(3) UL (ref 1–4)
LYMPHOCYTES NFR BLD AUTO: 10.4 %
MAGNESIUM SERPL-MCNC: 2.3 MG/DL (ref 1.6–2.6)
MCH RBC QN AUTO: 29.4 PG (ref 26–34)
MCHC RBC AUTO-ENTMCNC: 31.6 G/DL (ref 31–37)
MCV RBC AUTO: 92.9 FL
MONOCYTES # BLD AUTO: 0.27 X10(3) UL (ref 0.1–1)
MONOCYTES NFR BLD AUTO: 5.7 %
NEUTROPHILS # BLD AUTO: 3.81 X10 (3) UL (ref 1.5–7.7)
NEUTROPHILS # BLD AUTO: 3.81 X10(3) UL (ref 1.5–7.7)
NEUTROPHILS NFR BLD AUTO: 80.6 %
OSMOLALITY SERPL CALC.SUM OF ELEC: 297 MOSM/KG (ref 275–295)
PLATELET # BLD AUTO: 279 10(3)UL (ref 150–450)
POTASSIUM SERPL-SCNC: 3.2 MMOL/L (ref 3.5–5.1)
PROT SERPL-MCNC: 6.4 G/DL (ref 5.7–8.2)
RBC # BLD AUTO: 3.1 X10(6)UL
SODIUM SERPL-SCNC: 137 MMOL/L (ref 136–145)
WBC # BLD AUTO: 4.7 X10(3) UL (ref 4–11)

## 2023-11-20 PROCEDURE — 80053 COMPREHEN METABOLIC PANEL: CPT

## 2023-11-20 PROCEDURE — 83735 ASSAY OF MAGNESIUM: CPT

## 2023-11-20 PROCEDURE — 36415 COLL VENOUS BLD VENIPUNCTURE: CPT

## 2023-11-20 PROCEDURE — 99215 OFFICE O/P EST HI 40 MIN: CPT | Performed by: INTERNAL MEDICINE

## 2023-11-20 PROCEDURE — 85025 COMPLETE CBC W/AUTO DIFF WBC: CPT

## 2023-11-20 NOTE — PROGRESS NOTES
Oncology follow up      Diagnosis:  Tonsillar cancer. 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 Biopsy 08/24/2023 with Dr. Dakota Mcintosh. He has a CT of the neck as of 08/18/2023 that shows 2 ipsilateral enlarged lymph nodes, one measuring 4.2 cm and the other 2.9 cm. Interval history:  Here for post hospital dc follow up. Admitted for radiation esophagitis. S/p PEG tube placement. (Radiation initiated on 10/3/23). Doing better. Able to take small sips of water. Utilizing feeding tube. Still very weak and fatigued. PAST MEDICAL HISTORY:  End-stage renal disease on hemodialysis, hypertension. MEDICATIONS:  Amlodipine, clonazepam.    ALLERGIES:  No known drug allergies. SOCIAL HISTORY:  Never smoker. Denies any alcohol or illicit drug use. FAMILY MEDICAL HISTORY:  No reported history of any head or neck cancer. REVIEW OF SYSTEMS:  Otherwise, negative x12. PHYSICAL EXAMINATION:  ECOG 1  GENERAL:  In no acute distress. HEENT:  mucositis now resolved. NECK:  Supple, no LAD, normal AROM  LUNGS:  Symmetric expansion, CTA  HEART:  RRR  ABDOMEN:  Soft, mild tender LLQ, non distended, BS+, PEG tube placed. EXTREMITIES:  No edema. NEUROLOGIC:  DTRs grossly intact  PSYCHIATRIC:  Appropriate mood, appropriate affect. ASSESSMENT AND PLAN:      72year old  male being evaluated by Medical Oncology for p16-positive, node-positive right-sided tonsillar cancer. He has 2 ipsilateral nodes enlarged on CT. Since no evidence of metastatic disease, we discussed concurrent systemic therapy with radiation therapy. He has end-stage renal disease on hemodialysis; therefore, we would not use cisplatin. We discussed using single-agent cetuximab. Radiation initiated on 10/3/23. S/p C1D36 last received on 11/7/23.    S/p hospitalization due to xrt esophagitis. S/p PEG tube placement. Will await to see when xRT will be re-initiated - fu according to start of xRT date. Dysphagia. Slightly improved. Break from xRT. Now with feeding tube. Has norco at home. Essential HTN:  Recheck BP in infusion. Follow-up with PCP for optimal management. CRF:  On HD  Diarrhea:  Baseline. Instructions given to maximize Loperamide use    MDM: high, malignancy, life threatening. Drug therapy requiring intensive monitoring for tox    Florina Allison PA-C    Seen and examined with PA agree with and edited above tonsillar cancer currently undergoing radiotherapy with cetuximab recent admission for esophagitis status post PEG tube placement. Radiation is holding therapy we will continue to monitor potentially reinitiate cetuximab and radiotherapy begins again. On exam comfortable nad.

## 2023-11-21 ENCOUNTER — APPOINTMENT (OUTPATIENT)
Dept: HEMATOLOGY/ONCOLOGY | Facility: HOSPITAL | Age: 73
End: 2023-11-21
Attending: INTERNAL MEDICINE
Payer: MEDICARE

## 2023-11-21 ENCOUNTER — APPOINTMENT (OUTPATIENT)
Dept: RADIATION ONCOLOGY | Facility: HOSPITAL | Age: 73
End: 2023-11-21
Attending: RADIOLOGY
Payer: MEDICARE

## 2023-11-21 ENCOUNTER — MED REC SCAN ONLY (OUTPATIENT)
Dept: INTERNAL MEDICINE CLINIC | Facility: CLINIC | Age: 73
End: 2023-11-21

## 2023-11-21 PROCEDURE — 77386 HC IMRT COMPLEX: CPT | Performed by: RADIOLOGY

## 2023-11-21 PROCEDURE — 77300 RADIATION THERAPY DOSE PLAN: CPT | Performed by: RADIOLOGY

## 2023-11-21 PROCEDURE — 77338 DESIGN MLC DEVICE FOR IMRT: CPT | Performed by: RADIOLOGY

## 2023-11-22 PROCEDURE — 77386 HC IMRT COMPLEX: CPT | Performed by: RADIOLOGY

## 2023-11-27 ENCOUNTER — APPOINTMENT (OUTPATIENT)
Dept: HEMATOLOGY/ONCOLOGY | Facility: HOSPITAL | Age: 73
End: 2023-11-27
Attending: INTERNAL MEDICINE
Payer: MEDICARE

## 2023-11-27 ENCOUNTER — APPOINTMENT (OUTPATIENT)
Dept: RADIATION ONCOLOGY | Facility: HOSPITAL | Age: 73
End: 2023-11-27
Attending: RADIOLOGY
Payer: MEDICARE

## 2023-11-27 PROCEDURE — 77386 HC IMRT COMPLEX: CPT | Performed by: RADIOLOGY

## 2023-11-28 ENCOUNTER — APPOINTMENT (OUTPATIENT)
Dept: HEMATOLOGY/ONCOLOGY | Facility: HOSPITAL | Age: 73
End: 2023-11-28
Attending: INTERNAL MEDICINE
Payer: MEDICARE

## 2023-11-28 ENCOUNTER — OFFICE VISIT (OUTPATIENT)
Dept: RADIATION ONCOLOGY | Facility: HOSPITAL | Age: 73
End: 2023-11-28
Attending: RADIOLOGY
Payer: MEDICARE

## 2023-11-28 VITALS
SYSTOLIC BLOOD PRESSURE: 166 MMHG | RESPIRATION RATE: 18 BRPM | DIASTOLIC BLOOD PRESSURE: 65 MMHG | HEART RATE: 78 BPM | WEIGHT: 212.38 LBS | BODY MASS INDEX: 25 KG/M2 | OXYGEN SATURATION: 100 % | TEMPERATURE: 97 F

## 2023-11-28 VITALS
TEMPERATURE: 98 F | HEART RATE: 78 BPM | OXYGEN SATURATION: 100 % | WEIGHT: 212.13 LBS | HEIGHT: 77.01 IN | BODY MASS INDEX: 25.05 KG/M2 | RESPIRATION RATE: 18 BRPM | DIASTOLIC BLOOD PRESSURE: 66 MMHG | SYSTOLIC BLOOD PRESSURE: 163 MMHG

## 2023-11-28 DIAGNOSIS — C09.9 TONSIL CANCER (HCC): Primary | ICD-10-CM

## 2023-11-28 DIAGNOSIS — Z51.11 CHEMOTHERAPY MANAGEMENT, ENCOUNTER FOR: ICD-10-CM

## 2023-11-28 DIAGNOSIS — K12.30 MUCOSITIS: ICD-10-CM

## 2023-11-28 LAB
ALBUMIN SERPL-MCNC: 3.7 G/DL (ref 3.2–4.8)
ALBUMIN/GLOB SERPL: 1.4 {RATIO} (ref 1–2)
ALP LIVER SERPL-CCNC: 133 U/L
ALT SERPL-CCNC: 24 U/L
ANION GAP SERPL CALC-SCNC: 7 MMOL/L (ref 0–18)
AST SERPL-CCNC: 31 U/L (ref ?–34)
BASOPHILS # BLD AUTO: 0.01 X10(3) UL (ref 0–0.2)
BASOPHILS NFR BLD AUTO: 0.3 %
BILIRUB SERPL-MCNC: 0.5 MG/DL (ref 0.2–1.1)
BUN BLD-MCNC: 56 MG/DL (ref 9–23)
BUN/CREAT SERPL: 9 (ref 10–20)
CALCIUM BLD-MCNC: 8.9 MG/DL (ref 8.7–10.4)
CHLORIDE SERPL-SCNC: 95 MMOL/L (ref 98–112)
CO2 SERPL-SCNC: 36 MMOL/L (ref 21–32)
CREAT BLD-MCNC: 6.25 MG/DL
DEPRECATED RDW RBC AUTO: 55.9 FL (ref 35.1–46.3)
EGFRCR SERPLBLD CKD-EPI 2021: 9 ML/MIN/1.73M2 (ref 60–?)
EOSINOPHIL # BLD AUTO: 0.07 X10(3) UL (ref 0–0.7)
EOSINOPHIL NFR BLD AUTO: 2.2 %
ERYTHROCYTE [DISTWIDTH] IN BLOOD BY AUTOMATED COUNT: 17 % (ref 11–15)
GLOBULIN PLAS-MCNC: 2.6 G/DL (ref 2.8–4.4)
GLUCOSE BLD-MCNC: 209 MG/DL (ref 70–99)
HCT VFR BLD AUTO: 28.8 %
HGB BLD-MCNC: 9.2 G/DL
IMM GRANULOCYTES # BLD AUTO: 0.02 X10(3) UL (ref 0–1)
IMM GRANULOCYTES NFR BLD: 0.6 %
LYMPHOCYTES # BLD AUTO: 0.38 X10(3) UL (ref 1–4)
LYMPHOCYTES NFR BLD AUTO: 12 %
MAGNESIUM SERPL-MCNC: 2.5 MG/DL (ref 1.6–2.6)
MCH RBC QN AUTO: 29.4 PG (ref 26–34)
MCHC RBC AUTO-ENTMCNC: 31.9 G/DL (ref 31–37)
MCV RBC AUTO: 92 FL
MONOCYTES # BLD AUTO: 0.23 X10(3) UL (ref 0.1–1)
MONOCYTES NFR BLD AUTO: 7.3 %
NEUTROPHILS # BLD AUTO: 2.45 X10 (3) UL (ref 1.5–7.7)
NEUTROPHILS # BLD AUTO: 2.45 X10(3) UL (ref 1.5–7.7)
NEUTROPHILS NFR BLD AUTO: 77.6 %
OSMOLALITY SERPL CALC.SUM OF ELEC: 308 MOSM/KG (ref 275–295)
PLATELET # BLD AUTO: 209 10(3)UL (ref 150–450)
POTASSIUM SERPL-SCNC: 3.6 MMOL/L (ref 3.5–5.1)
PROT SERPL-MCNC: 6.3 G/DL (ref 5.7–8.2)
RBC # BLD AUTO: 3.13 X10(6)UL
SODIUM SERPL-SCNC: 138 MMOL/L (ref 136–145)
WBC # BLD AUTO: 3.2 X10(3) UL (ref 4–11)

## 2023-11-28 PROCEDURE — 77386 HC IMRT COMPLEX: CPT | Performed by: RADIOLOGY

## 2023-11-28 PROCEDURE — 80053 COMPREHEN METABOLIC PANEL: CPT

## 2023-11-28 PROCEDURE — 83735 ASSAY OF MAGNESIUM: CPT

## 2023-11-28 PROCEDURE — 99215 OFFICE O/P EST HI 40 MIN: CPT | Performed by: INTERNAL MEDICINE

## 2023-11-28 PROCEDURE — 85025 COMPLETE CBC W/AUTO DIFF WBC: CPT

## 2023-11-28 PROCEDURE — 96413 CHEMO IV INFUSION 1 HR: CPT

## 2023-11-28 RX ORDER — DIPHENHYDRAMINE HCL 50 MG
50 CAPSULE ORAL ONCE
Status: CANCELLED | OUTPATIENT
Start: 2023-11-28

## 2023-11-28 RX ORDER — DIPHENHYDRAMINE HCL 50 MG
CAPSULE ORAL
Status: COMPLETED
Start: 2023-11-28 | End: 2023-11-28

## 2023-11-28 RX ORDER — ACETAMINOPHEN 325 MG/1
650 TABLET ORAL ONCE
Status: CANCELLED | OUTPATIENT
Start: 2023-11-28

## 2023-11-28 RX ORDER — ACETAMINOPHEN 325 MG/1
TABLET ORAL
Status: COMPLETED
Start: 2023-11-28 | End: 2023-11-28

## 2023-11-28 RX ORDER — ACETAMINOPHEN 325 MG/1
650 TABLET ORAL ONCE
Status: COMPLETED | OUTPATIENT
Start: 2023-11-28 | End: 2023-11-28

## 2023-11-28 RX ORDER — DIPHENHYDRAMINE HCL 50 MG
50 CAPSULE ORAL ONCE
Status: COMPLETED | OUTPATIENT
Start: 2023-11-28 | End: 2023-11-28

## 2023-11-28 RX ADMIN — DIPHENHYDRAMINE HCL 50 MG: 50 MG CAPSULE ORAL at 10:57:00

## 2023-11-28 RX ADMIN — ACETAMINOPHEN 650 MG: 325 TABLET ORAL at 10:57:00

## 2023-11-28 NOTE — PROGRESS NOTES
Pt here for C1D36 Drug(s)erbitux. + radiation  Mg level 2.5  Arrives Ambulating with cane, accompanied by Self     Patient was evaluated today by MERVIN. Oral medications included in this regimen:  no    Patient confirms comprehension of cancer treatment schedule:  yes    Pregnancy screening:  Not applicable    Modifications in dose or schedule:  yes, was previously deferred for hospitalization    Medications appearance and physical integrity checked by RN: yes. Chemotherapy IV pump settings verified by 2 RNs:  No due to targeted therapy IV administration.   Frequency of blood return and site check throughout administration: Prior to administration and At completion of therapy     Infusion/treatment outcome:  patient tolerated treatment without incident    Education Record      Learner:  Patient  Barriers / Limitations:  None  Method:  Discussion  Education / instructions given:  premedications for  Outcome:  Shows understanding    Discharged Other stable from infusion , Ambulating with cane, accompanied by:Self    Patient/family verbalized understanding of future appointments: by [facility]

## 2023-11-28 NOTE — PROGRESS NOTES
Oncology follow up      Diagnosis:  Tonsillar cancer. 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 Biopsy 08/24/2023 with Dr. Amandeep Weems. He has a CT of the neck as of 08/18/2023 that shows 2 ipsilateral enlarged lymph nodes, one measuring 4.2 cm and the other 2.9 cm. Interval history:  Here for fu/labs and C1D36. XRT re-initiated on 11/21/23. Now with PEG tube placement. Doing much better. Has 7 more tx xrt left. Able to drink water. Utilizing tube feedings. No issues. Fatigued but overall better. No pain to mouth. PAST MEDICAL HISTORY:  End-stage renal disease on hemodialysis, hypertension. MEDICATIONS:  Amlodipine, clonazepam.    ALLERGIES:  No known drug allergies. SOCIAL HISTORY:  Never smoker. Denies any alcohol or illicit drug use. FAMILY MEDICAL HISTORY:  No reported history of any head or neck cancer. REVIEW OF SYSTEMS:  Otherwise, negative x12. PHYSICAL EXAMINATION:  ECOG 1  GENERAL:  In no acute distress. HEENT:  mucositis now resolved. No erythema/edema/white exudate. NECK:  Supple, no LAD, normal AROM  LUNGS:  Symmetric expansion, CTA  HEART:  RRR  ABDOMEN:  Soft, NT, non distended, BS+, PEG tube placed. EXTREMITIES:  No edema. NEUROLOGIC:  DTRs grossly intact  PSYCHIATRIC:  Appropriate mood, appropriate affect. ASSESSMENT AND PLAN:      72year old  male being evaluated by Medical Oncology for p16-positive, node-positive right-sided tonsillar cancer. He has 2 ipsilateral nodes enlarged on CT. Since no evidence of metastatic disease, we discussed concurrent systemic therapy with radiation therapy. He has end-stage renal disease on hemodialysis; therefore, we would not use cisplatin. We discussed using single-agent cetuximab. Radiation initiated on 10/3/23. S/p C1D36 last received on 11/7/23.    S/p hospitalization due to xrt esophagitis. S/p PEG tube placement. xRT re-initiated 11/21/23     Dysphagia. Slightly improved. Break from xRT. Now with feeding tube. Has norco at home. Able to drink water. Essential HTN:   Follow-up with PCP for optimal management. CRF:  On HD  Diarrhea:  Baseline. Instructions given to maximize Loperamide use    MDM: high, malignancy, life threatening. Drug therapy requiring intensive monitoring for tox    Lin Beltre PA-C    Seen and examined with PA agree with and edited above tonsillar cancer currently on radiotherapy with cetuximab. Issues with mucositis as noted have improved.   We will proceed with next dose of cetuximab he will complete radiotherapy with radiation oncology on exam comfortable nonlabored respirations mucositis improved

## 2023-11-29 PROCEDURE — 77386 HC IMRT COMPLEX: CPT | Performed by: RADIOLOGY

## 2023-11-30 ENCOUNTER — DIETICIAN VISIT (OUTPATIENT)
Dept: NUTRITION | Facility: HOSPITAL | Age: 73
End: 2023-11-30

## 2023-11-30 VITALS — BODY MASS INDEX: 25 KG/M2 | WEIGHT: 213 LBS

## 2023-11-30 PROCEDURE — 77386 HC IMRT COMPLEX: CPT | Performed by: RADIOLOGY

## 2023-12-01 ENCOUNTER — APPOINTMENT (OUTPATIENT)
Dept: RADIATION ONCOLOGY | Facility: HOSPITAL | Age: 73
End: 2023-12-01
Attending: RADIOLOGY
Payer: MEDICARE

## 2023-12-01 PROCEDURE — 77336 RADIATION PHYSICS CONSULT: CPT | Performed by: RADIOLOGY

## 2023-12-01 PROCEDURE — 77386 HC IMRT COMPLEX: CPT | Performed by: RADIOLOGY

## 2023-12-04 ENCOUNTER — OFFICE VISIT (OUTPATIENT)
Dept: RADIATION ONCOLOGY | Facility: HOSPITAL | Age: 73
End: 2023-12-04
Attending: RADIOLOGY
Payer: MEDICARE

## 2023-12-04 ENCOUNTER — NURSE ONLY (OUTPATIENT)
Dept: HEMATOLOGY/ONCOLOGY | Facility: HOSPITAL | Age: 73
End: 2023-12-04
Attending: INTERNAL MEDICINE

## 2023-12-04 VITALS
WEIGHT: 207 LBS | OXYGEN SATURATION: 99 % | TEMPERATURE: 97 F | DIASTOLIC BLOOD PRESSURE: 73 MMHG | HEART RATE: 88 BPM | SYSTOLIC BLOOD PRESSURE: 134 MMHG | RESPIRATION RATE: 18 BRPM | BODY MASS INDEX: 25 KG/M2

## 2023-12-04 DIAGNOSIS — C09.9 TONSIL CANCER (HCC): Primary | ICD-10-CM

## 2023-12-04 LAB
ALBUMIN SERPL-MCNC: 4.1 G/DL (ref 3.2–4.8)
ALBUMIN/GLOB SERPL: 1.4 {RATIO} (ref 1–2)
ALP LIVER SERPL-CCNC: 145 U/L
ALT SERPL-CCNC: 29 U/L
ANION GAP SERPL CALC-SCNC: 4 MMOL/L (ref 0–18)
AST SERPL-CCNC: 29 U/L (ref ?–34)
BASOPHILS # BLD AUTO: 0.01 X10(3) UL (ref 0–0.2)
BASOPHILS NFR BLD AUTO: 0.2 %
BILIRUB SERPL-MCNC: 0.3 MG/DL (ref 0.2–1.1)
BUN BLD-MCNC: 32 MG/DL (ref 9–23)
BUN/CREAT SERPL: 7.9 (ref 10–20)
CALCIUM BLD-MCNC: 9.6 MG/DL (ref 8.7–10.4)
CHLORIDE SERPL-SCNC: 97 MMOL/L (ref 98–112)
CO2 SERPL-SCNC: 36 MMOL/L (ref 21–32)
CREAT BLD-MCNC: 4.06 MG/DL
DEPRECATED RDW RBC AUTO: 54.1 FL (ref 35.1–46.3)
EGFRCR SERPLBLD CKD-EPI 2021: 15 ML/MIN/1.73M2 (ref 60–?)
EOSINOPHIL # BLD AUTO: 0.01 X10(3) UL (ref 0–0.7)
EOSINOPHIL NFR BLD AUTO: 0.2 %
ERYTHROCYTE [DISTWIDTH] IN BLOOD BY AUTOMATED COUNT: 16.4 % (ref 11–15)
GLOBULIN PLAS-MCNC: 3 G/DL (ref 2.8–4.4)
GLUCOSE BLD-MCNC: 162 MG/DL (ref 70–99)
HCT VFR BLD AUTO: 32.9 %
HGB BLD-MCNC: 10.9 G/DL
IMM GRANULOCYTES # BLD AUTO: 0.03 X10(3) UL (ref 0–1)
IMM GRANULOCYTES NFR BLD: 0.7 %
LYMPHOCYTES # BLD AUTO: 0.17 X10(3) UL (ref 1–4)
LYMPHOCYTES NFR BLD AUTO: 3.8 %
MAGNESIUM SERPL-MCNC: 2.2 MG/DL (ref 1.6–2.6)
MCH RBC QN AUTO: 30.2 PG (ref 26–34)
MCHC RBC AUTO-ENTMCNC: 33.1 G/DL (ref 31–37)
MCV RBC AUTO: 91.1 FL
MONOCYTES # BLD AUTO: 0.11 X10(3) UL (ref 0.1–1)
MONOCYTES NFR BLD AUTO: 2.4 %
NEUTROPHILS # BLD AUTO: 4.19 X10 (3) UL (ref 1.5–7.7)
NEUTROPHILS # BLD AUTO: 4.19 X10(3) UL (ref 1.5–7.7)
NEUTROPHILS NFR BLD AUTO: 92.7 %
OSMOLALITY SERPL CALC.SUM OF ELEC: 294 MOSM/KG (ref 275–295)
PLATELET # BLD AUTO: 197 10(3)UL (ref 150–450)
POTASSIUM SERPL-SCNC: 3.5 MMOL/L (ref 3.5–5.1)
PROT SERPL-MCNC: 7.1 G/DL (ref 5.7–8.2)
RBC # BLD AUTO: 3.61 X10(6)UL
SODIUM SERPL-SCNC: 137 MMOL/L (ref 136–145)
WBC # BLD AUTO: 4.5 X10(3) UL (ref 4–11)

## 2023-12-04 PROCEDURE — 83735 ASSAY OF MAGNESIUM: CPT

## 2023-12-04 PROCEDURE — 77386 HC IMRT COMPLEX: CPT | Performed by: RADIOLOGY

## 2023-12-04 PROCEDURE — 80053 COMPREHEN METABOLIC PANEL: CPT

## 2023-12-04 PROCEDURE — 36415 COLL VENOUS BLD VENIPUNCTURE: CPT

## 2023-12-04 PROCEDURE — 85025 COMPLETE CBC W/AUTO DIFF WBC: CPT

## 2023-12-04 NOTE — PROGRESS NOTES
Oncology follow up      Diagnosis:  Tonsillar cancer. 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 Biopsy 08/24/2023 with Dr. Kylah Burroughs. He has a CT of the neck as of 08/18/2023 that shows 2 ipsilateral enlarged lymph nodes, one measuring 4.2 cm and the other 2.9 cm. Interval history:  Here for fu/labs. XRT re-initiated on 11/21/23. Last day of xRT is tomorrow, 12/6. Now with PEG tube placement. Able to drink water - small sips. Utilizing tube feedings. No issues. Fatigued but overall better. No pain to mouth. Doing very well. In good spirits as it is his last cetux tx. PAST MEDICAL HISTORY:  End-stage renal disease on hemodialysis, hypertension. MEDICATIONS:  Amlodipine, clonazepam.    ALLERGIES:  No known drug allergies. SOCIAL HISTORY:  Never smoker. Denies any alcohol or illicit drug use. FAMILY MEDICAL HISTORY:  No reported history of any head or neck cancer. REVIEW OF SYSTEMS:  Otherwise, negative x12. PHYSICAL EXAMINATION:  ECOG 1  GENERAL:  In no acute distress. Ambulates w/ cane   HEENT:  mucositis now resolved. No erythema/edema/white exudate. NECK:  Supple, no LAD, normal AROM  LUNGS:  Symmetric expansion, CTA  HEART:  RRR  ABDOMEN:  Soft, NT, non distended, BS+, PEG tube placed. EXTREMITIES:  No edema. NEUROLOGIC:  DTRs grossly intact  PSYCHIATRIC:  Appropriate mood, appropriate affect. ASSESSMENT AND PLAN:      72year old  male being evaluated by Medical Oncology for p16-positive, node-positive right-sided tonsillar cancer. He has 2 ipsilateral nodes enlarged on CT. Since no evidence of metastatic disease, we discussed concurrent systemic therapy with radiation therapy. He has end-stage renal disease on hemodialysis; therefore, we would not use cisplatin. We discussed using single-agent cetuximab.     Radiation initiated on 10/3/23. S/p C1D36 received on 11/7/23. S/p hospitalization due to xrt esophagitis. S/p PEG tube placement. xRT re-initiated 11/21/23. Last xrt planned for 12/6/23.   - here for D43. Last cetux. FU in  weeks for labs/fu. Dysphagia. Slightly improved. Break from xRT. Now with feeding tube. Has norco at home. Able to drink water- small sips. Essential HTN:   Follow-up with PCP for optimal management. CRF:  On HD  Diarrhea:  Baseline. Instructions given to maximize Loperamide use    MDM: high, malignancy, life threatening. Drug therapy requiring intensive monitoring for tox    Hakan Heurta PA-C    Seen and examined with PA agree with and edited above tonsillar cancer currently on radiotherapy with cetuximab. Issues with mucositis as noted have improved.   We will proceed with next dose of cetuximab he will complete radiotherapy with radiation oncology on exam comfortable nonlabored respirations mucositis improved

## 2023-12-04 NOTE — PATIENT INSTRUCTIONS
Follow up with Dr. Luis Alcala in 2-3 weeks. Dorothy Sahu will call you to schedule your follow up appointment. Please call [phone] with any radiation questions. Continue to moisturize for the next 2 weeks with Aquaphor.       Referral to speech/swallow

## 2023-12-05 ENCOUNTER — OFFICE VISIT (OUTPATIENT)
Dept: HEMATOLOGY/ONCOLOGY | Facility: HOSPITAL | Age: 73
End: 2023-12-05
Attending: INTERNAL MEDICINE

## 2023-12-05 VITALS
BODY MASS INDEX: 24.63 KG/M2 | HEART RATE: 78 BPM | WEIGHT: 208.63 LBS | HEIGHT: 77.01 IN | DIASTOLIC BLOOD PRESSURE: 59 MMHG | RESPIRATION RATE: 16 BRPM | SYSTOLIC BLOOD PRESSURE: 117 MMHG | TEMPERATURE: 98 F | OXYGEN SATURATION: 99 %

## 2023-12-05 VITALS
OXYGEN SATURATION: 99 % | SYSTOLIC BLOOD PRESSURE: 139 MMHG | HEART RATE: 66 BPM | DIASTOLIC BLOOD PRESSURE: 68 MMHG | TEMPERATURE: 98 F | RESPIRATION RATE: 16 BRPM

## 2023-12-05 DIAGNOSIS — Z51.11 CHEMOTHERAPY MANAGEMENT, ENCOUNTER FOR: ICD-10-CM

## 2023-12-05 DIAGNOSIS — C09.9 TONSIL CANCER (HCC): Primary | ICD-10-CM

## 2023-12-05 DIAGNOSIS — K12.30 MUCOSITIS: ICD-10-CM

## 2023-12-05 PROCEDURE — 99215 OFFICE O/P EST HI 40 MIN: CPT | Performed by: INTERNAL MEDICINE

## 2023-12-05 PROCEDURE — 96413 CHEMO IV INFUSION 1 HR: CPT

## 2023-12-05 PROCEDURE — 77386 HC IMRT COMPLEX: CPT | Performed by: RADIOLOGY

## 2023-12-05 RX ORDER — ACETAMINOPHEN 325 MG/1
650 TABLET ORAL ONCE
Status: COMPLETED | OUTPATIENT
Start: 2023-12-05 | End: 2023-12-05

## 2023-12-05 RX ORDER — DIPHENHYDRAMINE HCL 50 MG
CAPSULE ORAL
Status: COMPLETED
Start: 2023-12-05 | End: 2023-12-05

## 2023-12-05 RX ORDER — ACETAMINOPHEN 325 MG/1
TABLET ORAL
Status: COMPLETED
Start: 2023-12-05 | End: 2023-12-05

## 2023-12-05 RX ORDER — DIPHENHYDRAMINE HCL 50 MG
50 CAPSULE ORAL ONCE
Status: COMPLETED | OUTPATIENT
Start: 2023-12-05 | End: 2023-12-05

## 2023-12-05 RX ADMIN — ACETAMINOPHEN 650 MG: 325 TABLET ORAL at 11:32:00

## 2023-12-05 RX ADMIN — DIPHENHYDRAMINE HCL 50 MG: 50 MG CAPSULE ORAL at 11:32:00

## 2023-12-05 NOTE — PROGRESS NOTES
Pt here for C1D43 Drug(s) Erbitux / RT. Arrives Ambulating independently, accompanied by Self     Patient was evaluated today by MERVIN. Labs performed yesterday     Oral medications included in this regimen:  no    Patient confirms comprehension of cancer treatment schedule:  yes    Pregnancy screening:  Not applicable    Modifications in dose or schedule:  No - last treatment day today     Medications appearance and physical integrity checked by RN: yes. Chemotherapy IV pump settings verified by 2 RNs:  No due to targeted therapy IV administration. Frequency of blood return and site check throughout administration: Prior to administration, Prior to each drug, and At completion of therapy     Infusion/treatment outcome:  patient tolerated treatment without incident  Patient assessed for 30 min post erbitux - patient refused 1 hr obs. Vitals stable for discharge.      Education Record    Learner:  Patient  Barriers / Limitations:  None  Method:  Discussion  Education / instructions given:  plan of care, treatment schedule - follow up with labs / provider in 2 weeks   Outcome:  Shows understanding    Discharged Home, Ambulating independently, accompanied by:Self    Patient/family verbalized understanding of future appointments: by printed AVS

## 2023-12-06 PROCEDURE — 77386 HC IMRT COMPLEX: CPT | Performed by: RADIOLOGY

## 2023-12-06 PROCEDURE — 77336 RADIATION PHYSICS CONSULT: CPT | Performed by: RADIOLOGY

## 2023-12-06 NOTE — TELEPHONE ENCOUNTER
Please review refill protocol failed/ no protocol  Requested Prescriptions   Pending Prescriptions Disp Refills    CLONAZEPAM 1 MG Oral Tab [Pharmacy Med Name: clonazePAM 1 MG Oral Tablet] 30 tablet 0     Sig: TAKE 1 TABLET BY MOUTH NIGHTLY AS NEEDED FOR ANXIETY       There is no refill protocol information for this order

## 2023-12-07 RX ORDER — CLONAZEPAM 1 MG/1
1 TABLET ORAL NIGHTLY PRN
Qty: 30 TABLET | Refills: 0 | Status: SHIPPED | OUTPATIENT
Start: 2023-12-07

## 2023-12-07 NOTE — TELEPHONE ENCOUNTER
Please review; protocol failed. Gabrielle Adair hour ago (10:46 AM)     CC  Patient calling to check on status of refill, he is out of medication and would also like a 90 day supply         Requested Prescriptions   Pending Prescriptions Disp Refills    clonazePAM 1 MG Oral Tab [Pharmacy Med Name: clonazePAM 1 MG Oral Tablet] 30 tablet 0     Sig: Take 1 tablet (1 mg total) by mouth nightly as needed for Anxiety. There is no refill protocol information for this order        Recent Outpatient Visits              2 days ago Tonsil cancer [facility])    [facility] Hematology Oncology Naomy Dunne MD    Office Visit    2 days ago Tonsil cancer [facility])    [address] Visit    3 days ago Tonsil cancer [facility])    [facility] Hematology Oncology    Nurse Only    3 days ago Tonsil cancer [facility])    Yaw Ziegler MD    Office Visit    1 week ago Tonsil cancer [facility])    Tabitha Mendoza MD    Office Visit          Future Appointments         Provider Department Appt Notes    In 1 week [facility] Hematology Oncology LAB: 2w  per Mica Falcon RN    In 1 week Rizwan Park MD; [address] RAD ONC RN [address] 2-3 wk f/u    In 1 week Tommy Danielle [facility] Hematology Oncology FOLLOW UP VISIT.   2w

## 2023-12-07 NOTE — TELEPHONE ENCOUNTER
Patient calling to check on status of refill, he is out of medication and would also like a 90 day supply

## 2023-12-11 ENCOUNTER — APPOINTMENT (OUTPATIENT)
Dept: HEMATOLOGY/ONCOLOGY | Facility: HOSPITAL | Age: 73
End: 2023-12-11
Attending: INTERNAL MEDICINE
Payer: MEDICARE

## 2023-12-12 ENCOUNTER — APPOINTMENT (OUTPATIENT)
Dept: HEMATOLOGY/ONCOLOGY | Facility: HOSPITAL | Age: 73
End: 2023-12-12
Attending: INTERNAL MEDICINE
Payer: MEDICARE

## 2023-12-19 ENCOUNTER — APPOINTMENT (OUTPATIENT)
Dept: HEMATOLOGY/ONCOLOGY | Facility: HOSPITAL | Age: 73
End: 2023-12-19
Attending: NURSE PRACTITIONER
Payer: MEDICARE

## 2023-12-19 ENCOUNTER — OFFICE VISIT (OUTPATIENT)
Dept: RADIATION ONCOLOGY | Facility: HOSPITAL | Age: 73
End: 2023-12-19
Attending: RADIOLOGY
Payer: MEDICARE

## 2023-12-19 ENCOUNTER — NURSE ONLY (OUTPATIENT)
Dept: HEMATOLOGY/ONCOLOGY | Facility: HOSPITAL | Age: 73
End: 2023-12-19
Attending: NURSE PRACTITIONER

## 2023-12-19 VITALS
SYSTOLIC BLOOD PRESSURE: 121 MMHG | WEIGHT: 206.38 LBS | OXYGEN SATURATION: 99 % | BODY MASS INDEX: 24 KG/M2 | DIASTOLIC BLOOD PRESSURE: 59 MMHG | TEMPERATURE: 97 F | RESPIRATION RATE: 18 BRPM | HEART RATE: 96 BPM

## 2023-12-19 VITALS
BODY MASS INDEX: 24 KG/M2 | SYSTOLIC BLOOD PRESSURE: 121 MMHG | RESPIRATION RATE: 18 BRPM | WEIGHT: 206.38 LBS | DIASTOLIC BLOOD PRESSURE: 59 MMHG | OXYGEN SATURATION: 99 % | TEMPERATURE: 97 F | HEART RATE: 96 BPM

## 2023-12-19 DIAGNOSIS — Z51.11 CHEMOTHERAPY MANAGEMENT, ENCOUNTER FOR: ICD-10-CM

## 2023-12-19 DIAGNOSIS — Z99.2 ESRD (END STAGE RENAL DISEASE) ON DIALYSIS (HCC): ICD-10-CM

## 2023-12-19 DIAGNOSIS — R13.10 DYSPHAGIA, UNSPECIFIED TYPE: ICD-10-CM

## 2023-12-19 DIAGNOSIS — K20.80 RADIATION ESOPHAGITIS: ICD-10-CM

## 2023-12-19 DIAGNOSIS — N18.6 ESRD (END STAGE RENAL DISEASE) ON DIALYSIS (HCC): ICD-10-CM

## 2023-12-19 DIAGNOSIS — C09.9 TONSIL CANCER (HCC): Primary | ICD-10-CM

## 2023-12-19 DIAGNOSIS — T66.XXXA RADIATION ESOPHAGITIS: ICD-10-CM

## 2023-12-19 DIAGNOSIS — C09.9 TONSIL CANCER (HCC): ICD-10-CM

## 2023-12-19 LAB
ALBUMIN SERPL-MCNC: 4.1 G/DL (ref 3.2–4.8)
ALBUMIN/GLOB SERPL: 1.4 {RATIO} (ref 1–2)
ALP LIVER SERPL-CCNC: 124 U/L
ALT SERPL-CCNC: 33 U/L
ANION GAP SERPL CALC-SCNC: 7 MMOL/L (ref 0–18)
AST SERPL-CCNC: 29 U/L (ref ?–34)
BASOPHILS # BLD AUTO: 0.04 X10(3) UL (ref 0–0.2)
BASOPHILS NFR BLD AUTO: 0.8 %
BILIRUB SERPL-MCNC: 0.3 MG/DL (ref 0.2–1.1)
BUN BLD-MCNC: 59 MG/DL (ref 9–23)
BUN/CREAT SERPL: 8.9 (ref 10–20)
CALCIUM BLD-MCNC: 9.5 MG/DL (ref 8.7–10.4)
CHLORIDE SERPL-SCNC: 98 MMOL/L (ref 98–112)
CO2 SERPL-SCNC: 34 MMOL/L (ref 21–32)
CREAT BLD-MCNC: 6.66 MG/DL
DEPRECATED RDW RBC AUTO: 53.4 FL (ref 35.1–46.3)
EGFRCR SERPLBLD CKD-EPI 2021: 8 ML/MIN/1.73M2 (ref 60–?)
EOSINOPHIL # BLD AUTO: 0.11 X10(3) UL (ref 0–0.7)
EOSINOPHIL NFR BLD AUTO: 2.2 %
ERYTHROCYTE [DISTWIDTH] IN BLOOD BY AUTOMATED COUNT: 15.9 % (ref 11–15)
FASTING STATUS PATIENT QL REPORTED: NO
GLOBULIN PLAS-MCNC: 2.9 G/DL (ref 2.8–4.4)
GLUCOSE BLD-MCNC: 183 MG/DL (ref 70–99)
HCT VFR BLD AUTO: 35 %
HGB BLD-MCNC: 11.6 G/DL
IMM GRANULOCYTES # BLD AUTO: 0.04 X10(3) UL (ref 0–1)
IMM GRANULOCYTES NFR BLD: 0.8 %
LYMPHOCYTES # BLD AUTO: 0.38 X10(3) UL (ref 1–4)
LYMPHOCYTES NFR BLD AUTO: 7.6 %
MCH RBC QN AUTO: 31.3 PG (ref 26–34)
MCHC RBC AUTO-ENTMCNC: 33.1 G/DL (ref 31–37)
MCV RBC AUTO: 94.3 FL
MONOCYTES # BLD AUTO: 0.34 X10(3) UL (ref 0.1–1)
MONOCYTES NFR BLD AUTO: 6.8 %
NEUTROPHILS # BLD AUTO: 4.1 X10 (3) UL (ref 1.5–7.7)
NEUTROPHILS # BLD AUTO: 4.1 X10(3) UL (ref 1.5–7.7)
NEUTROPHILS NFR BLD AUTO: 81.8 %
OSMOLALITY SERPL CALC.SUM OF ELEC: 309 MOSM/KG (ref 275–295)
PLATELET # BLD AUTO: 242 10(3)UL (ref 150–450)
POTASSIUM SERPL-SCNC: 3.5 MMOL/L (ref 3.5–5.1)
PROT SERPL-MCNC: 7 G/DL (ref 5.7–8.2)
RBC # BLD AUTO: 3.71 X10(6)UL
SODIUM SERPL-SCNC: 139 MMOL/L (ref 136–145)
WBC # BLD AUTO: 5 X10(3) UL (ref 4–11)

## 2023-12-19 PROCEDURE — 99211 OFF/OP EST MAY X REQ PHY/QHP: CPT

## 2023-12-19 PROCEDURE — 99214 OFFICE O/P EST MOD 30 MIN: CPT | Performed by: NURSE PRACTITIONER

## 2023-12-19 PROCEDURE — 36415 COLL VENOUS BLD VENIPUNCTURE: CPT

## 2023-12-19 PROCEDURE — 80053 COMPREHEN METABOLIC PANEL: CPT

## 2023-12-19 PROCEDURE — 85025 COMPLETE CBC W/AUTO DIFF WBC: CPT

## 2023-12-19 NOTE — PATIENT INSTRUCTIONS
PET scan mid march-- call to schedule. Make appts for follow-up today with Dr Luis Alcala and Dr Guzmán File on same day in March-- 1-2 days after PET scan is done. Dr Idris Boles in Gastroenterology-- make follow-up with him in spring for any tube feeding management.

## 2023-12-19 NOTE — PROGRESS NOTES
Oncology follow up      Diagnosis:  Tonsillar cancer. 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 Biopsy 08/24/2023 with Dr. Latrice Rondon. He has a CT of the neck as of 08/18/2023 that shows 2 ipsilateral enlarged lymph nodes, one measuring 4.2 cm and the other 2.9 cm. Interval history:  Here for fu/labs after chemoRT. Last day of xRT was 12/6. Stable with PEG tube . Able to drink water - small sips. Utilizing tube feedings. No issues. Fatigued but overall better. No pain to mouth. Doing very well. In good spirits. PAST MEDICAL HISTORY:  End-stage renal disease on hemodialysis, hypertension. MEDICATIONS:  Amlodipine, clonazepam.    ALLERGIES:  No known drug allergies. SOCIAL HISTORY:  Never smoker. Denies any alcohol or illicit drug use. FAMILY MEDICAL HISTORY:  No reported history of any head or neck cancer. REVIEW OF SYSTEMS:  Otherwise, negative x12. PHYSICAL EXAMINATION:  ECOG 1  GENERAL:  In no acute distress. Ambulates w/ cane   HEENT:  mucositis now resolved. No erythema/edema/white exudate. NECK:  Supple, no LAD, normal AROM  LUNGS:  Symmetric expansion, CTA  HEART:  RRR  ABDOMEN:  Soft, NT, non distended, BS+, PEG tube placed. EXTREMITIES:  No edema. NEUROLOGIC:  DTRs grossly intact  PSYCHIATRIC:  Appropriate mood, appropriate affect. ASSESSMENT AND PLAN:      72year old  male being evaluated by Medical Oncology for p16-positive, node-positive right-sided tonsillar cancer. He has 2 ipsilateral nodes enlarged on CT. Since no evidence of metastatic disease, we discussed concurrent systemic therapy with radiation therapy. He has end-stage renal disease on hemodialysis; therefore, we would not use cisplatin. We discussed using single-agent cetuximab. Radiation initiated on 10/3/23. S/p C1D36 received on 11/7/23.    S/p hospitalization due to xrt esophagitis 11/2023. S/p PEG tube placement. xRT re-initiated 11/21/23. Last xrt planned for 12/6/23, completed chemo 12/5.   - here for labs/fu. PET scan in ~10 weeks with Praveena Rico MD and labs/MDV for review. Order placed by Rad Onc. Dysphagia. Slightly improved. Had temp Break from xRT. Cpntinue with feeding tube. Has norco at home-not needing. Able to drink water- small sips. Enocuraged gastroenterology f/u in spring for TF/tube mgmt. Essential HTN:   Follow-up with PCP for optimal management. CRF:  On HD  Diarrhea:  Baseline. Instructions given to maximize Loperamide use. improved      MDM: high, malignancy, life threatening.  Drug therapy requiring intensive monitoring for tox    SEFERINO Torres

## 2023-12-19 NOTE — PROGRESS NOTES
RADIATION ONCOLOGY NOTE    DATE OF VISIT: 12/19/2023    DIAGNOSIS: Group I [id number] SCC P16+ R tonsil, for definitive XRT with Cetuximab on 12/6/2023, recovering well, for re-staging PET/CT. As you recall, pt is a 73 yo with history of CKD on dialysis, with R neck mass for several months, without dysphagia or oltagia. PET/CT revealed multiple R LN. 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 Pt had tx break during tx due to weight loss and mucositis. Pt resumed and completed tx and returns for his first post-XRT visit. Overall, pt is recovering well, off pain medications and using 6 cans of supplements a day. Pt has not tried to advance his diet yet. Skin and mucositis has healed. He has altered taste and xerostomia. On exam, his mucositis has resolved and skin well healed with residual hyperpigmentation. Pt will continue to follow closely with Dr. Ramirez Alberto and Dr. Sharri Austin of ENT     I have discussed the role of speech/swallow, and various xerostomia products and ideally he will continue to gain weight. Thank you for allowing me to take care of your patient. Benedict Pulliam MD, [email or web address] Coffman  [phone]    12/19/2023      Recent Labs   Lab 12/04/23  1003   WBC 4.5   HGB 10.9*   .0   NE 4.19       Recent Results (from the past 282241 hour(s))   PSA Screen    Collection Time: 10/07/22 10:25 AM   Result Value Ref Range    Prostate Specific Antigen Screen 0.71 <=4.00 ng/mL     *Note: Due to a large number of results and/or encounters for the requested time period, some results have not been displayed. A complete set of results can be found in Results Review. PSA:  No results found for: \"PSA\"    PSA Screen:    Lab Results   Component Value Date    PSAS 0.71 10/07/2022    PSAS 0.74 02/03/2020         ROS    A 12 Point review of system was obtained and is as above and per HPI and nursing note.          Current Outpatient Medications Medication Sig Dispense Refill    clonazePAM 1 MG Oral Tab Take 1 tablet (1 mg total) by mouth nightly as needed for Anxiety. 30 tablet 0    dicyclomine 20 MG Oral Tab Take 1 tablet (20 mg total) by mouth nightly as needed (as needed for cramping/ab pain). 30 tablet 2    hydrALAZINE 10 MG Oral Tab Take 1 tablet (10 mg total) by mouth in the morning and 1 tablet (10 mg total) before bedtime. 60 tablet 2    amLODIPine 10 MG Oral Tab Take 1 tablet (10 mg total) by mouth daily. 180 tablet 1    aspirin 81 MG Oral Tab daily. PAIN:   , Pain Score: 0,     ,  ,     ,  ,      ALLERGIES :   No Known Allergies    PAST MEDICAL HISTORY:   has a past medical history of Diabetes ([facility] Dialysis patient ([facility] [address] ESRD (end stage renal disease) on dialysis ([facility] [address] (2020), Essential hypertension, High blood pressure, Renal disorder, and Tonsillar cancer ([facility] [address] He has no past medical history of Anesthesia complication, Arrhythmia, Asthma, COPD (chronic obstructive pulmonary disease) ([address] Deep vein thrombosis ([address] Difficult intubation, Disorder of liver, Heart attack ([address] High cholesterol, History of adverse reaction to anesthesia, Malignant hyperthermia, Pulmonary embolism ([address] Seizure disorder ([address] Sleep apnea, or Stroke ([facility] [address] PAST SURGICAL HISTORY:   has a past surgical history that includes colonoscopy. PAST SOCIAL HISTORY   reports that he has never smoked. He has never used smokeless tobacco. He reports that he does not currently use alcohol. He reports that he does not use drugs. PAST FAMILY HISTORY   family history includes Cancer in his sister.     Wt Readings from Last 6 Encounters:   12/19/23 93.6 kg (206 lb 6.4 oz)   12/05/23 94.6 kg (208 lb 9.6 oz)   12/04/23 93.9 kg (207 lb)   11/30/23 96.6 kg (213 lb)   11/28/23 96.3 kg (212 lb 6.4 oz)   11/28/23 96.2 kg (212 lb 1.6 oz)              Imaging & Referral Orders:  PET STANDARD BODY SCAN (ONCOLOGY) (CPT=78815)       L Home

## 2023-12-19 NOTE — PATIENT INSTRUCTIONS
Follow up with Dr. Moises Zazueta in 2.5 months. Ricardo Bull will call you to schedule your follow up appointment. Schedule your PET SCAN prior to your follow up. Call [phone] to schedule. Call Dr. Marva Kamara for a sleep aid. Call to schedule a follow up appointment with Dr. Rafita Banegas after pet scan. Call Dr. Moises Zazueta  at [phone] if you feel you need a speech and swallow evaluation order.

## 2024-01-08 ENCOUNTER — NURSE TRIAGE (OUTPATIENT)
Dept: INTERNAL MEDICINE CLINIC | Facility: CLINIC | Age: 74
End: 2024-01-08

## 2024-01-08 NOTE — TELEPHONE ENCOUNTER
Action Requested: Summary for Provider     []  Critical Lab, Recommendations Needed  [] Need Additional Advice  [x]   FYI    []   Need Orders  [] Need Medications Sent to Pharmacy  []  Other     SUMMARY: Patient stated that he has a feeding tube in place and on Tuesday 1/2/2024 he fell on his left side. Since then having abdominal pain and abdomen is tender to touch. No fevers. No drainage from the abdominal area. No other symptoms.  Advised patient to go to the ER now for an evaluation and possible imaging to make sure feeding tube is still in place properly. Patient agreed.     Reason for call: Fall (Abdominal pain, abdomen tender)  Onset: Jan 2, 2024    Reason for Disposition   SEVERE abdominal pain    Protocols used: Abdominal Injury-A-[place]

## 2024-01-09 ENCOUNTER — OFFICE VISIT (OUTPATIENT)
Dept: INTERNAL MEDICINE CLINIC | Facility: CLINIC | Age: 74
End: 2024-01-09

## 2024-01-09 ENCOUNTER — HOSPITAL ENCOUNTER (OUTPATIENT)
Dept: CT IMAGING | Facility: HOSPITAL | Age: 74
Discharge: HOME | End: 2024-01-09
Attending: INTERNAL MEDICINE
Payer: MEDICARE

## 2024-01-09 VITALS
WEIGHT: 206.63 LBS | HEIGHT: 77 IN | HEART RATE: 103 BPM | DIASTOLIC BLOOD PRESSURE: 77 MMHG | BODY MASS INDEX: 24.4 KG/M2 | TEMPERATURE: 98 F | SYSTOLIC BLOOD PRESSURE: 130 MMHG

## 2024-01-09 DIAGNOSIS — R10.32 LLQ ABDOMINAL PAIN: ICD-10-CM

## 2024-01-09 DIAGNOSIS — R53.83 OTHER FATIGUE: ICD-10-CM

## 2024-01-09 DIAGNOSIS — R10.32 LLQ ABDOMINAL PAIN: Primary | ICD-10-CM

## 2024-01-09 PROBLEM — C79.89 METASTATIC SQUAMOUS CELL CARCINOMA TO HEAD AND NECK (HCC): Status: ACTIVE | Noted: 2024-01-09

## 2024-01-09 PROBLEM — E11.3211 MILD NONPROLIFERATIVE DIABETIC RETINOPATHY OF RIGHT EYE WITH MACULAR EDEMA ASSOCIATED WITH TYPE 2 DIABETES MELLITUS (HCC): Status: ACTIVE | Noted: 2024-01-09

## 2024-01-09 PROCEDURE — 1111F DSCHRG MED/CURRENT MED MERGE: CPT | Performed by: INTERNAL MEDICINE

## 2024-01-09 PROCEDURE — 99214 OFFICE O/P EST MOD 30 MIN: CPT | Performed by: INTERNAL MEDICINE

## 2024-01-09 PROCEDURE — 1125F AMNT PAIN NOTED PAIN PRSNT: CPT | Performed by: INTERNAL MEDICINE

## 2024-01-09 PROCEDURE — 74176 CT ABD & PELVIS W/O CONTRAST: CPT | Performed by: INTERNAL MEDICINE

## 2024-01-09 RX ORDER — GLIPIZIDE 10 MG/1
1 TABLET ORAL DAILY
Status: ON HOLD | COMMUNITY
End: 2024-01-11

## 2024-01-09 NOTE — TELEPHONE ENCOUNTER
Patient indicated that he did not go to the ER yesterday. Abdominal pain feels alittle better today. Advised patient that should go to the ER for an evaluation/imaging. Patient stated that will play it by ear as far as going to the ER. Appointment made for today at 2:45pm with Dr Rich Ruggiero at the [facility]. Advised patient that if symptoms worse to go to the ER. Patient agreed.

## 2024-01-10 ENCOUNTER — HOSPITAL ENCOUNTER (OUTPATIENT)
Facility: HOSPITAL | Age: 74
Setting detail: OBSERVATION
Discharge: HOME | End: 2024-01-13
Attending: EMERGENCY MEDICINE | Admitting: INTERNAL MEDICINE
Payer: MEDICARE

## 2024-01-10 DIAGNOSIS — N18.6 ESRD ON HEMODIALYSIS (HCC): ICD-10-CM

## 2024-01-10 DIAGNOSIS — Z99.2 ESRD ON HEMODIALYSIS (HCC): ICD-10-CM

## 2024-01-10 DIAGNOSIS — K57.92 ACUTE DIVERTICULITIS: Primary | ICD-10-CM

## 2024-01-10 LAB
ANION GAP SERPL CALC-SCNC: 8 MMOL/L (ref 0–18)
BASOPHILS # BLD AUTO: 0.03 X10(3) UL (ref 0–0.2)
BASOPHILS NFR BLD AUTO: 0.5 %
BUN BLD-MCNC: 49 MG/DL (ref 9–23)
BUN/CREAT SERPL: 9.4 (ref 10–20)
CALCIUM BLD-MCNC: 9.4 MG/DL (ref 8.7–10.4)
CHLORIDE SERPL-SCNC: 95 MMOL/L (ref 98–112)
CO2 SERPL-SCNC: 37 MMOL/L (ref 21–32)
CREAT BLD-MCNC: 5.23 MG/DL
DEPRECATED RDW RBC AUTO: 52.2 FL (ref 35.1–46.3)
EGFRCR SERPLBLD CKD-EPI 2021: 11 ML/MIN/1.73M2 (ref 60–?)
EOSINOPHIL # BLD AUTO: 0.21 X10(3) UL (ref 0–0.7)
EOSINOPHIL NFR BLD AUTO: 3.6 %
ERYTHROCYTE [DISTWIDTH] IN BLOOD BY AUTOMATED COUNT: 15.4 % (ref 11–15)
GLUCOSE BLD-MCNC: 181 MG/DL (ref 70–99)
HCT VFR BLD AUTO: 33.7 %
HGB BLD-MCNC: 10.7 G/DL
IMM GRANULOCYTES # BLD AUTO: 0.02 X10(3) UL (ref 0–1)
IMM GRANULOCYTES NFR BLD: 0.3 %
LYMPHOCYTES # BLD AUTO: 0.61 X10(3) UL (ref 1–4)
LYMPHOCYTES NFR BLD AUTO: 10.3 %
MCH RBC QN AUTO: 29.6 PG (ref 26–34)
MCHC RBC AUTO-ENTMCNC: 31.8 G/DL (ref 31–37)
MCV RBC AUTO: 93.1 FL
MONOCYTES # BLD AUTO: 0.75 X10(3) UL (ref 0.1–1)
MONOCYTES NFR BLD AUTO: 12.7 %
NEUTROPHILS # BLD AUTO: 4.29 X10 (3) UL (ref 1.5–7.7)
NEUTROPHILS # BLD AUTO: 4.29 X10(3) UL (ref 1.5–7.7)
NEUTROPHILS NFR BLD AUTO: 72.6 %
OSMOLALITY SERPL CALC.SUM OF ELEC: 308 MOSM/KG (ref 275–295)
PLATELET # BLD AUTO: 248 10(3)UL (ref 150–450)
POTASSIUM SERPL-SCNC: 4 MMOL/L (ref 3.5–5.1)
RBC # BLD AUTO: 3.62 X10(6)UL
SODIUM SERPL-SCNC: 140 MMOL/L (ref 136–145)
WBC # BLD AUTO: 5.9 X10(3) UL (ref 4–11)

## 2024-01-10 RX ORDER — SODIUM CHLORIDE 9 MG/ML
INJECTION, SOLUTION INTRAVENOUS CONTINUOUS
Status: ACTIVE | OUTPATIENT
Start: 2024-01-10 | End: 2024-01-11

## 2024-01-10 RX ORDER — METRONIDAZOLE 500 MG/100ML
500 INJECTION, SOLUTION INTRAVENOUS ONCE
Status: DISCONTINUED | OUTPATIENT
Start: 2024-01-10 | End: 2024-01-10

## 2024-01-11 PROBLEM — R13.19 OTHER DYSPHAGIA: Status: ACTIVE | Noted: 2023-11-06

## 2024-01-11 PROBLEM — Z99.2 ESRD ON HEMODIALYSIS (HCC): Status: ACTIVE | Noted: 2024-01-11

## 2024-01-11 PROBLEM — N18.6 ESRD ON HEMODIALYSIS (HCC): Status: ACTIVE | Noted: 2024-01-11

## 2024-01-11 LAB
EST. AVERAGE GLUCOSE BLD GHB EST-MCNC: 114 MG/DL (ref 68–126)
GLUCOSE BLDC GLUCOMTR-MCNC: 106 MG/DL (ref 70–99)
GLUCOSE BLDC GLUCOMTR-MCNC: 120 MG/DL (ref 70–99)
GLUCOSE BLDC GLUCOMTR-MCNC: 120 MG/DL (ref 70–99)
GLUCOSE BLDC GLUCOMTR-MCNC: 168 MG/DL (ref 70–99)
HBA1C MFR BLD: 5.6 % (ref ?–5.7)

## 2024-01-11 PROCEDURE — 99222 1ST HOSP IP/OBS MODERATE 55: CPT | Performed by: INTERNAL MEDICINE

## 2024-01-11 RX ORDER — SODIUM BICARBONATE 650 MG/1
325 TABLET ORAL AS NEEDED
Status: DISCONTINUED | OUTPATIENT
Start: 2024-01-11 | End: 2024-01-13

## 2024-01-11 RX ORDER — AMLODIPINE BESYLATE 10 MG/1
10 TABLET ORAL DAILY
Status: DISCONTINUED | OUTPATIENT
Start: 2024-01-11 | End: 2024-01-13

## 2024-01-11 RX ORDER — HYDRALAZINE HYDROCHLORIDE 10 MG/1
10 TABLET, FILM COATED ORAL 2 TIMES DAILY
Status: DISCONTINUED | OUTPATIENT
Start: 2024-01-11 | End: 2024-01-13

## 2024-01-11 RX ORDER — HEPARIN SODIUM 5000 [USP'U]/ML
5000 INJECTION, SOLUTION INTRAVENOUS; SUBCUTANEOUS EVERY 8 HOURS SCHEDULED
Status: DISCONTINUED | OUTPATIENT
Start: 2024-01-11 | End: 2024-01-13

## 2024-01-11 RX ORDER — CLONAZEPAM 0.5 MG/1
1 TABLET ORAL NIGHTLY PRN
Status: DISCONTINUED | OUTPATIENT
Start: 2024-01-11 | End: 2024-01-13

## 2024-01-11 NOTE — ED QUICK NOTES
Assume care at this time. Pt resting comfortably in cart, no distress noted on exam. Pt expresses no other needs at this time.

## 2024-01-11 NOTE — ED QUICK NOTES
Orders for admission, patient is aware of plan and ready to go upstairs. Any questions, please call ED RN Bryanna at extension [phone].     Patient Covid vaccination status: Fully vaccinated     COVID Test Ordered in ED: None    COVID Suspicion at Admission: N/A    Running Infusions:  None    Mental Status/LOC at time of transport: ALERT    Other pertinent information: On room air, ambulates independently, 20G IV R AC, AV fistula to L arm, last dialysis was today    CIWA score: N/A   NIH score:  N/A

## 2024-01-11 NOTE — ED PROVIDER NOTES
Patient Seen in: [facility] Emergency Department      History     Chief Complaint   Patient presents with    Abdomen/Flank Pain     Stated Complaint: Stomach Pain    Subjective:   HPI    The patient is a 73-year-old male with a history of hypertension diabetes end-stage renal disease on hemodialysis.  His last dialysis was this morning.  He presents with 10 days of left lower quadrant abdominal pain and nausea.  No fevers or chills.  Positive decreased appetite.  No vomiting or diarrhea.  He saw his primary doctor and had an outpatient CT scan which showed mild diverticulitis and was sent here for admission.    Objective:   Past Medical History:   Diagnosis Date    Diabetes (HCC)     Dialysis patient (HCC)     ESRD (end stage renal disease) on dialysis (HCC) 2020    Essential hypertension     High blood pressure     Renal disorder     Tonsillar cancer (HCC)               Past Surgical History:   Procedure Laterality Date    COLONOSCOPY                  Social History     Socioeconomic History    Marital status:    Tobacco Use    Smoking status: Never    Smokeless tobacco: Never   Vaping Use    Vaping Use: Never used   Substance and Sexual Activity    Alcohol use: Not Currently    Drug use: Never     Social Determinants of Health     Financial Resource Strain: Low Risk  (11/15/2023)    Financial Resource Strain     Difficulty of Paying Living Expenses: Not very hard     Med Affordability: No   Food Insecurity: No Food Insecurity (11/6/2023)    Food Insecurity     Food Insecurity: Never true   Transportation Needs: No Transportation Needs (11/15/2023)    Transportation Needs     Lack of Transportation: No   Housing Stability: Low Risk  (11/6/2023)    Housing Stability     Housing Instability: No              Review of Systems    Positive for stated complaint: Stomach Pain  Other systems are as noted in HPI.  Constitutional and vital signs reviewed.      All other systems reviewed and negative except as  noted above.    Physical Exam     ED Triage Vitals [01/10/24 1900]   /69   Pulse 98   Resp 18   Temp 98.2 °F (36.8 °C)   Temp src Temporal   SpO2 99 %   O2 Device None (Room air)       Current:/69   Pulse 98   Temp 98.2 °F (36.8 °C) (Temporal)   Resp 18   Ht 195.6 cm (6' 5\")   Wt 92.5 kg   SpO2 99%   BMI 24.19 kg/m²         Physical Exam  Vitals and nursing note reviewed.   Constitutional:       General: He is not in acute distress.     Appearance: Normal appearance. He is well-developed. He is not ill-appearing.   HENT:      Head: Normocephalic and atraumatic.      Mouth/Throat:      Mouth: Mucous membranes are moist.   Eyes:      Conjunctiva/sclera: Conjunctivae normal.      Pupils: Pupils are equal, round, and reactive to light.   Neck:      Vascular: No JVD.   Cardiovascular:      Rate and Rhythm: Normal rate and regular rhythm.      Heart sounds: Normal heart sounds. No murmur heard.  Pulmonary:      Effort: Pulmonary effort is normal.      Breath sounds: Normal breath sounds.   Abdominal:      General: Bowel sounds are normal. There is no distension.      Palpations: Abdomen is soft. There is no mass.      Tenderness: There is abdominal tenderness in the left upper quadrant and left lower quadrant. There is no right CVA tenderness, left CVA tenderness, guarding or rebound.   Musculoskeletal:         General: Normal range of motion.      Cervical back: Normal range of motion and neck supple.      Right lower leg: No edema.      Left lower leg: No edema.   Skin:     General: Skin is warm and dry.      Capillary Refill: Capillary refill takes less than 2 seconds.      Findings: No rash.   Neurological:      General: No focal deficit present.      Mental Status: He is alert and oriented to person, place, and time.      Deep Tendon Reflexes: Reflexes are normal and symmetric.   Psychiatric:         Judgment: Judgment normal.     Differential diagnosis includes diverticulitis          ED Course      Labs Reviewed   BASIC METABOLIC PANEL (8) - Abnormal; Notable for the following components:       Result Value    Glucose 181 (*)     Chloride 95 (*)     CO2 37.0 (*)     BUN 49 (*)     Creatinine 5.23 (*)     BUN/CREA Ratio 9.4 (*)     Calculated Osmolality 308 (*)     eGFR-Cr 11 (*)     All other components within normal limits   CBC W/ DIFFERENTIAL - Abnormal; Notable for the following components:    RBC 3.62 (*)     HGB 10.7 (*)     HCT 33.7 (*)     RDW-SD 52.2 (*)     RDW 15.4 (*)     Lymphocyte Absolute 0.61 (*)     All other components within normal limits   CBC WITH DIFFERENTIAL WITH PLATELET    Narrative:     The following orders were created for panel order CBC With Differential With Platelet.  Procedure                               Abnormality         Status                     ---------                               -----------         ------                     CBC W/ DIFFERENTIAL[[id number]]          Abnormal            Final result                 Please view results for these tests on the individual orders.   RAINBOW DRAW LAVENDER   RAINBOW DRAW LIGHT GREEN                      [facility]        Admission disposition: 1/10/2024  9:49 PM                                        Medical Decision Making  Patient with acute diverticulitis will admit for IV antibiotics  Case discussed with patient's primary  Vital signs stable prior to admission    Problems Addressed:  Acute diverticulitis: acute illness or injury  ESRD on hemodialysis (HCC): chronic illness or injury    Amount and/or Complexity of Data Reviewed  External Data Reviewed: radiology and notes.     Details: From PCP visit reviewed from yesterday and outpatient CT today reviewed  Labs: ordered.  Discussion of management or test interpretation with external provider(s): Case discussed with Dr. Roth and patient will be admitted for IV antibiotics and bowel rest    Risk  Decision regarding hospitalization.        Disposition and Plan     Clinical  Impression:  1. Acute diverticulitis    2. ESRD on hemodialysis (HCC)         Disposition:  Admit  1/10/2024  9:49 pm    Follow-up:  No follow-up provider specified.  We recommend that you schedule follow up care with a primary care provider within the next three months to obtain basic health screening including reassessment of your blood pressure.      Medications Prescribed:  Current Discharge Medication List                            Hospital Problems       Present on Admission  Date Reviewed: 12/19/2023            ICD-10-CM Noted POA    * (Principal) Acute diverticulitis K57.92 1/10/2024 Unknown

## 2024-01-11 NOTE — PROGRESS NOTES
[facility] Pharmacy Note:  Renal Adjustment for piperacillin/tazobactam (ZOSYN)    Luisito Diop is a 73 year old patient who has been prescribed piperacillin/tazobactam (ZOSYN) 3.375 g every 8 hrs.  The estimated creatinine clearance is 16.3 mL/min (A) (based on SCr of 5.23 mg/dL (H)). The dose has been adjusted to piperacillin/tazobactam (ZOSYN) 3.375 g every 12 hrs per hospital renal dose adjustment protocol for treatment of intra-abdominal infection.  Pharmacy will follow and adjust dose as warranted for additional renal function changes.    Thank you,    Ernie Bradshaw, PharmD  1/11/2024  2:28 AM

## 2024-01-11 NOTE — H&P
[facility]    History and Physical    Luisito Diop Patient Status:  Observation    1950 MRN [id number]   Location [facility] 5SW/SE Attending Rich Ruggiero MD   Hosp Day # 0 PCP Rich Ruggiero MD     Date:  2024  Date of Admission:  1/10/2024    History provided by:patient  HPI:     Chief Complaint   Patient presents with    Abdomen/Flank Pain       Patient seen by me yesterday in office with complaint of left lower and mid quadrant abdominal pain which has been going on for a week initially patient thought it was due to the fall that he sustained but there is no bruising the pain seemed more deep.  He has been using the G-tube and its been flushing and working okay no blood in the stool patient still with dysphagia can take some liquids.  I did a CT scan outpatient that showed acute diverticulitis of the sigmoid and descending colon  No abscess noted    Patient started on IV antibiotic Zosyn he says today feels little better but still in pain        History     Past Medical History:   Diagnosis Date    Diabetes (HCC)     Dialysis patient (HCC)     ESRD (end stage renal disease) on dialysis (HCC)     Essential hypertension     High blood pressure     Renal disorder     Tonsillar cancer (HCC)      Past Surgical History:   Procedure Laterality Date    COLONOSCOPY       Family History   Problem Relation Age of Onset    Cancer Sister         lung cancer     Social History:  Social History     Socioeconomic History    Marital status:    Tobacco Use    Smoking status: Never    Smokeless tobacco: Never   Vaping Use    Vaping Use: Never used   Substance and Sexual Activity    Alcohol use: Not Currently    Drug use: Never     Social Determinants of Health     Financial Resource Strain: Low Risk  (11/15/2023)    Financial Resource Strain     Difficulty of Paying Living Expenses: Not very hard     Med Affordability: No   Food Insecurity: No Food Insecurity (2024)    Food  Insecurity     Food Insecurity: Never true   Transportation Needs: No Transportation Needs (1/11/2024)    Transportation Needs     Lack of Transportation: No   Housing Stability: Low Risk  (1/11/2024)    Housing Stability     Housing Instability: No     Allergies/Medications:   Allergies: No Active Allergies  Medications Prior to Admission   Medication Sig    clonazePAM 1 MG Oral Tab Take 1 tablet (1 mg total) by mouth nightly as needed for Anxiety.    hydrALAZINE 10 MG Oral Tab Take 1 tablet (10 mg total) by mouth in the morning and 1 tablet (10 mg total) before bedtime.    amLODIPine 10 MG Oral Tab Take 1 tablet (10 mg total) by mouth daily.    aspirin 81 MG Oral Tab Take 1 tablet (81 mg total) by mouth daily.    dicyclomine 20 MG Oral Tab Take 1 tablet (20 mg total) by mouth nightly as needed (as needed for cramping/ab pain).       Review of Systems:     Unable to perform ROS  Constitutional: Negative.    HENT: Negative.     Eyes: Negative.    Respiratory: Negative.     Cardiovascular: Negative.    Gastrointestinal:  Positive for abdominal pain and abdominal distention.   Endocrine: Negative.    Genitourinary: Negative.    Skin: Negative.    Allergic/Immunologic: Negative.    Neurological: Negative.    Hematological: Negative.    Psychiatric/Behavioral: Negative.     All other systems reviewed and are negative.      Physical Exam:   Vital Signs:  Blood pressure 130/75, pulse 69, temperature 97.8 °F (36.6 °C), temperature source Oral, resp. rate 18, height 6' 5\" (1.956 m), weight 203 lb 9.6 oz (92.4 kg), SpO2 96%.  Physical Exam  Vitals and nursing note reviewed.   HENT:      Head: Normocephalic.   Cardiovascular:      Rate and Rhythm: Normal rate.   Pulmonary:      Effort: Pulmonary effort is normal.      Breath sounds: Normal breath sounds.   Abdominal:      General: Abdomen is flat. There is distension.      Palpations: Abdomen is soft.      Tenderness: There is abdominal tenderness.      Comments: Left mid and  left lower quadrant pain some improvement   Musculoskeletal:         General: Normal range of motion.      Cervical back: Normal range of motion.   Neurological:      Mental Status: He is alert and oriented to person, place, and time.   Psychiatric:         Mood and Affect: Mood normal.           Results:     Lab Results   Component Value Date    WBC 5.9 01/10/2024    HGB 10.7 (L) 01/10/2024    HCT 33.7 (L) 01/10/2024    .0 01/10/2024    CREATSERUM 5.23 (H) 01/10/2024    BUN 49 (H) 01/10/2024     01/10/2024    K 4.0 01/10/2024    CL 95 (L) 01/10/2024    CO2 37.0 (H) 01/10/2024     (H) 01/10/2024    CA 9.4 01/10/2024    ALB 4.1 12/19/2023    ALKPHO 124 (H) 12/19/2023    BILT 0.3 12/19/2023    TP 7.0 12/19/2023    AST 29 12/19/2023    ALT 33 12/19/2023    TSH 0.771 10/07/2022     10/07/2022    MG 2.2 12/04/2023    PHOS 4.3 11/14/2023    TROP 0.062 (HH) 11/12/2020    B12 664 08/24/2020     CT ABDOMEN+PELVIS(CPT=74176)    Result Date: 1/9/2024  CONCLUSION:  1. Acute diverticulitis at the junction of the descending and sigmoid colon.  No free intraperitoneal air or well-defined/drainable intra-abdominal collection.  Post treatment colonoscopy correlation is suggested, if not recently performed. 2. Cholelithiasis. 3. Percutaneous gastrostomy with tip in the gastric body. 4. Renal atrophy.  Subcentimeter high density left upper pole renal cortical lesion is unchanged since comparison CT from March, 2023.  This is incompletely characterized, but could relate to a benign proteinaceous/hemorrhagic cyst. 5. Circumferential urinary bladder wall thickening, which may relate to incomplete distention, chronic partial outlet obstruction from prostate gland enlargement, or cystitis.  If there are referable symptoms, urinalysis correlation is requested. 6. Coronary and peripheral atherosclerosis with partially imaged symmetric bilateral gynecomastia. 7. Lesser incidental findings as above.   elm-remote   Dictated by (CST): Eliot Fonseca MD on 1/09/2024 at 5:15 PM     Finalized by (CST): Eliot Fonseca MD on 1/09/2024 at 5:21 PM               Assessment/Plan:     Acute diverticulitis  Continue with IV antibiotic for at least 1 more day if improved possible discharge tomorrow on p.o. antibiotic with been restarted tube feeds      Essential hypertension continue home meds        Anemia due to chronic kidney disease, on chronic dialysis (HCC) chronic stable        Other dysphagia stable        ESRD on hemodialysis (HCC) on HD                  Rich Ruggiero MD  1/11/2024

## 2024-01-11 NOTE — DIETARY NOTE
ADULT NUTRITION INITIAL ASSESSMENT      RECOMMENDATIONS TO MD: See Nutrition Intervention for Enteral Nutrition ( EN)  specifics     Pt is at high nutrition risk.  Pt meets moderate malnutrition criteria.      CRITERIA FOR MALNUTRITION DIAGNOSIS: Criteria for non-severe malnutrition diagnosis: chronic illness related to body fat mild depletion and muscle mass mild depletion.     ADMITTING DIAGNOSIS:    ESRD on hemodialysis (HCC) [N18.6, Z99.2]  Acute diverticulitis [K57.92]     PERTINENT PAST MEDICAL HISTORY:  has a past medical history of Diabetes (Formerly [facility]), Dialysis patient (Formerly [facility]), ESRD (end stage renal disease) on dialysis (Formerly [facility]) (2020), Essential hypertension, High blood pressure, Renal disorder, and Tonsillar cancer (Formerly [facility]).    has a past surgical history that includes colonoscopy.     PATIENT STATUS: Initial 01/11/24: Patient (pt) identified at Nutrition risk due to poor po and unintentional wt loss after the screening process. RD identified pt is G-tube dependent.  Diagnosis & PMH above including throat CA, dysphagia, PEG placed 11/2023, ESRD-on HD.  Medical findings:  acute diverticulitis, choledolithiasis.  Upon visit, pt watching TV, denies N/V, abd pain decreasing. NPO.   Diet eval:   Pt receiving adequate nutrition via G-tube feeding and sipping liquids orally. Intake meeting 92% of kcal and 95% of protein needs based on reported use of 6 cans Nepro/day ( 2 cans TID) and water flushes of 6 oz before and after each feeds. ( Total free water 2160 ml/day) .Wt eval:    2% non significant wt loss based on 207# in Nov 2023 admission. However, pt reports he was 290# in 2022, but more recent usual wt of 240# in Aug 2023. Nutrition findings:    Poor po not relevant, non significant wt loss but mainly maintained good wt while on EN now. Initially identified with severe Malnutrition in Nov 2023 Nutrition assessment, however, pt has consistently been receiving adequate nutrition since PEG was placed. Identified with  Moderate Malnutrition following Nutrition Focus Physical Exam ( NFPE) completion with notable unrecovered muscle mass depletion. Discussed wit RN if MD will clear resumption of EN.  Later, received MD consult to initiate/manage EN. Nutrition plan discussed with RN. See Nutrition Intervention for details.       FOOD/NUTRITION INTAKE ANALYSIS:    Current Appetite: NPO  Current Intake: NPO  Current Intake Meeting Needs: TF meeting needs     Food Allergies: No Known Food Allergies (NKFA)  Cultural/Ethnic/Shinto Preferences: None    GASTROINTESTINAL: PEG/G-tube and denies nausea at visit, but abd pain subsiding.     Pt reports anything he takes in (water) came back up at home. Pt asked this writer if he can take soup. Deferred question to MD and or SLP but informed RN of pt's query.     MEDICATIONS: reviewed antibiotics , others noted.    dextrose 10%         LABS: reviewed Labs consistent with renal failure.   Recent Labs     01/10/24  2109   *   BUN 49*   CREATSERUM 5.23*   CA 9.4      K 4.0   CL 95*   CO2 37.0*   OSMOCALC 308*       NUTRITION RELATED PHYSICAL FINDINGS:  - Nutrition Focused Physical Exam (NFPE): mild depletion body fat orbital region and triceps region, mild depletion muscle mass clavicle region, thigh region, and calf region, and moderate depletion muscle mass temple region   - Fluid Accumulation: none  see RN documentation for details  - Skin Integrity: intact see RN documentation for details    ANTHROPOMETRICS:  HT: 195.6 cm (6' 5\")  WT: 92.4 kg (203 lb 9.6 oz)   BMI: Body mass index is 24.14 kg/m².  BMI CLASSIFICATION: 19-24.9 kg/m2 - WNL  IBW: 208 lbs        98% IBW  Usual Body Wt: 290 lbs in 2022, 240# in aug 2023, 207# in Nov 2023  (when G-tube feeds started)    98% UBW Based 207#  WEIGHT HISTORY:    Patient Weight(s) for the past 336 hrs:   Weight   01/11/24 0225 92.4 kg (203 lb 9.6 oz)   01/10/24 1900 92.5 kg (204 lb)     Wt Readings from Last 10 Encounters:   01/11/24 92.4 kg  (203 lb 9.6 oz)   01/09/24 93.7 kg (206 lb 9.6 oz)   12/19/23 93.6 kg (206 lb 6.4 oz)   12/19/23 93.6 kg (206 lb 6.4 oz)   12/05/23 94.6 kg (208 lb 9.6 oz)   12/04/23 93.9 kg (207 lb)   11/30/23 96.6 kg (213 lb)   11/28/23 96.3 kg (212 lb 6.4 oz)   11/28/23 96.2 kg (212 lb 1.6 oz)   11/20/23 92.5 kg (204 lb)       NUTRITION DIAGNOSIS/PROBLEM:    Moderate Malnutrition related to Chronic - Alteration in gastrointestinal tract structure and/or function in the setting of Throat CA requiring G-tube placement as evidenced by significant wt loss history prior to G-tube feeding in Nov 2023, mild to moderate Muscle mass loss and Adipose tissue depletion.     NUTRITION INTERVENTION:     NUTRITION PRESCRIPTION:   Estimated Nutrition needs: Dosing wt of 92.4 kg --wt taken on 1/11 .  Calories: 8543-5712 calories/day (MSJ 1800 x 1.4 AF x 1.2 IF Cancer or 30-33 calories per kg Dosing wt)  Protein: 120-139 g protein/day (1.3-1.5 g protein/kg  Dosing wt)   Fluids: ~ 2300- 2770  ml /day (25-30 ml/kg )--adjust per renal tolerance    - Diet:       Procedures    NPO     - Enteral Nutrition ( EN) : RD ordered the following,  EN Nepro  Bolus feeding  total of 6.5 cans/day ( to be given 2 cans TID and 1/2 can at HS)  via PEG   Water flushes of 135 ml q before and after each feeds.  (1080 ml). May further adjust/increase FWF if adequate output/HD.   (Prescription  provides 2730 kcal, 123 g pro & 1080 ml free water.   Total free water of 2198 ml/day.   Met 98% of min  kcal & 100 % of Protein needs and or >100 % RDIs)  Above orders discussed with RN.      - RD Malnutrition Care Plan:  Long term Nutrition support therapy --EN  - Vitamin and mineral supplements: none  - Nutrition education: none needed Pt knowledgeable on self bolus feeding including water flushes.   - Coordination of nutrition care: collaboration with other providers  - Discharge and transfer of nutrition care to new setting or provider: monitor plans      MONITOR AND  EVALUATE/NUTRITION GOALS:  - Food and Nutrient Intake:    Monitor:  NPO  - Food and Nutrient Administration:    Monitor: tolerance to enteral nutrition, adequacy of enteral nutrition, and for enteral nutrition adjustment  - Anthropometric Measurement:    Monitor weight  - Nutrition Goals:    maintain wt within 5%, tube feed meets greater than 80% of needs, labs within acceptable limits, minimize lean body mass loss, and improved GI status      RD will follow up.    Concepcion Kauffman RD, LDN, [facility]  Clinical Dietitian  [phone]

## 2024-01-11 NOTE — PLAN OF CARE
Problem: ALTERED NUTRIENT INTAKE - ADULT  Goal: Nutrient intake appropriate for improving, restoring or maintaining nutritional needs  Description: INTERVENTIONS:  - Assess nutritional status and recommend course of action  - Monitor labs, and treatment plans  - Recommend appropriate  vitamin/mineral supplements  - monitor, and adjust tube feedings based on assessed needs  - Provide specific nutrition education as appropriate  Outcome: Progressing

## 2024-01-11 NOTE — PLAN OF CARE
Problem: Patient Centered Care  Goal: Patient preferences are identified and integrated in the patient's plan of care  Description: Interventions:  - What would you like us to know as we care for you? From home with wife  - Provide timely, complete, and accurate information to patient/family  - Incorporate patient and family knowledge, values, beliefs, and cultural backgrounds into the planning and delivery of care  - Encourage patient/family to participate in care and decision-making at the level they choose  - Honor patient and family perspectives and choices  Outcome: Progressing     Problem: Diabetes/Glucose Control  Goal: Glucose maintained within prescribed range  Description: INTERVENTIONS:  - Monitor Blood Glucose as ordered  - Assess for signs and symptoms of hyperglycemia and hypoglycemia  - Administer ordered medications to maintain glucose within target range  - Assess barriers to adequate nutritional intake and initiate nutrition consult as needed  - Instruct patient on self management of diabetes  Outcome: Progressing     Problem: Patient/Family Goals  Goal: Patient/Family Long Term Goal  Description: Patient's Long Term Goal: return  home     Interventions:  - follow doctors recommendations   - See additional Care Plan goals for specific interventions  Outcome: Progressing  Goal: Patient/Family Short Term Goal  Description: Patient's Short Term Goal:     Interventions:     - See additional Care Plan goals for specific interventions  Outcome: Progressing     Problem: SAFETY ADULT - FALL  Goal: Free from fall injury  Description: INTERVENTIONS:  - Assess pt frequently for physical needs  - Identify cognitive and physical deficits and behaviors that affect risk of falls.  - [place] fall precautions as indicated by assessment.  - Educate pt/family on patient safety including physical limitations  - Instruct pt to call for assistance with activity based on assessment  - Modify environment to reduce  risk of injury  - Provide assistive devices as appropriate  - Consider OT/PT consult to assist with strengthening/mobility  - Encourage toileting schedule  Outcome: Progressing   Patient from home with wife, HD, LLQ abdominal pain, NPO,   IV Zosyn, patient has G  tube due to throat C, fall precautions , call light within reach

## 2024-01-11 NOTE — ED INITIAL ASSESSMENT (HPI)
Patient to ED for LLQ Pain for about 10 days. States he saw PCP and had a CT done. Results showed diverticulitis and cholelithiasis. Sent to ED for further eval. Hx Throat CA

## 2024-01-12 LAB
ANION GAP SERPL CALC-SCNC: 8 MMOL/L (ref 0–18)
BUN BLD-MCNC: 73 MG/DL (ref 9–23)
BUN/CREAT SERPL: 10.2 (ref 10–20)
C DIFF TOX B STL QL: NEGATIVE
CALCIUM BLD-MCNC: 9.2 MG/DL (ref 8.7–10.4)
CHLORIDE SERPL-SCNC: 96 MMOL/L (ref 98–112)
CO2 SERPL-SCNC: 35 MMOL/L (ref 21–32)
CREAT BLD-MCNC: 7.13 MG/DL
EGFRCR SERPLBLD CKD-EPI 2021: 8 ML/MIN/1.73M2 (ref 60–?)
GLUCOSE BLD-MCNC: 120 MG/DL (ref 70–99)
GLUCOSE BLDC GLUCOMTR-MCNC: 120 MG/DL (ref 70–99)
GLUCOSE BLDC GLUCOMTR-MCNC: 131 MG/DL (ref 70–99)
GLUCOSE BLDC GLUCOMTR-MCNC: 209 MG/DL (ref 70–99)
GLUCOSE BLDC GLUCOMTR-MCNC: 99 MG/DL (ref 70–99)
HBV SURFACE AB SER QL: NONREACTIVE
HBV SURFACE AB SERPL IA-ACNC: 3.23 MIU/ML
HBV SURFACE AG SER-ACNC: <0.1 [IU]/L
HBV SURFACE AG SERPL QL IA: NONREACTIVE
MAGNESIUM SERPL-MCNC: 2.6 MG/DL (ref 1.6–2.6)
OSMOLALITY SERPL CALC.SUM OF ELEC: 311 MOSM/KG (ref 275–295)
PHOSPHATE SERPL-MCNC: 5.1 MG/DL (ref 2.4–5.1)
POTASSIUM SERPL-SCNC: 3.8 MMOL/L (ref 3.5–5.1)
SODIUM SERPL-SCNC: 139 MMOL/L (ref 136–145)

## 2024-01-12 PROCEDURE — 99223 1ST HOSP IP/OBS HIGH 75: CPT | Performed by: INTERNAL MEDICINE

## 2024-01-12 PROCEDURE — 99232 SBSQ HOSP IP/OBS MODERATE 35: CPT | Performed by: INTERNAL MEDICINE

## 2024-01-12 NOTE — PLAN OF CARE
Patient updated on plan of care. Mild pain to LLQ, patient refused pain medication. No acute changes at this time. G-tube patent and intact. Bolus feeds during meal times. Continues on IV antibiotics. Plan for HD today. Possible discharge tomorrow. HD scheduled for Monday if patient is still here. Call light within reach.      Problem: Patient Centered Care  Goal: Patient preferences are identified and integrated in the patient's plan of care  Description: Interventions:  - What would you like us to know as we care for you? Home w/ wife  - Provide timely, complete, and accurate information to patient/family  - Incorporate patient and family knowledge, values, beliefs, and cultural backgrounds into the planning and delivery of care  - Encourage patient/family to participate in care and decision-making at the level they choose  - Honor patient and family perspectives and choices  1/12/2024 1634 by Nancy Alberto RN  Outcome: Progressing  1/12/2024 1634 by Nancy Alberto RN  Outcome: Progressing  1/12/2024 1631 by Nancy Alberto RN  Outcome: Progressing     Problem: Diabetes/Glucose Control  Goal: Glucose maintained within prescribed range  Description: INTERVENTIONS:  - Monitor Blood Glucose as ordered  - Assess for signs and symptoms of hyperglycemia and hypoglycemia  - Administer ordered medications to maintain glucose within target range  - Assess barriers to adequate nutritional intake and initiate nutrition consult as needed  - Instruct patient on self management of diabetes  1/12/2024 1634 by Nancy Alberto RN  Outcome: Progressing  1/12/2024 1634 by Nancy Alberto RN  Outcome: Progressing  1/12/2024 1631 by Nancy Alberto RN  Outcome: Progressing     Problem: Patient/Family Goals  Goal: Patient/Family Long Term Goal  Description: Patient's Long Term Goal: Discharge    Interventions:  - Follow healthcare team treatment plan   - Monitor labs, vitals, and diagnostic test  - Pain Management   - Diet  Recommendations  - Participate in therapy  - Discharge planning   - See additional Care Plan goals for specific interventions  1/12/2024 1634 by Nancy Alberto RN  Outcome: Progressing  1/12/2024 1634 by Nancy Alberto RN  Outcome: Progressing  1/12/2024 1631 by Nancy Alberto RN  Outcome: Progressing  Goal: Patient/Family Short Term Goal  Description: Patient's Short Term Goal: absence/less abdominal pain    Interventions:   - Follow healthcare team treatment plan   - Monitor labs, vitals, and diagnostic test  - Pain Management, pharmacological and non-pharmacological interventions  - Diet Recommendations  - Adequate oral intake  - IV antibiotics as ordered  - Gtube bolus feeds  - Sips of clears   - See additional Care Plan goals for specific interventions  1/12/2024 1634 by Nancy Alberto RN  Outcome: Progressing  1/12/2024 1634 by Nancy Alberto RN  Outcome: Progressing  1/12/2024 1631 by Nancy Alberto RN  Outcome: Progressing     Problem: SAFETY ADULT - FALL  Goal: Free from fall injury  Description: INTERVENTIONS:  - Assess pt frequently for physical needs  - Identify cognitive and physical deficits and behaviors that affect risk of falls.  - [place] fall precautions as indicated by assessment.  - Educate pt/family on patient safety including physical limitations  - Instruct pt to call for assistance with activity based on assessment  - Modify environment to reduce risk of injury  - Provide assistive devices as appropriate  - Consider OT/PT consult to assist with strengthening/mobility  - Encourage toileting schedule  1/12/2024 1634 by Nancy Alberto RN  Outcome: Progressing  1/12/2024 1634 by Nancy Alberto RN  Outcome: Progressing  1/12/2024 1631 by Nancy Alberto RN  Outcome: Progressing     Problem: GASTROINTESTINAL - ADULT  Goal: Minimal or absence of nausea and vomiting  Description: INTERVENTIONS:  - Maintain adequate hydration with IV or PO as ordered and tolerated  - Nasogastric  tube to low intermittent suction as ordered  - Evaluate effectiveness of ordered antiemetic medications  - Provide nonpharmacologic comfort measures as appropriate  - Advance diet as tolerated, if ordered  - Obtain nutritional consult as needed  - Evaluate fluid balance  1/12/2024 1634 by Nancy Alberto RN  Outcome: Progressing  1/12/2024 1634 by Nancy Alberto RN  Outcome: Progressing  1/12/2024 1631 by Nancy Alberto RN  Outcome: Progressing  Goal: Maintains or returns to baseline bowel function  Description: INTERVENTIONS:  - Assess bowel function  - Maintain adequate hydration with IV or PO as ordered and tolerated  - Evaluate effectiveness of GI medications  - Encourage mobilization and activity  - Obtain nutritional consult as needed  - Establish a toileting routine/schedule  - Consider collaborating with pharmacy to review patient's medication profile  1/12/2024 1634 by Nancy Alberto RN  Outcome: Progressing  1/12/2024 1634 by Nancy Alberto RN  Outcome: Progressing  1/12/2024 1631 by Nancy Alberto RN  Outcome: Progressing  Goal: Maintains adequate nutritional intake (undernourished)  Description: INTERVENTIONS:  - Monitor percentage of each meal consumed  - Identify factors contributing to decreased intake, treat as appropriate  - Assist with meals as needed  - Monitor I&O, WT and lab values  - Obtain nutritional consult as needed  - Optimize oral hygiene and moisture  - Encourage food from home; allow for food preferences  - Enhance eating environment  1/12/2024 1634 by Nancy Alberto RN  Outcome: Progressing  1/12/2024 1634 by Nancy Alberto RN  Outcome: Progressing  1/12/2024 1631 by Nancy Alberto RN  Outcome: Progressing  Goal: Achieves appropriate nutritional intake (bariatric)  Description: INTERVENTIONS:  - Monitor for over-consumption  - Identify factors contributing to increased intake, treat as appropriate  - Monitor I&O, WT and lab values  - Obtain nutritional consult as  needed  - Evaluate psychosocial factors contributing to over-consumption  1/12/2024 1634 by Nancy Alberto RN  Outcome: Progressing  1/12/2024 1634 by Nancy Alberto RN  Outcome: Progressing  1/12/2024 1631 by Nancy Alberto RN  Outcome: Progressing     Problem: METABOLIC/FLUID AND ELECTROLYTES - ADULT  Goal: Glucose maintained within prescribed range  Description: INTERVENTIONS:  - Monitor Blood Glucose as ordered  - Assess for signs and symptoms of hyperglycemia and hypoglycemia  - Administer ordered medications to maintain glucose within target range  - Assess barriers to adequate nutritional intake and initiate nutrition consult as needed  - Instruct patient on self management of diabetes  1/12/2024 1634 by Nancy Alberto RN  Outcome: Progressing  1/12/2024 1634 by Nancy Alberto RN  Outcome: Progressing  1/12/2024 1631 by Nancy Alberto RN  Outcome: Progressing  Goal: Electrolytes maintained within normal limits  Description: INTERVENTIONS:  - Monitor labs and rhythm and assess patient for signs and symptoms of electrolyte imbalances  - Administer electrolyte replacement as ordered  - Monitor response to electrolyte replacements, including rhythm and repeat lab results as appropriate  - Fluid restriction as ordered  - Instruct patient on fluid and nutrition restrictions as appropriate  1/12/2024 1634 by Nancy Alberto, RN  Outcome: Progressing  1/12/2024 1634 by Nancy Alberto RN  Outcome: Progressing  1/12/2024 1631 by Nancy Alberto RN  Outcome: Progressing

## 2024-01-12 NOTE — PLAN OF CARE
Problem: Patient Centered Care  Goal: Patient preferences are identified and integrated in the patient's plan of care  Description: Interventions:  - What would you like us to know as we care for you? Home with wife.  Problem: SAFETY ADULT - FALL  Goal: Free from fall injury  Description: INTERVENTIONS:  - Assess pt frequently for physical needs  - Identify cognitive and physical deficits and behaviors that affect risk of falls.  - [place] fall precautions as indicated by assessment.  - Educate pt/family on patient safety including physical limitations  - Instruct pt to call for assistance with activity based on assessment  - Modify environment to reduce risk of injury  - Provide assistive devices as appropriate  - Consider OT/PT consult to assist with strengthening/mobility  - Encourage toileting schedule  Outcome: Progressing     Problem: GASTROINTESTINAL - ADULT  Goal: Minimal or absence of nausea and vomiting  Description: INTERVENTIONS:  - Maintain adequate hydration with IV or PO as ordered and tolerated  - Nasogastric tube to low intermittent suction as ordered  - Evaluate effectiveness of ordered antiemetic medications  - Provide nonpharmacologic comfort measures as appropriate  - Advance diet as tolerated, if ordered  - Obtain nutritional consult as needed  - Evaluate fluid balance  Outcome: Progressing  Goal: Maintains or returns to baseline bowel function  Description: INTERVENTIONS:  - Assess bowel function  - Maintain adequate hydration with IV or PO as ordered and tolerated  - Evaluate effectiveness of GI medications  - Encourage mobilization and activity  - Obtain nutritional consult as needed  - Establish a toileting routine/schedule  - Consider collaborating with pharmacy to review patient's medication profile  Outcome: Progressing  Goal: Maintains adequate nutritional intake (undernourished)  Description: INTERVENTIONS:  - Monitor percentage of each meal consumed  - Identify factors  contributing to decreased intake, treat as appropriate  - Assist with meals as needed  - Monitor I&O, WT and lab values  - Obtain nutritional consult as needed  - Optimize oral hygiene and moisture  - Encourage food from home; allow for food preferences  - Enhance eating environment  Outcome: Progressing  Goal: Achieves appropriate nutritional intake (bariatric)  Description: INTERVENTIONS:  - Monitor for over-consumption  - Identify factors contributing to increased intake, treat as appropriate  - Monitor I&O, WT and lab values  - Obtain nutritional consult as needed  - Evaluate psychosocial factors contributing to over-consumption  Outcome: Progressing     A/O x4. No complains of pain. He restart his tube feedings around 4PM, he takes Nepro 2 cans x3/day.   If doing well will discharge home tomorrow.  - Provide timely, complete, and accurate information to patient/family  - Incorporate patient and family knowledge, values, beliefs, and cultural backgrounds into the planning and delivery of care  - Encourage patient/family to participate in care and decision-making at the level they choose  - Honor patient and family perspectives and choices  Outcome: Progressing     Problem: Diabetes/Glucose Control  Goal: Glucose maintained within prescribed range  Description: INTERVENTIONS:  - Monitor Blood Glucose as ordered  - Assess for signs and symptoms of hyperglycemia and hypoglycemia  - Administer ordered medications to maintain glucose within target range  - Assess barriers to adequate nutritional intake and initiate nutrition consult as needed  - Instruct patient on self management of diabetes  Outcome: Progressing

## 2024-01-12 NOTE — PLAN OF CARE
Problem: Patient Centered Care  Goal: Patient preferences are identified and integrated in the patient's plan of care  Description: Interventions:  - What would you like us to know as we care for you? From home with wife   - Provide timely, complete, and accurate information to patient/family  - Incorporate patient and family knowledge, values, beliefs, and cultural backgrounds into the planning and delivery of care  - Encourage patient/family to participate in care and decision-making at the level they choose  - Honor patient and family perspectives and choices  Outcome: Progressing     Problem: Diabetes/Glucose Control  Goal: Glucose maintained within prescribed range  Description: INTERVENTIONS:  - Monitor Blood Glucose as ordered  - Assess for signs and symptoms of hyperglycemia and hypoglycemia  - Administer ordered medications to maintain glucose within target range  - Assess barriers to adequate nutritional intake and initiate nutrition consult as needed  - Instruct patient on self management of diabetes  Outcome: Progressing     Problem: Patient/Family Goals  Goal: Patient/Family Long Term Goal  Description: Patient's Long Term Goal: return home     Interventions:  - follow doctors recommendations   - See additional Care Plan goals for specific interventions  Outcome: Progressing  Goal: Patient/Family Short Term Goal  Description: Patient's Short Term Goal: resolve diverticulitis     Interventions:   - iv abx   - monitoring labs and vital signs   - See additional Care Plan goals for specific interventions  Outcome: Progressing     Problem: SAFETY ADULT - FALL  Goal: Free from fall injury  Description: INTERVENTIONS:  - Assess pt frequently for physical needs  - Identify cognitive and physical deficits and behaviors that affect risk of falls.  - [place] fall precautions as indicated by assessment.  - Educate pt/family on patient safety including physical limitations  - Instruct pt to call for assistance  with activity based on assessment  - Modify environment to reduce risk of injury  - Provide assistive devices as appropriate  - Consider OT/PT consult to assist with strengthening/mobility  - Encourage toileting schedule  Outcome: Progressing     Problem: GASTROINTESTINAL - ADULT  Goal: Minimal or absence of nausea and vomiting  Description: INTERVENTIONS:  - Maintain adequate hydration with IV or PO as ordered and tolerated  - Nasogastric tube to low intermittent suction as ordered  - Evaluate effectiveness of ordered antiemetic medications  - Provide nonpharmacologic comfort measures as appropriate  - Advance diet as tolerated, if ordered  - Obtain nutritional consult as needed  - Evaluate fluid balance  Outcome: Progressing  Goal: Maintains or returns to baseline bowel function  Description: INTERVENTIONS:  - Assess bowel function  - Maintain adequate hydration with IV or PO as ordered and tolerated  - Evaluate effectiveness of GI medications  - Encourage mobilization and activity  - Obtain nutritional consult as needed  - Establish a toileting routine/schedule  - Consider collaborating with pharmacy to review patient's medication profile  Outcome: Progressing  Goal: Maintains adequate nutritional intake (undernourished)  Description: INTERVENTIONS:  - Monitor percentage of each meal consumed  - Identify factors contributing to decreased intake, treat as appropriate  - Assist with meals as needed  - Monitor I&O, WT and lab values  - Obtain nutritional consult as needed  - Optimize oral hygiene and moisture  - Encourage food from home; allow for food preferences  - Enhance eating environment  Outcome: Progressing  Goal: Achieves appropriate nutritional intake (bariatric)  Description: INTERVENTIONS:  - Monitor for over-consumption  - Identify factors contributing to increased intake, treat as appropriate  - Monitor I&O, WT and lab values  - Obtain nutritional consult as needed  - Evaluate psychosocial factors  contributing to over-consumption  Outcome: Progressing   Patient alert and oriented x4, vital signs stable , continue IV Zosyn, denies pain , patient on dialysis M-W-F, fall and aspiration precautions in place , call light within reach

## 2024-01-12 NOTE — PROGRESS NOTES
[facility]    Progress Note    Luisito Diop Patient Status:  Observation    1950 MRN [id number]   Location [facility] 5SW/SE Attending Rich Ruggiero MD   Hosp Day # 0 PCP Rich Ruggiero MD     Chief Complaint:     Subjective:     Constitutional: Negative.    HENT: Negative.     Cardiovascular: Negative.    Gastrointestinal:  Positive for abdominal pain and abdominal distention.        Better but still some  pain   Genitourinary: Negative.    Skin: Negative.    Neurological: Negative.    Hematological: Negative.        Objective:   Blood pressure 139/78, pulse 68, temperature 98.2 °F (36.8 °C), temperature source Oral, resp. rate 16, height 6' 5\" (1.956 m), weight 203 lb 9.6 oz (92.4 kg), SpO2 98%.  Physical Exam  Vitals and nursing note reviewed.   HENT:      Head: Normocephalic and atraumatic.      Nose: Nose normal.      Mouth/Throat:      Mouth: Mucous membranes are moist.   Cardiovascular:      Rate and Rhythm: Normal rate.   Pulmonary:      Effort: Pulmonary effort is normal.      Breath sounds: Normal breath sounds.   Abdominal:      General: Abdomen is flat. Bowel sounds are normal. There is distension.      Palpations: Abdomen is soft.      Tenderness: There is abdominal tenderness.      Comments: Llq pain,, improved but  to touch    Musculoskeletal:         General: Normal range of motion.      Cervical back: Normal range of motion and neck supple.   Skin:     General: Skin is warm and dry.   Neurological:      Mental Status: He is alert and oriented to person, place, and time.   Psychiatric:         Mood and Affect: Mood normal.         Results:   Lab Results   Component Value Date    WBC 5.9 01/10/2024    HGB 10.7 (L) 01/10/2024    HCT 33.7 (L) 01/10/2024    .0 01/10/2024    CREATSERUM 7.13 (H) 2024    BUN 73 (H) 2024     2024    K 3.8 2024    CL 96 (L) 2024    CO2 35.0 (H) 2024     (H) 2024    CA  9.2 01/12/2024    ALB 4.1 12/19/2023    ALKPHO 124 (H) 12/19/2023    BILT 0.3 12/19/2023    TP 7.0 12/19/2023    AST 29 12/19/2023    ALT 33 12/19/2023    TSH 0.771 10/07/2022     10/07/2022    MG 2.6 01/12/2024    PHOS 5.1 01/12/2024    TROP 0.062 (HH) 11/12/2020    B12 664 08/24/2020       No results found.        Assessment & Plan:         Acute diverticulitis  Still pain with touch some improvement, contienu 1 more day of IV ab   Continue with IV antibiotic for at least 1 more day if improved possible discharge tomorrow on p.o. antibiotic with been restarted tube feeds       Essential hypertension continue home meds          Anemia due to chronic kidney disease, on chronic dialysis (HCC) chronic stable          Other dysphagia stable          ESRD on hemodialysis (HCC) on HD, today     Rich Ruggiero MD  1/12/2024

## 2024-01-12 NOTE — CONSULTS
[facility]    Report of Consultation    Luisito Diop Patient Status:  Observation    1950 MRN [id number]   Location [facility] 5SW/SE Attending Rich Ruggiero MD   Hosp Day # 0 PCP Rich Ruggiero MD     Date of Admission:  1/10/2024  Date of Consult:  2024   Reason for Consultation:   ESRD    History of Present Illness:   Patient is a 73 year old male who was admitted to the hospital for Acute diverticulitis:    He presents with 10 days of left lower quadrant abdominal pain and nausea. No fevers or chills.  Positive decreased appetite.  No vomiting or diarrhea.  He saw his primary doctor and had an outpatient CT scan which showed mild diverticulitis and was sent here for admission.     He is feeling much better today      Past Medical History  Past Medical History:   Diagnosis Date    Diabetes (HCC)     Dialysis patient (HCC)     ESRD (end stage renal disease) on dialysis (HCC) 2020    Essential hypertension     High blood pressure     Renal disorder     Tonsillar cancer (HCC)        Past Surgical History  Past Surgical History:   Procedure Laterality Date    COLONOSCOPY         Family History  Family History   Problem Relation Age of Onset    Cancer Sister         lung cancer       Social History  Social History     Socioeconomic History    Marital status:    Tobacco Use    Smoking status: Never    Smokeless tobacco: Never   Vaping Use    Vaping Use: Never used   Substance and Sexual Activity    Alcohol use: Not Currently    Drug use: Never     Social Determinants of Health     Financial Resource Strain: Low Risk  (11/15/2023)    Financial Resource Strain     Difficulty of Paying Living Expenses: Not very hard     Med Affordability: No   Food Insecurity: No Food Insecurity (2024)    Food Insecurity     Food Insecurity: Never true   Transportation Needs: No Transportation Needs (2024)    Transportation Needs     Lack of Transportation: No   Housing Stability: Low  Risk  (1/11/2024)    Housing Stability     Housing Instability: No       Current Medications:  Current Facility-Administered Medications   Medication Dose Route Frequency    amLODIPine (Norvasc) tab 10 mg  10 mg Oral Daily    clonazePAM (KlonoPIN) tab 1 mg  1 mg Oral Nightly PRN    hydrALAZINE (Apresoline) tab 10 mg  10 mg Oral BID    heparin (Porcine) 5000 UNIT/ML injection 5,000 Units  5,000 Units Subcutaneous Q8H SAMIA    piperacillin-tazobactam (Zosyn) 3.375 g in dextrose 5% 100 mL IVPB-ADDV  3.375 g Intravenous q12h    dextrose 10% infusion (TPN no rate)   Intravenous Continuous PRN    pancrelipase (Lip-Prot-Amyl) (Zenpep) DR particles cap 10,000 Units  10,000 Units Per G Tube PRN    And    sodium bicarbonate tab 325 mg  325 mg Oral PRN     Medications Prior to Admission   Medication Sig    clonazePAM 1 MG Oral Tab Take 1 tablet (1 mg total) by mouth nightly as needed for Anxiety.    hydrALAZINE 10 MG Oral Tab Take 1 tablet (10 mg total) by mouth in the morning and 1 tablet (10 mg total) before bedtime.    amLODIPine 10 MG Oral Tab Take 1 tablet (10 mg total) by mouth daily.    aspirin 81 MG Oral Tab Take 1 tablet (81 mg total) by mouth daily.    dicyclomine 20 MG Oral Tab Take 1 tablet (20 mg total) by mouth nightly as needed (as needed for cramping/ab pain).       Allergies  No Active Allergies    Review of Systems:     General: weak        A comprehensive 12 point review of systems was completed.  Pertinent positives as above and all the rest were negative.     Physical Exam:   /75 (BP Location: Right arm)   Pulse 70   Temp 98.2 °F (36.8 °C) (Oral)   Resp 18   Ht 6' 5\" (1.956 m)   Wt 203 lb 9.6 oz (92.4 kg)   SpO2 100%   BMI 24.14 kg/m²      Intake/Output Summary (Last 24 hours) at 1/12/2024 1200  Last data filed at 1/11/2024 2307  Gross per 24 hour   Intake 893.4 ml   Output 300 ml   Net 593.4 ml     Wt Readings from Last 1 Encounters:   01/11/24 203 lb 9.6 oz (92.4 kg)       Exam  Gen: No acute  distress  Heent: NC AT, mucous memb clear, neck supple  Pulm: Lungs clear, normal respiratory effort  CV: Heart with regular rate and rhythm, no edema  Abd: Abdomen soft, nontender, nondistended, no organomegaly, bowel sounds present  Skin: no symptoms reported  Psych: alert and oriented        Results:     Laboratory Data:  Recent Labs   Lab 01/10/24  2109   RBC 3.62*   HGB 10.7*   HCT 33.7*   MCV 93.1   MCH 29.6   MCHC 31.8   RDW 15.4*   NEPRELIM 4.29   WBC 5.9   .0         Recent Labs   Lab 01/10/24  2109 01/12/24  0509   * 120*   BUN 49* 73*   CREATSERUM 5.23* 7.13*   CA 9.4 9.2    139   K 4.0 3.8   CL 95* 96*   CO2 37.0* 35.0*        Imaging:  No results found.            Impression/Receommendations:   1 - ESRD  The [facility].    I will do his regular dialysis treatment today and ordered again for Monday if he is still here     2 - Anemia  Stable, at goal    3 - HTN w ESRD  Hydralazine and amlodipine    4 -acute diverticulitis  Patient is on Zosyn    Thank you for allowing me to participate in the care of your patient.    KAROLYN METZGER MD  1/12/2024

## 2024-01-13 VITALS
SYSTOLIC BLOOD PRESSURE: 135 MMHG | DIASTOLIC BLOOD PRESSURE: 71 MMHG | HEIGHT: 77 IN | BODY MASS INDEX: 23.22 KG/M2 | OXYGEN SATURATION: 99 % | RESPIRATION RATE: 16 BRPM | WEIGHT: 196.63 LBS | TEMPERATURE: 99 F | HEART RATE: 71 BPM

## 2024-01-13 LAB
ALBUMIN SERPL-MCNC: 3.8 G/DL (ref 3.2–4.8)
ANION GAP SERPL CALC-SCNC: 8 MMOL/L (ref 0–18)
BASOPHILS # BLD AUTO: 0.03 X10(3) UL (ref 0–0.2)
BASOPHILS NFR BLD AUTO: 0.7 %
BUN BLD-MCNC: 35 MG/DL (ref 9–23)
BUN/CREAT SERPL: 7.8 (ref 10–20)
CALCIUM BLD-MCNC: 9.2 MG/DL (ref 8.7–10.4)
CHLORIDE SERPL-SCNC: 102 MMOL/L (ref 98–112)
CO2 SERPL-SCNC: 31 MMOL/L (ref 21–32)
CREAT BLD-MCNC: 4.48 MG/DL
DEPRECATED RDW RBC AUTO: 51.8 FL (ref 35.1–46.3)
EGFRCR SERPLBLD CKD-EPI 2021: 13 ML/MIN/1.73M2 (ref 60–?)
EOSINOPHIL # BLD AUTO: 0.28 X10(3) UL (ref 0–0.7)
EOSINOPHIL NFR BLD AUTO: 6.4 %
ERYTHROCYTE [DISTWIDTH] IN BLOOD BY AUTOMATED COUNT: 14.9 % (ref 11–15)
GLUCOSE BLD-MCNC: 94 MG/DL (ref 70–99)
GLUCOSE BLDC GLUCOMTR-MCNC: 101 MG/DL (ref 70–99)
GLUCOSE BLDC GLUCOMTR-MCNC: 128 MG/DL (ref 70–99)
GLUCOSE BLDC GLUCOMTR-MCNC: 145 MG/DL (ref 70–99)
HCT VFR BLD AUTO: 33.8 %
HGB BLD-MCNC: 10.8 G/DL
IMM GRANULOCYTES # BLD AUTO: 0.06 X10(3) UL (ref 0–1)
IMM GRANULOCYTES NFR BLD: 1.4 %
LYMPHOCYTES # BLD AUTO: 0.55 X10(3) UL (ref 1–4)
LYMPHOCYTES NFR BLD AUTO: 12.6 %
MCH RBC QN AUTO: 29.7 PG (ref 26–34)
MCHC RBC AUTO-ENTMCNC: 32 G/DL (ref 31–37)
MCV RBC AUTO: 92.9 FL
MONOCYTES # BLD AUTO: 0.55 X10(3) UL (ref 0.1–1)
MONOCYTES NFR BLD AUTO: 12.6 %
NEUTROPHILS # BLD AUTO: 2.88 X10 (3) UL (ref 1.5–7.7)
NEUTROPHILS # BLD AUTO: 2.88 X10(3) UL (ref 1.5–7.7)
NEUTROPHILS NFR BLD AUTO: 66.3 %
OSMOLALITY SERPL CALC.SUM OF ELEC: 300 MOSM/KG (ref 275–295)
PHOSPHATE SERPL-MCNC: 3.4 MG/DL (ref 2.4–5.1)
PLATELET # BLD AUTO: 247 10(3)UL (ref 150–450)
POTASSIUM SERPL-SCNC: 4 MMOL/L (ref 3.5–5.1)
RBC # BLD AUTO: 3.64 X10(6)UL
SODIUM SERPL-SCNC: 141 MMOL/L (ref 136–145)
WBC # BLD AUTO: 4.4 X10(3) UL (ref 4–11)

## 2024-01-13 PROCEDURE — 99233 SBSQ HOSP IP/OBS HIGH 50: CPT | Performed by: INTERNAL MEDICINE

## 2024-01-13 PROCEDURE — 99239 HOSP IP/OBS DSCHRG MGMT >30: CPT | Performed by: INTERNAL MEDICINE

## 2024-01-13 RX ORDER — LEVOFLOXACIN 750 MG/1
750 TABLET, FILM COATED ORAL DAILY
Qty: 10 TABLET | Refills: 0 | Status: SHIPPED | OUTPATIENT
Start: 2024-01-13

## 2024-01-13 RX ORDER — ACETAMINOPHEN 325 MG/1
650 TABLET ORAL EVERY 6 HOURS PRN
Status: DISCONTINUED | OUTPATIENT
Start: 2024-01-13 | End: 2024-01-13

## 2024-01-13 RX ORDER — METRONIDAZOLE 500 MG/1
500 TABLET ORAL 3 TIMES DAILY
Qty: 30 TABLET | Refills: 0 | Status: SHIPPED | OUTPATIENT
Start: 2024-01-13 | End: 2024-01-23

## 2024-01-13 NOTE — PLAN OF CARE
Patient updated on plan of care. No c/o pain. Bolus feeds given as ordered. Patient only wanted a total of 135ml flush total for each bolus feed. G-tube patent and intact. Sips of clears throughout shift. IV removed by this RN. Discharge education and paperwork given to patient. All questions answered. Patient to discharge home with wife pending .     Problem: Patient Centered Care  Goal: Patient preferences are identified and integrated in the patient's plan of care  Description: Interventions:  - What would you like us to know as we care for you? Home w/ wife  - Provide timely, complete, and accurate information to patient/family  - Incorporate patient and family knowledge, values, beliefs, and cultural backgrounds into the planning and delivery of care  - Encourage patient/family to participate in care and decision-making at the level they choose  - Honor patient and family perspectives and choices  Outcome: Adequate for Discharge     Problem: Diabetes/Glucose Control  Goal: Glucose maintained within prescribed range  Description: INTERVENTIONS:  - Monitor Blood Glucose as ordered  - Assess for signs and symptoms of hyperglycemia and hypoglycemia  - Administer ordered medications to maintain glucose within target range  - Assess barriers to adequate nutritional intake and initiate nutrition consult as needed  - Instruct patient on self management of diabetes  Outcome: Adequate for Discharge     Problem: Patient/Family Goals  Goal: Patient/Family Long Term Goal  Description: Patient's Long Term Goal: Discharge    Interventions:  - Follow healthcare team treatment plan   - Monitor labs, vitals, and diagnostic test  - Pain Management   - Diet Recommendations  - Participate in therapy  - Discharge planning   - See additional Care Plan goals for specific interventions  Outcome: Adequate for Discharge  Goal: Patient/Family Short Term Goal  Description: Patient's Short Term Goal: absence/less abdominal  pain    Interventions:   - Follow healthcare team treatment plan   - Monitor labs, vitals, and diagnostic test  - Pain Management, pharmacological and non-pharmacological interventions  - Diet Recommendations  - Adequate oral intake  - IV antibiotics as ordered  - Gtube bolus feeds  - Sips of clears   - See additional Care Plan goals for specific interventions  Outcome: Adequate for Discharge     Problem: SAFETY ADULT - FALL  Goal: Free from fall injury  Description: INTERVENTIONS:  - Assess pt frequently for physical needs  - Identify cognitive and physical deficits and behaviors that affect risk of falls.  - [place] fall precautions as indicated by assessment.  - Educate pt/family on patient safety including physical limitations  - Instruct pt to call for assistance with activity based on assessment  - Modify environment to reduce risk of injury  - Provide assistive devices as appropriate  - Consider OT/PT consult to assist with strengthening/mobility  - Encourage toileting schedule  Outcome: Adequate for Discharge     Problem: GASTROINTESTINAL - ADULT  Goal: Minimal or absence of nausea and vomiting  Description: INTERVENTIONS:  - Maintain adequate hydration with IV or PO as ordered and tolerated  - Nasogastric tube to low intermittent suction as ordered  - Evaluate effectiveness of ordered antiemetic medications  - Provide nonpharmacologic comfort measures as appropriate  - Advance diet as tolerated, if ordered  - Obtain nutritional consult as needed  - Evaluate fluid balance  Outcome: Adequate for Discharge  Goal: Maintains or returns to baseline bowel function  Description: INTERVENTIONS:  - Assess bowel function  - Maintain adequate hydration with IV or PO as ordered and tolerated  - Evaluate effectiveness of GI medications  - Encourage mobilization and activity  - Obtain nutritional consult as needed  - Establish a toileting routine/schedule  - Consider collaborating with pharmacy to review patient's  medication profile  Outcome: Adequate for Discharge  Goal: Maintains adequate nutritional intake (undernourished)  Description: INTERVENTIONS:  - Monitor percentage of each meal consumed  - Identify factors contributing to decreased intake, treat as appropriate  - Assist with meals as needed  - Monitor I&O, WT and lab values  - Obtain nutritional consult as needed  - Optimize oral hygiene and moisture  - Encourage food from home; allow for food preferences  - Enhance eating environment  Outcome: Adequate for Discharge  Goal: Achieves appropriate nutritional intake (bariatric)  Description: INTERVENTIONS:  - Monitor for over-consumption  - Identify factors contributing to increased intake, treat as appropriate  - Monitor I&O, WT and lab values  - Obtain nutritional consult as needed  - Evaluate psychosocial factors contributing to over-consumption  Outcome: Adequate for Discharge     Problem: METABOLIC/FLUID AND ELECTROLYTES - ADULT  Goal: Glucose maintained within prescribed range  Description: INTERVENTIONS:  - Monitor Blood Glucose as ordered  - Assess for signs and symptoms of hyperglycemia and hypoglycemia  - Administer ordered medications to maintain glucose within target range  - Assess barriers to adequate nutritional intake and initiate nutrition consult as needed  - Instruct patient on self management of diabetes  Outcome: Adequate for Discharge  Goal: Electrolytes maintained within normal limits  Description: INTERVENTIONS:  - Monitor labs and rhythm and assess patient for signs and symptoms of electrolyte imbalances  - Administer electrolyte replacement as ordered  - Monitor response to electrolyte replacements, including rhythm and repeat lab results as appropriate  - Fluid restriction as ordered  - Instruct patient on fluid and nutrition restrictions as appropriate  Outcome: Adequate for Discharge

## 2024-01-13 NOTE — PROGRESS NOTES
[facility]    Progress Note    Luisito Diop Patient Status:  Observation    1950 MRN [id number]   Location [facility] 5SW/SE Attending Rich Ruggiero MD   Hosp Day # 0 PCP Rich Ruggiero MD       Subjective:   Luisito Diop is a(n) 73 year old male who I am seeing for ESRD    Patient is resting      Objective:   /76 (BP Location: Right arm)   Pulse 68   Temp 98.4 °F (36.9 °C) (Oral)   Resp 18   Ht 6' 5\" (1.956 m)   Wt 196 lb 10.4 oz (89.2 kg)   SpO2 98%   BMI 23.32 kg/m²      Intake/Output Summary (Last 24 hours) at 2024 1139  Last data filed at 2024 0935  Gross per 24 hour   Intake 3169 ml   Output 2000 ml   Net 1169 ml     Wt Readings from Last 1 Encounters:   24 196 lb 10.4 oz (89.2 kg)       Exam  Gen: No acute distress, Heent: NC AT, mucous memb clear, neck supple  Pulm: Lungs clear, normal respiratory effort  CV: Heart with regular rate and rhythm, no edema  Abd: Abdomen soft, nontender, nondistended, no organomegaly, bowel sounds present  Skin: no symptoms reported  Psych: alert and oriented    Assessment and Plan:   1 - ESRD  Patient had dialysis last night, he was upset about the late timing and he shortened his treatment to 3 hrs    [facility]     If he is still here Monday I will do his dialysis     2 - Anemia  Stable, at goal     3 - HTN w ESRD  Hydralazine and amlodipine     4 -acute diverticulitis  Patient is on Zosyn                 Results:     Recent Labs   Lab 01/10/24  2109 01/13/24  0542   RBC 3.62* 3.64*   HGB 10.7* 10.8*   HCT 33.7* 33.8*   MCV 93.1 92.9   MCH 29.6 29.7   MCHC 31.8 32.0   RDW 15.4* 14.9   NEPRELIM 4.29 2.88   WBC 5.9 4.4   .0 247.0         Recent Labs   Lab 01/10/24  2109 01/12/24  0509 24  0542   * 120* 94   BUN 49* 73* 35*   CREATSERUM 5.23* 7.13* 4.48*   CA 9.4 9.2 9.2    139 141   K 4.0 3.8 4.0   CL 95* 96* 102   CO2 37.0*  35.0* 31.0          No results found.        KARLOYN METZGER MD  1/13/2024

## 2024-01-13 NOTE — DISCHARGE SUMMARY
[facility]  Hospitalist Discharge Summary    Luisito Diop Patient Status:  Observation    1950 MRN [id number]   Location [facility] 5SW/SE Attending Rich Ruggiero MD   Hosp Day # 0 PCP Rich Ruggiero MD     Date of Admission: 1/10/2024  Date of Discharge: 24  Discharge Disposition: Home or Self Care    Admitting Diagnosis:   ESRD on hemodialysis (HCC) [N18.6, Z99.2]  Acute diverticulitis [K57.92]    Hospital Discharge Diagnoses:  Acute diverticulitis, ESRD, Anemia, HTN    Lace+ Score: 82  59-90 High Risk  29-58 Medium Risk  0-28   Low Risk.    TCM Follow-Up Recommendation:  LACE > 58: High Risk of readmission after discharge from the hospital.    Risk of readmission: Luisito Diop has High Risk of readmission after discharge from the hospital.    Discharge Diagnosis:   Acute diverticulitis, ESRD, Anemia, HTN    History of Present Illness:   As per H&P note:  Patient seen by me yesterday in office with complaint of left lower and mid quadrant abdominal pain which has been going on for a week initially patient thought it was due to the fall that he sustained but there is no bruising the pain seemed more deep.  He has been using the G-tube and its been flushing and working okay no blood in the stool patient still with dysphagia can take some liquids.  I did a CT scan outpatient that showed acute diverticulitis of the sigmoid and descending colon  No abscess noted     Patient started on IV antibiotic Zosyn he says today feels little better but still in pain    Brief Synopsis:   Patient continued IV antibiotics and his symptoms significantly improved.  He also received dialysis during his hospitalization.  Denies fever or chills.  Abdomen is soft with no tenderness on the day of discharge.  Patient was deemed cleared for discharge with oral antibiotics.    Procedures during hospitalization:   Hemodialysis    Incidental or significant findings and recommendations (brief  descriptions):  N/A    Lab/Test results pending at Discharge:   N/A    Consultants:  Trista Benjamin MD-nephrology    Discharge Medication List:     Discharge Medications        START taking these medications        Instructions Prescription details   levoFLOXacin 750 MG Tabs  Commonly known as: Levaquin      Take 1 tablet (750 mg total) by mouth daily.   Quantity: 10 tablet  Refills: 0     metRONIDAZOLE 500 MG Tabs  Commonly known as: Flagyl      Take 1 tablet (500 mg total) by mouth 3 (three) times daily for 10 days.   Stop taking on: January 23, 2024  Quantity: 30 tablet  Refills: 0            CONTINUE taking these medications        Instructions Prescription details   amLODIPine 10 MG Tabs  Commonly known as: Norvasc      Take 1 tablet (10 mg total) by mouth daily.   Quantity: 180 tablet  Refills: 1     aspirin 81 MG Tabs      Take 1 tablet (81 mg total) by mouth daily.   Refills: 0     clonazePAM 1 MG Tabs  Commonly known as: KlonoPIN      Take 1 tablet (1 mg total) by mouth nightly as needed for Anxiety.   Quantity: 30 tablet  Refills: 0     dicyclomine 20 MG Tabs  Commonly known as: Bentyl      Take 1 tablet (20 mg total) by mouth nightly as needed (as needed for cramping/ab pain).   Stop taking on: February 8, 2024  Quantity: 30 tablet  Refills: 2     hydrALAZINE 10 MG Tabs  Commonly known as: Apresoline      Take 1 tablet (10 mg total) by mouth in the morning and 1 tablet (10 mg total) before bedtime.   Quantity: 60 tablet  Refills: 2               Where to Get Your Medications        These medications were sent to [facility] [address] [place] [address] [phone], [phone]  [address] [place] [id number]      Phone: [phone]   levoFLOXacin 750 MG Tabs  metRONIDAZOLE 500 MG Tabs         Follow-up appointment:   No follow-up provider specified.    Vital signs:  Temp:  [97.9 °F (36.6 °C)-98.5 °F (36.9 °C)] 98.4 °F (36.9 °C)  Pulse:  [68-80] 68  Resp:  [16-19]  18  BP: (108-140)/(69-81) 113/76  SpO2:  [97 %-98 %] 98 %    Physical Exam:    General: No acute distress.   Respiratory: Clear to auscultation bilaterally. No wheezes. No rhonchi.  Cardiovascular: S1, S2. Regular rate and rhythm. No murmurs, rubs or gallops.   Abdomen: Soft, nontender, nondistended.  Positive bowel sounds. No rebound or guarding.  Neurologic: No focal neurological deficits.   Musculoskeletal: Moves all extremities.  Extremities: No edema.  -----------------------------------------------------------------------------------------------  PATIENT DISCHARGE INSTRUCTIONS: See electronic chart    Kimmie Smith MD 1/13/2024    Time spent:  > 30 minutes

## 2024-01-13 NOTE — HOME CARE LIAISON
[facility]. Spoke w/ patient's spouse  who is agreeable with [facility]. Contact information placed on AVS.

## 2024-01-13 NOTE — PLAN OF CARE
Problem: Patient Centered Care  Goal: Patient preferences are identified and integrated in the patient's plan of care  Description: Interventions:  - What would you like us to know as we care for you? Home w/ wife  - Provide timely, complete, and accurate information to patient/family  - Incorporate patient and family knowledge, values, beliefs, and cultural backgrounds into the planning and delivery of care  - Encourage patient/family to participate in care and decision-making at the level they choose  - Honor patient and family perspectives and choices  Outcome: Progressing     Problem: Diabetes/Glucose Control  Goal: Glucose maintained within prescribed range  Description: INTERVENTIONS:  - Monitor Blood Glucose as ordered  - Assess for signs and symptoms of hyperglycemia and hypoglycemia  - Administer ordered medications to maintain glucose within target range  - Assess barriers to adequate nutritional intake and initiate nutrition consult as needed  - Instruct patient on self management of diabetes  Outcome: Progressing     Problem: Patient/Family Goals  Goal: Patient/Family Long Term Goal  Description: Patient's Long Term Goal: Discharge    Interventions:  - Follow healthcare team treatment plan   - Monitor labs, vitals, and diagnostic test  - Pain Management   - Diet Recommendations  - Participate in therapy  - Discharge planning   - See additional Care Plan goals for specific interventions  Outcome: Progressing  Goal: Patient/Family Short Term Goal  Description: Patient's Short Term Goal: absence/less abdominal pain    Interventions:   - Follow healthcare team treatment plan   - Monitor labs, vitals, and diagnostic test  - Pain Management, pharmacological and non-pharmacological interventions  - Diet Recommendations  - Adequate oral intake  - IV antibiotics as ordered  - Gtube bolus feeds  - Sips of clears   - See additional Care Plan goals for specific interventions  Outcome: Progressing     Problem:  SAFETY ADULT - FALL  Goal: Free from fall injury  Description: INTERVENTIONS:  - Assess pt frequently for physical needs  - Identify cognitive and physical deficits and behaviors that affect risk of falls.  - Brady fall precautions as indicated by assessment.  - Educate pt/family on patient safety including physical limitations  - Instruct pt to call for assistance with activity based on assessment  - Modify environment to reduce risk of injury  - Provide assistive devices as appropriate  - Consider OT/PT consult to assist with strengthening/mobility  - Encourage toileting schedule  Outcome: Progressing     Problem: GASTROINTESTINAL - ADULT  Goal: Minimal or absence of nausea and vomiting  Description: INTERVENTIONS:  - Maintain adequate hydration with IV or PO as ordered and tolerated  - Nasogastric tube to low intermittent suction as ordered  - Evaluate effectiveness of ordered antiemetic medications  - Provide nonpharmacologic comfort measures as appropriate  - Advance diet as tolerated, if ordered  - Obtain nutritional consult as needed  - Evaluate fluid balance  Outcome: Progressing  Goal: Maintains adequate nutritional intake (undernourished)  Description: INTERVENTIONS:  - Monitor percentage of each meal consumed  - Identify factors contributing to decreased intake, treat as appropriate  - Assist with meals as needed  - Monitor I&O, WT and lab values  - Obtain nutritional consult as needed  - Optimize oral hygiene and moisture  - Encourage food from home; allow for food preferences  - Enhance eating environment  Outcome: Progressing     Problem: METABOLIC/FLUID AND ELECTROLYTES - ADULT  Goal: Glucose maintained within prescribed range  Description: INTERVENTIONS:  - Monitor Blood Glucose as ordered  - Assess for signs and symptoms of hyperglycemia and hypoglycemia  - Administer ordered medications to maintain glucose within target range  - Assess barriers to adequate nutritional intake and initiate  nutrition consult as needed  - Instruct patient on self management of diabetes  Outcome: Progressing  Goal: Electrolytes maintained within normal limits  Description: INTERVENTIONS:  - Monitor labs and rhythm and assess patient for signs and symptoms of electrolyte imbalances  - Administer electrolyte replacement as ordered  - Monitor response to electrolyte replacements, including rhythm and repeat lab results as appropriate  - Fluid restriction as ordered  - Instruct patient on fluid and nutrition restrictions as appropriate  Outcome: Progressing    Patient alert, oriented x4. Follows commands. On RA. SR. VSS. Had HD last night. 2L removed. After he came back he c/o abdominal cramping. MD paged and tylenol PRN given. Oliguric. Bolus 1 can Nepro last night. Pt refused 1.5 as ordered. Had 1BM last night. Repositions by self.

## 2024-01-13 NOTE — CM/SW NOTE
01/13/24 1300   Discharge disposition   Expected discharge disposition Home or Self   Post [facility] Care Provider [facility]   Outpatient services Dialysis  ([facility]  MWF)   DME/Infusion Providers [facility]  ([facility])   Discharge transportation Private car     Pt received MDO for discharge. Pt is self, NN at this time.     327pm- KELLY was informed by RN that MD order [facility]. SW reached out to [facility] liaison to see if HH has been order. Mary Ann from [facility] informed SW that pt declined HH in Nov. SW met with pt in regards to HH. Pt wishes to have his wife answer, Wife wishes for HH. SW initiated referral via [facility]in, and placed f2f. SW informed [facility] of HH referral sent. [facility] avail to accept patient, SW reserved HH, and placed [facility] information in AVS.       SW/CM to remain available for support and/or discharge planning.     Jennifer Boucher, MSW, LSW   x [phone]

## 2024-01-14 NOTE — PROGRESS NOTES
Subjective:     Patient ID: Luisito Diop is a 73 year old male.    HPI    Pt comes in with complaint of left side abd pain for a week not getting better. Pt had fallen at home and thought the ;pain was due to that   No nause and no vomiting, no fever      History/Other:   Review of Systems   Constitutional: Negative.  Negative for fatigue and fever.   HENT: Negative.  Negative for congestion.    Eyes: Negative.    Respiratory: Negative.  Negative for cough, shortness of breath and wheezing.    Cardiovascular: Negative.  Negative for chest pain, palpitations and leg swelling.   Gastrointestinal:  Positive for abdominal pain. Negative for nausea and vomiting.   Endocrine: Negative for cold intolerance and heat intolerance.   Genitourinary: Negative.  Negative for dysuria, flank pain and hematuria.   Musculoskeletal: Negative.  Negative for arthralgias, back pain and myalgias.   Skin: Negative.    Neurological: Negative.  Negative for dizziness, tremors, syncope, weakness and headaches.   Psychiatric/Behavioral: Negative.  Negative for agitation, behavioral problems and suicidal ideas. The patient is not nervous/anxious.      Current Outpatient Medications   Medication Sig Dispense Refill    levoFLOXacin 750 MG Oral Tab Take 1 tablet (750 mg total) by mouth daily. 10 tablet 0    metRONIDAZOLE 500 MG Oral Tab Take 1 tablet (500 mg total) by mouth 3 (three) times daily for 10 days. 30 tablet 0    clonazePAM 1 MG Oral Tab Take 1 tablet (1 mg total) by mouth nightly as needed for Anxiety. 30 tablet 0    dicyclomine 20 MG Oral Tab Take 1 tablet (20 mg total) by mouth nightly as needed (as needed for cramping/ab pain). 30 tablet 2    hydrALAZINE 10 MG Oral Tab Take 1 tablet (10 mg total) by mouth in the morning and 1 tablet (10 mg total) before bedtime. 60 tablet 2    amLODIPine 10 MG Oral Tab Take 1 tablet (10 mg total) by mouth daily. 180 tablet 1    aspirin 81 MG Oral Tab Take 1 tablet (81 mg total) by mouth daily.        Allergies:No Active Allergies    Past Medical History:   Diagnosis Date    Diabetes (HCC)     Dialysis patient (HCC)     ESRD (end stage renal disease) on dialysis ([facility]) 2020    Essential hypertension     High blood pressure     Renal disorder     Tonsillar cancer (HCC)       Past Surgical History:   Procedure Laterality Date    COLONOSCOPY        Family History   Problem Relation Age of Onset    Cancer Sister         lung cancer      Social History:   Social History     Socioeconomic History    Marital status:    Tobacco Use    Smoking status: Never    Smokeless tobacco: Never   Vaping Use    Vaping Use: Never used   Substance and Sexual Activity    Alcohol use: Not Currently    Drug use: Never     Social Determinants of Health     Financial Resource Strain: Low Risk  (11/15/2023)    Financial Resource Strain     Difficulty of Paying Living Expenses: Not very hard     Med Affordability: No   Food Insecurity: No Food Insecurity (1/11/2024)    Food Insecurity     Food Insecurity: Never true   Transportation Needs: No Transportation Needs (1/11/2024)    Transportation Needs     Lack of Transportation: No   Housing Stability: Low Risk  (1/11/2024)    Housing Stability     Housing Instability: No        Objective:   Physical Exam  Constitutional:       Appearance: He is well-developed.   HENT:      Head: Normocephalic and atraumatic.      Right Ear: External ear normal.      Left Ear: External ear normal.      Nose: Nose normal.   Eyes:      Conjunctiva/sclera: Conjunctivae normal.      Pupils: Pupils are equal, round, and reactive to light.   Cardiovascular:      Rate and Rhythm: Normal rate and regular rhythm.      Heart sounds: Normal heart sounds.   Pulmonary:      Effort: Pulmonary effort is normal.      Breath sounds: Normal breath sounds.   Abdominal:      General: Bowel sounds are normal.      Palpations: Abdomen is soft.      Tenderness: There is abdominal tenderness. There is guarding.       Comments: Left side abd pain    Genitourinary:     Penis: Normal.       Prostate: Normal.      Rectum: Normal.   Musculoskeletal:         General: Normal range of motion.      Cervical back: Normal range of motion and neck supple.   Skin:     General: Skin is warm and dry.   Neurological:      Mental Status: He is alert and oriented to person, place, and time.      Motor: Weakness present.      Deep Tendon Reflexes: Reflexes are normal and symmetric.         Assessment & Plan:   1. LLQ abdominal pain will refer for CT abd ,stat    2. Other fatigue , will forder few labs also will follow        Orders Placed This Encounter   Procedures    CBC With Differential With Platelet    Comp Metabolic Panel (14)    TSH W Reflex To Free T4       Meds This Visit:  Requested Prescriptions      No prescriptions requested or ordered in this encounter       Imaging & Referrals:  None

## 2024-01-15 ENCOUNTER — TELEPHONE (OUTPATIENT)
Dept: INTERNAL MEDICINE CLINIC | Facility: CLINIC | Age: 74
End: 2024-01-15

## 2024-01-15 ENCOUNTER — PATIENT OUTREACH (OUTPATIENT)
Dept: CASE MANAGEMENT | Age: 74
End: 2024-01-15

## 2024-01-15 DIAGNOSIS — K57.92 ACUTE DIVERTICULITIS: Primary | ICD-10-CM

## 2024-01-15 DIAGNOSIS — Z02.9 ENCOUNTERS FOR UNSPECIFIED ADMINISTRATIVE PURPOSE: ICD-10-CM

## 2024-01-15 PROCEDURE — 1111F DSCHRG MED/CURRENT MED MERGE: CPT

## 2024-01-15 NOTE — PROGRESS NOTES
Initial Post Discharge Follow Up   Discharge Date: 1/13/24  Contact Date: 1/15/2024    Consent Verification:  Assessment Completed With: Patient  HIPAA Verified?  Yes    Discharge Dx:    Acute diverticulitis, ESRD, Anemia, HTN     General:   How have you been since your discharge from the hospital? Pt feeling better, since hospital discharge--denies any further abdominal pain at this time. Pt tolerating chronic G-tube feeds with sips of clears, waiting for pharmacy for Levaquin and Metronidazole Rx . Patient denies fever, chills, nausea, vomiting, diarrhea, chest pain or shortness of breath at this time. Pt confirms his spouse will f/u with [facility]  for start of care date.   Do you have any pain since discharge?  No    How well was your pain managed while in the hospital?   On a scale of 1-5   1- Very Poor and 5- Very well   Very Well  When you were leaving the hospital were your discharge instructions reviewed with you? Yes  How well were your discharge instructions explained to you?   On a scale of 1-5   1- Very Poor and 5- Very well   Very Well  Do you have any questions about your discharge instructions?  No  Before leaving the hospital was your diagnoses explained to you? Yes  Do you have any questions about your diagnoses? No  Are you able to perform normal daily activities of living as you have prior to your hospital stay (dressing, bathing, ambulating to the bathroom, etc)? yes  (NCM) Was patient given a different diet per AVS? yes  If so, which diet?  Diverticulitis diet  Are there any barriers to following this diet? no--pt with chronic G-tube feeds and sips of clears    Medications:   Current Outpatient Medications   Medication Sig Dispense Refill    levoFLOXacin 750 MG Oral Tab Take 1 tablet (750 mg total) by mouth daily. 10 tablet 0    metRONIDAZOLE 500 MG Oral Tab Take 1 tablet (500 mg total) by mouth 3 (three) times daily for 10 days. 30 tablet 0    clonazePAM 1 MG Oral Tab Take 1 tablet  (1 mg total) by mouth nightly as needed for Anxiety. 30 tablet 0    dicyclomine 20 MG Oral Tab Take 1 tablet (20 mg total) by mouth nightly as needed (as needed for cramping/ab pain). 30 tablet 2    hydrALAZINE 10 MG Oral Tab Take 1 tablet (10 mg total) by mouth in the morning and 1 tablet (10 mg total) before bedtime. 60 tablet 2    amLODIPine 10 MG Oral Tab Take 1 tablet (10 mg total) by mouth daily. 180 tablet 1    aspirin 81 MG Oral Tab Take 1 tablet (81 mg total) by mouth daily.       Were there any changes to your current medication(s) noted on the AVS? Yes  START taking:  levoFLOXacin (Levaquin)  metRONIDAZOLE (Flagyl)  If so, were these medication changes discussed with you prior to leaving the hospital? Yes  If a new medication was prescribed:    Was the new medication's purpose & side effects reviewed? Yes  Do you have any questions about your new medication? No  Did you  your discharge medications when you left the hospital? No--waiting on pharmacy   Let's go over your medications together to make sure we are not missing anything. Medications Reviewed  Are there any reasons that keep you from taking your medication as prescribed? No  Are you having any concerns with constipation? No  Did patient receive their flu shot (Sept-March)? No    Discharge medications reviewed/discussed/and reconciled against outpatient medications with patient.  Any changes or updates to medications sent to PCP.  Patient Acknowledged     Referrals/orders at D/C:  Referrals/orders placed at D/C? yes  What services:   Home health   (If HH was ordered) Has HH been set up?  No--spouse to f/u with H    If Yes: With Whom: [facility]  DME ordered at D/C? No    Discharge orders, AVS reviewed and discussed with patient. Any changes or updates to orders sent to PCP.  Patient Acknowledged      SDOH:   Transportation:   Transportation Needs: No Transportation Needs (1/15/2024)    Transportation Needs     Lack of Transportation:  No     Financial Strain:    Financial Resource Strain: Low Risk  (1/15/2024)    Financial Resource Strain     Difficulty of Paying Living Expenses: Not very hard     Med Affordability: No       Follow up appointments:      Your appointments       Date & Time Appointment Department (Center)    Mar 19, 2024 10:00 AM CDT Lab Visit with CMA [facility] Hematology Oncology ([facility])        Mar 19, 2024 11:00 AM CDT HEMATOLOGY ONCOLOGY FOLLOW UP with Justen Wseley MD [facility] Hematology Oncology ([facility])              [facility] Hematology Oncology  [facility]  [address]  [facility] [address]  [phone]            TCC  Was TCC ordered: No    PCP (If no TCC appointment)  Does patient already have a PCP appointment scheduled? No  NCM Scheduled PCP office TCM appointment with patient for 1/23/2024     Specialist    Does the patient have any other follow up appointment(s) needing to be scheduled? No    Is there any reason as to why you cannot make your appointment(s)?  No     Needs post D/C:   Now that you are home, are there any needs or concerns you need addressed before your next visit with your PCP?  (DME, meds, questions, etc.): No    Interventions by NCM:   Discussed diet, activity, medications and need for f/u visits. TCM appt made for 1/23/2024 with Dr. Ruggiero--pt declined sooner appt d/t weather--sent TE to office staff as FYI/TCM protocol.  Patient aware when to contact PCP/specialists and when to seek emergency care. No further questions/concerns at this time.    Overall Rating:   How would you rate the care you received while in the hospital? excellent    CCM referral placed:    No    BOOK BY DATE: 1/27/2024

## 2024-01-15 NOTE — TELEPHONE ENCOUNTER
Sent as FYI/TCM protocol:    Spoke with patient for TCM today. TCM appt made for 1/23/2024 with Dr. Ruggiero--pt declined sooner appt d/t weather.    TCM appointment recommended by 1/20/2024, as patient is a High risk for readmission.      BOOK BY DATE (last date for TCM): 1/27/2024    Future Appointments   Date Time Provider Department Center   1/23/2024  1:30 PM Rich Ruggiero MD [id number]  [place] [facility]   3/19/2024 10:00 AM [facility] HEM ONC [facility]   3/19/2024 11:00 AM Justen Wesley MD [facility] HEM ONC [facility]

## 2024-01-16 ENCOUNTER — TELEPHONE (OUTPATIENT)
Dept: INTERNAL MEDICINE CLINIC | Facility: CLINIC | Age: 74
End: 2024-01-16

## 2024-01-16 NOTE — TELEPHONE ENCOUNTER
Heather from [facility] called to let  know that patient was suppose to start home health today but patient is refusing home health so they wont be starting.   Voicemail confidential.

## 2024-01-17 ENCOUNTER — TELEPHONE (OUTPATIENT)
Dept: RADIATION ONCOLOGY | Facility: HOSPITAL | Age: 74
End: 2024-01-17

## 2024-01-17 NOTE — TELEPHONE ENCOUNTER
1/17/24 [facility] for patient reminding him that he needs to schedule the PET Scan that Dr. Park ordered. Once he has that scheduled, we will then set him up for a follow up with Dr Park in March

## 2024-01-23 ENCOUNTER — OFFICE VISIT (OUTPATIENT)
Dept: INTERNAL MEDICINE CLINIC | Facility: CLINIC | Age: 74
End: 2024-01-23

## 2024-01-23 VITALS
RESPIRATION RATE: 17 BRPM | HEIGHT: 77 IN | TEMPERATURE: 98 F | HEART RATE: 101 BPM | WEIGHT: 210 LBS | BODY MASS INDEX: 24.79 KG/M2 | SYSTOLIC BLOOD PRESSURE: 102 MMHG | OXYGEN SATURATION: 97 % | DIASTOLIC BLOOD PRESSURE: 58 MMHG

## 2024-01-23 DIAGNOSIS — Z09 HOSPITAL DISCHARGE FOLLOW-UP: Primary | ICD-10-CM

## 2024-01-23 PROCEDURE — 1126F AMNT PAIN NOTED NONE PRSNT: CPT | Performed by: INTERNAL MEDICINE

## 2024-01-23 PROCEDURE — 99496 TRANSJ CARE MGMT HIGH F2F 7D: CPT | Performed by: INTERNAL MEDICINE

## 2024-01-23 PROCEDURE — 1111F DSCHRG MED/CURRENT MED MERGE: CPT | Performed by: INTERNAL MEDICINE

## 2024-01-24 NOTE — PROGRESS NOTES
Subjective:   Patient ID: Luisito Diop is a 73 year old male.    HPI  Patient comes in for follow-up after he was discharged from the hospital he was admitted with diverticulitis treated with IV antibiotic for few days then discharged on p.o. antibiotics he is feeling much better overall pain resolved still has a few more days left of antibiotic no new complaints.     History/Other:   Review of Systems   Constitutional: Negative.  Negative for fatigue and fever.   HENT: Negative.  Negative for congestion.    Eyes: Negative.    Respiratory: Negative.  Negative for cough, shortness of breath and wheezing.    Cardiovascular: Negative.  Negative for chest pain, palpitations and leg swelling.   Gastrointestinal: Negative.    Endocrine: Negative for cold intolerance and heat intolerance.   Genitourinary: Negative.  Negative for dysuria, flank pain and hematuria.   Musculoskeletal: Negative.  Negative for arthralgias, back pain and myalgias.   Skin: Negative.    Neurological: Negative.  Negative for dizziness, tremors, syncope, weakness and headaches.   Psychiatric/Behavioral: Negative.  Negative for agitation, behavioral problems and suicidal ideas. The patient is not nervous/anxious.      Current Outpatient Medications   Medication Sig Dispense Refill    clonazePAM 1 MG Oral Tab Take 1 tablet (1 mg total) by mouth nightly as needed for Anxiety. 30 tablet 0    levoFLOXacin 750 MG Oral Tab Take 1 tablet (750 mg total) by mouth daily. 10 tablet 0    metRONIDAZOLE 500 MG Oral Tab Take 1 tablet (500 mg total) by mouth 3 (three) times daily for 10 days. 30 tablet 0    dicyclomine 20 MG Oral Tab Take 1 tablet (20 mg total) by mouth nightly as needed (as needed for cramping/ab pain). 30 tablet 2    hydrALAZINE 10 MG Oral Tab Take 1 tablet (10 mg total) by mouth in the morning and 1 tablet (10 mg total) before bedtime. 60 tablet 2    amLODIPine 10 MG Oral Tab Take 1 tablet (10 mg total) by mouth daily. 180 tablet 1    aspirin  81 MG Oral Tab Take 1 tablet (81 mg total) by mouth daily.       Allergies:No Known Allergies    Objective:   Physical Exam  Vitals and nursing note reviewed.   Constitutional:       Appearance: He is well-developed.   HENT:      Head: Normocephalic and atraumatic.      Right Ear: External ear normal.      Left Ear: External ear normal.      Nose: Nose normal.   Eyes:      Conjunctiva/sclera: Conjunctivae normal.      Pupils: Pupils are equal, round, and reactive to light.   Cardiovascular:      Rate and Rhythm: Normal rate and regular rhythm.      Heart sounds: Normal heart sounds.   Pulmonary:      Effort: Pulmonary effort is normal.      Breath sounds: Normal breath sounds.   Abdominal:      General: Bowel sounds are normal.      Palpations: Abdomen is soft.   Genitourinary:     Penis: Normal.       Prostate: Normal.      Rectum: Normal.   Musculoskeletal:         General: Normal range of motion.      Cervical back: Normal range of motion and neck supple.   Skin:     General: Skin is warm and dry.   Neurological:      Mental Status: He is alert and oriented to person, place, and time.      Deep Tendon Reflexes: Reflexes are normal and symmetric.         Assessment & Plan:   1. Hospital discharge follow-up  doing ok pain resolved,        No orders of the defined types were placed in this encounter.      Meds This Visit:  Requested Prescriptions      No prescriptions requested or ordered in this encounter       Imaging & Referrals:  None

## 2024-02-16 ENCOUNTER — TELEPHONE (OUTPATIENT)
Dept: NEPHROLOGY | Facility: CLINIC | Age: 74
End: 2024-02-16

## 2024-02-16 NOTE — TELEPHONE ENCOUNTER
Tell the pt that the ENT doctor he saw who biopsied his tonsil was Dr. Elva Cummnigs.  Have him see her for follow up ASAP to evaluate his swallowing

## 2024-02-20 ENCOUNTER — OFFICE VISIT (OUTPATIENT)
Dept: OTOLARYNGOLOGY | Facility: CLINIC | Age: 74
End: 2024-02-20

## 2024-02-20 VITALS — HEIGHT: 77 IN | WEIGHT: 210 LBS | BODY MASS INDEX: 24.79 KG/M2

## 2024-02-20 DIAGNOSIS — R13.12 OROPHARYNGEAL DYSPHAGIA: Primary | ICD-10-CM

## 2024-02-20 DIAGNOSIS — C09.9 PRIMARY CANCER OF TONSIL (HCC): ICD-10-CM

## 2024-02-20 PROCEDURE — 99213 OFFICE O/P EST LOW 20 MIN: CPT | Performed by: SPECIALIST

## 2024-02-20 PROCEDURE — 31575 DIAGNOSTIC LARYNGOSCOPY: CPT | Performed by: SPECIALIST

## 2024-02-21 NOTE — PATIENT INSTRUCTIONS
There is no evidence of recurrent or metastatic cancer.  I did order swallowing therapy to better evaluate your ability to swallow.  Follow-up in 3 months time, sooner if problems.

## 2024-02-21 NOTE — PROGRESS NOTES
Luisito Diop is a 73 year old male.   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  Biopsy done August 28, 2023.  Patient status post chemoradiation therapy finished on December 6, 2023.  Is unable to eat.    Current Outpatient Medications   Medication Sig Dispense Refill    clonazePAM 1 MG Oral Tab Take 1 tablet (1 mg total) by mouth nightly as needed for Anxiety. 30 tablet 0    levoFLOXacin 750 MG Oral Tab Take 1 tablet (750 mg total) by mouth daily. 10 tablet 0    hydrALAZINE 10 MG Oral Tab Take 1 tablet (10 mg total) by mouth in the morning and 1 tablet (10 mg total) before bedtime. 60 tablet 2    amLODIPine 10 MG Oral Tab Take 1 tablet (10 mg total) by mouth daily. 180 tablet 1    aspirin 81 MG Oral Tab Take 1 tablet (81 mg total) by mouth daily.        Past Medical History:   Diagnosis Date    Diabetes (HCC)     Dialysis patient (HCC)     ESRD (end stage renal disease) on dialysis (HCC) 2020    Essential hypertension     High blood pressure     Renal disorder     Tonsillar cancer (HCC)       Social History:  Social History     Socioeconomic History    Marital status:    Tobacco Use    Smoking status: Never    Smokeless tobacco: Never   Vaping Use    Vaping Use: Never used   Substance and Sexual Activity    Alcohol use: Not Currently    Drug use: Never     Social Determinants of Health     Financial Resource Strain: Low Risk  (1/15/2024)    Financial Resource Strain     Difficulty of Paying Living Expenses: Not very hard     Med Affordability: No   Food Insecurity: No Food Insecurity (1/11/2024)    Food Insecurity     Food Insecurity: Never true   Transportation Needs: No Transportation Needs (1/15/2024)    Transportation Needs     Lack of Transportation: No   Housing Stability: Low Risk  (1/11/2024)    Housing Stability     Housing Instability: No        REVIEW OF SYSTEMS:    GENERAL HEALTH: feels well otherwise  GENERAL : denies fever, chills, sweats, weight loss, weight gain  SKIN: denies any unusual skin lesions or rashes  RESPIRATORY: denies shortness of breath with exertion  NEURO: denies headaches    EXAM:   Ht 6' 5\" (1.956 m)   Wt 210 lb (95.3 kg)   BMI 24.90 kg/m²   System Details   Skin Inspection - Normal.   Constitutional Overall appearance - Normal.   Head/Face Facial features - Normal. Eyebrows - Normal. Skull - Normal.   Eyes Conjunctiva - Right: Normal, Left: Normal. Pupil - Right: Normal, Left: Normal.    Ears Inspection - Right: Normal, Left: Normal.   Canal - Right: Normal, Left: Normal.    TM - Right: Normal, Left: Normal.   Nasal External nose - Normal.   Nasal septum - Normal.  Turbinates - Normal   Oral/Oropharynx Lips - Normal, Tonsils -evidence of recurrent right tonsillar cancer.  Some edema consistent with prior therapy.  Tongue - Normal    Neck Exam Inspection - Normal. Palpation - Normal. Parotid gland - Normal. Thyroid gland - Normal.   Lymph Detail Submental. Submandibular. Anterior cervical. Posterior cervical. Supraclavicular.  All without enlargement   Psychiatric Orientation - Oriented to time, place, person & situation. Appropriate mood and affect.   Neurological Memory - Normal. Cranial nerves - Cranial nerves II through XII grossly intact.   Nasopharynx Normal by fiberoptic exam   Larynx Consent was obtained for the procedure.  The nose was asesthetized with 1% neosynephrine and 4% lidocaine topical drops.    The scope was placed into the bilateral nares as well as the nasopharynx, hypopharynx and larynx.    All of which were examined.  The  entire larynx including the vallecula, epiglottis, true and false vocal cords, aryepiglottic folds, piriform sinuses and post cricord area were examined for tumors and infectious processes as well as for evidence of reflux and retained secretions.  Vocal cord mobility was also assessed.    Any abnormalities  are noted in the exam section.  Laryngeal edema however no tumors or masses seen.  No retained secretions.  Normal vocal cord mobility.     ASSESSMENT AND PLAN:   1. Oropharyngeal dysphagia  Recommend speech therapy.  - Speech Therapy Referral - [facility]    2. Primary cancer of tonsil (HCC)  Evidence of recurrent or metastatic disease.  Follow-up in 3 months time, sooner if problems.      The patient indicates understanding of these issues and agrees to the plan.      Elva Cummings MD  2/20/2024  10:00 PM

## 2024-03-07 ENCOUNTER — HOSPITAL ENCOUNTER (OUTPATIENT)
Dept: NUCLEAR MEDICINE | Facility: HOSPITAL | Age: 74
Discharge: HOME | End: 2024-03-07
Attending: RADIOLOGY
Payer: MEDICARE

## 2024-03-07 DIAGNOSIS — C09.9 TONSIL CANCER (HCC): ICD-10-CM

## 2024-03-07 LAB — GLUCOSE BLDC GLUCOMTR-MCNC: 122 MG/DL (ref 70–99)

## 2024-03-07 PROCEDURE — 78815 PET IMAGE W/CT SKULL-THIGH: CPT | Performed by: RADIOLOGY

## 2024-03-07 PROCEDURE — 82962 GLUCOSE BLOOD TEST: CPT

## 2024-03-08 ENCOUNTER — OFFICE VISIT (OUTPATIENT)
Dept: RADIATION ONCOLOGY | Facility: HOSPITAL | Age: 74
End: 2024-03-08
Attending: RADIOLOGY
Payer: MEDICARE

## 2024-03-08 VITALS
BODY MASS INDEX: 24 KG/M2 | RESPIRATION RATE: 18 BRPM | HEART RATE: 98 BPM | SYSTOLIC BLOOD PRESSURE: 135 MMHG | TEMPERATURE: 97 F | WEIGHT: 205.19 LBS | OXYGEN SATURATION: 99 % | DIASTOLIC BLOOD PRESSURE: 65 MMHG

## 2024-03-08 PROCEDURE — 99211 OFF/OP EST MAY X REQ PHY/QHP: CPT

## 2024-03-08 NOTE — PROGRESS NOTES
Nursing Follow-Up Note    Patient: Luisito Diop  YOB: 1950  Age: 73 year old  Radiation Oncologist: Dr. Julian Park  Referring Physician: Justen Wesley  Chief Complaint:   Chief Complaint   Patient presents with    Radiation     Date: 3/8/2024    Toxicities: Fatigue Grade 1= Fatigue relieved by rest, per patient no improvement post treatment  Dysphagia Grade 3= Severely altered eating/swallowing; tube feeding, TPN, or hospitalization indicated  Dyspepsia Grade 0= None  Mucositis Grade 0= None  Constipation Grade 0= None  Diarrhea  Grade 0= None  Nausea Grade 0= None  Vomiting Grade 0= None  Xerostomia Not Present=No, improved per patient, however has phlegm that is light yellow in throat  All intake through G tube- 6 cans of feeds/day, 2 in AM + 2 in afternoon + 2 in PM    Vital Signs: There were no vitals taken for this visit.,   Wt Readings from Last 6 Encounters:   02/20/24 95.3 kg (210 lb)   01/23/24 95.3 kg (210 lb)   01/13/24 89.2 kg (196 lb 10.4 oz)   01/09/24 93.7 kg (206 lb 9.6 oz)   12/19/23 93.6 kg (206 lb 6.4 oz)   12/19/23 93.6 kg (206 lb 6.4 oz)       Allergies:  No Known Allergies    Nursing Note: Pt seen for follow up with Dr. Park. Pt completed RT on 12/6/2023. Pt completed Pet scan 3/7/2024, Dr. Park to review results with patient. Pt previously saw Dr. Cummings 2/20/2024, referred to speech + swallow. Pt hasn't made appointments yet. Pt given referral sheet today. Pt scheduled to see Dr. Wesley 3/19/2024. Patient to make an appointment with Dr. Cummings. Patient to see Dr. Park in 2-3 months.

## 2024-03-08 NOTE — PROGRESS NOTES
RADIATION ONCOLOGY NOTE    DATE OF VISIT: 3/8/2024    DIAGNOSIS: Group I T2N1MO SCC P16+ R tonsil, for definitive XRT with Cetuximab on 12/6/2023, with residual activity of uncertain etiology on PET/CT, for follow-up endoscopy.     Dear Lupillo Pacheco and colleagues,    As you recall, pt is a 72 yo with history of CKD on dialysis, with R neck mass for several months, without dysphagia or oltagia.  PET/CT revealed multiple R LN.   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   Pt had tx break during tx due to weight loss and mucositis.      Pt does complain of increasing mucous which causes difficulty with PO intake.  Pt is off pain medications and using 6 cans of supplements a day via G tube.  Pt has not tried to advance his diet yet.     Skin and mucositis has resolved.  He has altered taste and xerostomia.   On exam, his mucositis has resolved and skin well healed with residual hyperpigmentation.  No obvious masses in o/p region.     I have previously ordered speech/swallow eval and instructed pt to make an appointment.    Pt will start mucinex, bland rinses and various xerostomia products.       Pt will continue to follow closely with Dr. Wesley and Dr. Cummings of ENT for endoscopy and possible biopsy.  If there were no lesions seen, I discussed the option of repeating the PET.CT in 2-3 months.    Thank you for allowing me to take care of your patient.  Please do not hesitate to contact me directly.    Julian Park MD, FACRO  Radiation Oncology  [email or web address]  [phone]    3/8/2024      Oncology Chemo Meds          Cycle 1; Day 36    11/28/2023 Cycle 1; Day 43    12/5/2023 1/10/2024 1/11/2024 1/12/2024    10:35 1/13/2024   Oncology Flowsheet   Day, Cycle Day 36, Cycle 1 Day 43, Cycle 1       acetaminophen (Tylenol)  mg 650 mg       acetaminophen (Tylenol) Per G Tube      650 mg   cetuximab 100 mg/50mL (Erbitux)  mg/m2 = 596 mg       +blood +filter 250 mg/m2 = 596 mg        dextrose 5% (Dextrose) IV   100 mL 100 mL    100 mL 100 mL 100 mL    100 mL   diphenhydrAMINE (Benadryl) OR 50 mg 50 mg       sodium chloride IV   New Bag (unknown dose)         Details          Administered dose cannot be determined                  Recent Results (from the past [id number] hour(s))   PSA Screen    Collection Time: 10/07/22 10:25 AM   Result Value Ref Range    Prostate Specific Antigen Screen 0.71 <=4.00 ng/mL     *Note: Due to a large number of results and/or encounters for the requested time period, some results have not been displayed. A complete set of results can be found in Results Review.     PSA:  No results found for: \"PSA\"    PSA Screen:    Lab Results   Component Value Date    PSAS 0.71 10/07/2022    PSAS 0.74 02/03/2020         PET STANDARD BODY SCAN (ONCOLOGY) (CPT=78815)    Result Date: 3/7/2024  CONCLUSION:   Significantly decreased activity in the right tonsil and right neck nodes  Mild activity in the right tonsil which increases on delayed imaging, suggesting residual disease  Mild activity in 2 partly calcified right neck lymph nodes which show no increased activity on delayed imaging, suggesting treated disease    Dictated by (CST): Sterling Mccormick MD on 3/07/2024 at 10:49 AM     Finalized by (CST): Sterling Mccormick MD on 3/07/2024 at 11:07 AM             ROS    A 12 Point review of system was obtained and is as above and per HPI and nursing note.         Current Outpatient Medications   Medication Sig Dispense Refill    clonazePAM 1 MG Oral Tab Take 1 tablet (1 mg total) by mouth nightly as needed for Anxiety. 30 tablet 0    levoFLOXacin 750 MG Oral Tab Take 1 tablet (750 mg total) by mouth daily. 10 tablet 0    hydrALAZINE 10 MG Oral Tab Take 1 tablet (10 mg total) by mouth in the morning and 1 tablet (10 mg total) before bedtime. 60 tablet 2    amLODIPine 10 MG Oral Tab Take 1 tablet (10 mg total) by mouth daily. 180 tablet 1    aspirin 81 MG Oral Tab Take 1 tablet (81 mg total) by  mouth daily.         PAIN:   , Pain Score: 0,     ,  ,     ,  ,      ALLERGIES :   No Known Allergies    PAST MEDICAL HISTORY:   has a past medical history of Diabetes (HCC), Dialysis patient (HCC), ESRD (end stage renal disease) on dialysis ([facility]) (2020), Essential hypertension, High blood pressure, Renal disorder, and Tonsillar cancer (HCC).    He has no past medical history of Anesthesia complication, Arrhythmia, Asthma (HCC), COPD (chronic obstructive pulmonary disease) (HCC), Deep vein thrombosis (HCC), Difficult intubation, Disorder of liver, Heart attack (HCC), High cholesterol, History of adverse reaction to anesthesia, Malignant hyperthermia, Pulmonary embolism (HCC), Seizure disorder (HCC), Sleep apnea, or Stroke (HCC).    PAST SURGICAL HISTORY:   has a past surgical history that includes colonoscopy.    PAST SOCIAL HISTORY   reports that he has never smoked. He has never used smokeless tobacco. He reports that he does not currently use alcohol. He reports that he does not use drugs.    PAST FAMILY HISTORY   family history includes Cancer in his sister.    Wt Readings from Last 6 Encounters:   03/08/24 93.1 kg (205 lb 3.2 oz)   02/20/24 95.3 kg (210 lb)   01/23/24 95.3 kg (210 lb)   01/13/24 89.2 kg (196 lb 10.4 oz)   01/09/24 93.7 kg (206 lb 9.6 oz)   12/19/23 93.6 kg (206 lb 6.4 oz)        PHYSICAL EXAM  Blood pressure 135/65, pulse 98, temperature 97.1 °F (36.2 °C), temperature source Temporal, resp. rate 18, weight 93.1 kg (205 lb 3.2 oz), SpO2 99%.  PERFORMANCE STATUS  1,  denies PAIN  GENERAL:  Pt is alert and oriented times three in no acute distress.    HEENT:  PERRLA, EOMI,  no obvious lesions, +secretions   NECK:  Supple with no  lymphadenopathy.   CHEST:  Clear   ABDOMEN: Feeding tube   EXTREMITIES:  There is no upper or lower extremity edema.    NEUROLOGIC:  Cranial nerves II-XII are intact.  Neuro exam is nonfocal.    COMPLETED TESTS:  I have reviewed the patient's clinical, radiographic,  pathologic and laboratory studies.

## 2024-03-08 NOTE — PATIENT INSTRUCTIONS
Follow up with Dr. Park in 2-3 months.  [facility] will call you to schedule your follow up appointment.     Please call [phone] with any radiation questions.     Make an appointment with speech + swallow. Referral from Dr. Cummings given to you today.    Make an appointment with Dr. Cummings. Office number: [phone].    Start mucinex and bland rinses

## 2024-03-18 DIAGNOSIS — C09.9 TONSIL CANCER (HCC): Primary | ICD-10-CM

## 2024-03-19 ENCOUNTER — OFFICE VISIT (OUTPATIENT)
Dept: HEMATOLOGY/ONCOLOGY | Facility: HOSPITAL | Age: 74
End: 2024-03-19
Attending: INTERNAL MEDICINE
Payer: MEDICARE

## 2024-03-19 ENCOUNTER — NURSE ONLY (OUTPATIENT)
Dept: HEMATOLOGY/ONCOLOGY | Facility: HOSPITAL | Age: 74
End: 2024-03-19
Attending: NURSE PRACTITIONER
Payer: MEDICARE

## 2024-03-19 VITALS
TEMPERATURE: 98 F | HEART RATE: 74 BPM | BODY MASS INDEX: 24.79 KG/M2 | OXYGEN SATURATION: 99 % | WEIGHT: 210 LBS | DIASTOLIC BLOOD PRESSURE: 62 MMHG | HEIGHT: 77 IN | RESPIRATION RATE: 18 BRPM | SYSTOLIC BLOOD PRESSURE: 123 MMHG

## 2024-03-19 DIAGNOSIS — C09.9 TONSIL CANCER (HCC): Primary | ICD-10-CM

## 2024-03-19 DIAGNOSIS — Z92.3 HISTORY OF RADIATION THERAPY: ICD-10-CM

## 2024-03-19 DIAGNOSIS — C09.9 TONSIL CANCER (HCC): ICD-10-CM

## 2024-03-19 LAB
ALBUMIN SERPL-MCNC: 4.1 G/DL (ref 3.2–4.8)
ALBUMIN/GLOB SERPL: 1.4 {RATIO} (ref 1–2)
ALP LIVER SERPL-CCNC: 135 U/L
ALT SERPL-CCNC: 50 U/L
ANION GAP SERPL CALC-SCNC: 4 MMOL/L (ref 0–18)
AST SERPL-CCNC: 40 U/L (ref ?–34)
BASOPHILS # BLD AUTO: 0.04 X10(3) UL (ref 0–0.2)
BASOPHILS NFR BLD AUTO: 0.7 %
BILIRUB SERPL-MCNC: 0.4 MG/DL (ref 0.2–1.1)
BUN BLD-MCNC: 57 MG/DL (ref 9–23)
BUN/CREAT SERPL: 10.1 (ref 10–20)
CALCIUM BLD-MCNC: 10 MG/DL (ref 8.7–10.4)
CHLORIDE SERPL-SCNC: 101 MMOL/L (ref 98–112)
CO2 SERPL-SCNC: 34 MMOL/L (ref 21–32)
CREAT BLD-MCNC: 5.63 MG/DL
DEPRECATED RDW RBC AUTO: 53.3 FL (ref 35.1–46.3)
EGFRCR SERPLBLD CKD-EPI 2021: 10 ML/MIN/1.73M2 (ref 60–?)
EOSINOPHIL # BLD AUTO: 0.16 X10(3) UL (ref 0–0.7)
EOSINOPHIL NFR BLD AUTO: 2.8 %
ERYTHROCYTE [DISTWIDTH] IN BLOOD BY AUTOMATED COUNT: 15.7 % (ref 11–15)
FASTING STATUS PATIENT QL REPORTED: NO
GLOBULIN PLAS-MCNC: 3 G/DL (ref 2.8–4.4)
GLUCOSE BLD-MCNC: 120 MG/DL (ref 70–99)
HCT VFR BLD AUTO: 35.2 %
HGB BLD-MCNC: 11.3 G/DL
IMM GRANULOCYTES # BLD AUTO: 0.01 X10(3) UL (ref 0–1)
IMM GRANULOCYTES NFR BLD: 0.2 %
LYMPHOCYTES # BLD AUTO: 0.9 X10(3) UL (ref 1–4)
LYMPHOCYTES NFR BLD AUTO: 16 %
MCH RBC QN AUTO: 29.7 PG (ref 26–34)
MCHC RBC AUTO-ENTMCNC: 32.1 G/DL (ref 31–37)
MCV RBC AUTO: 92.6 FL
MONOCYTES # BLD AUTO: 0.56 X10(3) UL (ref 0.1–1)
MONOCYTES NFR BLD AUTO: 10 %
NEUTROPHILS # BLD AUTO: 3.95 X10 (3) UL (ref 1.5–7.7)
NEUTROPHILS # BLD AUTO: 3.95 X10(3) UL (ref 1.5–7.7)
NEUTROPHILS NFR BLD AUTO: 70.3 %
OSMOLALITY SERPL CALC.SUM OF ELEC: 305 MOSM/KG (ref 275–295)
PLATELET # BLD AUTO: 244 10(3)UL (ref 150–450)
POTASSIUM SERPL-SCNC: 4 MMOL/L (ref 3.5–5.1)
PROT SERPL-MCNC: 7.1 G/DL (ref 5.7–8.2)
RBC # BLD AUTO: 3.8 X10(6)UL
SODIUM SERPL-SCNC: 139 MMOL/L (ref 136–145)
WBC # BLD AUTO: 5.6 X10(3) UL (ref 4–11)

## 2024-03-19 PROCEDURE — 80053 COMPREHEN METABOLIC PANEL: CPT

## 2024-03-19 PROCEDURE — 85025 COMPLETE CBC W/AUTO DIFF WBC: CPT

## 2024-03-19 PROCEDURE — 99214 OFFICE O/P EST MOD 30 MIN: CPT | Performed by: INTERNAL MEDICINE

## 2024-03-19 PROCEDURE — 36415 COLL VENOUS BLD VENIPUNCTURE: CPT

## 2024-03-19 NOTE — PROGRESS NOTES
Oncology follow up      Diagnosis:  Tonsillar cancer.    HISTORY OF PRESENT ILLNESS:  The patient is a pleasant 73-year-old male being evaluated by Medical Oncology for node-positive, p16-positive squamous cell carcinoma of the right tonsil.  Biopsy 08/24/2023 with Dr. Elva Cummings.  He has a CT of the neck as of 08/18/2023 that shows 2 ipsilateral enlarged lymph nodes, one measuring 4.2 cm and the other 2.9 cm.  cT2N1    He has ESRD    Completed cetuximab with radiotherapy 12/6/23      Interval:  He feels well overall      PAST MEDICAL HISTORY:  End-stage renal disease on hemodialysis, hypertension.    MEDICATIONS:  Amlodipine, clonazepam.    ALLERGIES:  No known drug allergies.    SOCIAL HISTORY:  Never smoker.  Denies any alcohol or illicit drug use.    FAMILY MEDICAL HISTORY:  No reported history of any head or neck cancer.    REVIEW OF SYSTEMS:  Otherwise, negative x12.    PHYSICAL EXAMINATION:  ECOG 1  Vitals:    03/19/24 1107   BP: 123/62   Pulse: 74   Resp: 18   Temp: 97.9 °F (36.6 °C)       GENERAL:  In no acute distress. Ambulates w/ cane   HEENT:  mucositis now resolved. No erythema/edema/white exudate.   NECK:  Supple, no LAD, normal AROM  LUNGS:  Symmetric expansion, CTA  HEART:  RRR  ABDOMEN:  Soft, NT, non distended, BS+, PEG tube placed.   EXTREMITIES:  No edema.  NEUROLOGIC:  DTRs grossly intact  PSYCHIATRIC:  Appropriate mood, appropriate affect.      ASSESSMENT AND PLAN:      73 year old  male being evaluated by Medical Oncology for p16-positive, node-positive right-sided tonsillar cancer.  He has 2 ipsilateral nodes enlarged on CT.  Since no evidence of metastatic disease, we discussed concurrent systemic therapy with radiation therapy.  He has end-stage renal disease on hemodialysis; cT2N1 received chemoradiotherapy completed 12/23 with cetuximab    Visit 3/19/24 PET/CT with equivocal findings.   Exam with ENT without residual disease    RTC  in 3 months with repeat pet/ct    Data: moderate. Pet,  ent notes, rad onc notes, labs

## 2024-04-23 ENCOUNTER — OFFICE VISIT (OUTPATIENT)
Dept: INTERNAL MEDICINE CLINIC | Facility: CLINIC | Age: 74
End: 2024-04-23

## 2024-04-23 VITALS
SYSTOLIC BLOOD PRESSURE: 110 MMHG | BODY MASS INDEX: 24.68 KG/M2 | HEIGHT: 77 IN | OXYGEN SATURATION: 99 % | RESPIRATION RATE: 17 BRPM | DIASTOLIC BLOOD PRESSURE: 54 MMHG | TEMPERATURE: 98 F | WEIGHT: 209 LBS | HEART RATE: 67 BPM

## 2024-04-23 DIAGNOSIS — E11.22 TYPE 2 DIABETES MELLITUS WITH STAGE 4 CHRONIC KIDNEY DISEASE, WITHOUT LONG-TERM CURRENT USE OF INSULIN (HCC): ICD-10-CM

## 2024-04-23 DIAGNOSIS — I10 ESSENTIAL HYPERTENSION: ICD-10-CM

## 2024-04-23 DIAGNOSIS — C09.9 PRIMARY TONSILLAR SQUAMOUS CELL CARCINOMA (HCC): ICD-10-CM

## 2024-04-23 DIAGNOSIS — Z00.00 PREVENTATIVE HEALTH CARE: ICD-10-CM

## 2024-04-23 DIAGNOSIS — K20.80 RADIATION ESOPHAGITIS: ICD-10-CM

## 2024-04-23 DIAGNOSIS — D63.1 ANEMIA DUE TO CHRONIC KIDNEY DISEASE, ON CHRONIC DIALYSIS (HCC): ICD-10-CM

## 2024-04-23 DIAGNOSIS — N18.4 TYPE 2 DIABETES MELLITUS WITH STAGE 4 CHRONIC KIDNEY DISEASE, WITHOUT LONG-TERM CURRENT USE OF INSULIN (HCC): ICD-10-CM

## 2024-04-23 DIAGNOSIS — Z99.2 ANEMIA DUE TO CHRONIC KIDNEY DISEASE, ON CHRONIC DIALYSIS (HCC): ICD-10-CM

## 2024-04-23 DIAGNOSIS — R13.19 OTHER DYSPHAGIA: ICD-10-CM

## 2024-04-23 DIAGNOSIS — M54.50 CHRONIC BILATERAL LOW BACK PAIN WITHOUT SCIATICA: ICD-10-CM

## 2024-04-23 DIAGNOSIS — N18.6 ESRD (END STAGE RENAL DISEASE) ON DIALYSIS (HCC): ICD-10-CM

## 2024-04-23 DIAGNOSIS — E11.3211 MILD NONPROLIFERATIVE DIABETIC RETINOPATHY OF RIGHT EYE WITH MACULAR EDEMA ASSOCIATED WITH TYPE 2 DIABETES MELLITUS (HCC): ICD-10-CM

## 2024-04-23 DIAGNOSIS — Z87.442 HISTORY OF KIDNEY STONES: ICD-10-CM

## 2024-04-23 DIAGNOSIS — N18.6 ESRD ON HEMODIALYSIS (HCC): ICD-10-CM

## 2024-04-23 DIAGNOSIS — Z00.00 MEDICARE ANNUAL WELLNESS VISIT, SUBSEQUENT: Primary | ICD-10-CM

## 2024-04-23 DIAGNOSIS — G89.29 CHRONIC BILATERAL LOW BACK PAIN WITHOUT SCIATICA: ICD-10-CM

## 2024-04-23 DIAGNOSIS — F41.9 ANXIETY: ICD-10-CM

## 2024-04-23 DIAGNOSIS — Z99.2 ESRD ON HEMODIALYSIS (HCC): ICD-10-CM

## 2024-04-23 DIAGNOSIS — R06.09 DYSPNEA ON EXERTION: ICD-10-CM

## 2024-04-23 DIAGNOSIS — N18.6 ANEMIA DUE TO CHRONIC KIDNEY DISEASE, ON CHRONIC DIALYSIS (HCC): ICD-10-CM

## 2024-04-23 DIAGNOSIS — Z12.5 ENCOUNTER FOR SCREENING FOR MALIGNANT NEOPLASM OF PROSTATE: ICD-10-CM

## 2024-04-23 DIAGNOSIS — N25.81 SECONDARY HYPERPARATHYROIDISM (HCC): ICD-10-CM

## 2024-04-23 DIAGNOSIS — Z99.2 ESRD (END STAGE RENAL DISEASE) ON DIALYSIS (HCC): ICD-10-CM

## 2024-04-23 DIAGNOSIS — Z00.00 ENCOUNTER FOR ANNUAL HEALTH EXAMINATION: ICD-10-CM

## 2024-04-23 DIAGNOSIS — T66.XXXA RADIATION ESOPHAGITIS: ICD-10-CM

## 2024-04-23 LAB
COMPLEXED PSA SERPL-MCNC: 0.41 NG/ML (ref ?–4)
TSI SER-ACNC: 0.93 MIU/ML (ref 0.55–4.78)

## 2024-04-23 PROCEDURE — 36415 COLL VENOUS BLD VENIPUNCTURE: CPT | Performed by: INTERNAL MEDICINE

## 2024-04-24 ENCOUNTER — TELEPHONE (OUTPATIENT)
Dept: INTERNAL MEDICINE CLINIC | Facility: CLINIC | Age: 74
End: 2024-04-24

## 2024-04-24 RX ORDER — CYCLOBENZAPRINE HCL 5 MG
5 TABLET ORAL NIGHTLY PRN
Qty: 30 TABLET | Refills: 0 | Status: SHIPPED | OUTPATIENT
Start: 2024-04-24

## 2024-04-24 RX ORDER — METHYLPREDNISOLONE 4 MG/1
TABLET ORAL
Qty: 1 EACH | Refills: 0 | Status: SHIPPED | OUTPATIENT
Start: 2024-04-24

## 2024-04-24 NOTE — TELEPHONE ENCOUNTER
Patient had appointment w/ Dr alexander Ruggiero.  Patient states Dr Alexander Ruggiero said he would call in a pain medicine for his back pain to this verified pharmacy:  [facility] [address] [place] [address] [phone], [phone]   [address] [facility] [address]   Phone: [phone] Fax: [phone]     They have not received anything per patient.

## 2024-04-24 NOTE — TELEPHONE ENCOUNTER
Dr. Ruggiero, please see patient's request for pain medication for his back. He was seen yesterday 4/23/24.

## 2024-04-24 NOTE — TELEPHONE ENCOUNTER
Spoke with patient, (  Name and  verified ) informed of that Medrol pack and Cyclobenzaprine have been sen to the [facility],  educated patient on  reason for use of each medication     Advised to call back if symptoms and/ or condition worsens    Patient verbalizes understanding and agrees with plan.

## 2024-04-25 DIAGNOSIS — F41.9 ANXIETY: ICD-10-CM

## 2024-04-26 NOTE — TELEPHONE ENCOUNTER
Please review. Protocol Failed; No Protocol    Recent fills: 12/7/2023, 1/16/2024  Last Rx written: 1/16/2024  Last office visit: 4/23/2024      Requested Prescriptions   Pending Prescriptions Disp Refills    CLONAZEPAM 1 MG Oral Tab [Pharmacy Med Name: clonazePAM 1 MG Oral Tablet] 30 tablet 0     Sig: TAKE 1 TABLET BY MOUTH NIGHTLY AS NEEDED FOR ANXIETY       Controlled Substance Medication Failed - 4/25/2024  1:22 PM        Failed - This medication is a controlled substance - forward to provider to refill               Future Appointments         Provider Department Appt Notes    In 1 month CF PET DOSE RM [facility] PET Scan - [place] for [facility]     In 1 month CF PET RM1 [facility] PET Scan - [facility]     In 1 month Julian Park MD; [facility] RAD ONC RN Zoe DREW [facility] - Rad/Onc f/u 2-3 months (appt same day as medonc)    In 1 month CMA RESOURCE [facility] Hematology Oncology CBC, CMP    In 1 month Justen Wesley MD [facility] Hematology Oncology PET review          Recent Outpatient Visits              3 days ago Medicare annual wellness visit, subsequent    [facility] [place] Rich Ruggiero MD    Office Visit    1 month ago Tonsil cancer (HCC)    [facility] Hematology Oncology Justen Wesley MD    Office Visit    1 month ago Tonsil cancer (HCC)    [facility] Hematology Oncology    Nurse Only    1 month ago     [facility] - Rad/Onc Julian Park MD    Office Visit    2 months ago Oropharyngeal dysphagia    [facility], Elva Wong MD    Office Visit

## 2024-04-29 RX ORDER — CLONAZEPAM 1 MG/1
1 TABLET ORAL NIGHTLY PRN
Qty: 30 TABLET | Refills: 0 | Status: SHIPPED | OUTPATIENT
Start: 2024-04-29

## 2024-04-29 NOTE — PROGRESS NOTES
Subjective:   Luisito Diop is a 74 year old male who presents for a Medicare Subsequent Annual Wellness visit (Pt already had Initial Annual Wellness) and .   Patient completed annual overall doing okay  Complaint he has today's been having some back pain this is a chronic problem on and off no sciatica symptoms.  Both sides.  Patient overall feeling better with his throat cancer post radiation dysphagia still there but improved still has a G-tube for feeds    History/Other:   Fall Risk Assessment:   He has been screened for Falls and is low risk.      Cognitive Assessment:   He had a completely normal cognitive assessment - see flowsheet entries     Functional Ability/Status:   Luisito Diop has a completely normal functional assessment. See flowsheet for details.      Depression Screening (PHQ-2/PHQ-9): PHQ-2 SCORE: 0  , done 4/23/2024   If you checked off any problems, how difficult have these problems made it for you to do your work, take care of things at home, or get along with other people?: Not difficult at all    Last [place] Suicide Screening on 4/23/2024 was No Risk.          Advanced Directives:   He does NOT have a Living Will. [Do you have a living will?: No]  He does NOT have a Power of  for Health Care. [Do you have a healthcare power of ?: No]  Discussed Advance Care Planning with patient (and family/surrogate if present). Standard forms made available to patient in After Visit Summary.      Patient Active Problem List   Diagnosis    History of kidney stones    Essential hypertension    Type 2 diabetes mellitus with stage 4 chronic kidney disease, without long-term current use of insulin (HCC)    Dyspnea on exertion    ESRD (end stage renal disease) on dialysis (HCC)    Anemia due to chronic kidney disease, on chronic dialysis (HCC)    Secondary hyperparathyroidism (HCC)    Umbilical hernia without obstruction and without gangrene    Neck mass    Tonsil cancer (HCC)    Diarrhea     Hypovolemia    Radiation esophagitis    Dehydration    Mucositis    Other dysphagia    Constipation    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    Current Medications:  Outpatient Medications Marked as Taking for the 4/23/24 encounter (Office Visit) with Rich Ruggiero MD   Medication Sig    methylPREDNISolone (MEDROL) 4 MG Oral Tablet Therapy Pack As directed.    cyclobenzaprine 5 MG Oral Tab Take 1 tablet (5 mg total) by mouth nightly as needed.    [DISCONTINUED] clonazePAM 1 MG Oral Tab Take 1 tablet (1 mg total) by mouth nightly as needed for Anxiety.    aspirin 81 MG Oral Tab Take 1 tablet (81 mg total) by mouth daily.       Medical History:  He  has a past medical history of Diabetes ([facility]), Dialysis patient ([facility]), ESRD (end stage renal disease) on dialysis ([facility]) (2020), Essential hypertension, High blood pressure, Renal disorder, and Tonsillar cancer ([facility]).  Surgical History:  He  has a past surgical history that includes colonoscopy.   Family History:  His family history includes Cancer in his sister.  Social History:  He  reports that he has never smoked. He has never used smokeless tobacco. He reports that he does not currently use alcohol. He reports that he does not use drugs.    Tobacco:  He has never smoked tobacco.    CAGE Alcohol Screen:   CAGE screening score of 0 on 4/23/2024, showing low risk of alcohol abuse.      Patient Care Team:  Rich Ruggiero MD as PCP - General (Internal Medicine)  Kristopher Black MD (NEPHROLOGY)  Hakan Sparks MD (SURGERY, GENERAL)  Julian Park MD (Radiation Oncology)  Jing Hunter MD (Radiation Oncology)  Mica Fitch, LUCERO as Registered Nurse (Registered Nurse)  Adithya Hernandez MD (Radiation Oncology)    Review of  Systems   Constitutional: Negative.    HENT: Negative.          Difficulty swallowing   Eyes: Negative.    Respiratory: Negative.     Cardiovascular: Negative.    Gastrointestinal: Negative.    Genitourinary: Negative.    Musculoskeletal:  Positive for back pain.   Skin: Negative.    Neurological: Negative.    Psychiatric/Behavioral: Negative.            Objective:   Physical Exam  Vitals and nursing note reviewed.   Constitutional:       Appearance: He is well-developed.   HENT:      Head: Normocephalic and atraumatic.      Right Ear: External ear normal.      Left Ear: External ear normal.      Nose: Nose normal.   Eyes:      Conjunctiva/sclera: Conjunctivae normal.      Pupils: Pupils are equal, round, and reactive to light.   Cardiovascular:      Rate and Rhythm: Normal rate and regular rhythm.      Heart sounds: Normal heart sounds.   Pulmonary:      Effort: Pulmonary effort is normal.      Breath sounds: Normal breath sounds.   Abdominal:      General: Bowel sounds are normal.      Palpations: Abdomen is soft.   Genitourinary:     Penis: Normal.       Prostate: Normal.      Rectum: Normal.   Musculoskeletal:         General: Tenderness present. Normal range of motion.      Cervical back: Normal range of motion and neck supple.      Comments: Low back pain   Skin:     General: Skin is warm and dry.   Neurological:      Mental Status: He is alert and oriented to person, place, and time.      Deep Tendon Reflexes: Reflexes are normal and symmetric.          /54 (BP Location: Left arm, Patient Position: Sitting, Cuff Size: adult)   Pulse 67   Temp 98 °F (36.7 °C) (Oral)   Resp 17   Ht 6' 5\" (1.956 m)   Wt 209 lb (94.8 kg)   SpO2 99%   BMI 24.78 kg/m²  Estimated body mass index is 24.78 kg/m² as calculated from the following:    Height as of this encounter: 6' 5\" (1.956 m).    Weight as of this encounter: 209 lb (94.8 kg).    Medicare Hearing Assessment:   Hearing Screening    Screening Method:  Questionnaire  I have a problem hearing over the telephone: No I have trouble following the conversations when two or more people are talking at the same time: No   I have trouble understanding things on the TV: No I have to strain to understand conversations: No   I have to worry about missing the telephone ring or doorbell: No I have trouble hearing conversations in a noisy background such as a crowded room or restaurant: No   I get confused about where sounds come from: No I misunderstand some words in a sentence and need to ask people to repeat themselves: No   I especially have trouble understanding the speech of women and children: No I have trouble understanding the speaker in a large room such as at a meeting or place of Jain: No   Many people I talk to seem to mumble (or don't speak clearly): No People get annoyed because I misunderstand what they say: No   I misunderstand what others are saying and make inappropriate responses: No I avoid social activities because I cannot hear well and fear I will reply improperly: No   Family members and friends have told me they think I may have hearing loss: No             Visual Acuity:   Right Eye Visual Acuity: Corrected Right Eye Chart Acuity: 20/20   Left Eye Visual Acuity: Corrected Left Eye Chart Acuity: 20/20   Both Eyes Visual Acuity: Corrected Both Eyes Chart Acuity: 20/20   Able To Tolerate Visual Acuity: Yes        Assessment & Plan:   Luisito Diop is a 74 year old male who presents for a Medicare Assessment.     1. Medicare annual wellness visit, subsequent (Primary) exam  as above we will do labs  -     TSH W Reflex To Free T4; Future; Expected date: 04/23/2024  -     PSA Total, Screen; Future; Expected date: 04/23/2024  -     TSH W Reflex To Free T4  -     PSA Total, Screen  2. Essential hypertension well-controlled continue current treatment  -     TSH W Reflex To Free T4; Future; Expected date: 04/23/2024  -     PSA Total, Screen; Future;  Expected date: 04/23/2024  -     TSH W Reflex To Free T4  -     PSA Total, Screen  3. ESRD on hemodialysis (HCC) continue dialysis  -     TSH W Reflex To Free T4; Future; Expected date: 04/23/2024  -     PSA Total, Screen; Future; Expected date: 04/23/2024  -     TSH W Reflex To Free T4  -     PSA Total, Screen  4. Type 2 diabetes mellitus with stage 4 chronic kidney disease, without long-term current use of insulin (HCC) continue current treatment  -     TSH W Reflex To Free T4; Future; Expected date: 04/23/2024  -     PSA Total, Screen; Future; Expected date: 04/23/2024  -     Ophthalmology Referral - In Network  -     TSH W Reflex To Free T4  -     PSA Total, Screen  5. Preventative health care referred to GI for screening colonoscopy  -     Gastro Referral - In Network  -     TSH W Reflex To Free T4; Future; Expected date: 04/23/2024  -     PSA Total, Screen; Future; Expected date: 04/23/2024  -     TSH W Reflex To Free T4  -     PSA Total, Screen  6. Encounter for screening for malignant neoplasm of prostate check PSA     -     PSA Total, Screen; Future; Expected date: 04/23/2024  -     PSA Total, Screen  7. Chronic bilateral low back pain without sciatica will give steroids and muscle relaxant short course of  9. Anemia due to chronic kidney disease, on chronic dialysis (HCC) stable  10. 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 Secondary hyperparathyroidism (HCC) follows with renal  12. Radiation esophagitis improving still with dysphagia  13. Other dysphagia due to above still with dysphagia G-tube in place  14. History of kidney stones stable  15. Anxiety stable medical management  16. Dyspnea on exertion chronic stable  17. 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  Dispense: 1 each; Refill: 0  -      Cyclobenzaprine HCl; Take 1 tablet (5 mg total) by mouth nightly as needed.  Dispense: 30 tablet; Refill: 0    The patient indicates understanding of these issues and agrees to the plan.  Reinforced healthy diet, lifestyle, and exercise.                  No follow-ups on file.     Rich Ruggiero MD, 4/29/2024     Supplementary Documentation:   General Health:  In the past six months, have you lost more than 10 pounds without trying?: 2 - No  Has your appetite been poor?: No  Type of Diet: Balanced  How does the patient maintain a good energy level?: Other  How would you describe your daily physical activity?: None  How would you describe your current health state?: Fair  How do you maintain positive mental well-being?: Visiting Family;Visiting Friends  On a scale of 0 to 10, with 0 being no pain and 10 being severe pain, what is your pain level?: 0 - (None)  In the past six months, have you experienced urine leakage?: 0-No  At any time do you feel concerned for the safety/well-being of yourself and/or your children, in your home or elsewhere?: No  Have you had any immunizations at another office such as Influenza, Hepatitis B, Tetanus, or Pneumococcal?: No        Luisito Diop's SCREENING SCHEDULE   Tests on this list are recommended by your physician but may not be covered, or covered at this frequency, by your insurer.   Please check with your insurance carrier before scheduling to verify coverage.   PREVENTATIVE SERVICES FREQUENCY &  COVERAGE DETAILS LAST COMPLETION DATE   Diabetes Screening    Fasting Blood Sugar / Glucose    One screening every 12 months if never tested or if previously tested but not diagnosed with pre-diabetes   One screening every 6 months if diagnosed with pre-diabetes Lab Results   Component Value Date     (H) 03/19/2024        Cardiovascular Disease Screening    Lipid Panel  Cholesterol  Lipoprotein (HDL)  Triglycerides Covered every 5 years for all Medicare beneficiaries without  apparent signs or symptoms of cardiovascular disease Lab Results   Component Value Date    CHOLEST 181 10/07/2022    HDL 50 10/07/2022     (H) 10/07/2022    TRIG 133 10/07/2022         Electrocardiogram (EKG)   Covered if needed at Welcome to Medicare, and non-screening if indicated for medical reasons 06/23/2022      Ultrasound Screening for Abdominal Aortic Aneurysm (AAA) Covered once in a lifetime for one of the following risk factors    Men who are 65-75 years old and have ever smoked    Anyone with a family history -     Colorectal Cancer Screening  Covered for ages 50-85; only need ONE of the following:    Colonoscopy   Covered every 10 years    Covered every 2 years if patient is at high risk or previous colonoscopy was abnormal 03/28/2023    Health Maintenance   Topic Date Due    Colorectal Cancer Screening  03/28/2028       Flexible Sigmoidoscopy   Covered every 4 years -    Fecal Occult Blood Test Covered annually -   Prostate Cancer Screening    Prostate-Specific Antigen (PSA) Annually No results found for: \"PSA\"  Health Maintenance   Topic Date Due    PSA  04/23/2026      Immunizations    Influenza Covered once per flu season  Please get every year -  No recommendations at this time    Pneumococcal Each vaccine ([id number] & [id number]) covered once after 65 Prevnar 13: 06/20/2022    Nrfciakzv92: 12/29/2020     No recommendations at this time    Hepatitis B One screening covered for patients with certain risk factors   08/27/2021  No recommendations at this time    Tetanus Toxoid Not covered by Medicare Part B unless medically necessary (cut with metal); may be covered with your pharmacy prescription benefits -    Tetanus, Diptheria and Pertusis TD and TDaP Not covered by Medicare Part B -  No recommendations at this time    Zoster Not covered by Medicare Part B; may be covered with your pharmacy  prescription benefits -  Zoster Vaccines(1 of 2) Never done     Diabetes      Hemoglobin A1C  Annually; if last result is elevated, may be asked to retest more frequently.    Medicare covers every 3 months Lab Results   Component Value Date     01/10/2024    A1C 5.6 01/10/2024       No recommendations at this time    Creat/alb ratio Annually Lab Results   Component Value Date    MICROALBCREA 593.3 (H) 10/10/2022       LDL Annually Lab Results   Component Value Date     (H) 10/07/2022       Dilated Eye Exam Annually Last Diabetic Eye Exam:  No data recorded  No data recorded       Annual Monitoring of Persistent Medications (ACE/ARB, digoxin diuretics, anticonvulsants)    Potassium Annually Lab Results   Component Value Date    K 4.0 03/19/2024         Creatinine   Annually Lab Results   Component Value Date    CREATSERUM 5.63 (H) 03/19/2024         BUN Annually Lab Results   Component Value Date    BUN 57 (H) 03/19/2024       Drug Serum Conc Annually No results found for: \"DIGOXIN\", \"DIG\", \"VALP\"

## 2024-04-29 NOTE — PATIENT INSTRUCTIONS
Luisito Diop's SCREENING SCHEDULE   Tests on this list are recommended by your physician but may not be covered, or covered at this frequency, by your insurer.   Please check with your insurance carrier before scheduling to verify coverage.   PREVENTATIVE SERVICES FREQUENCY &  COVERAGE DETAILS LAST COMPLETION DATE   Diabetes Screening    Fasting Blood Sugar / Glucose    One screening every 12 months if never tested or if previously tested but not diagnosed with pre-diabetes   One screening every 6 months if diagnosed with pre-diabetes Lab Results   Component Value Date     (H) 03/19/2024        Cardiovascular Disease Screening    Lipid Panel  Cholesterol  Lipoprotein (HDL)  Triglycerides Covered every 5 years for all Medicare beneficiaries without apparent signs or symptoms of cardiovascular disease Lab Results   Component Value Date    CHOLEST 181 10/07/2022    HDL 50 10/07/2022     (H) 10/07/2022    TRIG 133 10/07/2022         Electrocardiogram (EKG)   Covered if needed at Welcome to Medicare, and non-screening if indicated for medical reasons 06/23/2022      Ultrasound Screening for Abdominal Aortic Aneurysm (AAA) Covered once in a lifetime for one of the following risk factors   • Men who are 65-75 years old and have ever smoked   • Anyone with a family history -     Colorectal Cancer Screening  Covered for ages 50-85; only need ONE of the following:    Colonoscopy   Covered every 10 years    Covered every 2 years if patient is at high risk or previous colonoscopy was abnormal 03/28/2023    Health Maintenance   Topic Date Due   • Colorectal Cancer Screening  03/28/2028       Flexible Sigmoidoscopy   Covered every 4 years -    Fecal Occult Blood Test Covered annually -   Prostate Cancer Screening    Prostate-Specific Antigen (PSA) Annually No results found for: \"PSA\"  Health Maintenance   Topic Date Due   • PSA  04/23/2026      Immunizations    Influenza Covered once per flu season  Please get  every year -  No recommendations at this time    Pneumococcal Each vaccine ([id number] & [id number]) covered once after 65 Prevnar 13: 06/20/2022    [id number]: 12/29/2020     No recommendations at this time    Hepatitis B One screening covered for patients with certain risk factors   08/27/2021  No recommendations at this time    Tetanus Toxoid Not covered by Medicare Part B unless medically necessary (cut with metal); may be covered with your pharmacy prescription benefits -    Tetanus, Diptheria and Pertusis TD and TDaP Not covered by Medicare Part B -  No recommendations at this time    Zoster Not covered by Medicare Part B; may be covered with your pharmacy  prescription benefits -  Zoster Vaccines(1 of 2) Never done     Diabetes      Hemoglobin A1C Annually; if last result is elevated, may be asked to retest more frequently.    Medicare covers every 3 months Lab Results   Component Value Date     01/10/2024    A1C 5.6 01/10/2024       No recommendations at this time    Creat/alb ratio Annually Lab Results   Component Value Date    MICROALBCREA 593.3 (H) 10/10/2022       LDL Annually Lab Results   Component Value Date     (H) 10/07/2022       Dilated Eye Exam Annually Last Diabetic Eye Exam:  No data recorded  No data recorded       Annual Monitoring of Persistent Medications (ACE/ARB, digoxin diuretics, anticonvulsants)    Potassium Annually Lab Results   Component Value Date    K 4.0 03/19/2024         Creatinine   Annually Lab Results   Component Value Date    CREATSERUM 5.63 (H) 03/19/2024         BUN Annually Lab Results   Component Value Date    BUN 57 (H) 03/19/2024       Drug Serum Conc Annually No results found for: \"DIGOXIN\", \"DIG\", \"VALP\"

## 2024-06-18 ENCOUNTER — HOSPITAL ENCOUNTER (OUTPATIENT)
Dept: NUCLEAR MEDICINE | Facility: HOSPITAL | Age: 74
Discharge: HOME | End: 2024-06-18
Attending: INTERNAL MEDICINE

## 2024-06-18 DIAGNOSIS — C09.9 TONSIL CANCER (HCC): ICD-10-CM

## 2024-06-18 LAB — GLUCOSE BLDC GLUCOMTR-MCNC: 104 MG/DL (ref 70–99)

## 2024-06-18 PROCEDURE — 82962 GLUCOSE BLOOD TEST: CPT

## 2024-06-18 PROCEDURE — 78815 PET IMAGE W/CT SKULL-THIGH: CPT | Performed by: INTERNAL MEDICINE

## 2024-06-20 ENCOUNTER — TELEPHONE (OUTPATIENT)
Dept: PHYSICAL THERAPY | Facility: HOSPITAL | Age: 74
End: 2024-06-20

## 2024-06-20 ENCOUNTER — APPOINTMENT (OUTPATIENT)
Dept: HEMATOLOGY/ONCOLOGY | Facility: HOSPITAL | Age: 74
End: 2024-06-20
Attending: INTERNAL MEDICINE
Payer: MEDICARE

## 2024-06-20 ENCOUNTER — OFFICE VISIT (OUTPATIENT)
Dept: RADIATION ONCOLOGY | Facility: HOSPITAL | Age: 74
End: 2024-06-20
Attending: RADIOLOGY

## 2024-06-20 VITALS
HEART RATE: 74 BPM | WEIGHT: 208.63 LBS | BODY MASS INDEX: 25 KG/M2 | SYSTOLIC BLOOD PRESSURE: 144 MMHG | RESPIRATION RATE: 16 BRPM | TEMPERATURE: 98 F | OXYGEN SATURATION: 99 % | DIASTOLIC BLOOD PRESSURE: 70 MMHG

## 2024-06-20 DIAGNOSIS — C09.9 TONSIL CANCER (HCC): Primary | ICD-10-CM

## 2024-06-20 PROCEDURE — 99211 OFF/OP EST MAY X REQ PHY/QHP: CPT

## 2024-06-20 NOTE — PROGRESS NOTES
Nursing Follow-Up Note    Patient: Luisito Diop  YOB: 1950  Age: 74 year old  Radiation Oncologist: Dr. Julian Park  Referring Physician: Justen Wesley  Chief Complaint:   Chief Complaint   Patient presents with    Radiation     Date: 6/20/2024    Toxicities:   Fatigue Grade 1= Fatigue relieved by rest  Dysphagia Grade 3= Severely altered eating/swallowing; tube feeding, TPN, or hospitalization indicated- has g-tube. 2 cans 3x per day.   Dyspepsia Grade 0= None  Mucositis Grade 0= None  Constipation Grade 0= None  Diarrhea  Grade 0= None  Nausea Grade 0= None  Vomiting Grade 0= None  Xerostomia Present=Yes- using biotene and salt rinses once a day- discussed increasing frequency of both.     Vital Signs: /70 (BP Location: Right arm, Patient Position: Sitting, Cuff Size: adult)   Pulse 74   Temp 97.5 °F (36.4 °C) (Temporal)   Resp 16   Wt 94.6 kg (208 lb 9.6 oz)   SpO2 99%   BMI 24.74 kg/m² ,   Wt Readings from Last 6 Encounters:   06/20/24 94.6 kg (208 lb 9.6 oz)   04/23/24 94.8 kg (209 lb)   03/19/24 95.3 kg (210 lb)   03/08/24 93.1 kg (205 lb 3.2 oz)   02/20/24 95.3 kg (210 lb)   01/23/24 95.3 kg (210 lb)       Allergies:  No Known Allergies    Nursing Note:   Patient seen for follow up with Dr. Park. Finished treatment 12/6/23. Last seen 3/8/24. PET scan completed 6/18/24. Dr. Park to review with patient. Patient has not followed up with Dr. Cummings yet or made speech therapy appointment. Per patient, called speech therapy and they \"turned him away\". RN printed referral and helped patient make Speech therpay and a video swallow. Patient very down this visit, has not been able to get anything down orally. Denied social work assistance. Using tube feeds- 2 cans 3x per day. Has labs and an appointment with Dr. Zavala 6/28/24. Follow up with Dr. Park in 4-6 months. Patient provided with number to make an appointment with Dr. Cummings.

## 2024-06-20 NOTE — PROGRESS NOTES
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    Dear Lucas Pacheco and colleagues,    As you recall, pt is a 73 yo with history of CKD on dialysis, with R neck mass for several months, without dysphagia or oltagia.  PET/CT revealed multiple R LN.   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   Pt had prolonged tx break during tx due to weight loss and mucositis and had feeding tube placed during tx and continues to use 6 cans of supplements a day via G tube.  Pt tolerates small amounts of water and has not tried to advance his diet yet.     Pt also has not had recent f/u with Dr Cummings and speech/swallow.   Pt's mucositis has resolved.  He has altered taste and xerostomia.       Repeat PET/CT was reviewed as below.  Pt will continue to follow closely with Dr. Zavala and Dr. Cummings of ENT for endoscopy.      PET STANDARD BODY SCAN (ONCOLOGY) (CPT=78815)    Result Date: 6/18/2024  CONCLUSION:   Decreased activity in the right tonsil and two right neck nodes. The SUV max increases by less than 20% at all three sites on delayed head and neck imaging, which suggests this is all treated disease rather than residual disease.   No new disease   Dictated by (CST): Sterling Mccormick MD on 6/18/2024 at 9:17 AM     Finalized by (CST): Sterling Mccormick MD on 6/18/2024 at 10:22 AM             ROS    A 12 Point review of system was obtained and is as above and per HPI and nursing note.         Current Outpatient Medications   Medication Sig Dispense Refill    clonazePAM 1 MG Oral Tab Take 1 tablet (1 mg total) by mouth nightly as needed for Anxiety. 30 tablet 0    aspirin 81 MG Oral Tab Take 1 tablet (81 mg total) by mouth daily.      methylPREDNISolone (MEDROL) 4 MG Oral Tablet Therapy Pack As  directed. (Patient not taking: Reported on 6/20/2024) 1 each 0    cyclobenzaprine 5 MG Oral Tab Take 1 tablet (5 mg total) by mouth nightly as needed. (Patient not taking: Reported on 6/20/2024) 30 tablet 0       PAIN:   , Pain Score: 0,     ,  ,     ,  ,      ALLERGIES :   No Known Allergies    PAST MEDICAL HISTORY:   has a past medical history of Diabetes (HCC), Dialysis patient (HCC), ESRD (end stage renal disease) on dialysis ([facility]) (2020), Essential hypertension, High blood pressure, Renal disorder, and Tonsillar cancer (HCC).    He has no past medical history of Anesthesia complication, Arrhythmia, Asthma (HCC), COPD (chronic obstructive pulmonary disease) (HCC), Deep vein thrombosis (HCC), Difficult intubation, Disorder of liver, Heart attack (HCC), High cholesterol, History of adverse reaction to anesthesia, Malignant hyperthermia, Pulmonary embolism (HCC), Seizure disorder (HCC), Sleep apnea, or Stroke (HCC).    PAST SURGICAL HISTORY:   has a past surgical history that includes colonoscopy.    PAST SOCIAL HISTORY   reports that he has never smoked. He has never used smokeless tobacco. He reports that he does not currently use alcohol. He reports that he does not use drugs.    PAST FAMILY HISTORY   family history includes Cancer in his sister.    Wt Readings from Last 6 Encounters:   06/20/24 94.6 kg (208 lb 9.6 oz)   04/23/24 94.8 kg (209 lb)   03/19/24 95.3 kg (210 lb)   03/08/24 93.1 kg (205 lb 3.2 oz)   02/20/24 95.3 kg (210 lb)   01/23/24 95.3 kg (210 lb)        PHYSICAL EXAM  Blood pressure 144/70, pulse 74, temperature 97.5 °F (36.4 °C), temperature source Temporal, resp. rate 16, weight 94.6 kg (208 lb 9.6 oz), SpO2 99%.  PERFORMANCE STATUS 1 , denies  PAIN  GENERAL:  Pt is alert and oriented times three in no acute distress.    HEENT:  PERRLA, EOMI, no lesions in visualized portion of oropharynx.   NECK:  Supple with no  lymphadenopathy.   CHEST:  Clear   Feeding tube in place.  ABDOMEN:  Soft with  no masses.   EXTREMITIES:  There is no upper or lower extremity edema.  Thin, mild muscle wasting.   NEUROLOGIC:  Cranial nerves II-XII are intact.  Neuro exam is nonfocal.    COMPLETED TESTS:  I have reviewed the patient's clinical, radiographic, pathologic and laboratory studies.    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    Pt continues to use 6 cans of supplements a day via G tube.  Pt tolerates small amounts of water and has not tried to advance his diet yet.     Pt will f/u with Dr Cummings and speech/swallow and ideally he will be able to advance his diet.    I will see patient in 4-6  months for a routine follow-up visit.     Thank you for allowing me to take care of your patient.  Please do not hesitate to contact me directly.    Julian Park MD, FACRO  Radiation Oncology  [email or web address]  [phone]    6/20/2024          Imaging & Referral Orders:  XR VIDEO SWALLOW (CPT=74230)

## 2024-06-20 NOTE — PATIENT INSTRUCTIONS
Follow up with Dr. Park in 4 months-6 months.  Esperanza will call you to schedule your follow up appointment.   Please call [phone] with any radiation questions.     We will call you with the date and time of your video swallow.    Make an appointment to see Dr. Cummings soon:  [email or web address]  Elva Cummings MD  [address] [place] [address], [id number], [place], [place] [id number] [phone]

## 2024-06-23 PROBLEM — C09.9 CANCER OF TONSIL (HCC): Status: ACTIVE | Noted: 2024-06-23

## 2024-06-23 PROBLEM — C79.89 METASTATIC SQUAMOUS CELL CARCINOMA TO HEAD AND NECK (HCC): Status: RESOLVED | Noted: 2024-01-09 | Resolved: 2024-06-23

## 2024-06-23 NOTE — PROGRESS NOTES
[facility]ematology Oncology [facility] Progress Note      Patient Name: Luisito Diop   YOB: 1950  Medical Record Number: [id number]  Attending Physician: Adolph Zavala M.D.     The 21st Century Cures Act makes medical notes like these available to patients in the interest of transparency. Please be advised this is a medical document. Medical documents are intended to carry relevant information, facts as evident, and the clinical opinion of the practitioner. The medical note is intended as peer to peer communication and may appear blunt or direct. It is written in medical language and may contain abbreviations or verbiage that are unfamiliar.      Date of Visit: 6/28/2024      Chief Complaint  Squamous cell carcinoma, right tonsil - follow up.    Oncologic History  Luisito Diop is a 74 year old male who presented to the ED on 08/17/2023 with complaint of a 2 week history of progression right neck pain and enlarging mass. Non-contrast CT soft tissue neck on 08/18/2023 suggested enlargement of the right thyroid lobe with with a nodule measuring 3.9 cm and 2 enlarged right sided level 2 lymph nodes. On the same day, he underwent ultrasound of the thyroid which showed that there was a 2.6 cm mass abutting the superior pole of the right thyroid lobe and a 5.2 cm more superior mass.     On 08/18/2023 he underwent biopsy of the right neck mass and adjacent lymph node. Pathology showed metastatic squamous cell carcinoma with extensive necrosis and fragments of lymph node. Tumor cells were positive for p16.    On 08/24/2023 he underwent endoscopic evaluation by ENT which showed an irregular tonsil. Biopsy showed invasive keratinizing squamous cell carcinoma. Tumor cells were p16 positive.    PET/CT scan on 09/14/2023 showed a hypermetabolic right tonsillar lesion and two hypermetabolic enlarged right neck lymph nodes. No distant metastases were noted. Patient was staged as T2N1M0.     On 10/03/2023  he began definitive systemic therapy with weekly cetuximab and radiation therapy. Last cetuximab was on 12/05/2023. Last radiation was 12/06/2023. Patient required a prolonged treatment interruption from 10/03/2023 to 11/21/2023 for weight loss and mucositis. He required placement of G tube.     Endoscopic exam by ENT on 02/20/2024 showed treatment related edema but no other abnormalities.     PET/CT imaging on 03/06/2024 showed a residual right tonsillar mass with SUV 3.7, two partially calcified right neck lymph nodes with SUV 3.7 and 3.4.     PET/CT scan on 06/18/2024 showed right tonsillar soft tissue thickening with SUV 6.2 and two lymph nodes with SUV 5.2 and 5.3. Per radiology report, SUV max increases by less than 20% at all abnormal sites on delayed imaging suggested treated rather than residual disease.     History of Present Illness      Performance Status   Karnofsky 100% - Normal, no complaints.     Past Medical History (historical data, reviewed by physician)   Past Medical History:    Diabetes (HCC)    Dialysis patient (HCC)    ESRD (end stage renal disease) on dialysis (HCC)    Essential hypertension    High blood pressure    Renal disorder    Tonsillar cancer (HCC)     Past Surgical History (historical data, reviewed by physician)   Past Surgical History:   Procedure Laterality Date    Colonoscopy       Family History (historical data, reviewed by physician)   Family History   Problem Relation Age of Onset    Cancer Sister         lung cancer     Social History (historical data, reviewed by physician)   Social History     Socioeconomic History    Marital status:    Tobacco Use    Smoking status: Never    Smokeless tobacco: Never   Vaping Use    Vaping status: Never Used   Substance and Sexual Activity    Alcohol use: Not Currently    Drug use: Never     Current Medications   No outpatient medications have been marked as taking for the 6/28/24 encounter (Appointment) with Adolph Zavala  MD Roger.     Allergies   Mr. Diop has No Known Allergies.    Vital Signs   There were no vitals taken for this visit.    Physical Examination   Constitutional      Well developed, well nourished. Appears close to chronological age. No apparent distress.   Head                   Normocephalic and atraumatic.  Eyes                   Conjunctiva clear; sclera anicteric.  ENMT                 External nose normal; external ears normal.  Neck                   Supple, without masses; no thyromegaly.  Hematologic/Lymphatic No cervical, supraclavicular, axillary or inguinal lymphadenopathy; no petechiae or purpura.     Respiratory          Normal effort; no respiratory distress; lungs clear to auscultation bilaterally.  Cardiovascular     Regular rate and rhythm; normal S1S2.  Abdomen            Non-tender; non-distended; no masses,no fluid wave; normoactive bowel sounds; no hepatosplenomegaly.  Back/Spine         No spinal tenderness; no costovertebral tenderness.  Musculoskeletal   No joint swelling or erythema; no palpable masses; no significant decreases in range of motion.  Extremities          No clubbing, cyanosis, or edema.  Integumentary      Skin is warm and dry; no rashes.  Neurologic           Alert and oriented x 3; motor and sensory grossly intact.  Psychiatric          Mood and affect appropriate; coherent speech; verbalizes understanding of our discussions today.    Laboratory   No results found for this or any previous visit (from the past 48 hour(s)).    Radiology   06/18/2024:  PET/CT scan - I independently visualized the radiologic images in addition to reviewing the written report:     Impression and Plan   1.   P16 positive squamous cell carcinoma, right tonsil: Patient was staged as T2N1M0. He received definitive chemoradiotherapy with cetuximab completing all treatment on 12/06/2024. Treatment was complicated by weight loss and mucositis requiring placement of a G tube for feeds.     2.    Dysphagia:     3.   Anemia: Normocytic normochromic anemia. Stable and consistent with anemia of CKD.    Planned Follow Up       Encounter Time  Pre-charting/reviewing medical records: minutes.  In room with patient obtaining history, performing exam, counseling on diagnosis, and reviewing plan: minutes.  Orders/notes: minutes.  Total time: minutes.      Electronically signed by:    Adolph Zavala M.D.  System Medical Director of Oncology Services  [facility]      previous CT scan. The absence of increase in SUV on delayed images suggests the absence of active malignancy. At last endoscopy with ENT in 02/2024, right tonsil was described as normal except for edema consistent with previous therapy.           I recommend continued active surveillance. Patient will have CT imaging in 3 months. He should also continue every 3 month follow up with ENT for endoscopic surveillance for the first 2 years post treatment. We will help patient set up this appointment. TSH will be periodically checked as he is at risk for radiation induced hypothyroidism.     2.   Dysphagia: Video swallow showed that patient was unable to swallow any of the contrast. While esophagram was recommended in the report, if patient is unable to swallow at all this will not be a useful endeavor. Arrangements will be made for the patient to see gastroenterology for EGD. He may have radiation induced esophageal stricture for which esophageal dilation can be performed. Once this evaluation is completed, patient will also be referred to speech and swallow.     3.   Anemia: Normocytic normochromic anemia. Stable and consistent with anemia of CKD.    4.   Nutrition: Patient will continue G tube feeds.     Patient will continue to receive longitudinal care by me for the complex care required for the cancer diagnosis.    Planned Follow Up   Patient will return for follow with labs in 3 months.    Encounter Time  Pre-charting/reviewing medical records: 20 minutes.  In room with patient obtaining history, performing exam, counseling on diagnosis, and reviewing plan: 30 minutes.  Orders/notes: 5 minutes.  Total time: 55 minutes.    Electronically signed by:    Adolph Zavala M.D.  System Medical Director of Oncology Services  [facility]

## 2024-06-26 DIAGNOSIS — C09.9 TONSIL CANCER (HCC): Primary | ICD-10-CM

## 2024-06-28 ENCOUNTER — TELEPHONE (OUTPATIENT)
Dept: RADIATION ONCOLOGY | Facility: HOSPITAL | Age: 74
End: 2024-06-28

## 2024-06-28 ENCOUNTER — OFFICE VISIT (OUTPATIENT)
Dept: HEMATOLOGY/ONCOLOGY | Facility: HOSPITAL | Age: 74
End: 2024-06-28
Attending: NURSE PRACTITIONER
Payer: MEDICARE

## 2024-06-28 ENCOUNTER — HOSPITAL ENCOUNTER (OUTPATIENT)
Dept: GENERAL RADIOLOGY | Facility: HOSPITAL | Age: 74
Discharge: HOME | End: 2024-06-28
Attending: RADIOLOGY

## 2024-06-28 VITALS
OXYGEN SATURATION: 99 % | WEIGHT: 209.31 LBS | HEIGHT: 77.01 IN | DIASTOLIC BLOOD PRESSURE: 68 MMHG | SYSTOLIC BLOOD PRESSURE: 140 MMHG | HEART RATE: 60 BPM | RESPIRATION RATE: 16 BRPM | TEMPERATURE: 98 F | BODY MASS INDEX: 24.71 KG/M2

## 2024-06-28 DIAGNOSIS — C09.9 TONSIL CANCER (HCC): Primary | ICD-10-CM

## 2024-06-28 DIAGNOSIS — Z93.1 G TUBE FEEDINGS (HCC): ICD-10-CM

## 2024-06-28 DIAGNOSIS — C09.9 TONSIL CANCER (HCC): ICD-10-CM

## 2024-06-28 DIAGNOSIS — R13.10 DYSPHAGIA, UNSPECIFIED TYPE: ICD-10-CM

## 2024-06-28 DIAGNOSIS — D64.9 NORMOCYTIC NORMOCHROMIC ANEMIA: ICD-10-CM

## 2024-06-28 LAB
ALBUMIN SERPL-MCNC: 3.9 G/DL (ref 3.2–4.8)
ALBUMIN/GLOB SERPL: 1.3 {RATIO} (ref 1–2)
ALP LIVER SERPL-CCNC: 161 U/L
ALT SERPL-CCNC: 41 U/L
ANION GAP SERPL CALC-SCNC: 2 MMOL/L (ref 0–18)
AST SERPL-CCNC: 41 U/L (ref ?–34)
BASOPHILS # BLD AUTO: 0.03 X10(3) UL (ref 0–0.2)
BASOPHILS NFR BLD AUTO: 0.9 %
BILIRUB SERPL-MCNC: 0.3 MG/DL (ref 0.2–1.1)
BUN BLD-MCNC: 33 MG/DL (ref 9–23)
BUN/CREAT SERPL: 8.9 (ref 10–20)
CALCIUM BLD-MCNC: 9.7 MG/DL (ref 8.7–10.4)
CHLORIDE SERPL-SCNC: 100 MMOL/L (ref 98–112)
CO2 SERPL-SCNC: 36 MMOL/L (ref 21–32)
CREAT BLD-MCNC: 3.69 MG/DL
DEPRECATED RDW RBC AUTO: 44.3 FL (ref 35.1–46.3)
EGFRCR SERPLBLD CKD-EPI 2021: 17 ML/MIN/1.73M2 (ref 60–?)
EOSINOPHIL # BLD AUTO: 0.08 X10(3) UL (ref 0–0.7)
EOSINOPHIL NFR BLD AUTO: 2.3 %
ERYTHROCYTE [DISTWIDTH] IN BLOOD BY AUTOMATED COUNT: 13.2 % (ref 11–15)
GLOBULIN PLAS-MCNC: 2.9 G/DL (ref 2–3.5)
GLUCOSE BLD-MCNC: 115 MG/DL (ref 70–99)
HCT VFR BLD AUTO: 31.6 %
HGB BLD-MCNC: 10.8 G/DL
IMM GRANULOCYTES # BLD AUTO: 0.02 X10(3) UL (ref 0–1)
IMM GRANULOCYTES NFR BLD: 0.6 %
LYMPHOCYTES # BLD AUTO: 0.51 X10(3) UL (ref 1–4)
LYMPHOCYTES NFR BLD AUTO: 14.9 %
MCH RBC QN AUTO: 31.9 PG (ref 26–34)
MCHC RBC AUTO-ENTMCNC: 34.2 G/DL (ref 31–37)
MCV RBC AUTO: 93.2 FL
MONOCYTES # BLD AUTO: 0.39 X10(3) UL (ref 0.1–1)
MONOCYTES NFR BLD AUTO: 11.4 %
NEUTROPHILS # BLD AUTO: 2.4 X10 (3) UL (ref 1.5–7.7)
NEUTROPHILS # BLD AUTO: 2.4 X10(3) UL (ref 1.5–7.7)
NEUTROPHILS NFR BLD AUTO: 69.9 %
OSMOLALITY SERPL CALC.SUM OF ELEC: 294 MOSM/KG (ref 275–295)
PLATELET # BLD AUTO: 211 10(3)UL (ref 150–450)
POTASSIUM SERPL-SCNC: 3.4 MMOL/L (ref 3.5–5.1)
PROT SERPL-MCNC: 6.8 G/DL (ref 5.7–8.2)
RBC # BLD AUTO: 3.39 X10(6)UL
SODIUM SERPL-SCNC: 138 MMOL/L (ref 136–145)
WBC # BLD AUTO: 3.4 X10(3) UL (ref 4–11)

## 2024-06-28 PROCEDURE — 80053 COMPREHEN METABOLIC PANEL: CPT

## 2024-06-28 PROCEDURE — 85025 COMPLETE CBC W/AUTO DIFF WBC: CPT

## 2024-06-28 PROCEDURE — G2211 COMPLEX E/M VISIT ADD ON: HCPCS | Performed by: SPECIALIST

## 2024-06-28 PROCEDURE — 92611 MOTION FLUOROSCOPY/SWALLOW: CPT

## 2024-06-28 PROCEDURE — 36415 COLL VENOUS BLD VENIPUNCTURE: CPT

## 2024-06-28 PROCEDURE — 99215 OFFICE O/P EST HI 40 MIN: CPT | Performed by: SPECIALIST

## 2024-06-28 PROCEDURE — 74230 X-RAY XM SWLNG FUNCJ C+: CPT | Performed by: RADIOLOGY

## 2024-06-28 NOTE — PATIENT INSTRUCTIONS
Please call Dr. Elva Cummings [phone] and schedule your 3 month follow up with Endoscopic eval- This is overdue    Please call Dr. Afshan River [phone] and schedule a follow up you need an EGD and evaluation for possible dilation    Please call and schedule with a urologist the number is [phone]     Schedule a follow up for 3 months with CT scan prior

## 2024-06-28 NOTE — PROGRESS NOTES
ADULT VIDEOFLUOROSCOPIC SWALLOWING STUDY       ADULT VIDEOFLUOROSCOPIC SWALLOWING STUDY:   Referring Physician: Vivian      Radiologist: Dr. Figueredo  Diagnosis: dysphagia    Date of Service: 6/28/2024     PATIENT SUMMARY   Chief Complaint: \"I can't eat anything.\"  The patient had cancer of the right tonsil involving 2 R neck nodes diagnosed in June 2023.  He had radiation and chemotherapy.  During his course of treatment, he was not able to swallow and a G-tube was placed, interrupting treatment for a time.  After treatment he had mild residual activity on repeat PET, most likely related to post therapy.  Pt remains NPO except for plain water with pills.  He denies coughing/choking with the water.  He has not seen a speech therapist or had any form of dysphagia therapy.  Prior to radiation and chemotherapy, the patient had no dysphagia.  When he attempts a small amount of food, he either gags or coughs and brings it back up.  It will not go down and gets \"hung up\"  in his throat.  Pt has always had a sensitive gag reflex.  Prior to his cancer treatment he would often gag when brushing his teeth or tongue.    Current Diet: NPO with G-tube feeding. Sips of water with pills.         Problem List  Active Problems:  Active Problems:    * No active hospital problems. *      Past Medical History  Past Medical History:    Diabetes (HCC)    Dialysis patient (HCC)    ESRD (end stage renal disease) on dialysis (HCC)    Essential hypertension    High blood pressure    Renal disorder    Tonsillar cancer (HCC)        Imaging results: 1/9/24 CT:  LUNG BASES: The heart is normal in size.  Partially imaged coronary artery calcification.  Mild-to-moderate symmetric bilateral gynecomastia.  Minor dependent subsegmental atelectasis     ASSESSMENT   DYSPHAGIA ASSESSMENT  Test completed in conjunction with Radiologist.   Food/Liquid Types Presented: puree, nectar/mildly thick liquid, honey/moderately thick liquid , and thin  liquids.    Study Position and View:  Patient was seated upright and viewed laterally and A-P.    Pain Assessment: The patient reports pain at a level of 0/10.    Oral phase:  Bilabial seal was intact with no anterior food or liquid loss.  Slight oral hesitation before swallow of liquids and much greater delay with a quarter teaspoon of puree with minimal tongue pumping.  Bolus formation and control were intact with no loss prematurely into the pharynx nor loss to floor of mouth.  With full spoonful of puree, the patient attempted to propel the food posteriorly with several inefficient A-P lingual movements, then began gagging and had to spit out the food.  This exacerbated into vomiting and patient vomited several times.  The patient was unable to / did not propel a full teaspoon of puree into the pharynx.    Pharyngeal phase:  The pharyngeal response was timely triggering at the tongue base for liquids and a quarter teaspoon of puree.  No other food was propelled into the pharynx.  No laryngeal penetration or aspiration was observed.  No pharyngeal retention remained.      Esophageal phase:   Adequate flow of bolus through esophagus     Penetration Aspiration Scale: 1/8.  Material does not enter the airway.    Overall Impression: The pharyngeal phase of the swallow was WNL.  Moderate-severe oral dysphagia is suspected.  Pt was initially given thin then thick liquids and then puree in quarter teaspoon amounts then full teaspoon.  Severe oral gagging occurred with full teaspoon amounts of puree, after which the patient vomited.  Suspect hyper active gag reflex, however, may want to rule out GI involvement.  Recommend dysphagia therapy with systematic increase in bolus amounts and food textures.  Pt is scheduled for therapy starting in August.     FCM category and level: Swallowing, 4  PLAN   Potential: Good    Diet Recommendations:  Solids: Pureed, slowly advance amount and texture.  Start with thick liquids or  liquidy puree such as applesauce in quarter teaspoon amounts.  Liquids: Thin,  try to increase the amount of liquid you drink.  Start with 2-3 ounces and slowly increase amount.    Recommended compensatory strategies:   Sit upright    Medication Administration:  No restrictions    Further Follow-up:  Recommend dysphagia therapy.    GOALS (to be met 6 visits)  The patient will increase toleration of foods starting with puree with goal of general diet consistency and thin liquids without overt signs or symptoms of aspiration and without gagging with 100 % accuracy  The patient/family/caregiver will demonstrate understanding and implementation of aspiration precautions and swallow strategies independently     EDUCATION/INSTRUCTION  Reviewed results and recommendations with patient.  Written instructions were provided.  Agreement/Understanding verbalized and all questions answered to their apparent satisfaction.      INTERDISCIPLINARY COMMUNICATION  Reviewed results with Radiologist; agreement verbalized.        Patient was advised of these findings, precautions, recommendations and treatment options and has agreed to actively participate in planning and for this course of care.    Thank you for your referral. Please co-sign or sign and return this letter via fax as soon as possible. If you have any questions, please contact me at [phone].    Gina Mcnulty MA/MARYA-SLP  Speech Language Pathologist  [facility]  [phone]    Electronically signed by therapist: Gina Mcnulty, SLP  Physician's certification required: Yes  I certify the need for these services furnished under this plan of treatment and while under my care.    X___________________________________________________ Date____________________    Certification From: 6/28/2024  To:9/26/2024

## 2024-06-28 NOTE — PATIENT INSTRUCTIONS
VIDEO SWALLOW STUDY    Diet Recommendations:  Solids: Pureed, slowly advance amount and texture.  Start with thick liquids or liquidy puree such as applesauce in quarter teaspoon amounts.  Liquids: Thin,  try to increase the amount of liquid you drink.  Start with 2-3 ounces and slowly increase amount.    Recommended compensatory strategies:   Sit upright    Medication Administration:  No restrictions    Further Follow-up:  Recommend dysphagia (swallowing) therapy.    Gina Mcnulty MA/MARYA-SLP  Speech Language Pathologist  [facility]  [phone]

## 2024-07-06 PROBLEM — Z93.1 G TUBE FEEDINGS (HCC): Status: ACTIVE | Noted: 2024-07-06

## 2024-07-09 DIAGNOSIS — F41.9 ANXIETY: ICD-10-CM

## 2024-07-12 RX ORDER — CLONAZEPAM 1 MG/1
1 TABLET ORAL NIGHTLY PRN
Qty: 30 TABLET | Refills: 0 | Status: SHIPPED | OUTPATIENT
Start: 2024-07-12

## 2024-07-12 NOTE — TELEPHONE ENCOUNTER
Please review. Protocol Failed; No Protocol      Recent fills: 1/16/2024, 4/29/2024  Last Rx written: 4/29/2024  Last office visit: 4/23/2024      Requested Prescriptions   Pending Prescriptions Disp Refills    CLONAZEPAM 1 MG Oral Tab [Pharmacy Med Name: clonazePAM 1 MG Oral Tablet] 30 tablet 0     Sig: TAKE 1 TABLET BY MOUTH NIGHTLY AS NEEDED FOR ANXIETY       Controlled Substance Medication Failed - 7/9/2024  4:56 PM        Failed - This medication is a controlled substance - forward to provider to refill               Future Appointments         Provider Department Appt Notes    In 2 weeks GilbertPayton triana SLP [facility] Speech Pathology Medicare A&B/[employer]BS Supp  scheduled with nursestanley calling back to fill out MSPQ    In 3 weeks FallonPayton triana SLP [facility] Speech Pathology Medicare A&B/BCBS Supp    In 1 month GilbertPayton triana [facility] Speech Pathology Medicare A&B/BCBS Supp    In 1 month GilbertPayton [facility] Speech Pathology Medicare A&B/BCBS Supp    In 1 month GilbertPayton triana [facility] Speech Pathology Medicare A&B/BCBS Supp    In 1 month FallonPayton triana [facility] Speech Pathology Medicare A&B/BCBS Supp    In 2 months CMA RESOURCE [facility] Hematology Oncology CBC, CMP    In 2 months Adolph Zavala MD [facility] Hematology Oncology follow-up 3m          Recent Outpatient Visits              2 weeks ago Tonsil cancer (HCC)    [facility] Hematology Oncology Adolph Zavala MD    Office Visit    2 weeks ago Tonsil cancer ([facility])    [facility] Hematology Oncology    Nurse Only    3 weeks ago Tonsil cancer (HCC)    [facility] DREW [facility] - Rad/Onc [place], Julian Robles MD    Office Visit    2 months ago Medicare annual wellness visit, subsequent    [facility], Rich Aguirre MD    Office Visit    3 months ago Tonsil cancer ([facility])    [facility]  Hematology Oncology Justen Wesley MD    Office Visit

## 2024-07-17 NOTE — TELEPHONE ENCOUNTER
Current Outpatient Medications:   ••  colchicine 0.6 MG Oral Tab, Take bid x 3 days then daily for 4 more days, Disp: 30 tablet, Rfl: 0 [facility]     Received confirmation of receipt of message/recs from Antonette Kitchen [facility] Heart Core Nurse (supporting You)2 minutes ago (1:22 PM)       Awww man! I m just floored at how it can be so bad. I don t exactly eat healthy but I only eat dinner and I don t eat a lot of it. If I get a burger I only eat half usually. But let s go for it I suppose       Yoly Delgado RN BSN   1:26 PM 07/17/24  Nurse line M-F 8a-4p: [phone]

## 2024-07-29 ENCOUNTER — TELEPHONE (OUTPATIENT)
Dept: HEMATOLOGY/ONCOLOGY | Facility: HOSPITAL | Age: 74
End: 2024-07-29

## 2024-07-29 NOTE — TELEPHONE ENCOUNTER
Received a call from patient. He stated Dr. Zavala told him to see Dr. Afshan River from GI, but when he called, he was told she only sees patients inpatient. Reviewed with Dr. Zavala and he stated the patient can see the first available GI provider and whoever is in network with his insurance. Patient also stated he was told to see a urologist. Reviewed Dr. Zavala's last visit note and looked at the referrals and told him there's no notation on him recommending him to see a urologist. He stated understanding. The patient also stated he was told to follow up with Dr. Cummings and asked if there's any records of that. Reviewed Dr. Zavala's note and told him he does recommend he follow up with Dr. Cummings. Patient asked if Dr. Cummings knows him and I stated yes, he had a visit with her in February 2024. Provided the contact phone number for Dr. Cummings. Patient stated understanding and thanked me for the information.

## 2024-08-01 ENCOUNTER — OFFICE VISIT (OUTPATIENT)
Dept: SPEECH THERAPY | Facility: HOSPITAL | Age: 74
End: 2024-08-01
Attending: SPECIALIST
Payer: MEDICARE

## 2024-08-01 DIAGNOSIS — R13.12 OROPHARYNGEAL DYSPHAGIA: Primary | ICD-10-CM

## 2024-08-01 PROCEDURE — 92526 ORAL FUNCTION THERAPY: CPT

## 2024-08-01 NOTE — PROGRESS NOTES
Diagnosis:   Oropharyngeal Dysphagia      Referring Provider: Elva Cummings  Date of Evaluation:    6/28/2024 VFSS    Precautions:  Cancer Next MD visit:   none scheduled  Date of Surgery: n/a   Insurance Primary/Secondary: MEDICARE / [employer] [place] PPO     # Auth Visits: N/A            Subjective: Pt reports he can't eat anything by mouth.    Pain: 0/10      Objective:     Date: 8/1/2024  TX#: 2 Date:                 TX#: 3 Date:                 TX#: 4 Date:                 TX#: 5 Date:   Tx#: 6   Results/recommendations of VFSS reviewed with pt.         Eating Assessment Tool (EAT-10) = 26         Assessment: Pt attended today's session alone.  Pt was late to session due to getting lost.  VFSS results were reviewed with pt and via a handout and explanation.  All questions answered.  Reviewed recommended PO intake with pt and rationale.  Pt was instructed to initiate po intake of liquid pureed consistencies, such as applesauce, by quarter teaspoon amounts and approximately 2-3 ounces of thin liquids using small sips.  Pt was instructed to start po intake prior to G-tube feedings 3X/day.  In addition, pt will keep a log of amount of intake and reaction to po intake.  Pt completed the Eating Assessment Tool (EAT-10), a QOL measurement of swallowing.  Pt self scored a 26 on this questionnaire.  A score of 15+ is considered abnormal and indicative of increased dysphagia.       Per VFSS results, \"Overall Impression: The pharyngeal phase of the swallow was WNL. Moderate-severe oral dysphagia is suspected. Pt was initially given thin then thick liquids and then puree in quarter teaspoon amounts then full teaspoon. Severe oral gagging occurred with full teaspoon amounts of puree, after which the patient vomited. Suspect hyper active gag reflex, however, may want to rule out GI involvement. Recommend dysphagia therapy with systematic increase in bolus amounts and food textures.         Goals:   Potential: Good     Diet  Recommendations:  Solids: Pureed, slowly advance amount and texture.  Start with thick liquids or liquidy puree such as applesauce in quarter teaspoon amounts.  Liquids: Thin,  try to increase the amount of liquid you drink.  Start with 2-3 ounces and slowly increase amount.     Recommended compensatory strategies:   Sit upright     Medication Administration:  No restrictions      GOALS   The patient will increase toleration of foods starting with puree with goal of general diet consistency and thin liquids without overt signs or symptoms of aspiration and without gagging with 100 % accuracy  The patient/family/caregiver will demonstrate understanding and implementation of aspiration precautions and swallow strategies independently     Plan: Recommend continue POC as established    HEP: Initial PO bolus of 1/4 teaspoon pureed consistency and 2-3 ounces of thin liquids prior to G-tube feedings.    Charges: 28338       Total Timed Treatment: 0 min  Total Treatment Time: 30 min

## 2024-08-08 ENCOUNTER — OFFICE VISIT (OUTPATIENT)
Dept: SPEECH THERAPY | Facility: HOSPITAL | Age: 74
End: 2024-08-08
Attending: SPECIALIST
Payer: MEDICARE

## 2024-08-08 PROCEDURE — 92526 ORAL FUNCTION THERAPY: CPT

## 2024-08-08 NOTE — PROGRESS NOTES
Diagnosis:   Oropharyngeal Dysphagia      Referring Provider: Elva Cummings  Date of Evaluation:    6/28/2024 VFSS    Precautions:  Cancer Next MD visit:   none scheduled  Date of Surgery: n/a   Insurance Primary/Secondary: MEDICARE / [employer] [place] PPO     # Auth Visits: N/A            Subjective: Pt reported he is taking just one 1/2 teaspoon of applesauce by mouth prior to tube feedings.    Pain: 0/10      Objective:     Date: 8/1/2024  TX#: 2 Date:  8/8/2024               TX#: 3 Date:                 TX#: 4 Date:                 TX#: 5 Date:   Tx#: 6   Results/recommendations of VFSS reviewed with pt.   Handout of VFSS results, Diet Recommendations, and Safe Swallowing Recommendations provided      Eating Assessment Tool (EAT-10) = 26 [place] Performance Instrument (IOPI):  Anterior Max Pressure: 41 kPa = at 10th percentile-moderate weakness    Posterior Max Pressure: 29 kPa = at 1st percentile-significant weakness    Complete Posterior presses at 80% of max pressure-23 kPa  6 presses  6 presses  1 press  1 press  7 presses  3 presses  5 rest breaks required to get through 25 presses; max of 7 repetitions across 6 trials.    Gag x 2        Pureed Consistency: Applesauce  X 1   1/2 teaspoon without gag/vomiting  2nd 1/2 teaspoon =vomiting and dry heaving        Assessment: Pt attended today's session alone.  Pt was provided a handout with VFSS information along with diet recommendations and safe swallowing strategies.  Pt reported he has been taking only one 1/2 teaspoon of applesauce before each tube feedings during the week.  He is taking approximately 1-2 ounces of liquids in the morning with his medication.  Today, pt was able to take in only x 1 half teaspoon of applesauce by mouth.  After second trial, pt immediately vomited and dry heaved for approximately 5 minutes following intake.  Pt reported that he felt nauseous afterwards and was unable to take anything else by mouth.  [place] Performance Instrument (IOPI)  measurements were taken and pt demonstrated significant posterior weakness with max pressure at 29 kPa which is at the 1st percentile.  During posterior IOPI strength exercises, pt demonstrated reduced tolerance of the bulb in his mouth with gag x 2 during 25 repetitions.  He had to take 5 rest breaks to get through 25 repetitions with max of 7 presses noted across 6 trials.  Reviewed recommended PO intake with pt and rationale.  Pt was instructed to trial po intake of liquidy pureed consistencies, such as applesauce, by quarter teaspoon amounts.  Pt instructed to try to get in 3-4 quarter teaspoons 3X/day prior to G-tube feedings.       Per VFSS results, \"Overall Impression: The pharyngeal phase of the swallow was WNL. Moderate-severe oral dysphagia is suspected. Pt was initially given thin then thick liquids and then puree in quarter teaspoon amounts then full teaspoon. Severe oral gagging occurred with full teaspoon amounts of puree, after which the patient vomited. Suspect hyper active gag reflex, however, may want to rule out GI involvement. Recommend dysphagia therapy with systematic increase in bolus amounts and food textures.         Goals:   Potential: Good     Diet Recommendations:  Solids: Pureed, slowly advance amount and texture.  Start with thick liquids or liquidy puree such as applesauce in quarter teaspoon amounts.  Liquids: Thin,  try to increase the amount of liquid you drink.  Start with 2-3 ounces and slowly increase amount.     Recommended compensatory strategies:   Sit upright     Medication Administration:  No restrictions      GOALS   The patient will increase toleration of foods starting with puree with goal of general diet consistency and thin liquids without overt signs or symptoms of aspiration and without gagging with 100 % accuracy  The patient/family/caregiver will demonstrate understanding and implementation of aspiration precautions and swallow strategies independently  The patient  will complete 1 set of 25 posterior lingual presses using the IOPI device set at 80% of max pressure without gag reflex and with no more than 2 rest breaks needed for 25 repetitions.      Plan: Recommend continue POC as established    HEP: Initial PO bolus of 1/4 teaspoon pureed consistency and 2-3 ounces of thin liquids prior to G-tube feedings.    Charges: 13476       Total Timed Treatment: 0 min  Total Treatment Time: 45 min

## 2024-08-15 ENCOUNTER — OFFICE VISIT (OUTPATIENT)
Dept: SPEECH THERAPY | Facility: HOSPITAL | Age: 74
End: 2024-08-15
Attending: SPECIALIST
Payer: MEDICARE

## 2024-08-15 PROCEDURE — 92526 ORAL FUNCTION THERAPY: CPT

## 2024-08-15 NOTE — PROGRESS NOTES
Diagnosis:   Oropharyngeal Dysphagia      Referring Provider: Elva Cummings  Date of Evaluation:    6/28/2024 VFSS    Precautions:  Cancer Next MD visit:   none scheduled  Date of Surgery: n/a   Insurance Primary/Secondary: MEDICARE / [employer] [place] PPO     # Auth Visits: N/A            Subjective: Pt reports not changes in his ability to take in increased amounts of pureed consistencies by mouth at home.      Pain: 0/10      Objective:     Date: 8/1/2024  TX#: 2 Date:  8/8/2024               TX#: 3 Date: 8/15/2024                TX#: 4 Date:                 TX#: 5 Date:   Tx#: 6   Results/recommendations of VFSS reviewed with pt.   Handout of VFSS results, Diet Recommendations, and Safe Swallowing Recommendations provided      Eating Assessment Tool (EAT-10) = 26 [place] [employer] (IOPI):  Anterior Max Pressure: 41 kPa = at 10th percentile-moderate weakness    Posterior Max Pressure: 29 kPa = at 1st percentile-significant weakness    Complete Posterior presses at 80% of max pressure-23 kPa  6 presses  6 presses  1 press  1 press  7 presses  3 presses  5 rest breaks required to get through 25 presses; max of 7 repetitions across 6 trials.    Gag x 2  [place] [employer] (IOPI)  POSTERIOR:  Max Pressure: taken from last session 31 kPa  60% of Max: 19 kPa  Completed x 50 repetitions without gag reflex  Max Pressure reassessed today-31 kPa  60% of Max:19 kPa  Trial 1:   Completed 24/50 repetitions.    Gag reflex after 24 reps   Time: 40 seconds    Trial 2:   Completed 27/50  Gag reflex after 27 repetitions  Time: 33 seconds    80% of Max: 25 kPa  1.  6 presses  2.  3 presses  3.  15 presses  4.  10 presses  5.  6 presses  6.  13 presses  Completed 53 presses in total across 6 trials  Gag x 5;   Gag reflex initiated after 30-40 seconds each time                Pureed Consistency: Applesauce  X 1   1/2 teaspoon without gag/vomiting  2nd 1/2 teaspoon =vomiting and dry heaving PO Trials not attempted today due  to severity of gag reflex       Assessment: Pt attended today's session alone.  PO food trials were not attempted today due to severity of Pt's gag reflex.  Therapy today focused on oral stimulation using the IOPI device in order to strengthen the posterior tongue and desensitize Pt's gag reflex.  Progress noted as Pt was able to complete several trials of posterior lingual presses with calibration set between 60-80% of max pressure.  At last session, Pt was only able to get through a total of 25 repetitions over several trials using the IOPI device for posterior tongue.  Today, Pt was able to get through 3 separate trials of 50 repetitions of posterior lingual presses.  He demonstrated gag reflex after 30-40 seconds on average and required several rest breaks in between repetitions (see above).  Discussed Pt using a soft toothbrush and/or Nuk brush to initiate oral stimulation at home in order to further work on desensitizing his gag reflex.  Pt was instructed to repeat oral stimulation several times throughout the day and attempt to increase the amount of time he can tolerate the brush in his mouth.  Pt further instructed to focus on posterior tongue and soft palate.  Pt verbalized understanding of instructions.           Per VFSS results, \"Overall Impression: The pharyngeal phase of the swallow was WNL. Moderate-severe oral dysphagia is suspected. Pt was initially given thin then thick liquids and then puree in quarter teaspoon amounts then full teaspoon. Severe oral gagging occurred with full teaspoon amounts of puree, after which the patient vomited. Suspect hyper active gag reflex, however, may want to rule out GI involvement. Recommend dysphagia therapy with systematic increase in bolus amounts and food textures.         Goals:   Potential: Good     Diet Recommendations:  Solids: Pureed, slowly advance amount and texture.  Start with thick liquids or liquidy puree such as applesauce in quarter teaspoon  amounts.  Liquids: Thin,  try to increase the amount of liquid you drink.  Start with 2-3 ounces and slowly increase amount.     Recommended compensatory strategies:   Sit upright     Medication Administration:  No restrictions      GOALS   The patient will increase toleration of foods starting with puree with goal of general diet consistency and thin liquids without overt signs or symptoms of aspiration and without gagging with 100 % accuracy  The patient/family/caregiver will demonstrate understanding and implementation of aspiration precautions and swallow strategies independently  The patient will complete 1 set of 25 posterior lingual presses using the IOPI device set at 80% of max pressure without gag reflex and with no more than 2 rest breaks needed for 25 repetitions.      Plan: Recommend continue POC as established    HEP: Initial PO bolus of 1/4 teaspoon pureed consistency and 2-3 ounces of thin liquids prior to G-tube feedings; initiate oral stimulation using soft toothbrush or Nuk brush focusing on posterior tongue and soft palate.    Charges: [id number]       Total Timed Treatment: 0 min  Total Treatment Time: 30 min

## 2024-08-22 ENCOUNTER — APPOINTMENT (OUTPATIENT)
Dept: SPEECH THERAPY | Facility: HOSPITAL | Age: 74
End: 2024-08-22
Attending: SPECIALIST
Payer: MEDICARE

## 2024-08-29 ENCOUNTER — OFFICE VISIT (OUTPATIENT)
Dept: SPEECH THERAPY | Facility: HOSPITAL | Age: 74
End: 2024-08-29
Attending: SPECIALIST
Payer: MEDICARE

## 2024-08-29 PROCEDURE — 92526 ORAL FUNCTION THERAPY: CPT

## 2024-08-29 NOTE — PROGRESS NOTES
Diagnosis:   Oropharyngeal Dysphagia      Referring Provider: Elva Cummings  Date of Evaluation:    6/28/2024 VFSS    Precautions:  Cancer Next MD visit:   none scheduled  Date of Surgery: n/a   Insurance Primary/Secondary: MEDICARE / [employer] [place]PO     # Auth Visits: N/A            Subjective: Pt reports no changes in his ability to tolerate oral stimulation or trials of pureed consistencies by mouth at home.      Pain: 0/10      Objective:     Date: 8/1/2024  TX#: 2 Date:  8/8/2024               TX#: 3 Date: 8/15/2024                TX#: 4 Date: 8/29/2024                 TX#: 5 Date:   Tx#: 6   Results/recommendations of VFSS reviewed with pt.   Handout of VFSS results, Diet Recommendations, and Safe Swallowing Recommendations provided      Eating Assessment Tool (EAT-10) = 26 [place] [employer] (IOPI):  Anterior Max Pressure: 41 kPa = at 10th percentile-moderate weakness    Posterior Max Pressure: 29 kPa = at 1st percentile-significant weakness    Complete Posterior presses at 80% of max pressure-23 kPa  6 presses  6 presses  1 press  1 press  7 presses  3 presses  5 rest breaks required to get through 25 presses; max of 7 repetitions across 6 trials.    Gag x 2  [place] [employer] (IOPI)  POSTERIOR:  Max Pressure: taken from last session 31 kPa  60% of Max: 19 kPa  Completed x 50 repetitions without gag reflex  Max Pressure reassessed today-31 kPa  60% of Max:19 kPa  Trial 1:   Completed 24/50 repetitions.    Gag reflex after 24 reps   Time: 40 seconds    Trial 2:   Completed 27/50  Gag reflex after 27 repetitions  Time: 33 seconds    80% of Max: 25 kPa  1.  6 presses  2.  3 presses  3.  15 presses  4.  10 presses  5.  6 presses  6.  13 presses  Completed 53 presses in total across 6 trials  Gag x 5;   Gag reflex initiated after 30-40 seconds each time           [place] [employer] (IOPI)  POSTERIOR:  Max Pressure reassessed today-32 kPa    Trial 1:  70% of Max: 22 kPa   Completed 49/50  repetitions before gag reflex initiated  Time: 1:36 before gag reflex initiated    Trial 2:   70% of Max: 22 kPa  Completed x 24 repetitions  Time: 48 seconds  Stopped due to fatigue versus gag reflex    Trial 3:   60% of Max:  19 kPa  Completed 41/50 repetitions before gag reflex initiated  Time:  58 seconds before gag reflex initiated  Rest Breaks between trials: 1-2 minutes     Pureed Consistency: Applesauce  X 1   1/2 teaspoon without gag/vomiting  2nd 1/2 teaspoon =vomiting and dry heaving PO Trials not attempted today due to severity of gag reflex PO Trials not attempted due to severity of gag reflex.        Assessment: Pt attended today's session alone.  PO food trials were not attempted today due to severity of Pt's gag reflex.  Therapy today focused on oral stimulation using the IOPI device in order to strengthen the posterior tongue and desensitize Pt's gag reflex.  Progress noted as Pt was able to complete several trials of posterior lingual presses with calibration set between 60-70% of max pressure.  Progress noted as Pt demonstrated increased ability to tolerate IOPI bulb in his oral cavity, increasing tolerance from max of 40 seconds to max of 1 minute and 36 seconds today.  Rest breaks between repetitions were 1-2 minutes.        Pt reported that attempts at home to introduce a soft toothbrush in his mouth further back in center of tongue or along soft palate was not tolerated well.  Pt reported immediate gag reflex.  Pt reported feeling discouraged regarding results at attempts at home to desensitize his gag reflex.  Discussed Pt using a soft toothbrush and/or Nuk brush to initiate oral stimulation at home in order to further work on desensitizing his gag reflex.  Pt was instructed to repeat oral stimulation several times throughout the day and attempt to increase the amount of time he can tolerate the brush in his mouth as well as introduction of cold and warm stimuli when using a Nuk or  toothbrush.  Pt further instructed to focus on posterior tongue and soft palate, but start with front of his mouth and work way posterior as tolerated.  Pt further instructed to keep a daily log of tolerance time and number of sessions each day.  Pt verbalized understanding of instructions.           Per VFSS results, \"Overall Impression: The pharyngeal phase of the swallow was WNL. Moderate-severe oral dysphagia is suspected. Pt was initially given thin then thick liquids and then puree in quarter teaspoon amounts then full teaspoon. Severe oral gagging occurred with full teaspoon amounts of puree, after which the patient vomited. Suspect hyper active gag reflex, however, may want to rule out GI involvement. Recommend dysphagia therapy with systematic increase in bolus amounts and food textures.         Goals:   Potential: Good     Diet Recommendations:  Solids: Pureed, slowly advance amount and texture.  Start with thick liquids or liquidy puree such as applesauce in quarter teaspoon amounts.  Liquids: Thin,  try to increase the amount of liquid you drink.  Start with 2-3 ounces and slowly increase amount.     Recommended compensatory strategies:   Sit upright     Medication Administration:  No restrictions      GOALS   The patient will increase toleration of foods starting with puree with goal of general diet consistency and thin liquids without overt signs or symptoms of aspiration and without gagging with 100 % accuracy  The patient/family/caregiver will demonstrate understanding and implementation of aspiration precautions and swallow strategies independently  The patient will complete 1 set of 25 posterior lingual presses using the IOPI device set at 80% of max pressure without gag reflex and with no more than 2 rest breaks needed for 25 repetitions.      Plan: Recommend continue POC as established    HEP: Initial PO bolus of 1/4 teaspoon pureed consistency and 2-3 ounces of thin liquids prior to G-tube  feedings; Initiate oral stimulation using soft toothbrush or Nuk brush focusing on posterior tongue and soft palate with focus on increasing time of tolerance.      Charges: [id number]       Total Timed Treatment: 0 min  Total Treatment Time: 40 min

## 2024-09-05 ENCOUNTER — OFFICE VISIT (OUTPATIENT)
Dept: SPEECH THERAPY | Facility: HOSPITAL | Age: 74
End: 2024-09-05
Attending: SPECIALIST
Payer: MEDICARE

## 2024-09-05 PROCEDURE — 92526 ORAL FUNCTION THERAPY: CPT

## 2024-09-05 NOTE — PROGRESS NOTES
Diagnosis:   Oropharyngeal Dysphagia      Referring Provider: Elva Cummings  Date of Evaluation:    6/28/2024 VFSS    Precautions:  Cancer Next MD visit:   none scheduled  Date of Surgery: n/a   Insurance Primary/Secondary: MEDICARE / [employer] [place] PPO     # Auth Visits: N/A      Progress Summary  Pt has attended 6 visits in Speech Therapy.    Assessment: Progress noted during this course of treatment.  Pt attends therapy sessions alone.  PO food trials were initially attempted, but this has been discontinued over past few sessions due to severity of Pt's gag reflex.  Therapy has focused on oral stimulation using the IOPI device in order to strengthen the posterior tongue and desensitize Pt's gag reflex.  In addition, introduced additional oral stimulation via use of a soft baby toothbrush.  Progress noted as Pt was able to complete several trials of posterior lingual presses with calibration set at 70% of max pressure, which has increased from  24 to 34 kPa.  Pt has increased tolerance of IOPI bulb in his oral cavity from only a few seconds to now tolerating the blue bulb for over 1 minute.  Additional oral stimulation using the soft toothbrush was initially tolerated for 1:24.  On second trial, Pt was able to keep soft toothbrush in his oral cavity with stimulation on tongue and soft palate, anterior to posterior stimulation, for 3:10.  Oral stimulation of this length of time was followed by immediate and severe gagging and vomiting.  Pt reports that attempts at home to introduce a soft toothbrush in his mouth further back in center of tongue or along soft palate is being tolerated better if done in short intervals, several times during the day.  Overall, it appears that Pt is demonstrating increased tolerance of oral stimulation, but continues to demonstrate severe gag reflex which interferes with his ability to tolerate PO food trials.  Additional, skilled OP speech therapy is strongly recommended to continue to  target desensitizing of gag reflex.  Therapy should focus on toleration of oral stimulation for increased periods of time without gag reflex or vomiting following stimulation.           Per VFSS results, \"Overall Impression: The pharyngeal phase of the swallow was WNL. Moderate-severe oral dysphagia is suspected. Pt was initially given thin then thick liquids and then puree in quarter teaspoon amounts then full teaspoon. Severe oral gagging occurred with full teaspoon amounts of puree, after which the patient vomited. Suspect hyper active gag reflex, however, may want to rule out GI involvement. Recommend dysphagia therapy with systematic increase in bolus amounts and food textures.         Goals:   Potential: Good     Diet Recommendations:  Solids: Pureed, slowly advance amount and texture.  Start with thick liquids or liquidy puree such as applesauce in quarter teaspoon amounts.  Liquids: Thin,  try to increase the amount of liquid you drink.  Start with 2-3 ounces and slowly increase amount.     Recommended compensatory strategies:   Sit upright     Medication Administration:  No restrictions      GOALS   The patient will increase toleration of foods starting with puree with goal of general diet consistency and thin liquids without overt signs or symptoms of aspiration and without gagging with 100 % accuracy.  Continue goal  The patient/family/caregiver will demonstrate understanding and implementation of aspiration precautions and swallow strategies independently.  Continue Goal  The patient will complete 1 set of 25 posterior lingual presses using the IOPI device set at 80% of max pressure without gag reflex and with no more than 2 rest breaks needed for 25 repetitions.  Continue goal  The patient will tolerate oral stimulation using a soft toothbrush or Nuk brush for up to 5 minutes without gag response or vomiting following oral stimulation.  Continue goal     Plan: Recommend continue POC as  established    HEP: Oral stimulation using soft toothbrush or Nuk brush focusing on posterior tongue and soft palate with focus on increasing time of tolerance.     Patient/Family/Caregiver was advised of these findings, precautions, and treatment options and has agreed to actively participate in planning and for this course of care.    Thank you for your referral. If you have any questions, please contact me at Dept: [phone].    Sincerely,  Electronically signed by therapist: BRYCE Thomas     Physician's certification required:  Yes  Please co-sign or sign and return this letter via fax as soon as possible to [phone].   I certify the need for these services furnished under this plan of treatment and while under my care.    X___________________________________________________ Date____________________    Certification From: 9/5/2024  To:12/4/2024           Subjective: Pt reports he is using a toothbrush to provide oral stimulation approximately 4-5 times/day for several seconds per trial.  He reports that his toothbrush is wet when doing oral stimulation due to dry mouth.        Pain: 0/10      Objective:     Date: 8/1/2024  TX#: 2 Date:  8/8/2024               TX#: 3 Date: 8/15/2024                TX#: 4 Date: 8/29/2024                 TX#: 5 Date:  9/5/2024   Tx#: 6   Results/recommendations of VFSS reviewed with pt.   Handout of VFSS results, Diet Recommendations, and Safe Swallowing Recommendations provided      Eating Assessment Tool (EAT-10) = 26 [place] Performance Instrument (IOPI):  Anterior Max Pressure: 41 kPa = at 10th percentile-moderate weakness    Posterior Max Pressure: 29 kPa = at 1st percentile-significant weakness    Complete Posterior presses at 80% of max pressure-23 kPa  6 presses  6 presses  1 press  1 press  7 presses  3 presses  5 rest breaks required to get through 25 presses; max of 7 repetitions across 6 trials.    Gag x 2  [place] Performance Instrument (IOPI)  POSTERIOR:  Max  Pressure: taken from last session 31 kPa  60% of Max: 19 kPa  Completed x 50 repetitions without gag reflex  Max Pressure reassessed today-31 kPa  60% of Max:19 kPa  Trial 1:   Completed 24/50 repetitions.    Gag reflex after 24 reps   Time: 40 seconds    Trial 2:   Completed 27/50  Gag reflex after 27 repetitions  Time: 33 seconds    80% of Max: 25 kPa  1.  6 presses  2.  3 presses  3.  15 presses  4.  10 presses  5.  6 presses  6.  13 presses  Completed 53 presses in total across 6 trials  Gag x 5;   Gag reflex initiated after 30-40 seconds each time           [place] [employer] Instrument (IOPI)  POSTERIOR:  Max Pressure reassessed today-32 kPa    Trial 1:  70% of Max: 22 kPa   Completed 49/50 repetitions before gag reflex initiated  Time: 1:36 before gag reflex initiated    Trial 2:   70% of Max: 22 kPa  Completed x 24 repetitions  Time: 48 seconds  Stopped due to fatigue versus gag reflex    Trial 3:   60% of Max:  19 kPa  Completed 41/50 repetitions before gag reflex initiated  Time:  58 seconds before gag reflex initiated  Rest Breaks between trials: 1-2 minutes [place] [employer] Instrument (IOPI)  POSTERIOR:  Max Pressure reassessed today-34 kPa    Trial 1:  70% of Max: 24 kPa   Completed 13/50 repetitions before gag reflex initiated  Time: 1:00 before gag reflex initiated    Trial 2:    70% of Max: 24 kPa  Completed x 19 repetitions  Time: 1:10 before gag reflex initiated    Trial 3:  70% of Max: 24 kPa  Completed x 14 repetitions before gag initiated  Time: 50 seconds    Reduced time between repetitions =approximately 30 seconds          Pureed Consistency: Applesauce  X 1   1/2 teaspoon without gag/vomiting  2nd 1/2 teaspoon =vomiting and dry heaving PO Trials not attempted today due to severity of gag reflex PO Trials not attempted due to severity of gag reflex.   PO Trials not attempted due to severity of gag reflex.       Introduced soft baby toothbrush for oral stimulation anterior to posterior tongue  and anterior to posterior palate to soft palate  Pt tolerated time:    Trial 1:  1:24   Trial 2:  3:10 followed by strong coughing and gagging with expectoration of saliva/phlegm for several seconds following stimulation and then vomiting for several minutes after stimulation.         Assessment: Pt attended today's session alone.  PO food trials were not attempted again today due to severity of Pt's gag reflex.  Therapy today focused on oral stimulation using the IOPI device in order to strengthen the posterior tongue and desensitize Pt's gag reflex.  In addition, introduced additional oral stimulation via use of a soft baby toothbrush.  Progress noted as Pt was able to complete several trials of posterior lingual presses with calibration set at 70% of max pressure, which has increased from  24 to 34 kPa.  Pt has increased tolerance of IOPI bulb in his oral cavity from only a few seconds to now tolerating the blue bulb for over 1 minute.  Additional oral stimulation using the soft toothbrush was initially tolerated for 1:24.  On second trial today, Pt was able to keep soft toothbrush in his oral cavity with stimulation on tongue and soft palate, anterior to posterior stimulation, for 3:10.  Oral stimulation of this length of time was followed by immediate and severe gagging and vomiting.  Pt reports that attempts at home to introduce a soft toothbrush in his mouth further back in center of tongue or along soft palate is being tolerated better if done in short intervals, several times during the day.  Overall, it appears that Pt is demonstrating increased tolerance of oral stimulation, but continues to demonstrate severe gag reflex which interferes with his ability to tolerate PO food trials.  Additional, skilled OP speech therapy is strongly recommended to continue to target desensitizing of gag reflex.  Therapy should focus on toleration of oral stimulation for increased periods of time without gag reflex or  vomiting following stimulation.           Per VFSS results, \"Overall Impression: The pharyngeal phase of the swallow was WNL. Moderate-severe oral dysphagia is suspected. Pt was initially given thin then thick liquids and then puree in quarter teaspoon amounts then full teaspoon. Severe oral gagging occurred with full teaspoon amounts of puree, after which the patient vomited. Suspect hyper active gag reflex, however, may want to rule out GI involvement. Recommend dysphagia therapy with systematic increase in bolus amounts and food textures.         Goals:   Potential: Good     Diet Recommendations:  Solids: Pureed, slowly advance amount and texture.  Start with thick liquids or liquidy puree such as applesauce in quarter teaspoon amounts.  Liquids: Thin,  try to increase the amount of liquid you drink.  Start with 2-3 ounces and slowly increase amount.     Recommended compensatory strategies:   Sit upright     Medication Administration:  No restrictions      GOALS   The patient will increase toleration of foods starting with puree with goal of general diet consistency and thin liquids without overt signs or symptoms of aspiration and without gagging with 100 % accuracy.  Continue goal  The patient/family/caregiver will demonstrate understanding and implementation of aspiration precautions and swallow strategies independently.  Continue Goal  The patient will complete 1 set of 25 posterior lingual presses using the IOPI device set at 80% of max pressure without gag reflex and with no more than 2 rest breaks needed for 25 repetitions.  Continue goal  The patient will tolerate oral stimulation using a soft toothbrush or Nuk brush for up to 5 minutes without gag response or vomiting following oral stimulation.  Continue goal     Plan: Recommend continue POC as established    HEP: Oral stimulation using soft toothbrush or Nuk brush focusing on posterior tongue and soft palate with focus on increasing time of tolerance.       Charges: [id number]       Total Timed Treatment: 0 min  Total Treatment Time: 40 min

## 2024-09-12 ENCOUNTER — TELEPHONE (OUTPATIENT)
Dept: PHYSICAL THERAPY | Facility: HOSPITAL | Age: 74
End: 2024-09-12

## 2024-09-16 ENCOUNTER — OFFICE VISIT (OUTPATIENT)
Dept: SPEECH THERAPY | Facility: HOSPITAL | Age: 74
End: 2024-09-16
Attending: SPECIALIST
Payer: MEDICARE

## 2024-09-16 PROCEDURE — 92526 ORAL FUNCTION THERAPY: CPT

## 2024-09-16 NOTE — PROGRESS NOTES
Diagnosis:   Oropharyngeal Dysphagia      Referring Provider: Elva Cummings  Date of Evaluation:    6/28/2024 VFSS    Precautions:  Cancer Next MD visit:   none scheduled  Date of Surgery: n/a   Insurance Primary/Secondary: MEDICARE / [employer] [place] PPO     # Auth Visits: N/A       Subjective: Pt reports there is no change in his ability to tolerate oral stimulation for longer periods of time or his ability to tolerate foods by mouth.         Pain: 0/10      Objective:     Date: 8/1/2024  TX#: 2 Date:  8/8/2024               TX#: 3 Date: 8/15/2024                TX#: 4 Date: 8/29/2024                 TX#: 5 Date:  9/5/2024   Tx#: 6   Results/recommendations of VFSS reviewed with pt.   Handout of VFSS results, Diet Recommendations, and Safe Swallowing Recommendations provided      Eating Assessment Tool (EAT-10) = 26 [place] [employer] Instrument (IOPI):  Anterior Max Pressure: 41 kPa = at 10th percentile-moderate weakness    Posterior Max Pressure: 29 kPa = at 1st percentile-significant weakness    Complete Posterior presses at 80% of max pressure-23 kPa  6 presses  6 presses  1 press  1 press  7 presses  3 presses  5 rest breaks required to get through 25 presses; max of 7 repetitions across 6 trials.    Gag x 2  [place] [employer] Instrument (IOPI)  POSTERIOR:  Max Pressure: taken from last session 31 kPa  60% of Max: 19 kPa  Completed x 50 repetitions without gag reflex  Max Pressure reassessed today-31 kPa  60% of Max:19 kPa  Trial 1:   Completed 24/50 repetitions.    Gag reflex after 24 reps   Time: 40 seconds    Trial 2:   Completed 27/50  Gag reflex after 27 repetitions  Time: 33 seconds    80% of Max: 25 kPa  1.  6 presses  2.  3 presses  3.  15 presses  4.  10 presses  5.  6 presses  6.  13 presses  Completed 53 presses in total across 6 trials  Gag x 5;   Gag reflex initiated after 30-40 seconds each time           [place] [employer] (IOPI)  POSTERIOR:  Max Pressure reassessed today-32 kPa    Trial 1:  70%  of Max: 22 kPa   Completed 49/50 repetitions before gag reflex initiated  Time: 1:36 before gag reflex initiated    Trial 2:   70% of Max: 22 kPa  Completed x 24 repetitions  Time: 48 seconds  Stopped due to fatigue versus gag reflex    Trial 3:   60% of Max:  19 kPa  Completed 41/50 repetitions before gag reflex initiated  Time:  58 seconds before gag reflex initiated  Rest Breaks between trials: 1-2 minutes [place] [employer] (IOPI)  POSTERIOR:  Max Pressure reassessed today-34 kPa    Trial 1:  70% of Max: 24 kPa   Completed 13/50 repetitions before gag reflex initiated  Time: 1:00 before gag reflex initiated    Trial 2:    70% of Max: 24 kPa  Completed x 19 repetitions  Time: 1:10 before gag reflex initiated    Trial 3:  70% of Max: 24 kPa  Completed x 14 repetitions before gag initiated  Time: 50 seconds    Reduced time between repetitions =approximately 30 seconds          Pureed Consistency: Applesauce  X 1   1/2 teaspoon without gag/vomiting  2nd 1/2 teaspoon =vomiting and dry heaving PO Trials not attempted today due to severity of gag reflex PO Trials not attempted due to severity of gag reflex.   PO Trials not attempted due to severity of gag reflex.       Introduced soft baby toothbrush for oral stimulation anterior to posterior tongue and anterior to posterior palate to soft palate  Pt tolerated time:    Trial 1:  1:24   Trial 2:  3:10 followed by strong coughing and gagging with expectoration of saliva/phlegm for several seconds following stimulation and then vomiting for several minutes after stimulation.           Date:  09/16/2024  TX#: 7 Date:       TX#:  Date:       TX#:  Date:          TX#:  Date:   TX#:  Date:   TX#:   [place] [employer] Instrument (IOPI)  POSTERIOR:  Max Pressure reassessed today-34 kPa  Trial 1:  70% of Max: 24 kPa   Completed x 4  repetitions before gag reflex initiated  Time:  1:00 before gag reflex initiated    Trial 2:    70% of Max: 24 kPa  Completed x 30   repetitions  Time: 53 seconds before gag reflex initiated    Trial 3:  70% of Max: 24 kPa  Completed x 34 (then 30 second rest due to fatigue) then x 54 repetitions   NO GAG REFLEX   Time:  2:40               Soft baby toothbrush for oral stimulation anterior to posterior tongue and anterior to posterior palate to soft palate  Trial 1:  Pt tolerated time: 1:42 with brief 3-5 second breaks x 2    Trial 2:  immediate gag reflex        Food trials not attempted due to severity of gag reflex             Assessment: Pt attended today's session alone.  PO food trials were not attempted again today due to severity of Pt's gag reflex.  Therapy today focused on oral stimulation using the IOPI device in order to strengthen the posterior tongue and desensitize Pt's gag reflex.  In addition, provided additional oral stimulation via use of a soft baby toothbrush.  Progress noted as Pt was able to complete several trials of posterior lingual presses with calibration set at 70% of max pressure.  Today, Pt was able to tolerate IOPI bulb in posterior oral cavity for max of 2:40, which is improved from last session.  He was able to keep soft toothbrush in his oral cavity with stimulation on tongue and soft palate, anterior to posterior stimulation, for 1:42, but was unable to tolerate any stimulation on 2nd trial.  Pt reports that attempts at home to introduce a soft toothbrush in his mouth further back in center of tongue or along soft palate is being tolerated better if done in short intervals, several times during the day.  Overall, it appears that Pt is demonstrating increased tolerance of oral stimulation, but continues to demonstrate severe gag reflex which interferes with his ability to tolerate PO food trials.  Discussed possible medical options for gag desensitization and Pt reports that he will discuss this with his primary physician at next visit.  Pt reports that he is planning to trial po food at home today again to  determine if there is any improvement with food.  Discussed swallowing ability versus difficulty due to a gag reflex.  Additional, skilled OP speech therapy is recommended to continue to target desensitizing of gag reflex.  Therapy should focus on toleration of oral stimulation for increased periods of time without gag reflex or vomiting following stimulation.           Per VFSS results, \"Overall Impression: The pharyngeal phase of the swallow was WNL. Moderate-severe oral dysphagia is suspected. Pt was initially given thin then thick liquids and then puree in quarter teaspoon amounts then full teaspoon. Severe oral gagging occurred with full teaspoon amounts of puree, after which the patient vomited. Suspect hyper active gag reflex, however, may want to rule out GI involvement. Recommend dysphagia therapy with systematic increase in bolus amounts and food textures.         Goals:   Potential: Good     Diet Recommendations:  Solids: Pureed, slowly advance amount and texture.  Start with thick liquids or liquidy puree such as applesauce in quarter teaspoon amounts.  Liquids: Thin,  try to increase the amount of liquid you drink.  Start with 2-3 ounces and slowly increase amount.     Recommended compensatory strategies:   Sit upright     Medication Administration:  No restrictions      GOALS   The patient will increase toleration of foods starting with puree with goal of general diet consistency and thin liquids without overt signs or symptoms of aspiration and without gagging with 100 % accuracy.  Continue goal  The patient/family/caregiver will demonstrate understanding and implementation of aspiration precautions and swallow strategies independently.  Continue Goal  The patient will complete 1 set of 25 posterior lingual presses using the IOPI device set at 80% of max pressure without gag reflex and with no more than 2 rest breaks needed for 25 repetitions.  Continue goal  The patient will tolerate oral stimulation  using a soft toothbrush or Nuk brush for up to 5 minutes without gag response or vomiting following oral stimulation.  Continue goal     Plan: Recommend continue POC as established    HEP: Oral stimulation using soft toothbrush or Nuk brush focusing on posterior tongue and soft palate with focus on increasing time of tolerance.      Charges: [id number]       Total Timed Treatment: 0 min  Total Treatment Time: 40 min

## 2024-09-23 NOTE — PROGRESS NOTES
[facility] - Gastroenterology                                                                                                               Reason for consult: eval    Requesting physician or provider: Rich Ruggiero MD    Chief Complaint   Patient presents with    Change of Bowel Habits       HPI:   Luisito Diop is a 74 year old year-old male with history of T2DM, htn:     he is here today for f/U  Being referred by oncology - Dr. Zavala    Dysphagia: Video swallow showed that patient was unable to swallow any of the contrast. While esophagram was recommended in the report, if patient is unable to swallow at all this will not be a useful endeavor. Arrangements will be made for the patient to see gastroenterology for EGD. He may have radiation induced esophageal stricture for which esophageal dilation can be performed. Once this evaluation is completed, patient will also be referred to speech and swallow.      Normocytic normochromic anemia. Stable and consistent with anemia   of CKD.    He says has had dysphagia 12/6 and following radiation therapy for throat ca.  He denies odynophagia. Has g-tube. Takes 1 pill in am - no issues swallowing pill.  If he tries to take anything larger it triggers his gag reflex and he vomits contents. Has had speech therapy w/o improvement in sx.  Has been losing weight.  Takes nutrition through g-tube tid.  No gerd. No abd pain.     He moves his bowels daily. No constipation and/or diarrhea. No brbpr and/or melena.     Cln/EGD 1/2023    Impression  Repeat colonoscopy given poor prep for screening  Gastric erosions and erythema      Recommend:  Repeat colonoscopy given poor prep  High fiber diet.  Monitor for blood in the stool. If having more than just tinge of blood, call office or go to the ER.  Await biopsies  >>>If tissue was obtained and you have not received your pathology results either by phone or letter within 2 weeks, please  call our office at [phone].    Final Diagnosis:      A. Duodenum; biopsy:  Duodenal mucosa with no significant pathologic change.   Preserved villous architecture.     B. Gastric biopsy:  Gastric antral mucosa with reactive gastropathy and mild chronic inactive gastritis.  Negative for intestinal metaplasia or dysplasia.  Diff-Quik stain (with appropriate control reactivity) is negative for H. pylori microorganisms.     C. Gastric polypoid lesion; biopsy:  Gastric antral mucosa with reactive gastropathy and mild chronic inactive gastritis.  Negative for intestinal metaplasia or dysplasia.  Diff-Quik stain (with appropriate control reactivity) is negative for H. pylori microorganisms.     D. Random colon; biopsy:    Colonic mucosa with no significant pathologic change.  No evidence of lymphocytic or collagenous colitis.      ct c/a/p 3/27/23:     Impression  CONCLUSION:       1. Nodular enlargement of the thyroid especially the right lobe of the thyroid.  Recommend correlation with thyroid ultrasound.       2. Enlarged prostate gland.       3. Gallstones.       4. Thickening of the GE junction is unchanged.               Dictated by (CST): Gonzalez Vang MD on 3/27/2023 at 3:15 PM        Recommend:     Contact pcp to plan for ultrasound thyroid  Avoid fatty/greasy meals, large portions  Contact office and/or consider er if upper abd pain/ruq pain, consider surgical referral if indicated  Continue pantoprazole, reflux diet modification    Cln 3/28/23      Findings:   1. 2 polyp(s) noted as follows:      A. 3 mm polyp in the transverse colon; flat morphology; cold biopsy polypectomy and retrieved.      B. 6 mm polyp in the transverse colon; flat morphology; cold snare polypectomy and retrieved.     2. Diverticulosis: pandiverticulosis.     3. Terminal ileum: the visualized mucosa appeared normal.     4. A retroflexed view of the rectum revealed hemorrhoids.     5. The colonic mucosa throughout the colon showed  normal vascular pattern, without evidence of angioectasias or inflammation.      6. BARBIE: normal rectal tone, no masses palpated.      Recommend:  Await pathology, likely repeat colonoscopy in 5 years. The interval for the next colonoscopy will be determined after reviewing pathology. If new signs or symptoms develop, colonoscopy may need to be repeated sooner.   High fiber diet.  Monitor for blood in the stool. If having more than just tinge of blood, call office or go to the ER.     Final Diagnosis:      Transverse colon polyps x2:   Tubular adenoma fragments x2.       NSAIDS: no  Tobacco: no  Alcohol: no  Marijuana: no  Illicit drugs: no     No FH GI malignancy, ibd, celiac     No history of adverse reaction to sedation  No SANDRA  No anticoagulants  No pacemaker/defibrillator  No pain medications and/or sleep aides    Wt Readings from Last 6 Encounters:   09/25/24 208 lb (94.3 kg)   06/28/24 209 lb 4.8 oz (94.9 kg)   06/20/24 208 lb 9.6 oz (94.6 kg)   04/23/24 209 lb (94.8 kg)   03/19/24 210 lb (95.3 kg)   03/08/24 205 lb 3.2 oz (93.1 kg)        History, Medications, Allergies, ROS:      Past Medical History:    Cancer (HCC)    throat cancer    Diabetes (HCC)    Dialysis patient (HCC)    ESRD (end stage renal disease) on dialysis (HCC)    Essential hypertension    High blood pressure    Renal disorder    Tonsillar cancer (HCC)      Past Surgical History:   Procedure Laterality Date    Colonoscopy        Family Hx:   Family History   Problem Relation Age of Onset    Cancer Sister         lung cancer      Social History:   Social History     Socioeconomic History    Marital status:    Tobacco Use    Smoking status: Never    Smokeless tobacco: Never   Vaping Use    Vaping status: Never Used   Substance and Sexual Activity    Alcohol use: Not Currently    Drug use: Never     Social Determinants of Health     Financial Resource Strain: Low Risk  (1/15/2024)    Financial Resource Strain     Difficulty of Paying  Living Expenses: Not very hard     Med Affordability: No   Food Insecurity: No Food Insecurity (1/11/2024)    Food Insecurity     Food Insecurity: Never true   Transportation Needs: No Transportation Needs (1/15/2024)    Transportation Needs     Lack of Transportation: No   Housing Stability: Low Risk  (1/11/2024)    Housing Stability     Housing Instability: No        Medications (Active prior to today's visit):  Current Outpatient Medications   Medication Sig Dispense Refill    clonazePAM 1 MG Oral Tab Take 1 tablet (1 mg total) by mouth nightly as needed for Anxiety. 30 tablet 0    cyclobenzaprine 5 MG Oral Tab Take 1 tablet (5 mg total) by mouth nightly as needed. 30 tablet 0    aspirin 81 MG Oral Tab Take 1 tablet (81 mg total) by mouth daily.      methylPREDNISolone (MEDROL) 4 MG Oral Tablet Therapy Pack As directed. (Patient not taking: Reported on 6/28/2024) 1 each 0       Allergies:  No Known Allergies    ROS:   CONSTITUTIONAL: negative for fevers, chills, sweats and weight loss  EYES Negative for red eyes, yellow eyes, changes in vision  HEENT: Negative for dysphagia and hoarseness  RESPIRATORY: Negative for cough and shortness of breath  CARDIOVASCULAR: Negative for chest pain, palpitations  GASTROINTESTINAL: See HPI  GENITOURINARY: Negative for dysuria and frequency  MUSCULOSKELETAL: Negative for arthralgias and myalgias  NEUROLOGICAL: Negative for dizziness and headaches  BEHAVIOR/PSYCH: Negative for anxiety and poor appetite    PHYSICAL EXAM:   Blood pressure 106/63, pulse 69, height 6' 5\" (1.956 m), weight 208 lb (94.3 kg).    GEN: WD/WN, NAD  HEENT: Supple symmetrical, trachea midline  CV: RRR, the extremities are warm and well perfused   LUNGS: No increased work of breathing  ABDOMEN: No scars, normal bowel sounds, soft, non-tender, non-distended no rebound or guarding, no masses, no hepatomegaly  MSK: No redness, no warmth, no swelling of joints  SKIN: No jaundice, no erythema, no  rashes  HEMATOLOGIC: No bleeding, no bruising  NEURO: Alert and interactive, normal gait    Labs/Imaging/Procedures:     Vfss 6/2024  Impression   CONCLUSION:  1. Limited examination since the patient could not keep the contrast down and vomited after a small amount contrast was given.  No gross laryngeal penetration or aspiration during the procedure.  Recommend esophagram and upper GI study to further assess     PET 6/2024  Impression   CONCLUSION:     Decreased activity in the right tonsil and two right neck nodes. The SUV max increases by less than 20% at all three sites on delayed head and neck imaging, which suggests this is all treated disease rather than residual disease.        No new disease        .  ASSESSMENT/PLAN:   Luisito Diop is a 74 year old year-old male with history of T2DM, htn:     #crc screening  5 y crc screening interval advised. Due 3/2028 unless otherwise indicated.     #dysphagia  Here today as a referral from oncology for c/o dysphagia. VFSS limited as pt could not tolerate contrast.  No gross penetration and/or aspiration.  Esophagram was recommended, however since pt cannot tolerate contrast would be futile. He has had minimal improvement in sx with speech therapy.  Seems to have heightened gag reflex since radiation.  Currently has g-tube for nutrition.  Onc requesting egd to exclude radiation induced esophageal stricture. Plan for procedure.     -g-tube feedings  -er if condition decline    Egd w/ mac w/ urgent pool md w/ possible dil  Dx: dysphagia    Orders This Visit:  No orders of the defined types were placed in this encounter.      Meds This Visit:  Requested Prescriptions      No prescriptions requested or ordered in this encounter       Imaging & Referrals:  None    ENDOSCOPIC RISK BENEFIT DISCUSSION: I described the procedure in great detail with the patient. I discussed the risks and benefits, including but not limited to: bleeding, perforation, infection, anesthesia  complications, and even death. Patient will be NPO after midnight and will have a person physically present at time of pick-up to drive patient home. Patient verbalized understanding and agrees to proceed with procedure as planned.    Tameka Dykes, APRN   9/25/2024        This note was partially prepared using Dragon Medical voice recognition dictation software. As a result, errors may occur. When identified, these errors have been corrected. While every attempt is made to correct errors during dictation, discrepancies may still exist.

## 2024-09-25 ENCOUNTER — OFFICE VISIT (OUTPATIENT)
Dept: GASTROENTEROLOGY | Facility: CLINIC | Age: 74
End: 2024-09-25

## 2024-09-25 ENCOUNTER — TELEPHONE (OUTPATIENT)
Dept: GASTROENTEROLOGY | Facility: CLINIC | Age: 74
End: 2024-09-25

## 2024-09-25 VITALS
HEIGHT: 77 IN | DIASTOLIC BLOOD PRESSURE: 63 MMHG | WEIGHT: 208 LBS | HEART RATE: 69 BPM | BODY MASS INDEX: 24.56 KG/M2 | SYSTOLIC BLOOD PRESSURE: 106 MMHG

## 2024-09-25 DIAGNOSIS — R13.12 OROPHARYNGEAL DYSPHAGIA: Primary | ICD-10-CM

## 2024-09-25 DIAGNOSIS — Z12.11 COLON CANCER SCREENING: ICD-10-CM

## 2024-09-25 DIAGNOSIS — R13.10 DYSPHAGIA, UNSPECIFIED TYPE: Primary | ICD-10-CM

## 2024-09-25 PROCEDURE — 99215 OFFICE O/P EST HI 40 MIN: CPT | Performed by: NURSE PRACTITIONER

## 2024-09-25 NOTE — TELEPHONE ENCOUNTER
Patient stated he has Dialysis om Monday but will change it to Tuesday.    Scheduled for: EGD 85534 w/ possible Dilation  Provider Name: Dr Hunt   Date: Mon 10/07/2024 ok per endo  Location: [facility]   Sedation: MAC   Time: 3:30 pm, (pt is aware that Endo will call the day before to confirm arrival time)  Prep: Nothing after midnight the day before procedure and NPO 3 hrs prior procedure  Meds/Allergies Reconciled?: NKDA   Diagnosis with codes: Dysphagia R13.10   Was patient informed to call insurance with codes (Y/N): Yes   Referral sent?: Yes, Medicare [facility] or [facility] notified?: I sent an electronic request to Endo Scheduling and received a confirmation today.     Medication Orders: Patient is aware to NOT take iron pills, herbal meds and diet supplements for 7 days before exam. Also to NOT take any form of alcohol, recreational drugs and any forms of ED meds 24-72 hours before exam.     Misc Orders:       Further instructions given by staff: Instructions given in office and pt verbalized understanding.         Instructions and Information about Your Health    -g-tube feedings  -er if condition decline     Egd w/ mac w/ urgent pool md w/ possible dil  Dx: dysphagia

## 2024-09-25 NOTE — PATIENT INSTRUCTIONS
-g-tube feedings  -er if condition decline    Egd w/ mac w/ urgent pool md w/ possible dil  Dx: dysphagia

## 2024-09-27 ENCOUNTER — NURSE ONLY (OUTPATIENT)
Dept: HEMATOLOGY/ONCOLOGY | Facility: HOSPITAL | Age: 74
End: 2024-09-27
Attending: NURSE PRACTITIONER
Payer: MEDICARE

## 2024-09-27 ENCOUNTER — OFFICE VISIT (OUTPATIENT)
Dept: HEMATOLOGY/ONCOLOGY | Facility: HOSPITAL | Age: 74
End: 2024-09-27
Attending: SPECIALIST
Payer: MEDICARE

## 2024-09-27 ENCOUNTER — TELEPHONE (OUTPATIENT)
Facility: CLINIC | Age: 74
End: 2024-09-27

## 2024-09-27 VITALS
HEIGHT: 77 IN | SYSTOLIC BLOOD PRESSURE: 147 MMHG | DIASTOLIC BLOOD PRESSURE: 63 MMHG | RESPIRATION RATE: 16 BRPM | OXYGEN SATURATION: 100 % | WEIGHT: 208 LBS | BODY MASS INDEX: 24.56 KG/M2 | TEMPERATURE: 98 F | HEART RATE: 68 BPM

## 2024-09-27 DIAGNOSIS — F41.9 ANXIETY: ICD-10-CM

## 2024-09-27 DIAGNOSIS — D64.9 NORMOCYTIC NORMOCHROMIC ANEMIA: ICD-10-CM

## 2024-09-27 DIAGNOSIS — Z93.1 G TUBE FEEDINGS (HCC): ICD-10-CM

## 2024-09-27 DIAGNOSIS — C09.9 TONSIL CANCER (HCC): Primary | ICD-10-CM

## 2024-09-27 DIAGNOSIS — R13.10 DYSPHAGIA, UNSPECIFIED TYPE: ICD-10-CM

## 2024-09-27 DIAGNOSIS — C09.9 TONSIL CANCER (HCC): ICD-10-CM

## 2024-09-27 LAB
BASOPHILS # BLD AUTO: 0.03 X10(3) UL (ref 0–0.2)
BASOPHILS NFR BLD AUTO: 0.7 %
DEPRECATED HBV CORE AB SER IA-ACNC: 1543 NG/ML
DEPRECATED RDW RBC AUTO: 43.5 FL (ref 35.1–46.3)
EOSINOPHIL # BLD AUTO: 0.07 X10(3) UL (ref 0–0.7)
EOSINOPHIL NFR BLD AUTO: 1.7 %
ERYTHROCYTE [DISTWIDTH] IN BLOOD BY AUTOMATED COUNT: 13.2 % (ref 11–15)
FOLATE SERPL-MCNC: 30.2 NG/ML (ref 5.4–?)
HCT VFR BLD AUTO: 30.7 %
HGB BLD-MCNC: 10.8 G/DL
IMM GRANULOCYTES # BLD AUTO: 0.02 X10(3) UL (ref 0–1)
IMM GRANULOCYTES NFR BLD: 0.5 %
IRON SATN MFR SERPL: 18 %
IRON SERPL-MCNC: 40 UG/DL
LYMPHOCYTES # BLD AUTO: 0.46 X10(3) UL (ref 1–4)
LYMPHOCYTES NFR BLD AUTO: 11.2 %
MCH RBC QN AUTO: 32 PG (ref 26–34)
MCHC RBC AUTO-ENTMCNC: 35.2 G/DL (ref 31–37)
MCV RBC AUTO: 91.1 FL
MONOCYTES # BLD AUTO: 0.43 X10(3) UL (ref 0.1–1)
MONOCYTES NFR BLD AUTO: 10.4 %
NEUTROPHILS # BLD AUTO: 3.11 X10 (3) UL (ref 1.5–7.7)
NEUTROPHILS # BLD AUTO: 3.11 X10(3) UL (ref 1.5–7.7)
NEUTROPHILS NFR BLD AUTO: 75.5 %
PLATELET # BLD AUTO: 184 10(3)UL (ref 150–450)
RBC # BLD AUTO: 3.37 X10(6)UL
TIBC SERPL-MCNC: 226 UG/DL (ref 250–425)
TRANSFERRIN SERPL-MCNC: 152 MG/DL (ref 215–365)
TSI SER-ACNC: 1.32 MIU/ML (ref 0.55–4.78)
VIT B12 SERPL-MCNC: 965 PG/ML (ref 211–911)
WBC # BLD AUTO: 4.1 X10(3) UL (ref 4–11)

## 2024-09-27 PROCEDURE — 36415 COLL VENOUS BLD VENIPUNCTURE: CPT

## 2024-09-27 PROCEDURE — 84466 ASSAY OF TRANSFERRIN: CPT

## 2024-09-27 PROCEDURE — 82746 ASSAY OF FOLIC ACID SERUM: CPT

## 2024-09-27 PROCEDURE — 82728 ASSAY OF FERRITIN: CPT

## 2024-09-27 PROCEDURE — 99214 OFFICE O/P EST MOD 30 MIN: CPT | Performed by: SPECIALIST

## 2024-09-27 PROCEDURE — G2211 COMPLEX E/M VISIT ADD ON: HCPCS | Performed by: SPECIALIST

## 2024-09-27 PROCEDURE — 85025 COMPLETE CBC W/AUTO DIFF WBC: CPT

## 2024-09-27 PROCEDURE — 82607 VITAMIN B-12: CPT

## 2024-09-27 PROCEDURE — 84443 ASSAY THYROID STIM HORMONE: CPT

## 2024-09-27 PROCEDURE — 83540 ASSAY OF IRON: CPT

## 2024-09-27 RX ORDER — CLONAZEPAM 1 MG/1
1 TABLET ORAL NIGHTLY PRN
Qty: 60 TABLET | Refills: 0 | Status: SHIPPED | OUTPATIENT
Start: 2024-09-27

## 2024-09-27 NOTE — PROGRESS NOTES
Called  and spoke with agent Daria who will reach out to patient to schedule a overdue follow up with Dr. Cummings.  Called  and spoke with representative April to let them know to call patient needs procedure on 10/7 rescheduled due to a .  Message was sent by April to GI schedulers to call patient to reschedule.

## 2024-09-27 NOTE — TELEPHONE ENCOUNTER
ReScheduled for: EGD 37449 w/ possible Dilation    Provider Name: Dr Hunt     Date: From: 10/07/2024 To: 11/18/2024    Location: [facility]     Sedation: MAC     Time: From: 3:30 pm,To: 11:30 am (pt is aware that Endo will call the day before to confirm arrival time)    Prep: Nothing after midnight the day before procedure and NPO 3 hrs prior procedure    Meds/Allergies Reconciled?: NKDA     Diagnosis with codes:   Dysphagia R13.10     Was patient informed to call insurance with codes (Y/N): Yes     Referral sent?: Yes, Medicare [facility] or [facility] notified?: I sent an electronic request to Endo Scheduling and received a confirmation today.     Medication Orders: Patient is aware to NOT take iron pills, herbal meds and diet supplements for 7 days before exam. Also to NOT take any form of alcohol, recreational drugs and any forms of ED meds 24-72 hours before exam.     Misc Orders:       Further instructions given by staff: Instructions given in office and pt verbalized understanding.         Instructions and Information about Your Health    -g-tube feedings  -er if condition decline     Egd w/ mac w/ urgent pool md w/ possible dil  Dx: dysphagia

## 2024-10-01 ENCOUNTER — HOSPITAL ENCOUNTER (OUTPATIENT)
Dept: CT IMAGING | Facility: HOSPITAL | Age: 74
Discharge: HOME | End: 2024-10-01
Attending: SPECIALIST
Payer: MEDICARE

## 2024-10-01 DIAGNOSIS — C09.9 TONSIL CANCER (HCC): ICD-10-CM

## 2024-10-01 PROCEDURE — 70491 CT SOFT TISSUE NECK W/DYE: CPT | Performed by: SPECIALIST

## 2024-10-01 PROCEDURE — 74177 CT ABD & PELVIS W/CONTRAST: CPT | Performed by: SPECIALIST

## 2024-10-01 PROCEDURE — 71260 CT THORAX DX C+: CPT | Performed by: SPECIALIST

## 2024-10-02 ENCOUNTER — TELEPHONE (OUTPATIENT)
Dept: HEMATOLOGY/ONCOLOGY | Facility: HOSPITAL | Age: 74
End: 2024-10-02

## 2024-10-02 NOTE — TELEPHONE ENCOUNTER
Test(s) Completed: CT Scan    Results: Per Dr. Zavala: \"Let him know that CT shows no evidence of recurrent or metastatic cancer. Prostate is enlarge as expected at this age. He still need to f/u with ENT (it's scheduled) so that they can look directly at tonsillar area with scope.\"    Plan: Relayed above to patient. Pt stated understanding and in agreement.

## 2024-10-03 ENCOUNTER — APPOINTMENT (OUTPATIENT)
Dept: GENERAL RADIOLOGY | Age: 74
End: 2024-10-03
Attending: PHYSICIAN ASSISTANT
Payer: MEDICARE

## 2024-10-03 ENCOUNTER — HOSPITAL ENCOUNTER (OUTPATIENT)
Age: 74
Discharge: HOME | End: 2024-10-03
Payer: MEDICARE

## 2024-10-03 ENCOUNTER — NURSE TRIAGE (OUTPATIENT)
Dept: INTERNAL MEDICINE CLINIC | Facility: CLINIC | Age: 74
End: 2024-10-03

## 2024-10-03 VITALS
HEART RATE: 78 BPM | TEMPERATURE: 99 F | SYSTOLIC BLOOD PRESSURE: 124 MMHG | DIASTOLIC BLOOD PRESSURE: 90 MMHG | OXYGEN SATURATION: 97 % | RESPIRATION RATE: 17 BRPM

## 2024-10-03 DIAGNOSIS — M25.471 PAIN AND SWELLING OF RIGHT ANKLE: Primary | ICD-10-CM

## 2024-10-03 DIAGNOSIS — M25.571 PAIN AND SWELLING OF RIGHT ANKLE: Primary | ICD-10-CM

## 2024-10-03 PROCEDURE — 73610 X-RAY EXAM OF ANKLE: CPT | Performed by: PHYSICIAN ASSISTANT

## 2024-10-03 PROCEDURE — 99214 OFFICE O/P EST MOD 30 MIN: CPT

## 2024-10-03 PROCEDURE — 99213 OFFICE O/P EST LOW 20 MIN: CPT

## 2024-10-03 NOTE — ED PROVIDER NOTES
Patient Seen in: [facility] [place]    History     Chief Complaint   Patient presents with    Swelling     Stated Complaint: right ankle pain with swelling    HPI    HPI: Luisito Diop is a 74 year old male who presents with chief complaint of right ankle pain.  Onset 2 weeks ago.  Patient states that the leg of a chair struck his right ankle.  Patient reports associated right ankle swelling.  Patient denies other injury, head/neck injury or pain, decreased range of motion, ecchymosis, erythema, weakness, paraesthesias.      Past Medical History:    Cancer (HCC)    throat cancer    Diabetes (HCC)    Dialysis patient (HCC)    ESRD (end stage renal disease) on dialysis (HCC)    Essential hypertension    High blood pressure    Renal disorder    Tonsillar cancer (HCC)       Past Surgical History:   Procedure Laterality Date    Colonoscopy              Family History   Problem Relation Age of Onset    Cancer Sister         lung cancer       Social History     Socioeconomic History    Marital status:    Tobacco Use    Smoking status: Never    Smokeless tobacco: Never   Vaping Use    Vaping status: Never Used   Substance and Sexual Activity    Alcohol use: Not Currently    Drug use: Never     Social Determinants of Health     Financial Resource Strain: Low Risk  (1/15/2024)    Financial Resource Strain     Difficulty of Paying Living Expenses: Not very hard     Med Affordability: No   Food Insecurity: No Food Insecurity (1/11/2024)    Food Insecurity     Food Insecurity: Never true   Transportation Needs: No Transportation Needs (1/15/2024)    Transportation Needs     Lack of Transportation: No   Housing Stability: Low Risk  (1/11/2024)    Housing Stability     Housing Instability: No       Review of Systems    Positive for stated complaint: right ankle pain with swelling  Other systems are as noted in HPI.  Constitutional and vital signs reviewed.      All other systems reviewed and negative except as noted  above.    PSFH elements reviewed from today and agreed except as otherwise stated in HPI.    Physical Exam     ED Triage Vitals [10/03/24 1436]   /90   Pulse 78   Resp 17   Temp 98.6 °F (37 °C)   Temp src Temporal   SpO2 97 %   O2 Device None (Room air)       Current:/90   Pulse 78   Temp 98.6 °F (37 °C) (Temporal)   Resp 17   SpO2 97%     PULSE OX within normal limits on room air as interpreted by this provider.    Physical Exam      Constitutional: The patient is cooperative. Appears well-developed and well-nourished.  No acute distress.  Psychological: Alert, No abnormalities of mood, affect.  Head: Normocephalic/atraumatic.  Eyes: Pupils are equal round reactive to light.  Conjunctiva are within normal limits.  ENT: Oropharynx is clear.  Neck: The neck is supple.  No meningeal signs.  Respiratory: Respiratory effort was normal.  There is no stridor.  Air entry is equal.  Cardiovascular: Regular rate and rhythm.  Capillary refill is brisk.   Genitourinary: Not examined.  Lymphatic: No gross lymphadenopathy noted.  Musculoskeletal: Right lower extremity-Right lower extremity without acute bony deformity.  Positive swelling and tenderness to palpation present at right medial malleolus.  Healing, superficial abrasion is present proximal to this location.  No surrounding erythema, edema or purulent drainage of wound.  Right foot nontender to palpation.  Remainder of right lower extremity nontender to palpation.  Distal pulses intact.  Capillary refill normal.  Motor intact distally.  Sensory intact distally.  No ecchymosis.  No compartment syndrome.  No other edema.  Remainder of musculoskeletal system is grossly intact.  There is no obvious deformity.  Neurological: Gross motor movement is intact in all 4 extremities.  Patient exhibits normal speech.  Skin: Skin is normal to inspection, except as documented.  No obvious rash.        ED Course   Labs Reviewed - No data to display  Patient declined  Aircast ankle brace.  MDM     Differential diagnosis prior to work-up including but not limited to fracture, strain/sprain, contusion, arthritis    Radiology findings:     XR ANKLE (MIN 3 VIEWS), RIGHT (CPT=73610) (Final result)  Result time 10/03/24 15:08:32  Final result by Norberto Mccormick MD (10/03/24 15:08:32)                Narrative:    PROCEDURE: XR ANKLE (MIN 3 VIEWS), RIGHT (CPT=73610)     COMPARISON: None.     INDICATIONS: Right medial ankle pain post injury x 2 weeks.     TECHNIQUE: Three-view  Impression:     Normal alignment.  No fracture.     Moderate calcaneal spurs.  Moderate calcification in the plantar fascitis.  Small enthesophyte at the end insertion of the Achilles  Finalized by (CST): Sterling Mccormick MD on 10/03/2024 at 3:08 PM                  X-ray images of right ankle independently viewed by this provider-no acute fracture.    Physical exam remained stable as previously documented.  Available results reviewed with patient.    I have given the patient instructions regarding their diagnoses, expectations, follow up, and ER precautions. I explained to the patient that emergent conditions may arise and to go to the ER for new, worsening or any persistent conditions. I've explained the importance of following up with their doctor as instructed. The patient verbalized understanding of the discharge instructions and plan.    Disposition and Plan     Clinical Impression:  1. Pain and swelling of right ankle        Disposition:  Discharge    Follow-up:  Rich Ruggiero MD  [address] [facility] [address]  [phone]    Call in 1 day  For follow-up    Sarah Henriquez DPM  [address]  [place] [address]  [phone]    Call in 1 day  For follow-up    Horacio Ordaz MD  [address]  [address]  [facility] [address]  [phone]    Call in 1 day  For follow-up      Medications Prescribed:  Current Discharge Medication List

## 2024-10-03 NOTE — ED INITIAL ASSESSMENT (HPI)
States he was hit by a chair about 2 weeks ago, sustained abrasion to r medial malleolus, here for swelling and tenderness, abrasion healing well, able to bear weight, no redness noted

## 2024-10-03 NOTE — TELEPHONE ENCOUNTER
Action Requested: Summary for Provider     []  Critical Lab, Recommendations Needed  [] Need Additional Advice  []   FYI    []   Need Orders  [] Need Medications Sent to Pharmacy  []  Other     SUMMARY: Patient advised immediate care.     Patient states about a week and 1/2 ago he twisted his right ankle. Today the medal aspect of his ankle is painful and swollen. Patient able to put weight on leg, no drainage or fever.     Patient advised immediate care and agrees.     Reason for call: Ankle Injury (swelling)  Onset: 1-2 weeks ago  Reason for Disposition   Sounds like a serious injury to the triager    Protocols used: Ankle and Foot Injury-A-[place]

## 2024-10-04 ENCOUNTER — OFFICE VISIT (OUTPATIENT)
Dept: OTOLARYNGOLOGY | Facility: CLINIC | Age: 74
End: 2024-10-04

## 2024-10-04 VITALS — HEIGHT: 77 IN | BODY MASS INDEX: 24.56 KG/M2 | WEIGHT: 208 LBS

## 2024-10-04 DIAGNOSIS — R13.12 OROPHARYNGEAL DYSPHAGIA: ICD-10-CM

## 2024-10-04 DIAGNOSIS — C09.9 PRIMARY CANCER OF TONSIL (HCC): Primary | ICD-10-CM

## 2024-10-04 NOTE — PROGRESS NOTES
Luisito Diop is a 74 year old male.   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  Most recent PET CT scan done 10/1/2024 shows residual right.  14 tonsil mass 1.6 x 1 cm not changed from prior CT and stable level 2 lymph nodes on the right.  Patient complains of inability to swallow.    Current Outpatient Medications   Medication Sig Dispense Refill    clonazePAM 1 MG Oral Tab Take 1 tablet (1 mg total) by mouth nightly as needed for Anxiety. 60 tablet 0    cyclobenzaprine 5 MG Oral Tab Take 1 tablet (5 mg total) by mouth nightly as needed. 30 tablet 0    aspirin 81 MG Oral Tab Take 1 tablet (81 mg total) by mouth daily.      methylPREDNISolone (MEDROL) 4 MG Oral Tablet Therapy Pack As directed. (Patient not taking: Reported on 10/4/2024) 1 each 0      Past Medical History:    Cancer (HCC)    throat cancer    Diabetes (HCC)    Dialysis patient (HCC)    ESRD (end stage renal disease) on dialysis (HCC)    Essential hypertension    High blood pressure    Renal disorder    Tonsillar cancer (HCC)      Social History:  Social History     Socioeconomic History    Marital status:    Tobacco Use    Smoking status: Never    Smokeless tobacco: Never   Vaping Use    Vaping status: Never Used   Substance and Sexual Activity    Alcohol use: Not Currently    Drug use: Never     Social Determinants of Health     Financial Resource Strain: Low Risk  (1/15/2024)    Financial Resource Strain     Difficulty of Paying Living Expenses: Not very hard     Med Affordability: No   Food Insecurity: No Food Insecurity (1/11/2024)    Food Insecurity     Food Insecurity: Never true   Transportation Needs: No Transportation Needs (1/15/2024)    Transportation Needs     Lack of Transportation: No   Housing Stability: Low Risk  (1/11/2024)    Housing Stability      Housing Instability: No        REVIEW OF SYSTEMS:   GENERAL HEALTH: feels well otherwise  GENERAL : denies fever, chills, sweats, weight loss, weight gain  SKIN: denies any unusual skin lesions or rashes  RESPIRATORY: denies shortness of breath with exertion  NEURO: denies headaches    EXAM:   Ht 6' 5\" (1.956 m)   Wt 208 lb (94.3 kg)   BMI 24.67 kg/m²   System Details   Skin Inspection - Normal.   Constitutional Overall appearance - Normal.   Head/Face Facial features - Normal. Eyebrows - Normal. Skull - Normal.   Eyes Conjunctiva - Right: Normal, Left: Normal. Pupil - Right: Normal, Left: Normal.    Ears Inspection - Right: Normal, Left: Normal.   Canal - Right: Normal, Left: Normal.    TM - Right: Normal, Left: Normal.   Nasal External nose - Normal.   Nasal septum - Normal.  Turbinates - Normal   Oral/Oropharynx Lips - Normal, Tonsils -fullness but no ulceration in the right tonsillar fossa.  Tongue - Normal.  Trismus.   Neck Exam Inspection - Normal. Palpation - Normal. Parotid gland - Normal. Thyroid gland - Normal.   Lymph Detail Submental. Submandibular. Anterior cervical. Posterior cervical. Supraclavicular.  All without enlargement   Psychiatric Orientation - Oriented to time, place, person & situation. Appropriate mood and affect.   Neurological Memory - Normal. Cranial nerves - Cranial nerves II through XII grossly intact.   Nasopharynx Normal by fiberoptic exam   Larynx Consent was obtained for the procedure.  The nose was asesthetized with 1% neosynephrine and 4% lidocaine topical drops.    The scope was placed into the bilateral nares as well as the nasopharynx, hypopharynx and larynx.    All of which were examined.  The  entire larynx including the vallecula, epiglottis, true and false vocal cords, aryepiglottic folds, piriform sinuses and post cricord area were examined for tumors and infectious processes as well as for evidence of reflux and retained secretions.  Vocal cord mobility was also  assessed.    Any abnormalities are noted in the exam section.  Base of tongue without any evidence of disease or ulceration.  Normal vocal cord mobility.  No edema or retained secretions.  No tumors or masses seen.     ASSESSMENT AND PLAN:   1. Primary cancer of tonsil (HCC)  Residual tissue in the right tonsillar fossa.  Patient with trismus and a very brisk gag reflex.  If a Apsey is warranted, would very likely need to have a general anesthetic to accomplish it.    2. Oropharyngeal dysphagia  No retained secretions, patient to follow-up with GI to see whether there is a stenosis of the esophagus.  Follow-up with me in 4 months time, sooner if problems.      The patient indicates understanding of these issues and agrees to the plan.      Elva Cummings MD  10/4/2024  1:47 PM

## 2024-10-04 NOTE — PATIENT INSTRUCTIONS
There is some fullness in the right tonsillar fossa area.  I will discuss with Dr. Park and Dr. Wesley on whether a biopsy should be considered or not.  The remainder of your head and neck exam was within normal limits.

## 2024-10-05 NOTE — PROGRESS NOTES
[facility] Progress Note      Patient Name: Luisito Diop   YOB: 1950  Medical Record Number: [id number]  Attending Physician: Adolph Zavala M.D.     The 21st Century Cures Act makes medical notes like these available to patients in the interest of transparency. Please be advised this is a medical document. Medical documents are intended to carry relevant information, facts as evident, and the clinical opinion of the practitioner. The medical note is intended as peer to peer communication and may appear blunt or direct. It is written in medical language and may contain abbreviations or verbiage that are unfamiliar.      Date of Visit: 9/27/2024       Chief Complaint  Squamous cell carcinoma, right tonsil - follow up.    Oncologic History  Luisito Diop is a 74 year old male who presented to the ED on 08/17/2023 with complaint of a 2 week history of progression right neck pain and enlarging mass. Non-contrast CT soft tissue neck on 08/18/2023 suggested enlargement of the right thyroid lobe with with a nodule measuring 3.9 cm and 2 enlarged right sided level 2 lymph nodes. On the same day, he underwent ultrasound of the thyroid which showed that there was a 2.6 cm mass abutting the superior pole of the right thyroid lobe and a 5.2 cm more superior mass.     On 08/18/2023 he underwent biopsy of the right neck mass and adjacent lymph node. Pathology showed metastatic squamous cell carcinoma with extensive necrosis and fragments of lymph node. Tumor cells were positive for p16.    On 08/24/2023 he underwent endoscopic evaluation by ENT which showed an irregular tonsil. Biopsy showed invasive keratinizing squamous cell carcinoma. Tumor cells were p16 positive.    PET/CT scan on 09/14/2023 showed a hypermetabolic right tonsillar lesion and two hypermetabolic enlarged right neck lymph nodes. No distant metastases were noted. Patient was staged as T2N1M0.     On  10/03/2023 he began definitive systemic therapy with weekly cetuximab and radiation therapy. Last cetuximab was on 12/05/2023. Last radiation was 12/06/2023. Patient required a prolonged treatment interruption from 10/03/2023 to 11/21/2023 for weight loss and mucositis. He required placement of G tube.     Endoscopic exam by ENT on 02/20/2024 showed treatment related edema but no other abnormalities.     PET/CT imaging on 03/06/2024 showed a residual right tonsillar mass with SUV 3.7, two partially calcified right neck lymph nodes with SUV 3.7 and 3.4.     PET/CT scan on 06/18/2024 showed right tonsillar soft tissue thickening with SUV 6.2 and two lymph nodes with SUV 5.2 and 5.3. Per radiology report, SUV max increases by less than 20% at all abnormal sites on delayed imaging suggested treated rather than residual disease.     History of Present Illness  Patient returns for scheduled follow up. He is unable to take anything PO. He continues to use his G tube. He has no pain. He has no new respiratory complaints. He denies weight loss.     Patient has been seen by gastroenterology and endoscopy is planned. Patient did not follow up with ENT as advised. He is scheduled for CT imaging on 10/01/2024.     Performance Status   Karnofsky 90% - Normal, only minor signs/symptoms.     Past Medical History (historical data, reviewed by physician)   Past Medical History:    Cancer (HCC)    throat cancer    Diabetes (HCC)    Dialysis patient (HCC)    ESRD (end stage renal disease) on dialysis (HCC)    Essential hypertension    High blood pressure    Renal disorder    Tonsillar cancer (HCC)     Past Surgical History (historical data, reviewed by physician)   Past Surgical History:   Procedure Laterality Date    Colonoscopy       Family History (historical data, reviewed by physician)   Family History   Problem Relation Age of Onset    Cancer Sister         lung cancer     Social History (historical data, reviewed by physician)    Social History     Socioeconomic History    Marital status:    Tobacco Use    Smoking status: Never    Smokeless tobacco: Never   Vaping Use    Vaping status: Never Used   Substance and Sexual Activity    Alcohol use: Not Currently    Drug use: Never     Current Medications    clonazePAM 1 MG Oral Tab Take 1 tablet (1 mg total) by mouth nightly as needed for Anxiety. 60 tablet 0    methylPREDNISolone (MEDROL) 4 MG Oral Tablet Therapy Pack As directed. (Patient not taking: Reported on 10/4/2024) 1 each 0    cyclobenzaprine 5 MG Oral Tab Take 1 tablet (5 mg total) by mouth nightly as needed. 30 tablet 0    aspirin 81 MG Oral Tab Take 1 tablet (81 mg total) by mouth daily.       Allergies   Mr. Diop has No Known Allergies.    Vital Signs   /63 (BP Location: Right arm, Patient Position: Sitting, Cuff Size: adult)   Pulse 68   Temp 98 °F (36.7 °C) (Oral)   Resp 16   Ht 1.956 m (6' 5\")   Wt 94.3 kg (208 lb)   SpO2 100%   BMI 24.67 kg/m²     Physical Examination   Constitutional      Well developed, well nourished. Appears close to chronological age. No apparent distress.   Head                   Normocephalic and atraumatic.  Eyes                   Conjunctiva clear; sclera anicteric.  ENMT                 Oropharynx clear; tonsils not enlarged.   Neck                   Supple, without masses.  Hematologic/Lymphatic No cervical, supraclavicular, axillary lymphadenopathy.  Respiratory          Normal effort; no respiratory distress; lungs clear to auscultation bilaterally.  Cardiovascular     Regular rate and rhythm; normal S1S2.  Abdomen            Soft; not tender; no masses; G tube in place.   Neurologic           Motor and sensory grossly intact.  Psychiatric          Mood and affect appropriate.    Laboratory   Recent Results (from the past 336 hour(s))   CBC W/DIFF [E]    Collection Time: 09/27/24 10:30 AM   Result Value Ref Range    WBC 4.1 4.0 - 11.0 x10(3) uL    RBC 3.37 (L) 3.80 - 5.80  x10(6)uL    HGB 10.8 (L) 13.0 - 17.5 g/dL    HCT 30.7 (L) 39.0 - 53.0 %    MCV 91.1 80.0 - 100.0 fL    MCH 32.0 26.0 - 34.0 pg    MCHC 35.2 31.0 - 37.0 g/dL    RDW-SD 43.5 35.1 - 46.3 fL    RDW 13.2 11.0 - 15.0 %    .0 150.0 - 450.0 10(3)uL    Neutrophil Absolute Prelim 3.11 1.50 - 7.70 x10 (3) uL    Neutrophil Absolute 3.11 1.50 - 7.70 x10(3) uL    Lymphocyte Absolute 0.46 (L) 1.00 - 4.00 x10(3) uL    Monocyte Absolute 0.43 0.10 - 1.00 x10(3) uL    Eosinophil Absolute 0.07 0.00 - 0.70 x10(3) uL    Basophil Absolute 0.03 0.00 - 0.20 x10(3) uL    Immature Granulocyte Absolute 0.02 0.00 - 1.00 x10(3) uL    Neutrophil % 75.5 %    Lymphocyte % 11.2 %    Monocyte % 10.4 %    Eosinophil % 1.7 %    Basophil % 0.7 %    Immature Granulocyte % 0.5 %   FERRITIN [E]    Collection Time: 09/27/24 10:30 AM   Result Value Ref Range    Ferritin 1,543 (H) 50 - 336 ng/mL   FOLIC ACID SERUM [E]    Collection Time: 09/27/24 10:30 AM   Result Value Ref Range    Folate (Folic Acid) 30.2 >5.4 ng/mL   IRON AND TIBC [E]    Collection Time: 09/27/24 10:30 AM   Result Value Ref Range    Iron 40 (L) 65 - 175 ug/dL    Transferrin 152 (L) 215 - 365 mg/dL    Total Iron Binding Capacity 226 (L) 250 - 425 ug/dL    % Saturation 18 (L) 20 - 50 %   TSH W REFLEX TO FREE T4 [E]    Collection Time: 09/27/24 10:30 AM   Result Value Ref Range    TSH 1.316 0.550 - 4.780 mIU/mL   Vitamin B12 with reflex to MMA    Collection Time: 09/27/24 10:30 AM   Result Value Ref Range    Vitamin B12 965 (H) 211 - 911 pg/mL   HOLD MMA    Collection Time: 09/27/24 10:30 AM   Result Value Ref Range    HOLD MMA Auto Resulted      Impression and Plan   1.   P16 positive squamous cell carcinoma, right tonsil: Patient was staged as T2N1M0. He received definitive chemoradiotherapy with cetuximab completing all treatment on 12/06/2024. Treatment was complicated by weight loss and mucositis requiring placement of a G tube for feeds.           There is no clinical evidence of  recurrent or metastatic disease. Patient is scheduled for CT imaging next week. He is advised that he must follow up regularly with ENT for endoscopic evaluation; we will help arrange for the appointment.           I recommend continued active surveillance. Patient will have repeat CT imaging in 6 months. He should also continue every 3 month follow up with ENT for endoscopic surveillance for the first 2 years post treatment. SH will be periodically checked as he is at risk for radiation induced hypothyroidism.     2.   Dysphagia: Patient is scheduled for endoscopy with GI. If dilation is performed, patient should follow up with speech and swallow afterwards.     3.   Anemia: Normocytic normochromic anemia. Stable and consistent with anemia of CKD. Iron studies are negative for iron deficiency.     4.   Nutrition: Patient will continue G tube feeds.     Planned Follow Up   Patient will return for follow up in 3 months.    Electronically signed by:    Adolph Zavala M.D.  System Medical Director of Oncology Services  [facility]

## 2024-10-07 ENCOUNTER — PATIENT MESSAGE (OUTPATIENT)
Dept: OTOLARYNGOLOGY | Facility: CLINIC | Age: 74
End: 2024-10-07

## 2024-10-07 DIAGNOSIS — C09.9 MALIGNANT NEOPLASM OF TONSIL (HCC): Primary | ICD-10-CM

## 2024-10-09 NOTE — TELEPHONE ENCOUNTER
Dr. Cummings, please see patient message below. He would like the PET scan. I have pended the order for you to review and sign if appropriate. Thank you.

## 2024-10-17 ENCOUNTER — OFFICE VISIT (OUTPATIENT)
Dept: ORTHOPEDICS CLINIC | Facility: CLINIC | Age: 74
End: 2024-10-17
Payer: MEDICARE

## 2024-10-17 VITALS — WEIGHT: 208 LBS | BODY MASS INDEX: 24.56 KG/M2 | HEIGHT: 77 IN

## 2024-10-17 DIAGNOSIS — E11.22 TYPE 2 DIABETES MELLITUS WITH STAGE 4 CHRONIC KIDNEY DISEASE, WITHOUT LONG-TERM CURRENT USE OF INSULIN (HCC): ICD-10-CM

## 2024-10-17 DIAGNOSIS — N18.4 TYPE 2 DIABETES MELLITUS WITH STAGE 4 CHRONIC KIDNEY DISEASE, WITHOUT LONG-TERM CURRENT USE OF INSULIN (HCC): ICD-10-CM

## 2024-10-17 DIAGNOSIS — S91.109A OPEN WOUND OF TOE, INITIAL ENCOUNTER: ICD-10-CM

## 2024-10-17 DIAGNOSIS — S90.00XA: ICD-10-CM

## 2024-10-17 DIAGNOSIS — R20.9 COLD FEET: Primary | ICD-10-CM

## 2024-10-17 PROCEDURE — 99203 OFFICE O/P NEW LOW 30 MIN: CPT | Performed by: PODIATRIST

## 2024-10-17 NOTE — PROGRESS NOTES
[facility] New Patient Note    CC:   Chief Complaint   Patient presents with    Ankle Pain     Right ankle pain and swelling;  *chair fell on leg 1 month ago has wound  Pain Score: 4-5       HPI: The patient is a 74 year old male who presents today with complaints of an injury to his ankle and foot    DM and HTN much better with G tube and   ESRD    Chair dropped on foot about 1 month ago  He is doing well  Xrays were done at  and were normal        Past Medical History:    Cancer (HCC)    throat cancer    Diabetes (HCC)    Dialysis patient (HCC)    ESRD (end stage renal disease) on dialysis (HCC)    Essential hypertension    High blood pressure    Renal disorder    Tonsillar cancer (HCC)     Past Surgical History:   Procedure Laterality Date    Colonoscopy       Current Outpatient Medications   Medication Sig Dispense Refill    clonazePAM 1 MG Oral Tab Take 1 tablet (1 mg total) by mouth nightly as needed for Anxiety. 60 tablet 0    cyclobenzaprine 5 MG Oral Tab Take 1 tablet (5 mg total) by mouth nightly as needed. 30 tablet 0    aspirin 81 MG Oral Tab Take 1 tablet (81 mg total) by mouth daily.      methylPREDNISolone (MEDROL) 4 MG Oral Tablet Therapy Pack As directed. (Patient not taking: Reported on 10/17/2024) 1 each 0     Allergies[1]  Family History   Problem Relation Age of Onset    Cancer Sister         lung cancer     Social History     Occupational History    Not on file   Tobacco Use    Smoking status: Never    Smokeless tobacco: Never   Vaping Use    Vaping status: Never Used   Substance and Sexual Activity    Alcohol use: Not Currently    Drug use: Never    Sexual activity: Not on file        ROS:  Complete ROS reviewed by me and non-contributory to the chief complaint except as mentioned above.    Physical Exam:    Ht 6' 5\" (1.956 m)   Wt 208 lb (94.3 kg)   BMI 24.67 kg/m²       Exam bilateral feet are cold bilateral.  Palpable but very faint pedal pulses  Sensation is some diminished  but basically intact sharp versus dull.  Tenderness about the right ankle, medial malleolus there is either a resolving hematoma without warmth or developing ganglion cyst.  Mildly tender to palpation and movable.  This is about 2 cm in diameter.  Mild pain about the medial malleolus and underlying.  This is resolving.  At the great toe dorsally he has a healed scabbed over lesion without infection.  No open lesions noted.  Otherwise strength 5 out of 5 bilateral muscle groups    Imaging:  chip/avulsion off medial malleolus , increased ST density  Ankle mortise is clear .  Large posterior and inferior calcaneal spurs  3 views of ankle right   personally viewed, independently interpreted and radiology report read.      Assessment/Diagnoses:  Diagnoses and all orders for this visit:    Cold feet    Type 2 diabetes mellitus with stage 4 chronic kidney disease, without long-term current use of insulin (HCC)    Open wound of toe, initial encounter    Hematoma of ankle        Plan:  I reviewed imaging and exam findings with the patient.  He has a resolving hematoma or a developing ganglion cyst and we talked about both of these and the treatment plan.  He is really doing quite a bit better after 1 month she does not want to pursue any treatment at this time.  He has ganglion cysts of the wrist and he knows with those are.  In the foot and ankle often they are removed because of discomfort and rubbing.  So we can keep an eye on that.  If he has more of a hematoma this usually goes down with time and it is not worrisome right now.  He can do most activities without discomfort so this is a good sign and we do not need to get another x-ray      As far as the coldness of the feet I would like to get an STANISLAW study.  He did have diabetes but states that has resolved with weight loss    Attempted to order basic blood flow study - STANISLAW but diagnosis codes not working  He wants to wait on this - is afraid would not be  covered      Sarah Henriquez, DPodiatric Surgery  [facility]  [address], [address] [address]   [address] [place] [id number]   [address] [place], [place] [address] [place] [address].[email or web address]  [email or web address]  t: [phone]   f: [phone]            This document was partially prepared using Dragon Medical voice recognition software.            [1] No Known Allergies

## 2024-11-06 ENCOUNTER — HOSPITAL ENCOUNTER (OUTPATIENT)
Dept: GENERAL RADIOLOGY | Age: 74
Discharge: HOME | End: 2024-11-06
Attending: INTERNAL MEDICINE
Payer: MEDICARE

## 2024-11-06 ENCOUNTER — OFFICE VISIT (OUTPATIENT)
Dept: INTERNAL MEDICINE CLINIC | Facility: CLINIC | Age: 74
End: 2024-11-06

## 2024-11-06 VITALS
DIASTOLIC BLOOD PRESSURE: 80 MMHG | OXYGEN SATURATION: 100 % | SYSTOLIC BLOOD PRESSURE: 130 MMHG | RESPIRATION RATE: 17 BRPM | HEIGHT: 77 IN | WEIGHT: 208 LBS | BODY MASS INDEX: 24.56 KG/M2 | HEART RATE: 67 BPM | TEMPERATURE: 98 F

## 2024-11-06 DIAGNOSIS — R13.10 DYSPHAGIA, UNSPECIFIED TYPE: ICD-10-CM

## 2024-11-06 DIAGNOSIS — M54.50 CHRONIC BILATERAL LOW BACK PAIN WITHOUT SCIATICA: ICD-10-CM

## 2024-11-06 DIAGNOSIS — M54.50 CHRONIC BILATERAL LOW BACK PAIN WITHOUT SCIATICA: Primary | ICD-10-CM

## 2024-11-06 DIAGNOSIS — G89.29 CHRONIC BILATERAL LOW BACK PAIN WITHOUT SCIATICA: ICD-10-CM

## 2024-11-06 DIAGNOSIS — E11.9 TYPE 2 DIABETES MELLITUS WITHOUT COMPLICATION, WITHOUT LONG-TERM CURRENT USE OF INSULIN (HCC): ICD-10-CM

## 2024-11-06 DIAGNOSIS — G89.29 CHRONIC BILATERAL LOW BACK PAIN WITHOUT SCIATICA: Primary | ICD-10-CM

## 2024-11-06 PROCEDURE — 72110 X-RAY EXAM L-2 SPINE 4/>VWS: CPT | Performed by: INTERNAL MEDICINE

## 2024-11-06 RX ORDER — METHYLPREDNISOLONE 4 MG/1
TABLET ORAL
Qty: 1 EACH | Refills: 0 | Status: SHIPPED | OUTPATIENT
Start: 2024-11-06

## 2024-11-08 ENCOUNTER — OFFICE VISIT (OUTPATIENT)
Dept: RADIATION ONCOLOGY | Facility: HOSPITAL | Age: 74
End: 2024-11-08
Attending: RADIOLOGY
Payer: MEDICARE

## 2024-11-08 VITALS
WEIGHT: 208.63 LBS | TEMPERATURE: 97 F | HEART RATE: 84 BPM | RESPIRATION RATE: 16 BRPM | DIASTOLIC BLOOD PRESSURE: 68 MMHG | OXYGEN SATURATION: 98 % | BODY MASS INDEX: 25 KG/M2 | SYSTOLIC BLOOD PRESSURE: 144 MMHG

## 2024-11-08 DIAGNOSIS — C09.9 TONSIL CANCER (HCC): Primary | ICD-10-CM

## 2024-11-08 PROCEDURE — 99211 OFF/OP EST MAY X REQ PHY/QHP: CPT

## 2024-11-08 NOTE — PROGRESS NOTES
RADIATION ONCOLOGY NOTE    DATE OF VISIT: 11/8/2024    DIAGNOSIS: Group I T2N1MO SCC P16+ R tonsil, for definitive XRT with Cetuximab on 12/6/2023, with mild residual activity on repeat PET/CT likely post-tx related, for follow-up CT scan, and to advance diet as tolerated.      Dear Lucas Pacheco and colleagues,    As you recall, pt is a 73 yo with history of CKD on dialysis, with R neck mass for several months, without dysphagia or oltagia.  PET/CT revealed multiple R LN.   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   Pt had prolonged tx break during tx due to weight loss and mucositis and had feeding tube placed during tx and continues to use 6 cans of supplements a day via G tube.  Pt tolerates small amounts of water and has not tried to advance his diet yet.     Pt also has not had recent f/u with Dr Cummings and speech/swallow.   Pt's mucositis has resolved.  He has altered taste and xerostomia resolved.  Pt can tolerate liquids but has not advanced his diet yet.  Pt has seen speech and swallow previously.      Recent Results (from the past [id number] hours)   PSA Total, Screen    Collection Time: 04/23/24  2:12 PM   Result Value Ref Range    Prostate Specific Antigen Screen  0.41 <=4.00     *Note: Due to a large number of results and/or encounters for the requested time period, some results have not been displayed. A complete set of results can be found in Results Review.       PSA Screen:    Lab Results   Component Value Date    PSAS 0.41 04/23/2024    PSAS 0.71 10/07/2022    PSAS 0.74 02/03/2020       XR LUMBAR SPINE (MIN 4 VIEWS) (CPT=72110)    Result Date: 11/6/2024  CONCLUSION:  1. Multilevel degenerative changes the lumbar spine.  2. No radiographically visible acute osseous injury of the lumbar spine.     Dictated by (CST): Jaquan Dunaway MD on 11/06/2024 at 3:01 PM     Finalized by (CST): Jaquan Dunaway MD on 11/06/2024 at 3:03 PM             ROS    A 12 Point review of  system was obtained and is as above and per HPI and nursing note.         Current Outpatient Medications   Medication Sig Dispense Refill    methylPREDNISolone (MEDROL) 4 MG Oral Tablet Therapy Pack As directed. 1 each 0    clonazePAM 1 MG Oral Tab Take 1 tablet (1 mg total) by mouth nightly as needed for Anxiety. 60 tablet 0    methylPREDNISolone (MEDROL) 4 MG Oral Tablet Therapy Pack As directed. (Patient not taking: Reported on 10/4/2024) 1 each 0    cyclobenzaprine 5 MG Oral Tab Take 1 tablet (5 mg total) by mouth nightly as needed. 30 tablet 0    aspirin 81 MG Oral Tab Take 1 tablet (81 mg total) by mouth daily.         PAIN:  Pain Loc: Back, Pain Score: 10, Pain Frequency 2: Constant/continuous   ,  ,     ,  ,      ALLERGIES :   Allergies[1]    PAST MEDICAL HISTORY:   has a past medical history of Cancer ([facility]) (2022), Diabetes ([facility]), Dialysis patient ([facility]), ESRD (end stage renal disease) on dialysis ([facility]) (2020), Essential hypertension, High blood pressure, Renal disorder, and Tonsillar cancer ([facility]).    He has no past medical history of Anesthesia complication, Arrhythmia, Asthma ([facility]), COPD (chronic obstructive pulmonary disease) (HCC), Deep vein thrombosis (HCC), Difficult intubation, Disorder of liver, Heart attack (HCC), High cholesterol, History of adverse reaction to anesthesia, Malignant hyperthermia, Pulmonary embolism (HCC), Seizure disorder (HCC), Sleep apnea, or Stroke ([facility]).    PAST SURGICAL HISTORY:   has a past surgical history that includes colonoscopy.    PAST SOCIAL HISTORY   reports that he has never smoked. He has never been exposed to tobacco smoke. He has never used smokeless tobacco. He reports that he does not currently use alcohol. He reports that he does not use drugs.    PAST FAMILY HISTORY   family history includes Cancer in his sister.    Wt Readings from Last 6 Encounters:   11/08/24 94.6 kg (208 lb 9.6 oz)   11/06/24 94.3 kg (208 lb)   10/17/24 94.3 kg (208 lb)   10/04/24 94.3 kg  (208 lb)   09/27/24 94.3 kg (208 lb)   09/25/24 94.3 kg (208 lb)        PHYSICAL EXAM  Blood pressure 144/68, pulse 84, temperature 97.2 °F (36.2 °C), temperature source Temporal, resp. rate 16, weight 94.6 kg (208 lb 9.6 oz), SpO2 98%.  PERFORMANCE STATUS 1 ,   GENERAL:  Pt is alert and oriented times three in no acute distress.    HEENT:  PERRLA, EOMI,   NECK:  Supple with no  lymphadenopathy.   CHEST:  Clear   ABDOMEN:  G tube    EXTREMITIES:  There is no upper or lower extremity edema.    NEUROLOGIC:  Cranial nerves II-XII are intact.  Neuro exam is nonfocal.    COMPLETED TESTS:  I have reviewed the patient's clinical, radiographic, pathologic and laboratory studies.    ASSESSMENT/PLAN    73 yo group I T2N1MO SCC P16+ R tonsil, for definitive XRT with Cetuximab on 12/6/2023, with mild residual activity on repeat PET/CT likely post-tx related, for follow-up CT scan, and to advance diet as tolerated.      Pt can tolerate liquids but has not advanced his diet yet.  Pt has seen speech and swallow previously.      Ideally, pt will advance his diet to ween off the G-tube feedings.      Pt will continue radiographic and endoscopic f/u.      Thank you for allowing me to take care of your patient.  Please do not hesitate to contact me directly.    Julian Park MD, FACRO  Radiation Oncology  [email or web address]  [phone]        Imaging & Referral Orders:  PHYSICAL THERAPY - INTERNAL                           [1] No Known Allergies

## 2024-11-08 NOTE — PATIENT INSTRUCTIONS
Physical therapy put in by Dr. Park.  Call for speech and swallow order following dilation, if needed.  Follow up with Dr. Park in 9 months. Esperanza will call you to make the follow up appointment.  Try to get 1 can each day with a straw.   Call [phone] to reach a radiation nurse.

## 2024-11-08 NOTE — PROGRESS NOTES
Nursing Follow-Up Note    Patient: Luisito Diop  YOB: 1950  Age: 74 year old  Radiation Oncologist: Dr. Julian Park  Referring Physician: No ref. provider found  Chief Complaint: No chief complaint on file.    Date: 11/8/2024    Toxicities: Fatigue Grade 1= Fatigue relieved by rest  Dysphagia Grade 3= Severely altered eating/swallowing; tube feeding, TPN, or hospitalization indicated- doing 2 cans TID  Dyspepsia Grade 0= None  Mucositis Grade 0= None  Constipation Grade 1= Occasional or intermittent symptoms; occasional use of stool softeners, laxatives, dietary modification, or enema  Diarrhea  Grade 0= None  Nausea Grade 0= None  Vomiting Grade 0= None  Xerostomia Present=Yes- not using Biotene -ran out, provided with more today.    Vital Signs: There were no vitals taken for this visit.,   Wt Readings from Last 6 Encounters:   11/06/24 94.3 kg (208 lb)   10/17/24 94.3 kg (208 lb)   10/04/24 94.3 kg (208 lb)   09/27/24 94.3 kg (208 lb)   09/25/24 94.3 kg (208 lb)   06/28/24 94.9 kg (209 lb 4.8 oz)       Allergies:  Allergies[1]    Nursing Note: Pt seen for follow up with Dr. Park. Pt completed HN RT on 12/6/2023. Pt rates pain 10/10 back pain lower L back. On medrol dose pack, not using pain medications. Pt continues on tube feedings, 2 cans TID. Weight stable. Pt scheduled for PET scan 11/13/2024. EGD with dilation scheduled 11/18/2024. Dr. Cummings appointment 1/10/2025 and Dr Zavala 1/13/2025. Pt to follow up with Dr. Park in 9 months. Provided with RN number in case of radiation questions/concerns.          [1] No Known Allergies

## 2024-11-12 NOTE — PROGRESS NOTES
Subjective:     Patient ID: Luisito Diop is a 74 year old male.    Patient comes in for follow-up visit with complaint of low Back Pain (Started about 2wks ago, mostly in left side.  Doesn't recall any injury.  But yesterday    Pt had a fall yesterday and pain became worse.  Has been taking Flexoril & Tylenol w/ little to no relief.    Patient otherwise continues to have difficulty with swallowing follows with ENT and radiation oncologist           History/Other:   Review of Systems   Constitutional: Negative.  Negative for fatigue and fever.   HENT:  Positive for trouble swallowing. Negative for congestion.    Eyes: Negative.    Respiratory: Negative.  Negative for cough, shortness of breath and wheezing.    Cardiovascular: Negative.  Negative for chest pain, palpitations and leg swelling.   Gastrointestinal: Negative.    Endocrine: Negative for cold intolerance and heat intolerance.   Genitourinary: Negative.  Negative for dysuria, flank pain and hematuria.   Musculoskeletal:  Positive for back pain. Negative for arthralgias and myalgias.   Skin: Negative.    Neurological: Negative.  Negative for dizziness, tremors, syncope, weakness and headaches.   Psychiatric/Behavioral: Negative.  Negative for agitation, behavioral problems and suicidal ideas. The patient is not nervous/anxious.      Current Outpatient Medications   Medication Sig Dispense Refill    methylPREDNISolone (MEDROL) 4 MG Oral Tablet Therapy Pack As directed. 1 each 0    clonazePAM 1 MG Oral Tab Take 1 tablet (1 mg total) by mouth nightly as needed for Anxiety. 60 tablet 0    cyclobenzaprine 5 MG Oral Tab Take 1 tablet (5 mg total) by mouth nightly as needed. 30 tablet 0    aspirin 81 MG Oral Tab Take 1 tablet (81 mg total) by mouth daily.      methylPREDNISolone (MEDROL) 4 MG Oral Tablet Therapy Pack As directed. (Patient not taking: Reported on 10/4/2024) 1 each 0     Allergies:Allergies[1]    Past Medical History:    Cancer (HCC)    throat  cancer    Diabetes (HCC)    Dialysis patient (HCC)    ESRD (end stage renal disease) on dialysis (HCC)    Essential hypertension    High blood pressure    Renal disorder    Tonsillar cancer (HCC)      Past Surgical History:   Procedure Laterality Date    Colonoscopy        Family History   Problem Relation Age of Onset    Cancer Sister         lung cancer      Social History:   Social History     Socioeconomic History    Marital status:    Tobacco Use    Smoking status: Never     Passive exposure: Never    Smokeless tobacco: Never   Vaping Use    Vaping status: Never Used   Substance and Sexual Activity    Alcohol use: Not Currently    Drug use: Never     Social Drivers of Health     Financial Resource Strain: Low Risk  (1/15/2024)    Financial Resource Strain     Difficulty of Paying Living Expenses: Not very hard     Med Affordability: No   Food Insecurity: No Food Insecurity (1/11/2024)    Food Insecurity     Food Insecurity: Never true   Transportation Needs: No Transportation Needs (1/15/2024)    Transportation Needs     Lack of Transportation: No   Housing Stability: Low Risk  (1/11/2024)    Housing Stability     Housing Instability: No        Objective:   Physical Exam  Vitals and nursing note reviewed.   Constitutional:       Appearance: He is well-developed.   HENT:      Head: Normocephalic and atraumatic.      Right Ear: External ear normal.      Left Ear: External ear normal.      Nose: Nose normal.   Eyes:      Conjunctiva/sclera: Conjunctivae normal.      Pupils: Pupils are equal, round, and reactive to light.   Cardiovascular:      Rate and Rhythm: Normal rate and regular rhythm.      Heart sounds: Normal heart sounds.   Pulmonary:      Effort: Pulmonary effort is normal.      Breath sounds: Normal breath sounds.   Abdominal:      General: Bowel sounds are normal.      Palpations: Abdomen is soft.   Genitourinary:     Penis: Normal.       Prostate: Normal.      Rectum: Normal.   Musculoskeletal:          General: Tenderness present. Normal range of motion.      Cervical back: Normal range of motion and neck supple.      Comments: Low back pain   Skin:     General: Skin is warm and dry.   Neurological:      Mental Status: He is alert and oriented to person, place, and time.      Deep Tendon Reflexes: Reflexes are normal and symmetric.         Assessment & Plan:   1. Chronic bilateral low back pain without sciatica will order an x-ray will give steroids continue muscle relaxant follow-up with physiatry if not better    2. Dysphagia, unspecified type chronic due to history of throat cancer continues to have trouble with swallowing still is refusing to    3. Type 2 diabetes mellitus without complication, without long-term current use of insulin ([facility]) will retest will follow results       Orders Placed This Encounter   Procedures    Hemoglobin A1C    INFLUENZA VAC HIGH DOSE PRSV FREE       Meds This Visit:  Requested Prescriptions     Signed Prescriptions Disp Refills    methylPREDNISolone (MEDROL) 4 MG Oral Tablet Therapy Pack 1 each 0     Sig: As directed.       Imaging & Referrals:  INFLUENZA VAC HIGH DOSE PRSV FREE  PHYSIATRY - INTERNAL            [1] No Known Allergies

## 2024-11-13 ENCOUNTER — HOSPITAL ENCOUNTER (OUTPATIENT)
Dept: NUCLEAR MEDICINE | Facility: HOSPITAL | Age: 74
Discharge: HOME | End: 2024-11-13
Attending: SPECIALIST
Payer: MEDICARE

## 2024-11-13 DIAGNOSIS — C09.9 MALIGNANT NEOPLASM OF TONSIL (HCC): ICD-10-CM

## 2024-11-13 LAB — GLUCOSE BLDC GLUCOMTR-MCNC: 83 MG/DL (ref 70–99)

## 2024-11-13 PROCEDURE — 82962 GLUCOSE BLOOD TEST: CPT

## 2024-11-13 PROCEDURE — 78815 PET IMAGE W/CT SKULL-THIGH: CPT | Performed by: SPECIALIST

## 2024-11-14 ENCOUNTER — TELEPHONE (OUTPATIENT)
Dept: GASTROENTEROLOGY | Facility: CLINIC | Age: 74
End: 2024-11-14

## 2024-11-14 NOTE — PROGRESS NOTES
Concerning for recurrent/ residual right base of tongue and tonsillar disease.  He will be a challenging airway for biopsy.

## 2024-11-14 NOTE — TELEPHONE ENCOUNTER
Received message from ENT that he may not be good candidate for sedation given possible mass seen on PET on tongue. Would defer procedure until ENT workup completed as likely cause for symptoms and risk with sedation    Thank you.

## 2024-11-19 ENCOUNTER — TELEPHONE (OUTPATIENT)
Dept: INTERNAL MEDICINE CLINIC | Facility: CLINIC | Age: 74
End: 2024-11-19

## 2024-11-19 ENCOUNTER — MED REC SCAN ONLY (OUTPATIENT)
Dept: INTERNAL MEDICINE CLINIC | Facility: CLINIC | Age: 74
End: 2024-11-19

## 2024-11-19 NOTE — TELEPHONE ENCOUNTER
Patient did return my call regarding the contact information about his ophthalmologist.   I have sent the release of information to the provider for his eye exam results.

## 2024-11-19 NOTE — TELEPHONE ENCOUNTER
Patient called with ophthalmologist information. He does not have the doctor's name.     [facility]    [address] [place], [place]   [phone]    Office staff, were you trying to obtain ophthalmologist information?  There are no notes indicating this.  Patient states he is returning Keyana's call.

## 2024-12-20 ENCOUNTER — MED REC SCAN ONLY (OUTPATIENT)
Dept: INTERNAL MEDICINE CLINIC | Facility: CLINIC | Age: 74
End: 2024-12-20

## 2025-01-06 NOTE — PROGRESS NOTES
[facility] Progress Note       Patient Name: Luisito Diop   YOB: 1950  Medical Record Number: [id number]  Attending Physician: Adolph Zavala M.D.     The 21st Century Cures Act makes medical notes like these available to patients in the interest of transparency. Please be advised this is a medical document. Medical documents are intended to carry relevant information, facts as evident, and the clinical opinion of the practitioner. The medical note is intended as peer to peer communication and may appear blunt or direct. It is written in medical language and may contain abbreviations or verbiage that are unfamiliar.      Date of Visit: 1/13/2025       Chief Complaint  Squamous cell carcinoma, right tonsil - follow up.    Oncologic History  Luisito Diop is a 74 year old male who presented to the ED on 08/17/2023 with complaint of a 2 week history of progression right neck pain and enlarging mass. Non-contrast CT soft tissue neck on 08/18/2023 suggested enlargement of the right thyroid lobe with with a nodule measuring 3.9 cm and 2 enlarged right sided level 2 lymph nodes. On the same day, he underwent ultrasound of the thyroid which showed that there was a 2.6 cm mass abutting the superior pole of the right thyroid lobe and a 5.2 cm more superior mass.     On 08/18/2023 he underwent biopsy of the right neck mass and adjacent lymph node. Pathology showed metastatic squamous cell carcinoma with extensive necrosis and fragments of lymph node. Tumor cells were positive for p16.    On 08/24/2023 he underwent endoscopic evaluation by ENT which showed an irregular tonsil. Biopsy showed invasive keratinizing squamous cell carcinoma. Tumor cells were p16 positive.    PET/CT scan on 09/14/2023 showed a hypermetabolic right tonsillar lesion and two hypermetabolic enlarged right neck lymph nodes. No distant metastases were noted. Patient was staged as T2N1M0.     On 10/03/2023  he began definitive systemic therapy with weekly cetuximab and radiation therapy. Last cetuximab was on 12/05/2023. Last radiation was 12/06/2023. Patient required a prolonged treatment interruption from 10/03/2023 to 11/21/2023 for weight loss and mucositis. He required placement of G tube.     Endoscopic exam by ENT on 02/20/2024 showed treatment related edema but no other abnormalities.     PET/CT imaging on 03/06/2024 showed a residual right tonsillar mass with SUV 3.7, two partially calcified right neck lymph nodes with SUV 3.7 and 3.4.     PET/CT scan on 06/18/2024 showed right tonsillar soft tissue thickening with SUV 6.2 and two lymph nodes with SUV 5.2 and 5.3. Per radiology report, SUV max increases by less than 20% at all abnormal sites on delayed imaging suggested treated rather than residual disease.     Patient transferred his care to me on 06/28/2024.     At that time, patient was referred to ENT for endoscopic evaluation and biopsy and to gastroenterology for EGD and possible esophageal dilation for persistent dysphagia.     Patient failed to follow up with ENT and GI as recommended. CT imaging was performed on  until 10/01/2024 which showed residual mass in the region of the right palatine tonsil and calcified lymph nodes in the right neck.     Patient was again referred to ENT and GI. He was seen by ENT and biopsy was recommended but he declined. As a result, PET/CT scan was performed on 11/13/2024 which showed abnormal SUV uptake in the region of the right palatine tonsil and two right neck lymph nodes with increased SUV uptake concerning for residual/recurrent malignancy.     On 01/10/2025 he underwent biopsy of the right tonsillar mass which showed foci of infiltrating moderately differentiated nonkeratinizing p16 positive squamous cell carcinoma.     History of Present Illness  Patient returns for scheduled follow up. He reports stable dysphagia and is unable to take anything PO. He continues  to use his G tube. He has no pain. He has no new respiratory complaints. He has lost some weight over the last 3 months.     Performance Status   Karnofsky 90% - Normal, only minor signs/symptoms.     Past Medical History (historical data, reviewed by physician)   Past Medical History:    Cancer (HCC)    throat cancer    Diabetes (HCC)    Dialysis patient (HCC)    ESRD (end stage renal disease) on dialysis (HCC)    Essential hypertension    High blood pressure    Renal disorder    Tonsillar cancer (HCC)     Past Surgical History (historical data, reviewed by physician)   Past Surgical History:   Procedure Laterality Date    Colonoscopy       Family History (historical data, reviewed by physician)   Family History   Problem Relation Age of Onset    Cancer Sister         lung cancer     Social History (historical data, reviewed by physician)   Social History     Socioeconomic History    Marital status:    Tobacco Use    Smoking status: Never     Passive exposure: Never    Smokeless tobacco: Never   Vaping Use    Vaping status: Never Used   Substance and Sexual Activity    Alcohol use: Not Currently    Drug use: Never     Current Medications    clonazePAM 1 MG Oral Tab Take 1 tablet (1 mg total) by mouth nightly as needed for Anxiety. 60 tablet 0    aspirin 81 MG Oral Tab Take 1 tablet (81 mg total) by mouth daily.       Allergies   Mr. Diop has No Known Allergies.    Vital Signs   BP (!) 168/55 (BP Location: Right arm, Patient Position: Sitting, Cuff Size: adult)   Pulse 59   Temp 97.8 °F (36.6 °C) (Oral)   Resp 16   Ht 1.956 m (6' 5\")   Wt 90.7 kg (200 lb)   SpO2 100%   BMI 23.72 kg/m²     Physical Examination   Constitutional      Well developed, well nourished. Appears close to chronological age. No apparent distress.   Head                   Normocephalic and atraumatic.  Eyes                   Conjunctiva clear; sclera anicteric.  Respiratory          Normal effort; no respiratory  distress.  Cardiovascular     Regular rate and rhythm.  Neurologic           Motor and sensory grossly intact.  Psychiatric          Mood and affect appropriate.    Laboratory   Recent Results (from the past week)   CBC W/DIFF [E]    Collection Time: 01/13/25  1:55 PM   Result Value Ref Range    WBC 2.6 (L) 4.0 - 11.0 x10(3) uL    RBC 3.39 (L) 3.80 - 5.80 x10(6)uL    HGB 10.8 (L) 13.0 - 17.5 g/dL    HCT 31.2 (L) 39.0 - 53.0 %    MCV 92.0 80.0 - 100.0 fL    MCH 31.9 26.0 - 34.0 pg    MCHC 34.6 31.0 - 37.0 g/dL    RDW-SD 47.9 (H) 35.1 - 46.3 fL    RDW 14.2 11.0 - 15.0 %    .0 150.0 - 450.0 10(3)uL    Neutrophil Absolute Prelim 1.62 1.50 - 7.70 x10 (3) uL    Neutrophil Absolute 1.62 1.50 - 7.70 x10(3) uL    Lymphocyte Absolute 0.62 (L) 1.00 - 4.00 x10(3) uL    Monocyte Absolute 0.31 0.10 - 1.00 x10(3) uL    Eosinophil Absolute 0.06 0.00 - 0.70 x10(3) uL    Basophil Absolute 0.02 0.00 - 0.20 x10(3) uL    Immature Granulocyte Absolute 0.00 0.00 - 1.00 x10(3) uL    Neutrophil % 61.5 %    Lymphocyte % 23.6 %    Monocyte % 11.8 %    Eosinophil % 2.3 %    Basophil % 0.8 %    Immature Granulocyte % 0.0 %   COMP METABOLIC PANEL [E]    Collection Time: 01/13/25  1:55 PM   Result Value Ref Range    Glucose 126 (H) 70 - 99 mg/dL    Sodium 139 136 - 145 mmol/L    Potassium 3.7 3.5 - 5.1 mmol/L    Chloride 96 (L) 98 - 112 mmol/L    CO2 37.0 (H) 21.0 - 32.0 mmol/L    Anion Gap 6 0 - 18 mmol/L    BUN 29 (H) 9 - 23 mg/dL    Creatinine 3.69 (H) 0.70 - 1.30 mg/dL    BUN/CREA Ratio 7.9 (L) 10.0 - 20.0    Calcium, Total 9.7 8.7 - 10.4 mg/dL    Calculated Osmolality 295 275 - 295 mOsm/kg    eGFR-Cr 17 (L) >=60 mL/min/1.73m2    ALT 31 10 - 49 U/L    AST 35 (H) <34 U/L    Alkaline Phosphatase 142 (H) 45 - 117 U/L    Bilirubin, Total 0.5 0.2 - 1.1 mg/dL    Total Protein 6.8 5.7 - 8.2 g/dL    Albumin 4.2 3.2 - 4.8 g/dL    Globulin  2.6 2.0 - 3.5 g/dL    A/G Ratio 1.6 1.0 - 2.0    Patient Fasting for CMP? Patient not present       Radiology  11/13/2024:  PET/CT scan - I independently visualized the radiologic images in addition to reviewing the written report: Abnormal SUV uptake in the area of the right palatine tonsil and two right neck lymph nodes.     Impression and Plan   1.   P16 positive squamous cell carcinoma, right tonsil: PET/CT and biopsy confirm residual/recurrent disease. I recommend salvage surgical resection. Arrangements will be made for the patient to be seen by Dr. Zamora at [facility].     2.   Dysphagia: Plans were being made for endoscopic evaluation but these will be placed on hold in light of his biopsy proven malignancy.     3.   Anemia: Normocytic normochromic anemia. Stable and consistent with anemia of CKD. Iron studies were previously negative for iron deficiency.     4.   Nutrition: Patient will continue G tube feeds. Dietician will contact patient in light of documented weight loss.     Planned Follow Up   As above.     Electronically Signed by:     Adolph Zavala M.D.  System Medical Director, Oncology Services  [facility]

## 2025-01-10 ENCOUNTER — OFFICE VISIT (OUTPATIENT)
Dept: OTOLARYNGOLOGY | Facility: CLINIC | Age: 75
End: 2025-01-10

## 2025-01-10 DIAGNOSIS — R13.12 OROPHARYNGEAL DYSPHAGIA: ICD-10-CM

## 2025-01-10 DIAGNOSIS — C09.9 MALIGNANT NEOPLASM OF TONSIL (HCC): Primary | ICD-10-CM

## 2025-01-10 PROCEDURE — 99214 OFFICE O/P EST MOD 30 MIN: CPT | Performed by: SPECIALIST

## 2025-01-10 PROCEDURE — 42800 BIOPSY OF THROAT: CPT | Performed by: SPECIALIST

## 2025-01-11 NOTE — PROGRESS NOTES
Luisito Diop is a 74 year old male.   Chief Complaint   Patient presents with    Follow - Up     primary cancer of tonsil (HCC)     HPI:   Patient here has had an abnormal PET CT scan.  Is unable to swallow.  Had a history of a T2 N1 M0 squamous cell carcinoma of the right tonsil    Current Outpatient Medications   Medication Sig Dispense Refill    cyclobenzaprine 5 MG Oral Tab Take 1 tablet (5 mg total) by mouth nightly as needed. 30 tablet 0    aspirin 81 MG Oral Tab Take 1 tablet (81 mg total) by mouth daily.      methylPREDNISolone (MEDROL) 4 MG Oral Tablet Therapy Pack As directed. (Patient not taking: Reported on 1/10/2025) 1 each 0    clonazePAM 1 MG Oral Tab Take 1 tablet (1 mg total) by mouth nightly as needed for Anxiety. (Patient not taking: Reported on 1/10/2025) 60 tablet 0    methylPREDNISolone (MEDROL) 4 MG Oral Tablet Therapy Pack As directed. (Patient not taking: Reported on 1/10/2025) 1 each 0      Past Medical History:    Cancer (HCC)    throat cancer    Diabetes (HCC)    Dialysis patient (HCC)    ESRD (end stage renal disease) on dialysis (HCC)    Essential hypertension    High blood pressure    Renal disorder    Tonsillar cancer (HCC)      Social History:  Social History     Socioeconomic History    Marital status:    Tobacco Use    Smoking status: Never     Passive exposure: Never    Smokeless tobacco: Never   Vaping Use    Vaping status: Never Used   Substance and Sexual Activity    Alcohol use: Not Currently    Drug use: Never     Social Drivers of Health     Financial Resource Strain: Low Risk  (1/15/2024)    Financial Resource Strain     Difficulty of Paying Living Expenses: Not very hard     Med Affordability: No   Food Insecurity: No Food Insecurity (1/11/2024)    Food Insecurity     Food Insecurity: Never true   Transportation Needs: No Transportation Needs (1/15/2024)    Transportation Needs     Lack of Transportation: No   Housing Stability: Low Risk  (1/11/2024)    Housing  Stability     Housing Instability: No        REVIEW OF SYSTEMS:   GENERAL HEALTH: feels well otherwise  GENERAL : denies fever, chills, sweats, weight loss, weight gain  SKIN: denies any unusual skin lesions or rashes  RESPIRATORY: denies shortness of breath with exertion  NEURO: denies headaches    EXAM:   There were no vitals taken for this visit.  System Details   Skin Inspection - Normal.   Constitutional Overall appearance - Normal.   Head/Face Facial features - Normal. Eyebrows - Normal. Skull - Normal.   Eyes Conjunctiva - Right: Normal, Left: Normal. Pupil - Right: Normal, Left: Normal.    Ears Inspection - Right: Normal, Left: Normal.   Canal - Right: Normal, Left: Normal.    TM - Right: Normal, Left: Normal.   Nasal External nose - Normal.   Nasal septum - Normal.  Turbinates - Normal   Oral/Oropharynx Lips - Normal, Tonsils -retained tissue in the right tonsillar fossa.  Consent was obtained.  Area was anesthetized with Cetacaine and then 1% lidocaine with 100,000 epinephrine.  A biopsy was taken from the area.  Silver nitrate was placed.  No complications.  Patient also with trismus.  Tongue -of tongue examined via fiberoptic scope no masses noted   Neck Exam Inspection - Normal. Palpation - Normal. Parotid gland - Normal. Thyroid gland - Normal.   Lymph Detail Submental. Submandibular. Anterior cervical. Posterior cervical. Supraclavicular.  All without enlargement   Psychiatric Orientation - Oriented to time, place, person & situation. Appropriate mood and affect.   Neurological Memory - Normal. Cranial nerves - Cranial nerves II through XII grossly intact.   Nasopharynx Normal by fiberoptic scope   Larynx Consent was obtained for the procedure.  The nose was asesthetized with 1% neosynephrine and 4% lidocaine topical drops.    The scope was placed into the bilateral nares as well as the nasopharynx, hypopharynx and larynx.    All of which were examined.  The  entire larynx including the vallecula,  epiglottis, true and false vocal cords, aryepiglottic folds, piriform sinuses and post cricord area were examined for tumors and infectious processes as well as for evidence of reflux and retained secretions.  Vocal cord mobility was also assessed.    Any abnormalities are noted in the exam section.  Normal vocal cord mobility.  No retained secretions.  No tumors or masses seen.     ASSESSMENT AND PLAN:   1. Malignant neoplasm of tonsil (HCC)  Biopsy taken.  Reviewed PET CT scan done on 11/13/2024.  This shows residual focus in the right tongue base.  This was enlarged from 6/18/2024.  - Specimen to Pathology, Tissue; Future  - Specimen to Pathology, Tissue    2. Oropharyngeal dysphagia  Patient currently using a G-tube.  No laryngeal abnormalities noted.      The patient indicates understanding of these issues and agrees to the plan.      Elva Cummings MD  1/10/2025  10:03 PM

## 2025-01-11 NOTE — PATIENT INSTRUCTIONS
A biopsy was taken from your right tonsillar fossa.  I will of course notify of the results.  No abnormalities noted on the base of your tongue by fiberoptic examination.

## 2025-01-13 ENCOUNTER — OFFICE VISIT (OUTPATIENT)
Age: 75
End: 2025-01-13
Attending: NURSE PRACTITIONER
Payer: MEDICARE

## 2025-01-13 ENCOUNTER — NURSE ONLY (OUTPATIENT)
Age: 75
End: 2025-01-13
Attending: NURSE PRACTITIONER
Payer: MEDICARE

## 2025-01-13 VITALS
SYSTOLIC BLOOD PRESSURE: 168 MMHG | WEIGHT: 200 LBS | HEART RATE: 59 BPM | DIASTOLIC BLOOD PRESSURE: 55 MMHG | TEMPERATURE: 98 F | OXYGEN SATURATION: 100 % | HEIGHT: 77 IN | RESPIRATION RATE: 16 BRPM | BODY MASS INDEX: 23.62 KG/M2

## 2025-01-13 DIAGNOSIS — Z99.2 ESRD (END STAGE RENAL DISEASE) ON DIALYSIS (HCC): ICD-10-CM

## 2025-01-13 DIAGNOSIS — N18.6 ANEMIA DUE TO CHRONIC KIDNEY DISEASE, ON CHRONIC DIALYSIS (HCC): ICD-10-CM

## 2025-01-13 DIAGNOSIS — Z99.2 ANEMIA DUE TO CHRONIC KIDNEY DISEASE, ON CHRONIC DIALYSIS (HCC): ICD-10-CM

## 2025-01-13 DIAGNOSIS — C09.9 TONSIL CANCER (HCC): Primary | ICD-10-CM

## 2025-01-13 DIAGNOSIS — Z93.1 G TUBE FEEDINGS (HCC): ICD-10-CM

## 2025-01-13 DIAGNOSIS — R13.10 DYSPHAGIA, UNSPECIFIED TYPE: ICD-10-CM

## 2025-01-13 DIAGNOSIS — N18.6 ESRD (END STAGE RENAL DISEASE) ON DIALYSIS (HCC): ICD-10-CM

## 2025-01-13 DIAGNOSIS — D63.1 ANEMIA DUE TO CHRONIC KIDNEY DISEASE, ON CHRONIC DIALYSIS (HCC): ICD-10-CM

## 2025-01-13 DIAGNOSIS — C09.9 TONSIL CANCER (HCC): ICD-10-CM

## 2025-01-13 LAB
ALBUMIN SERPL-MCNC: 4.2 G/DL (ref 3.2–4.8)
ALBUMIN/GLOB SERPL: 1.6 {RATIO} (ref 1–2)
ALP LIVER SERPL-CCNC: 142 U/L
ALT SERPL-CCNC: 31 U/L
ANION GAP SERPL CALC-SCNC: 6 MMOL/L (ref 0–18)
AST SERPL-CCNC: 35 U/L (ref ?–34)
BASOPHILS # BLD AUTO: 0.02 X10(3) UL (ref 0–0.2)
BASOPHILS NFR BLD AUTO: 0.8 %
BILIRUB SERPL-MCNC: 0.5 MG/DL (ref 0.2–1.1)
BUN BLD-MCNC: 29 MG/DL (ref 9–23)
BUN/CREAT SERPL: 7.9 (ref 10–20)
CALCIUM BLD-MCNC: 9.7 MG/DL (ref 8.7–10.4)
CHLORIDE SERPL-SCNC: 96 MMOL/L (ref 98–112)
CO2 SERPL-SCNC: 37 MMOL/L (ref 21–32)
CREAT BLD-MCNC: 3.69 MG/DL
DEPRECATED RDW RBC AUTO: 47.9 FL (ref 35.1–46.3)
EGFRCR SERPLBLD CKD-EPI 2021: 17 ML/MIN/1.73M2 (ref 60–?)
EOSINOPHIL # BLD AUTO: 0.06 X10(3) UL (ref 0–0.7)
EOSINOPHIL NFR BLD AUTO: 2.3 %
ERYTHROCYTE [DISTWIDTH] IN BLOOD BY AUTOMATED COUNT: 14.2 % (ref 11–15)
GLOBULIN PLAS-MCNC: 2.6 G/DL (ref 2–3.5)
GLUCOSE BLD-MCNC: 126 MG/DL (ref 70–99)
HCT VFR BLD AUTO: 31.2 %
HGB BLD-MCNC: 10.8 G/DL
IMM GRANULOCYTES # BLD AUTO: 0 X10(3) UL (ref 0–1)
IMM GRANULOCYTES NFR BLD: 0 %
LYMPHOCYTES # BLD AUTO: 0.62 X10(3) UL (ref 1–4)
LYMPHOCYTES NFR BLD AUTO: 23.6 %
MCH RBC QN AUTO: 31.9 PG (ref 26–34)
MCHC RBC AUTO-ENTMCNC: 34.6 G/DL (ref 31–37)
MCV RBC AUTO: 92 FL
MONOCYTES # BLD AUTO: 0.31 X10(3) UL (ref 0.1–1)
MONOCYTES NFR BLD AUTO: 11.8 %
NEUTROPHILS # BLD AUTO: 1.62 X10 (3) UL (ref 1.5–7.7)
NEUTROPHILS # BLD AUTO: 1.62 X10(3) UL (ref 1.5–7.7)
NEUTROPHILS NFR BLD AUTO: 61.5 %
OSMOLALITY SERPL CALC.SUM OF ELEC: 295 MOSM/KG (ref 275–295)
PLATELET # BLD AUTO: 163 10(3)UL (ref 150–450)
POTASSIUM SERPL-SCNC: 3.7 MMOL/L (ref 3.5–5.1)
PROT SERPL-MCNC: 6.8 G/DL (ref 5.7–8.2)
RBC # BLD AUTO: 3.39 X10(6)UL
SODIUM SERPL-SCNC: 139 MMOL/L (ref 136–145)
WBC # BLD AUTO: 2.6 X10(3) UL (ref 4–11)

## 2025-01-13 NOTE — PROGRESS NOTES
I will notify patient   Right tonsillar biopsy positive for p16+ Scca of the right tonsil.  Corresponding to PET CT scan.  Please advise.

## 2025-01-15 ENCOUNTER — TELEPHONE (OUTPATIENT)
Age: 75
End: 2025-01-15

## 2025-01-15 NOTE — TELEPHONE ENCOUNTER
Called [facility] dietician and [facility] [phone] at the request of Dr. Zavala.  Patient has been losing weight on his current tube feedings- Fiorella will call patient and see if he needs calorie increase.

## 2025-01-21 ENCOUNTER — TELEPHONE (OUTPATIENT)
Facility: CLINIC | Age: 75
End: 2025-01-21

## 2025-01-21 NOTE — TELEPHONE ENCOUNTER
The dose is too high and should not exceed 300 mg/day for ESRD pt.  On dialysis days give post rx

## 2025-01-21 NOTE — TELEPHONE ENCOUNTER
Nurse Gail is requesting to speak to Dr Tellez or his nurse to make sure it is ok with Dr Tellez due to the patient's kidney issues, for the patient to take Gabapentin 300mg to take 3 times a day.

## 2025-01-21 NOTE — TELEPHONE ENCOUNTER
Dr Tellez dialysis patient spoke to Gail from [facility] pt was just diagnosed with tonsil cancer and prescribed Gabapentin 300 mg three times a day for pain,asking if ok , pt is scheduled for CT Neck and chest with contrast on Feb before pt dialysis day .

## 2025-01-22 ENCOUNTER — NURSE TRIAGE (OUTPATIENT)
Dept: INTERNAL MEDICINE CLINIC | Facility: CLINIC | Age: 75
End: 2025-01-22

## 2025-01-22 NOTE — TELEPHONE ENCOUNTER
Action Requested: Summary for Provider     []  Critical Lab, Recommendations Needed  [] Need Additional Advice  []   FYI    []   Need Orders  [] Need Medications Sent to Pharmacy  []  Other     SUMMARY: Patient scheduled 1st available, tomorrow 1/23/25 with Dr. Ruggiero at 9:00 AM.     Reason for call: Headache  Onset: Jan 18, 2025    Spoke to patient, full name and date of birth verified.  Patient reports he fell on Saturday 1/18/25 and did hit his head pretty hard.     Patient did not seek evaluation, he wanted to see if it improved on it's own.     Patient denies changes in vision and states when he is resting, he is fine.  Patient has been taking Tylenol for the headache, not much relief.     Patient describes that when he moves, he feels some pain around his eyes that he rates \"5-6/10\".     Patient is requesting first available appointment with Dr. Ruggiero, any location.     RN checked and there are only virtual appointments for the rest of today with any provider.     RN informed patient we recommend imaging at immediate care or emergency room, patient declined.     Patient scheduled tomorrow 1/23/25 with Dr. Ruggiero at [place] office.    Reason for Disposition   After 3 days and headache persists    Protocols used: Head Injury-A-OH

## 2025-01-23 ENCOUNTER — HOSPITAL ENCOUNTER (OUTPATIENT)
Dept: CT IMAGING | Facility: HOSPITAL | Age: 75
Discharge: HOME | End: 2025-01-23
Attending: INTERNAL MEDICINE
Payer: MEDICARE

## 2025-01-23 ENCOUNTER — OFFICE VISIT (OUTPATIENT)
Dept: INTERNAL MEDICINE CLINIC | Facility: CLINIC | Age: 75
End: 2025-01-23

## 2025-01-23 VITALS
BODY MASS INDEX: 24.44 KG/M2 | DIASTOLIC BLOOD PRESSURE: 70 MMHG | TEMPERATURE: 97 F | WEIGHT: 207 LBS | HEIGHT: 77 IN | SYSTOLIC BLOOD PRESSURE: 171 MMHG | OXYGEN SATURATION: 99 % | RESPIRATION RATE: 18 BRPM

## 2025-01-23 DIAGNOSIS — S09.90XA TRAUMATIC INJURY OF HEAD, INITIAL ENCOUNTER: ICD-10-CM
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
## 2025-01-23 DIAGNOSIS — I10 PRIMARY HYPERTENSION: ICD-10-CM

## 2025-01-23 DIAGNOSIS — G44.319 ACUTE POST-TRAUMATIC HEADACHE, NOT INTRACTABLE: ICD-10-CM
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
## 2025-01-23 PROCEDURE — 70450 CT HEAD/BRAIN W/O DYE: CPT | Performed by: INTERNAL MEDICINE

## 2025-01-23 PROCEDURE — 99214 OFFICE O/P EST MOD 30 MIN: CPT | Performed by: INTERNAL MEDICINE

## 2025-01-23 RX ORDER — AMLODIPINE BESYLATE 5 MG/1
5 TABLET ORAL DAILY
Qty: 90 TABLET | Refills: 3 | Status: SHIPPED | OUTPATIENT
Start: 2025-01-23 | End: 2026-01-18

## 2025-01-23 NOTE — PROGRESS NOTES
Subjective:     Patient ID: Luisito Diop is a 74 year old male.    Patient comes in today with complaint of headache after he had fallen over the weekend going down the stairs hit his head pretty hard he did not pass out but felt lightheaded after since then has been having a headache pressure and fullness eyes no vision changes.  No nausea no vomiting.  Patient also noted to have high blood pressure he says that spin going up and down he thinks is because of the pain now he says on dialysis days usually when they check it initially is high then it comes down.  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        History/Other:   Review of Systems   Constitutional: Negative.  Negative for fatigue and fever.   HENT: Negative.  Negative for congestion.    Eyes: Negative.    Respiratory: Negative.  Negative for cough, shortness of breath and wheezing.    Cardiovascular: Negative.  Negative for chest pain, palpitations and leg swelling.   Gastrointestinal: Negative.    Endocrine: Negative for cold intolerance and heat intolerance.   Genitourinary: Negative.  Negative for dysuria, flank pain and hematuria.   Musculoskeletal: Negative.  Negative for arthralgias, back pain and myalgias.   Skin: Negative.    Neurological:  Positive for light-headedness and headaches. Negative for tremors, syncope and weakness.   Psychiatric/Behavioral: Negative.  Negative for agitation, behavioral problems and suicidal ideas. The patient is not nervous/anxious.      Current Outpatient Medications   Medication Sig Dispense Refill    amLODIPine 5 MG Oral Tab Take 1 tablet (5 mg total) by mouth daily. 90 tablet 3    clonazePAM 1 MG Oral Tab Take 1 tablet (1 mg total) by mouth nightly as needed for Anxiety. 60 tablet 0    aspirin 81 MG Oral Tab Take 1 tablet (81 mg total) by mouth daily.      cyclobenzaprine 5 MG Oral Tab  Take 1 tablet (5 mg total) by mouth nightly as needed. (Patient not taking: Reported on 1/23/2025) 30 tablet 0     Allergies:Allergies[1]    Past Medical History:    Cancer (HCC)    throat cancer    Diabetes (HCC)    Dialysis patient (HCC)    ESRD (end stage renal disease) on dialysis (HCC)    Essential hypertension    High blood pressure    Renal disorder    Tonsillar cancer (HCC)      Past Surgical History:   Procedure Laterality Date    Colonoscopy        Family History   Problem Relation Age of Onset    Cancer Sister         lung cancer      Social History:   Social History     Socioeconomic History    Marital status:    Tobacco Use    Smoking status: Never     Passive exposure: Never    Smokeless tobacco: Never   Vaping Use    Vaping status: Never Used   Substance and Sexual Activity    Alcohol use: Not Currently    Drug use: Never     Social Drivers of Health     Financial Resource Strain: Low Risk  (1/15/2024)    Financial Resource Strain     Difficulty of Paying Living Expenses: Not very hard     Med Affordability: No   Food Insecurity: No Food Insecurity (1/11/2024)    Food Insecurity     Food Insecurity: Never true   Transportation Needs: No Transportation Needs (1/15/2024)    Transportation Needs     Lack of Transportation: No   Housing Stability: Low Risk  (1/11/2024)    Housing Stability     Housing Instability: No        Objective:   Physical Exam  Vitals and nursing note reviewed.   Constitutional:       Appearance: He is well-developed.   HENT:      Head: Normocephalic and atraumatic.      Comments: No bruising noted on the scalp or anywhere else     Right Ear: External ear normal.      Left Ear: External ear normal.      Nose: Nose normal.   Eyes:      Conjunctiva/sclera: Conjunctivae normal.      Pupils: Pupils are equal, round, and reactive to light.   Cardiovascular:      Rate and Rhythm: Normal rate and regular rhythm.      Heart sounds: Normal heart sounds.   Pulmonary:      Effort:  Pulmonary effort is normal.      Breath sounds: Normal breath sounds.   Abdominal:      General: Bowel sounds are normal.      Palpations: Abdomen is soft.   Genitourinary:     Penis: Normal.       Prostate: Normal.      Rectum: Normal.   Musculoskeletal:         General: Normal range of motion.      Cervical back: Normal range of motion and neck supple.   Skin:     General: Skin is warm and dry.   Neurological:      Mental Status: He is alert and oriented to person, place, and time.      Deep Tendon Reflexes: Reflexes are normal and symmetric.         Assessment & Plan:   1. Fall, initial encounter hit his head   2. Traumatic injury of head, initial encounter will order CT head stat will follow results   3. Acute post-traumatic headache, not intractable as above as needed medication for pain like Tylenol   4. Primary hypertension will start on amlodipine 5 mg daily monitor blood pressure at home follow back in 2 weeks with me       No orders of the defined types were placed in this encounter.      Meds This Visit:  Requested Prescriptions     Signed Prescriptions Disp Refills    amLODIPine 5 MG Oral Tab 90 tablet 3     Sig: Take 1 tablet (5 mg total) by mouth daily.       Imaging & Referrals:  CT BRAIN OR HEAD (CPT=70450)            [1] No Known Allergies

## 2025-02-17 ENCOUNTER — OFFICE VISIT (OUTPATIENT)
Dept: INTERNAL MEDICINE CLINIC | Facility: CLINIC | Age: 75
End: 2025-02-17

## 2025-02-17 ENCOUNTER — HOSPITAL ENCOUNTER (EMERGENCY)
Facility: HOSPITAL | Age: 75
Discharge: HOME | End: 2025-02-17
Attending: STUDENT IN AN ORGANIZED HEALTH CARE EDUCATION/TRAINING PROGRAM
Payer: MEDICARE

## 2025-02-17 VITALS
HEART RATE: 67 BPM | SYSTOLIC BLOOD PRESSURE: 174 MMHG | OXYGEN SATURATION: 99 % | RESPIRATION RATE: 17 BRPM | WEIGHT: 198 LBS | BODY MASS INDEX: 23.38 KG/M2 | TEMPERATURE: 98 F | HEIGHT: 77 IN | DIASTOLIC BLOOD PRESSURE: 69 MMHG

## 2025-02-17 VITALS
HEART RATE: 55 BPM | DIASTOLIC BLOOD PRESSURE: 74 MMHG | RESPIRATION RATE: 16 BRPM | TEMPERATURE: 97 F | SYSTOLIC BLOOD PRESSURE: 181 MMHG | OXYGEN SATURATION: 100 %

## 2025-02-17 DIAGNOSIS — E87.6 HYPOKALEMIA: ICD-10-CM

## 2025-02-17 DIAGNOSIS — Z01.818 PRE-OP EXAM: Primary | ICD-10-CM

## 2025-02-17 DIAGNOSIS — R94.31 ABNORMAL EKG: ICD-10-CM

## 2025-02-17 DIAGNOSIS — R94.31 PROLONGED Q-T INTERVAL ON ECG: ICD-10-CM

## 2025-02-17 DIAGNOSIS — R94.31 PROLONGED Q-T INTERVAL ON ECG: Primary | ICD-10-CM

## 2025-02-17 LAB
ANION GAP SERPL CALC-SCNC: 7 MMOL/L (ref 0–18)
ATRIAL RATE: 54 BPM
ATRIAL RATE: 57 BPM
BASOPHILS # BLD AUTO: 0.02 X10(3) UL (ref 0–0.2)
BASOPHILS NFR BLD AUTO: 0.7 %
BUN BLD-MCNC: 27 MG/DL (ref 9–23)
BUN/CREAT SERPL: 8 (ref 10–20)
CALCIUM BLD-MCNC: 9.4 MG/DL (ref 8.7–10.4)
CHLORIDE SERPL-SCNC: 97 MMOL/L (ref 98–112)
CO2 SERPL-SCNC: 34 MMOL/L (ref 21–32)
CREAT BLD-MCNC: 3.36 MG/DL
DEPRECATED RDW RBC AUTO: 46.8 FL (ref 35.1–46.3)
EGFRCR SERPLBLD CKD-EPI 2021: 19 ML/MIN/1.73M2 (ref 60–?)
EOSINOPHIL # BLD AUTO: 0.06 X10(3) UL (ref 0–0.7)
EOSINOPHIL NFR BLD AUTO: 2 %
ERYTHROCYTE [DISTWIDTH] IN BLOOD BY AUTOMATED COUNT: 14 % (ref 11–15)
GLUCOSE BLD-MCNC: 96 MG/DL (ref 70–99)
HCT VFR BLD AUTO: 32.7 %
HGB BLD-MCNC: 10.8 G/DL
IMM GRANULOCYTES # BLD AUTO: 0 X10(3) UL (ref 0–1)
IMM GRANULOCYTES NFR BLD: 0 %
LYMPHOCYTES # BLD AUTO: 0.52 X10(3) UL (ref 1–4)
LYMPHOCYTES NFR BLD AUTO: 17.7 %
MAGNESIUM SERPL-MCNC: 2.3 MG/DL (ref 1.6–2.6)
MCH RBC QN AUTO: 30.7 PG (ref 26–34)
MCHC RBC AUTO-ENTMCNC: 33 G/DL (ref 31–37)
MCV RBC AUTO: 92.9 FL
MONOCYTES # BLD AUTO: 0.36 X10(3) UL (ref 0.1–1)
MONOCYTES NFR BLD AUTO: 12.2 %
NEUTROPHILS # BLD AUTO: 1.98 X10 (3) UL (ref 1.5–7.7)
NEUTROPHILS # BLD AUTO: 1.98 X10(3) UL (ref 1.5–7.7)
NEUTROPHILS NFR BLD AUTO: 67.4 %
OSMOLALITY SERPL CALC.SUM OF ELEC: 291 MOSM/KG (ref 275–295)
P AXIS: 66 DEGREES
P AXIS: 77 DEGREES
P-R INTERVAL: 238 MS
P-R INTERVAL: 244 MS
PLATELET # BLD AUTO: 201 10(3)UL (ref 150–450)
POTASSIUM SERPL-SCNC: 3.3 MMOL/L (ref 3.5–5.1)
Q-T INTERVAL: 482 MS
Q-T INTERVAL: 524 MS
QRS DURATION: 110 MS
QRS DURATION: 112 MS
QTC CALCULATION (BEZET): 469 MS
QTC CALCULATION (BEZET): 496 MS
R AXIS: 3 DEGREES
R AXIS: 9 DEGREES
RBC # BLD AUTO: 3.52 X10(6)UL
SODIUM SERPL-SCNC: 138 MMOL/L (ref 136–145)
T AXIS: -41 DEGREES
T AXIS: 34 DEGREES
VENTRICULAR RATE: 54 BPM
VENTRICULAR RATE: 57 BPM
WBC # BLD AUTO: 2.9 X10(3) UL (ref 4–11)

## 2025-02-17 PROCEDURE — 99285 EMERGENCY DEPT VISIT HI MDM: CPT

## 2025-02-17 PROCEDURE — 36415 COLL VENOUS BLD VENIPUNCTURE: CPT

## 2025-02-17 PROCEDURE — 93005 ELECTROCARDIOGRAM TRACING: CPT

## 2025-02-17 PROCEDURE — 99284 EMERGENCY DEPT VISIT MOD MDM: CPT

## 2025-02-17 PROCEDURE — 80048 BASIC METABOLIC PNL TOTAL CA: CPT | Performed by: STUDENT IN AN ORGANIZED HEALTH CARE EDUCATION/TRAINING PROGRAM

## 2025-02-17 PROCEDURE — 93010 ELECTROCARDIOGRAM REPORT: CPT

## 2025-02-17 PROCEDURE — 85025 COMPLETE CBC W/AUTO DIFF WBC: CPT | Performed by: STUDENT IN AN ORGANIZED HEALTH CARE EDUCATION/TRAINING PROGRAM

## 2025-02-17 PROCEDURE — 83735 ASSAY OF MAGNESIUM: CPT | Performed by: STUDENT IN AN ORGANIZED HEALTH CARE EDUCATION/TRAINING PROGRAM

## 2025-02-17 PROCEDURE — 99214 OFFICE O/P EST MOD 30 MIN: CPT | Performed by: INTERNAL MEDICINE

## 2025-02-17 PROCEDURE — 93000 ELECTROCARDIOGRAM COMPLETE: CPT | Performed by: INTERNAL MEDICINE

## 2025-02-17 RX ORDER — POTASSIUM CHLORIDE 1.5 G/1.58G
20 POWDER, FOR SOLUTION ORAL ONCE
Status: COMPLETED | OUTPATIENT
Start: 2025-02-17 | End: 2025-02-17

## 2025-02-17 NOTE — ED PROVIDER NOTES
[place] Emergency Department Note  Patient: Luisito ARCEO White Age: 74 year old Sex: male      MRN: [id number]  : 1950    Patient Seen in: [facility] Emergency Department    History     Chief Complaint   Patient presents with    Abnormal Result     Stated Complaint: abnormal ekg/dialysis pt.    History obtained from: Patient    74-year-old male with a past medical history of hypertension, ESRD on HD M/W/F, squamous cell carcinoma of the throat with plans for surgical resection in 1 week presenting for evaluation of abnormal outpatient EKG.  He states that he has appointment today with his PCP to discuss cardiac clearance prior to his surgery next week.  Had a screening EKG performed and was sent to the emergency department for further evaluation.  Patient denies any complaints most notably no chest pain, shortness of breath or palpitations.  States that he has been fully dialyzed this morning.    Review of Systems:  Review of Systems  Positive for stated complaint: abnormal ekg/dialysis pt.. Constitutional and vital signs reviewed. All other systems reviewed and negative except as noted above.    Patient History:  Past Medical History:    Cancer (HCC)    throat cancer    Diabetes (HCC)    Dialysis patient    ESRD (end stage renal disease) on dialysis (HCC)    Essential hypertension    High blood pressure    Renal disorder    Tonsillar cancer (HCC)       Past Surgical History:   Procedure Laterality Date    Colonoscopy          Family History   Problem Relation Age of Onset    Cancer Sister         lung cancer       Specific Social Determinants of Health:   Social History     Socioeconomic History    Marital status:    Tobacco Use    Smoking status: Never     Passive exposure: Never    Smokeless tobacco: Never   Vaping Use    Vaping status: Never Used   Substance and Sexual Activity    Alcohol use: Not Currently    Drug use: Never     Social Drivers of Health     Food Insecurity: No Food Insecurity  (1/11/2024)    Food Insecurity     Food Insecurity: Never true   Transportation Needs: No Transportation Needs (1/15/2024)    Transportation Needs     Lack of Transportation: No   Housing Stability: Low Risk  (1/11/2024)    Housing Stability     Housing Instability: No           PSFH elements reviewed from today and agreed except as otherwise stated in HPI.    Physical Exam     ED Triage Vitals [02/17/25 1142]   BP (!) 177/75   Pulse 52   Resp 18   Temp 97.2 °F (36.2 °C)   Temp src    SpO2 96 %   O2 Device None (Room air)       Current:BP (!) 181/74   Pulse 55   Temp 97.2 °F (36.2 °C)   Resp 16   SpO2 100%         Physical Exam  Constitutional:       Appearance: He is well-developed.   HENT:      Head: Normocephalic and atraumatic.      Right Ear: External ear normal.      Left Ear: External ear normal.      Nose: Nose normal.   Eyes:      Conjunctiva/sclera: Conjunctivae normal.      Pupils: Pupils are equal, round, and reactive to light.   Cardiovascular:      Rate and Rhythm: Normal rate and regular rhythm.      Heart sounds: Normal heart sounds.   Pulmonary:      Effort: Pulmonary effort is normal.      Breath sounds: Normal breath sounds.   Abdominal:      General: Bowel sounds are normal.      Palpations: Abdomen is soft.      Tenderness: There is no abdominal tenderness.   Musculoskeletal:         General: Normal range of motion.      Cervical back: Normal range of motion and neck supple.      Comments: Left forearm AV fistula   Skin:     General: Skin is warm and dry.      Findings: No rash.   Neurological:      General: No focal deficit present.      Mental Status: He is alert and oriented to person, place, and time.      Deep Tendon Reflexes: Reflexes are normal and symmetric.   Psychiatric:         Mood and Affect: Mood normal.         Behavior: Behavior normal.         ED Course   Labs:   Labs Reviewed   BASIC METABOLIC PANEL (8) - Abnormal; Notable for the following components:       Result Value     Potassium 3.3 (*)     Chloride 97 (*)     CO2 34.0 (*)     BUN 27 (*)     Creatinine 3.36 (*)     BUN/CREA Ratio 8.0 (*)     eGFR-Cr 19 (*)     All other components within normal limits   CBC WITH DIFFERENTIAL WITH PLATELET - Abnormal; Notable for the following components:    WBC 2.9 (*)     RBC 3.52 (*)     HGB 10.8 (*)     HCT 32.7 (*)     RDW-SD 46.8 (*)     Lymphocyte Absolute 0.52 (*)     All other components within normal limits   MAGNESIUM - Normal     Radiology findings:  I personally reviewed the images.   No results found.    EKG as interpreted by me: Ventricular rate 57, normal sinus bradycardia, normal axis, IN interval prolongation consistent with first-degree AV block, narrow QRS, QTc 469 ms, no ST segment changes, no abnormal T wave inversions however interpretation severely limited secondary to motion artifact.    EKG as interpreted by me: Ventricular rate 54, normal sinus bradycardia, normal axis, IN interval prolongation consistent with first-degree AV block, narrow QRS with incomplete left bundle branch block, QTc 496 ms, no ST segment elevations or depressions, no abnormal T wave inversions  Cardiac Monitor: Interpreted by me.   Pulse Readings from Last 1 Encounters:   02/17/25 55   , sinus,     External non-ED records reviewed independently by me: PCP note from office visit earlier today notes EKG showed prolonged QT interval for which the patient was sent to the emergency department.    MDM   74-year-old male with past medical history of hypertension and ESRD on HD, last dialyzed this morning, sent to the ER by his PCP for asymptomatic prolonged QT interval on a screening EKG.  Patient without complaints    Differential diagnoses considered includes, but is not limited to: Prolonged QT syndrome, electrolyte or metabolic derangement, bradycardia, adverse side effect of medications    Will obtain the following tests: CBC, BMP, magnesium  Please see ED course for my independent review of these  tests/imaging results.    Initial Medications/Therapeutics administered: None    Chronic conditions affecting care: Hypertension, ESRD    Workup and medications considered but not ordered: None    Social Determinants of Health that impacted care: None     ED course: Laboratory workup with potassium of 3.3 but otherwise normal calcium.  Was given 20 mill equivalents of p.o. potassium chloride for gentle repletion with consideration for his ESRD.  Renal function consistent with ESRD.  He remained within normal sinus bradycardia throughout stay in the emergency department.  His QT prolongation is likely secondary to his baseline bradycardia.  He is asymptomatic.  I discussed his lab results with his PCP who requested follow-up with cardiology to discuss cardiac clearance prior to surgery next week.  I discussed the case with [facility] cardiology Alecia MENDEZ, who agreed to notify scheduling department of [facility] office to help arrange for follow-up as soon as possible to have cardiac clearance prior to surgery next week.  Patient stable for discharge home at this time.  Return precautions discussed and all questions answered.  Patient expressed understanding and agreement with plan.        Disposition and Plan     Clinical Impression:  1. Prolonged Q-T interval on ECG    2. Hypokalemia        Disposition:  Discharge    Follow-up:  Rich Ruggiero MD  [address] [place] [id number]  [phone]    Schedule an appointment as soon as possible for a visit in 2 day(s)  As needed    Mitul Hawk MD  [address] [place] [address] [address]  [place] [address]  [phone]    Schedule an appointment as soon as possible for a visit in 2 day(s)        Medications Prescribed:  Discharge Medication List as of 2/17/2025  2:04 PM            This note may have been created using voice dictation technology and may include inadvertent errors.      Brittnee Bowles MD  Emergency Medicine

## 2025-02-17 NOTE — PROGRESS NOTES
Subjective:     Patient ID: Luisito Diop is a 74 year old male.    Patient comes in today for preop clearance for throat cancer surgery.  Denies any complaints today patient has history of end-stage renal disease on dialysis  Denies any palpitations today.  EKG done in office shows prolonged QT over 500 ms        History/Other:   Review of Systems   Constitutional: Negative.  Negative for fatigue and fever.   HENT: Negative.  Negative for congestion.    Eyes: Negative.    Respiratory: Negative.  Negative for cough, shortness of breath and wheezing.    Cardiovascular: Negative.  Negative for chest pain, palpitations and leg swelling.   Gastrointestinal: Negative.    Endocrine: Negative for cold intolerance and heat intolerance.   Genitourinary: Negative.  Negative for dysuria, flank pain and hematuria.   Musculoskeletal: Negative.  Negative for arthralgias, back pain and myalgias.   Skin: Negative.    Neurological: Negative.  Negative for dizziness, tremors, syncope, weakness and headaches.   Psychiatric/Behavioral: Negative.  Negative for agitation, behavioral problems and suicidal ideas. The patient is not nervous/anxious.      Current Outpatient Medications   Medication Sig Dispense Refill    amLODIPine 5 MG Oral Tab Take 1 tablet (5 mg total) by mouth daily. 90 tablet 3    clonazePAM 1 MG Oral Tab Take 1 tablet (1 mg total) by mouth nightly as needed for Anxiety. 60 tablet 0    aspirin 81 MG Oral Tab Take 1 tablet (81 mg total) by mouth daily.       Allergies:Allergies[1]    Past Medical History:    Cancer (HCC)    throat cancer    Diabetes (HCC)    Dialysis patient    ESRD (end stage renal disease) on dialysis (HCC)    Essential hypertension    High blood pressure    Renal disorder    Tonsillar cancer (HCC)      Past Surgical History:   Procedure Laterality Date    Colonoscopy        Family History   Problem Relation Age of Onset    Cancer Sister         lung cancer      Social History:   Social History      Socioeconomic History    Marital status:    Tobacco Use    Smoking status: Never     Passive exposure: Never    Smokeless tobacco: Never   Vaping Use    Vaping status: Never Used   Substance and Sexual Activity    Alcohol use: Not Currently    Drug use: Never     Social Drivers of Health     Food Insecurity: No Food Insecurity (1/11/2024)    Food Insecurity     Food Insecurity: Never true   Transportation Needs: No Transportation Needs (1/15/2024)    Transportation Needs     Lack of Transportation: No   Housing Stability: Low Risk  (1/11/2024)    Housing Stability     Housing Instability: No        Objective:   Physical Exam  Vitals and nursing note reviewed.   Constitutional:       Appearance: He is well-developed.   HENT:      Head: Normocephalic and atraumatic.      Right Ear: External ear normal.      Left Ear: External ear normal.      Nose: Nose normal.   Eyes:      Conjunctiva/sclera: Conjunctivae normal.      Pupils: Pupils are equal, round, and reactive to light.   Cardiovascular:      Rate and Rhythm: Normal rate and regular rhythm.      Heart sounds: Normal heart sounds.   Pulmonary:      Effort: Pulmonary effort is normal.      Breath sounds: Normal breath sounds.   Abdominal:      General: Bowel sounds are normal.      Palpations: Abdomen is soft.   Genitourinary:     Penis: Normal.       Prostate: Normal.      Rectum: Normal.   Musculoskeletal:         General: Normal range of motion.      Cervical back: Normal range of motion and neck supple.   Skin:     General: Skin is warm and dry.   Neurological:      Mental Status: He is alert and oriented to person, place, and time.      Deep Tendon Reflexes: Reflexes are normal and symmetric.         Assessment & Plan:   1. Pre-op exam exam is okay patient will need further eval in ER due to abnormal EKG   2. Abnormal EKG with prolonged QT patient high risk due to end-stage renal disease last time he had labs was about 2 weeks ago there were okay  slightly elevated calcium due to secondary hyperparathyroidism due to end-stage renal disease   3. Prolonged Q-T interval on ECG as above will need further eval in ER and then will decide if he might need to see cardiology.       No orders of the defined types were placed in this encounter.      Meds This Visit:  Requested Prescriptions      No prescriptions requested or ordered in this encounter       Imaging & Referrals:  ELECTROCARDIOGRAM, COMPLETE            [1] No Known Allergies

## 2025-02-17 NOTE — ED INITIAL ASSESSMENT (HPI)
Pt presents to ED for abnormal EKG. Per pt he was at Dr office because he is scheduled to have neck surgery on Monday for tonsil cancer. Pt states they did an EKG and was told to come to ED.   Pt denies CP. Denies SOB  Pt is dialysis pt. Pt gets dialysis M,W,F. Denies missing any dialysis. Pt has a fistula on L forearm and a G tube.

## 2025-02-20 LAB
ATRIAL RATE: 58 BPM
P AXIS: -22 DEGREES
P-R INTERVAL: 226 MS
Q-T INTERVAL: 516 MS
QRS DURATION: 112 MS
QTC CALCULATION (BEZET): 506 MS
R AXIS: -6 DEGREES
T AXIS: 27 DEGREES
VENTRICULAR RATE: 58 BPM

## 2025-03-13 ENCOUNTER — TELEPHONE (OUTPATIENT)
Dept: INTERNAL MEDICINE CLINIC | Facility: CLINIC | Age: 75
End: 2025-03-13

## 2025-04-04 ENCOUNTER — TELEPHONE (OUTPATIENT)
Dept: INTERNAL MEDICINE CLINIC | Facility: CLINIC | Age: 75
End: 2025-04-04

## 2025-04-04 NOTE — TELEPHONE ENCOUNTER
Moira from home health called and wants to confirm if dr will be following the patient and sign plan of care

## 2025-04-04 NOTE — TELEPHONE ENCOUNTER
RN called home health, they are closed for the day.   Left detailed message with patient's name and date of birth stating that Dr. Ruggiero has agreed to provide home health orders and plan of care.     Left call back number with hours for additional questions.    Attempt #1

## 2025-04-07 NOTE — TELEPHONE ENCOUNTER
Moira was informed via voice mail of Dr. Rich Ruggiero message and to call back with any question.   Office tel # provided.

## 2025-04-08 ENCOUNTER — MED REC SCAN ONLY (OUTPATIENT)
Dept: INTERNAL MEDICINE CLINIC | Facility: CLINIC | Age: 75
End: 2025-04-08

## 2025-04-10 ENCOUNTER — OFFICE VISIT (OUTPATIENT)
Dept: INTERNAL MEDICINE CLINIC | Facility: CLINIC | Age: 75
End: 2025-04-10

## 2025-04-10 VITALS
WEIGHT: 212 LBS | RESPIRATION RATE: 17 BRPM | BODY MASS INDEX: 25.03 KG/M2 | DIASTOLIC BLOOD PRESSURE: 84 MMHG | HEIGHT: 77 IN | OXYGEN SATURATION: 98 % | HEART RATE: 66 BPM | SYSTOLIC BLOOD PRESSURE: 136 MMHG

## 2025-04-10 DIAGNOSIS — I10 ESSENTIAL HYPERTENSION: ICD-10-CM

## 2025-04-10 DIAGNOSIS — R53.83 OTHER FATIGUE: ICD-10-CM

## 2025-04-10 DIAGNOSIS — Z93.1 G TUBE FEEDINGS (HCC): ICD-10-CM

## 2025-04-10 DIAGNOSIS — E11.22 TYPE 2 DIABETES MELLITUS WITH STAGE 4 CHRONIC KIDNEY DISEASE, WITHOUT LONG-TERM CURRENT USE OF INSULIN (HCC): ICD-10-CM

## 2025-04-10 DIAGNOSIS — R05.2 SUBACUTE COUGH: ICD-10-CM

## 2025-04-10 DIAGNOSIS — Z99.2 ESRD (END STAGE RENAL DISEASE) ON DIALYSIS (HCC): ICD-10-CM

## 2025-04-10 DIAGNOSIS — Z85.819 HISTORY OF THROAT CANCER: ICD-10-CM

## 2025-04-10 DIAGNOSIS — Z09 HOSPITAL DISCHARGE FOLLOW-UP: Primary | ICD-10-CM

## 2025-04-10 DIAGNOSIS — N18.4 TYPE 2 DIABETES MELLITUS WITH STAGE 4 CHRONIC KIDNEY DISEASE, WITHOUT LONG-TERM CURRENT USE OF INSULIN (HCC): ICD-10-CM

## 2025-04-10 DIAGNOSIS — N18.6 ESRD (END STAGE RENAL DISEASE) ON DIALYSIS (HCC): ICD-10-CM

## 2025-04-10 PROCEDURE — 99214 OFFICE O/P EST MOD 30 MIN: CPT | Performed by: INTERNAL MEDICINE

## 2025-04-10 RX ORDER — MONTELUKAST SODIUM 10 MG/1
10 TABLET ORAL DAILY
Qty: 30 TABLET | Refills: 0 | Status: SHIPPED | OUTPATIENT
Start: 2025-04-10

## 2025-04-10 NOTE — PROGRESS NOTES
Subjective:     Patient ID: Luisito Diop is a 75 year old male.    HPI  Patient comes in today for follow-up after hospital discharge then went to rehab . Over all feeling tired will be doing pt at home   While in hospital she had acute heart failure diagnosed with Takotsubo disease patient thinks what caused it was that she was restrained for 2 days straight and is claustrophobic reason it was done is because patient was pulling on the lines after surgery.  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 Zamora (ENT), complicated by hematoma formation now s/p evacuation   -Staples removed, ok to shower   -Wound recs: clean the incision line twice daily with a moist face cloth. Apply a coat of mupirocin or Aquaphor ointment after each cleaning   - Continue oral suction to L oral cavity/L nares per ENT              -Pt requires home suction device. Pt cannot raise or clear secretions on their own   -guaifenesin-dextromethorphan scheduled every 8 hours   -duoneb q3 PRN (hypersal discontinued per patient preference)   - IV benadryl 12.5mg daily PRN after 1600 (too drowsy during the day) for feelings of tongue swelling -per ENT no clinical signs of swelling or airway compromise (has not used since 3/21)  -Follow up with ENT Nancy Whitehead PA-C within 1 week of dc     #Pleuritic chest pain (improved)   #Cough  -Patient on 04/03 noted with chest pain that was worsened when coughing  -Nocturnist called to bedside, with EKG showing NSR, troponin levels  unremarkable, and chest x-ray showing \"left lower lobe airspace opacities may represent residual atelectasis versus infection or aspiration\"  -due to patient not showing any signs or symptoms of active infection (normal WBC, normal vitals, no fever) will currently monitor with no antibiotics     #AHRF, resolved  #Septic shock, resolved  -Etiology thought to be in setting of aspiration pneumonia  -Now s/p 7 day IV antibiotic course with zosyn and ceftriaxone.   Plan:  -Gentle bronchial hygiene, suction to L mouth only  -Per discussion with IM, periodically receiving nebulizers at [facility], not medically necessary but ok to give for comfort PRN as above and to help with secretions      #HFrEF  #Likely Takotsubo's  - EF noted 30-35% while in shock, since improved to EF 55-60% on TTE 3/4 however w RWMA  - cards consulted, believe likely taksotubos  - Follow up with [facility] for outpatient ischemic workup     #HTN   #HLD  #CAD  - goal BP <160 in setting of SDH  - cont ASA 81mg & atorvastatin 80mg nightly   - cont coreg 12.5mg BID & amlodipine 10mg daily   -Continue lisinopril 20 mg daily   -continue hydralazine 25 mg TID (goal be to eventually remove this medication)     #Orthostatic hypotension   -patient with prior blood pressure drop with severe dizziness during therapy   -adjust FWF and HD accordingly   -Evgeny hose ordered with out of bed activities   -adjusting hypertension meds as above     #ESRD on HD  #Clotted LUE AVF  - Nephrology consulted for HD needs   - Access: previously LUE AVF - clotted, now with tunneled HD line   - Schedule: M/W/F  - Daily weights  - Renally dose medications  -Needs outpt f/u for clotted AVF as below  -Follow up with nephrology      #Right IJ clot  -Patient with occludied right IJ found during IR placement of HD catheter that was placed in right brachiocelpahlic vein. In light of recent bleed, patient not anticoagulated, but recommended for repeat ultrasound as outpatient   -Follow up  with IR for repeat US and management      #Anemia of chronic disease   -Hgb averaging btween 7-8, s/p 1u PRBC 3/15  -type and screen exp 3/22  -Transfuse hgb <7     #DM2  -Last A1c 2/24 controlled 5.0  -BG wnl, discontinued accuchecks and ISS     #Chronic SDH  - CT brain during admission revealed chronic SDH, stable  - stat CTH if neuro changes\"     History/Other:    Review of Systems   Constitutional:  Positive for fatigue. Negative for fever.   HENT: Negative.  Negative for congestion.    Eyes: Negative.    Respiratory: Negative.  Negative for cough, shortness of breath and wheezing.    Cardiovascular: Negative.  Negative for chest pain, palpitations and leg swelling.   Gastrointestinal: Negative.    Endocrine: Negative for cold intolerance and heat intolerance.   Genitourinary: Negative.  Negative for dysuria, flank pain and hematuria.   Musculoskeletal: Negative.  Negative for arthralgias, back pain and myalgias.   Skin: Negative.    Neurological:  Positive for weakness. Negative for dizziness, tremors, syncope and headaches.        Generalized weakness    Psychiatric/Behavioral: Negative.  Negative for agitation, behavioral problems and suicidal ideas. The patient is not nervous/anxious.      Current Medications[1]  Allergies:Allergies[2]    Past Medical History[3]   Past Surgical History[4]   Family History[5]   Social History: Short Social Hx on File[6]     Objective:   Physical Exam  Constitutional:       Appearance: He is well-developed. He is ill-appearing.   HENT:      Head: Normocephalic and atraumatic.      Right Ear: External ear normal.      Left Ear: External ear normal.      Nose: Nose normal.   Eyes:      Conjunctiva/sclera: Conjunctivae normal.      Pupils: Pupils are equal, round, and reactive to light.   Cardiovascular:      Rate and Rhythm: Normal rate and regular rhythm.      Heart sounds: Normal heart sounds.   Pulmonary:      Effort: Pulmonary effort is normal.      Breath sounds: Normal  breath sounds.   Abdominal:      General: Bowel sounds are normal.      Palpations: Abdomen is soft.   Genitourinary:     Penis: Normal.       Prostate: Normal.      Rectum: Normal.   Musculoskeletal:         General: Normal range of motion.      Cervical back: Normal range of motion and neck supple.   Skin:     General: Skin is warm and dry.   Neurological:      Mental Status: He is alert and oriented to person, place, and time.      Deep Tendon Reflexes: Reflexes are normal and symmetric.         Assessment & Plan:   1. Hospital discharge follow-up overall feeling tired exhausted recently discharged from rehab will continue with rehab at home   2. ESRD (end stage renal disease) on dialysis ([facility]) continues with dialysis   3. Type 2 diabetes mellitus with stage 4 chronic kidney disease, without long-term current use of insulin ([facility]) continue current treatment motion patient will come in for annual physical   4. History of throat cancer status post surgery has a follow-up with ENT and oncology at [facility]   5. G tube feedings ([facility]) continue to use G-tube tube feeds he cannot swallow food   6. Other fatigue multifactorial recent labs noted from a week ago   7. Subacute cough lungs clear will give Singulair continue with cough medicine during the day let us know if not better   8. Essential hypertension continue current treatment       No orders of the defined types were placed in this encounter.      Meds This Visit:  Requested Prescriptions     Signed Prescriptions Disp Refills    montelukast (SINGULAIR) 10 MG Oral Tab 30 tablet 0     Sig: Take 1 tablet (10 mg total) by mouth daily.       Imaging & Referrals:  None            [1]   Current Outpatient Medications   Medication Sig Dispense Refill    montelukast (SINGULAIR) 10 MG Oral Tab Take 1 tablet (10 mg total) by mouth daily. 30 tablet 0    amLODIPine 5 MG Oral Tab Take 1 tablet (5 mg total) by mouth daily. 90 tablet 3    clonazePAM 1 MG Oral Tab Take 1 tablet (1 mg  total) by mouth nightly as needed for Anxiety. 60 tablet 0    aspirin 81 MG Oral Tab Take 1 tablet (81 mg total) by mouth daily. (Patient not taking: Reported on 4/10/2025)     [2] No Known Allergies  [3]   Past Medical History:   Cancer (HCC)    throat cancer    Diabetes (HCC)    Dialysis patient    ESRD (end stage renal disease) on dialysis (HCC)    Essential hypertension    High blood pressure    Renal disorder    Tonsillar cancer (HCC)   [4]   Past Surgical History:  Procedure Laterality Date    Colonoscopy     [5]   Family History  Problem Relation Age of Onset    Cancer Sister         lung cancer   [6]   Social History  Socioeconomic History    Marital status:    Tobacco Use    Smoking status: Never     Passive exposure: Never    Smokeless tobacco: Never   Vaping Use    Vaping status: Never Used   Substance and Sexual Activity    Alcohol use: Not Currently    Drug use: Never     Social Drivers of Health     Food Insecurity: No Food Insecurity (2/27/2025)    Received from [facility]    Food Insecurity     Currently or in the past 3 months, have you worried your food would run out before you had money to buy more?: No     In the past 12 months, have you run out of food or been unable to get more?: No   Transportation Needs: No Transportation Needs (4/7/2025)    Received from [facility] Transportation Source     Has lack of transportation kept you from medical appointments or from getting medications?: No     Has lack of transportation kept you from meetings, work, or from getting things needed for daily living?: No   Stress: Stress Concern Present (4/6/2025)    Received from [facility] - Occupational Stress Questionnaire     Feeling of Stress : To some extent   Housing Stability: Low Risk  (1/11/2024)    Housing Stability     Housing Instability: No

## 2025-04-22 ENCOUNTER — TELEPHONE (OUTPATIENT)
Dept: NEPHROLOGY | Facility: CLINIC | Age: 75
End: 2025-04-22

## 2025-04-22 ENCOUNTER — HOSPITAL ENCOUNTER (EMERGENCY)
Facility: HOSPITAL | Age: 75
Discharge: HOME | End: 2025-04-22
Attending: EMERGENCY MEDICINE
Payer: MEDICARE

## 2025-04-22 ENCOUNTER — APPOINTMENT (OUTPATIENT)
Dept: GENERAL RADIOLOGY | Facility: HOSPITAL | Age: 75
End: 2025-04-22
Attending: EMERGENCY MEDICINE
Payer: MEDICARE

## 2025-04-22 ENCOUNTER — APPOINTMENT (OUTPATIENT)
Dept: CT IMAGING | Facility: HOSPITAL | Age: 75
End: 2025-04-22
Attending: EMERGENCY MEDICINE
Payer: MEDICARE

## 2025-04-22 VITALS
WEIGHT: 197 LBS | DIASTOLIC BLOOD PRESSURE: 64 MMHG | SYSTOLIC BLOOD PRESSURE: 158 MMHG | RESPIRATION RATE: 18 BRPM | TEMPERATURE: 98 F | BODY MASS INDEX: 23.26 KG/M2 | HEIGHT: 77 IN | HEART RATE: 66 BPM | OXYGEN SATURATION: 98 %

## 2025-04-22 DIAGNOSIS — M54.50 LOW BACK PAIN WITHOUT SCIATICA, UNSPECIFIED BACK PAIN LATERALITY, UNSPECIFIED CHRONICITY: ICD-10-CM

## 2025-04-22 DIAGNOSIS — D64.9 CHRONIC ANEMIA: Primary | ICD-10-CM

## 2025-04-22 LAB
ANION GAP SERPL CALC-SCNC: 7 MMOL/L (ref 0–18)
ANTIBODY SCREEN: NEGATIVE
BASOPHILS # BLD AUTO: 0.04 X10(3) UL (ref 0–0.2)
BASOPHILS NFR BLD AUTO: 0.7 %
BUN BLD-MCNC: 55 MG/DL (ref 9–23)
BUN/CREAT SERPL: 11.6 (ref 10–20)
CALCIUM BLD-MCNC: 9.2 MG/DL (ref 8.7–10.4)
CHLORIDE SERPL-SCNC: 97 MMOL/L (ref 98–112)
CO2 SERPL-SCNC: 33 MMOL/L (ref 21–32)
CREAT BLD-MCNC: 4.74 MG/DL (ref 0.7–1.3)
DEPRECATED RDW RBC AUTO: 51.9 FL (ref 35.1–46.3)
EGFRCR SERPLBLD CKD-EPI 2021: 12 ML/MIN/1.73M2 (ref 60–?)
EOSINOPHIL # BLD AUTO: 0.15 X10(3) UL (ref 0–0.7)
EOSINOPHIL NFR BLD AUTO: 2.6 %
ERYTHROCYTE [DISTWIDTH] IN BLOOD BY AUTOMATED COUNT: 16 % (ref 11–15)
GLUCOSE BLD-MCNC: 105 MG/DL (ref 70–99)
HCT VFR BLD AUTO: 30.2 % (ref 39–53)
HGB BLD-MCNC: 9.4 G/DL (ref 13–17.5)
IMM GRANULOCYTES # BLD AUTO: 0.03 X10(3) UL (ref 0–1)
IMM GRANULOCYTES NFR BLD: 0.5 %
LYMPHOCYTES # BLD AUTO: 0.94 X10(3) UL (ref 1–4)
LYMPHOCYTES NFR BLD AUTO: 16.5 %
MCH RBC QN AUTO: 28.3 PG (ref 26–34)
MCHC RBC AUTO-ENTMCNC: 31.1 G/DL (ref 31–37)
MCV RBC AUTO: 91 FL (ref 80–100)
MONOCYTES # BLD AUTO: 0.5 X10(3) UL (ref 0.1–1)
MONOCYTES NFR BLD AUTO: 8.8 %
NEUTROPHILS # BLD AUTO: 4.03 X10 (3) UL (ref 1.5–7.7)
NEUTROPHILS # BLD AUTO: 4.03 X10(3) UL (ref 1.5–7.7)
NEUTROPHILS NFR BLD AUTO: 70.9 %
OSMOLALITY SERPL CALC.SUM OF ELEC: 299 MOSM/KG (ref 275–295)
PLATELET # BLD AUTO: 258 10(3)UL (ref 150–450)
POTASSIUM SERPL-SCNC: 4 MMOL/L (ref 3.5–5.1)
RBC # BLD AUTO: 3.32 X10(6)UL (ref 3.8–5.8)
RH BLOOD TYPE: POSITIVE
SODIUM SERPL-SCNC: 137 MMOL/L (ref 136–145)
WBC # BLD AUTO: 5.7 X10(3) UL (ref 4–11)

## 2025-04-22 PROCEDURE — 86850 RBC ANTIBODY SCREEN: CPT | Performed by: EMERGENCY MEDICINE

## 2025-04-22 PROCEDURE — 86901 BLOOD TYPING SEROLOGIC RH(D): CPT | Performed by: EMERGENCY MEDICINE

## 2025-04-22 PROCEDURE — 80048 BASIC METABOLIC PNL TOTAL CA: CPT | Performed by: EMERGENCY MEDICINE

## 2025-04-22 PROCEDURE — 74176 CT ABD & PELVIS W/O CONTRAST: CPT | Performed by: EMERGENCY MEDICINE

## 2025-04-22 PROCEDURE — 72100 X-RAY EXAM L-S SPINE 2/3 VWS: CPT | Performed by: EMERGENCY MEDICINE

## 2025-04-22 PROCEDURE — 86900 BLOOD TYPING SEROLOGIC ABO: CPT | Performed by: EMERGENCY MEDICINE

## 2025-04-22 PROCEDURE — 85025 COMPLETE CBC W/AUTO DIFF WBC: CPT | Performed by: EMERGENCY MEDICINE

## 2025-04-22 PROCEDURE — 96374 THER/PROPH/DIAG INJ IV PUSH: CPT

## 2025-04-22 PROCEDURE — 99284 EMERGENCY DEPT VISIT MOD MDM: CPT

## 2025-04-22 RX ORDER — ONDANSETRON 2 MG/ML
4 INJECTION INTRAMUSCULAR; INTRAVENOUS ONCE
Status: DISCONTINUED | OUTPATIENT
Start: 2025-04-22 | End: 2025-04-22

## 2025-04-22 RX ORDER — ONDANSETRON 2 MG/ML
INJECTION INTRAMUSCULAR; INTRAVENOUS
Status: COMPLETED
Start: 2025-04-22 | End: 2025-04-22

## 2025-04-22 RX ORDER — ONDANSETRON 2 MG/ML
4 INJECTION INTRAMUSCULAR; INTRAVENOUS ONCE
Status: COMPLETED | OUTPATIENT
Start: 2025-04-22 | End: 2025-04-22

## 2025-04-22 NOTE — ED PROVIDER NOTES
Patient Seen in: [facility] Emergency Department      History     Chief Complaint   Patient presents with    Abnormal Labs     Stated Complaint: abnormal labs (hemoglobin 3.6)    Subjective:   HPI    Patient presents the emergency department after being called by his dialysis center stating that his hemoglobin was 3.6.  They were very concerned about this and told the wife that she needed to call an ambulance.  Patient has no other complaints other than the fact that he is just feeling generally weak which has been ongoing for months since a hospitalization for neck cancer.  Denies fever, chills, blood in his stool.  History of Present Illness               Objective:     Past Medical History:    Cancer (HCC)    throat cancer    Diabetes (HCC)    Dialysis patient    ESRD (end stage renal disease) on dialysis (HCC)    Essential hypertension    High blood pressure    Renal disorder    Tonsillar cancer (HCC)              Past Surgical History:   Procedure Laterality Date    Colonoscopy                  Social History     Socioeconomic History    Marital status:    Tobacco Use    Smoking status: Never     Passive exposure: Never    Smokeless tobacco: Never   Vaping Use    Vaping status: Never Used   Substance and Sexual Activity    Alcohol use: Not Currently    Drug use: Never     Social Drivers of Health     Food Insecurity: No Food Insecurity (2/27/2025)    Received [facility]    Food Insecurity     Currently or in the past 3 months, have you worried your food would run out before you had money to buy more?: No     In the past 12 months, have you run out of food or been unable to get more?: No   Transportation Needs: No Transportation Needs (4/7/2025)    Received from [facility] Transportation Source     Has lack of transportation kept you from medical appointments or from getting medications?: No     Has lack of transportation kept you from meetings, work, or  from getting things needed for daily living?: No   Housing Stability: Low Risk  (1/11/2024)    Housing Stability     Housing Instability: No                                Physical Exam     ED Triage Vitals   BP 04/22/25 1715 (!) 177/48   Pulse 04/22/25 1715 71   Resp 04/22/25 1715 18   Temp 04/22/25 1715 98 °F (36.7 °C)   Temp src --    SpO2 04/22/25 1715 98 %   O2 Device 04/22/25 1800 None (Room air)       Current Vitals:   Vital Signs  BP: 158/64  Pulse: 66  Resp: 18  Temp: 98 °F (36.7 °C)  MAP (mmHg): 93    Oxygen Therapy  SpO2: 98 %  O2 Device: None (Room air)        Physical Exam  Vitals and nursing note reviewed.   Constitutional:       General: He is not in acute distress.     Appearance: He is well-developed.   HENT:      Head: Normocephalic.      Nose: Nose normal.      Mouth/Throat:      Mouth: Mucous membranes are moist.   Eyes:      Conjunctiva/sclera: Conjunctivae normal.   Cardiovascular:      Rate and Rhythm: Normal rate and regular rhythm.      Heart sounds: No murmur heard.  Pulmonary:      Effort: Pulmonary effort is normal. No respiratory distress.      Breath sounds: Normal breath sounds.   Abdominal:      General: There is no distension.      Palpations: Abdomen is soft.      Tenderness: There is no abdominal tenderness.   Musculoskeletal:      Cervical back: Normal range of motion and neck supple.      Comments: Left upper extremity has a fistula with an palpable thrill and audible bruit.   Skin:     General: Skin is warm and dry.      Capillary Refill: Capillary refill takes less than 2 seconds.      Findings: No rash.   Neurological:      General: No focal deficit present.      Mental Status: He is alert and oriented to person, place, and time.      Sensory: No sensory deficit.      Motor: No weakness.      Deep Tendon Reflexes: Reflexes normal.           Physical Exam                ED Course     Labs Reviewed   CBC WITH DIFFERENTIAL WITH PLATELET - Abnormal; Notable for the following  components:       Result Value    RBC 3.32 (*)     HGB 9.4 (*)     HCT 30.2 (*)     RDW-SD 51.9 (*)     RDW 16.0 (*)     Lymphocyte Absolute 0.94 (*)     All other components within normal limits   BASIC METABOLIC PANEL (8) - Abnormal; Notable for the following components:    Glucose 105 (*)     Chloride 97 (*)     CO2 33.0 (*)     BUN 55 (*)     Creatinine 4.74 (*)     Calculated Osmolality 299 (*)     eGFR-Cr 12 (*)     All other components within normal limits   TYPE AND SCREEN    Narrative:     The following orders were created for panel order Type and screen.  Procedure                               Abnormality         Status                     ---------                               -----------         ------                     ABORH (Blood Type)[[id number]]                               Final result               Antibody Screen[[id number]]                                  Final result                 Please view results for these tests on the individual orders.   ABORH (BLOOD TYPE)   ANTIBODY SCREEN   ABORH CONFIRMATION   RAINBOW DRAW LAVENDER   RAINBOW DRAW LIGHT GREEN   RAINBOW DRAW BLUE   RAINBOW DRAW GOLD          Results                                 MDM              Medical Decision Making  Differential diagnosis considered for kidney stone, arthritis, anemia,    Problems Addressed:  Chronic anemia: acute illness or injury  Low back pain without sciatica, unspecified back pain laterality, unspecified chronicity: acute illness or injury    Amount and/or Complexity of Data Reviewed  Labs: ordered. Decision-making details documented in ED Course.     Details: Here is 9.4.  The hemoglobin identified in the dialysis center from yesterday was likely erroneous specimen either diluted or possibly hemolyzed.  Chemistry panel consistent with end-stage renal disease  Radiology: ordered and independent interpretation performed. Decision-making details documented in ED Course.     Details: X-ray shows no evidence  of fracture of the low back.  CT scan of the abdomen pelvis shows no evidence of kidney stone.  Discussion of management or test interpretation with external provider(s): Patient had a single episode of nausea with 1 episode of vomiting here.  Reexamination shows no nausea no abdominal pain.  Recommend follow-up and dialysis tomorrow as planned.  Discussed with Dr. Black, patient's nephrologist.    Risk  Prescription drug management.        Disposition and Plan     Clinical Impression:  1. Chronic anemia    2. Low back pain without sciatica, unspecified back pain laterality, unspecified chronicity         Disposition:  Discharge  4/22/2025  8:58 pm    Follow-up:  Rich Ruggiero MD  [address] [place] [id number]  [phone]    Schedule an appointment as soon as possible for a visit      We recommend that you schedule follow up care with a primary care provider within the next three months to obtain basic health screening including reassessment of your blood pressure.      Medications Prescribed:  Discharge Medication List as of 4/22/2025  9:04 PM          Supplementary Documentation:

## 2025-04-22 NOTE — TELEPHONE ENCOUNTER
Rn spoke to patient  and last name verified and advised to call dialysis center [facility] reported is getting ready to go to ER with patient.Noted from ER notes for abnormal labs hemoglobin 3.6.

## 2025-04-22 NOTE — ED INITIAL ASSESSMENT (HPI)
Patient brought here via ems for eval after low hemoglobin from dialysis draw, states the result is \"3 something\", states he has generalized weakness but that has been going on for a few months, denies new symptoms

## 2025-04-28 DIAGNOSIS — F41.9 ANXIETY: ICD-10-CM

## 2025-04-28 RX ORDER — CLONAZEPAM 1 MG/1
1 TABLET ORAL NIGHTLY PRN
Qty: 30 TABLET | Refills: 0 | OUTPATIENT
Start: 2025-04-28

## 2025-04-29 ENCOUNTER — TELEPHONE (OUTPATIENT)
Dept: INTERNAL MEDICINE CLINIC | Facility: CLINIC | Age: 75
End: 2025-04-29

## 2025-04-30 RX ORDER — CLONAZEPAM 1 MG/1
1 TABLET ORAL NIGHTLY PRN
Qty: 30 TABLET | Refills: 0 | OUTPATIENT
Start: 2025-04-30

## 2025-05-09 ENCOUNTER — MED REC SCAN ONLY (OUTPATIENT)
Dept: INTERNAL MEDICINE CLINIC | Facility: CLINIC | Age: 75
End: 2025-05-09

## 2025-05-12 RX ORDER — MONTELUKAST SODIUM 10 MG/1
10 TABLET ORAL DAILY
Qty: 90 TABLET | Refills: 0 | Status: SHIPPED | OUTPATIENT
Start: 2025-05-12

## 2025-07-03 ENCOUNTER — MED REC SCAN ONLY (OUTPATIENT)
Dept: INTERNAL MEDICINE CLINIC | Facility: CLINIC | Age: 75
End: 2025-07-03

## 2025-08-15 ENCOUNTER — MED REC SCAN ONLY (OUTPATIENT)
Dept: INTERNAL MEDICINE CLINIC | Facility: CLINIC | Age: 75
End: 2025-08-15

## 2025-08-21 ENCOUNTER — OFFICE VISIT (OUTPATIENT)
Dept: INTERNAL MEDICINE CLINIC | Facility: CLINIC | Age: 75
End: 2025-08-21

## 2025-08-21 VITALS
OXYGEN SATURATION: 98 % | WEIGHT: 213 LBS | TEMPERATURE: 98 F | HEIGHT: 77 IN | DIASTOLIC BLOOD PRESSURE: 80 MMHG | RESPIRATION RATE: 17 BRPM | SYSTOLIC BLOOD PRESSURE: 120 MMHG | BODY MASS INDEX: 25.15 KG/M2 | HEART RATE: 72 BPM

## 2025-08-21 DIAGNOSIS — E11.22 TYPE 2 DIABETES MELLITUS WITH STAGE 4 CHRONIC KIDNEY DISEASE, WITHOUT LONG-TERM CURRENT USE OF INSULIN (HCC): ICD-10-CM

## 2025-08-21 DIAGNOSIS — D63.1 ANEMIA DUE TO CHRONIC KIDNEY DISEASE, ON CHRONIC DIALYSIS (HCC): ICD-10-CM

## 2025-08-21 DIAGNOSIS — C09.9 CANCER OF TONSIL (HCC): ICD-10-CM

## 2025-08-21 DIAGNOSIS — G89.29 CHRONIC BILATERAL LOW BACK PAIN WITHOUT SCIATICA: ICD-10-CM

## 2025-08-21 DIAGNOSIS — L60.8 TOENAIL DEFORMITY: ICD-10-CM

## 2025-08-21 DIAGNOSIS — Z85.819 HISTORY OF THROAT CANCER: ICD-10-CM

## 2025-08-21 DIAGNOSIS — N25.81 SECONDARY HYPERPARATHYROIDISM (HCC): ICD-10-CM

## 2025-08-21 DIAGNOSIS — Z00.00 MEDICARE ANNUAL WELLNESS VISIT, SUBSEQUENT: Primary | ICD-10-CM

## 2025-08-21 DIAGNOSIS — N18.6 ESRD (END STAGE RENAL DISEASE) ON DIALYSIS (HCC): ICD-10-CM

## 2025-08-21 DIAGNOSIS — Z93.1 G TUBE FEEDINGS (HCC): ICD-10-CM

## 2025-08-21 DIAGNOSIS — N18.6 ANEMIA DUE TO CHRONIC KIDNEY DISEASE, ON CHRONIC DIALYSIS (HCC): ICD-10-CM

## 2025-08-21 DIAGNOSIS — N18.4 TYPE 2 DIABETES MELLITUS WITH STAGE 4 CHRONIC KIDNEY DISEASE, WITHOUT LONG-TERM CURRENT USE OF INSULIN (HCC): ICD-10-CM

## 2025-08-21 DIAGNOSIS — R13.10 DYSPHAGIA, UNSPECIFIED TYPE: ICD-10-CM

## 2025-08-21 DIAGNOSIS — Z99.2 ANEMIA DUE TO CHRONIC KIDNEY DISEASE, ON CHRONIC DIALYSIS (HCC): ICD-10-CM

## 2025-08-21 DIAGNOSIS — F41.9 ANXIETY: ICD-10-CM

## 2025-08-21 DIAGNOSIS — Z12.5 ENCOUNTER FOR SCREENING FOR MALIGNANT NEOPLASM OF PROSTATE: ICD-10-CM

## 2025-08-21 DIAGNOSIS — M54.50 CHRONIC BILATERAL LOW BACK PAIN WITHOUT SCIATICA: ICD-10-CM

## 2025-08-21 DIAGNOSIS — Z99.2 ESRD (END STAGE RENAL DISEASE) ON DIALYSIS (HCC): ICD-10-CM

## 2025-08-21 DIAGNOSIS — I10 ESSENTIAL HYPERTENSION: ICD-10-CM

## 2025-08-21 LAB
ALBUMIN SERPL-MCNC: 4 G/DL (ref 3.2–4.8)
ALBUMIN/GLOB SERPL: 1.3 (ref 1–2)
ALP LIVER SERPL-CCNC: 260 U/L (ref 45–117)
ALT SERPL-CCNC: 102 U/L (ref 10–49)
ANION GAP SERPL CALC-SCNC: 9 MMOL/L (ref 0–18)
AST SERPL-CCNC: 83 U/L (ref ?–34)
BASOPHILS # BLD AUTO: 0.05 X10(3) UL (ref 0–0.2)
BASOPHILS NFR BLD AUTO: 1.3 %
BILIRUB SERPL-MCNC: 0.2 MG/DL (ref 0.2–1.1)
BUN BLD-MCNC: 65 MG/DL (ref 9–23)
BUN/CREAT SERPL: 12.9 (ref 10–20)
CALCIUM BLD-MCNC: 10.3 MG/DL (ref 8.7–10.4)
CHLORIDE SERPL-SCNC: 94 MMOL/L (ref 98–112)
CHOLEST SERPL-MCNC: 105 MG/DL (ref ?–200)
CO2 SERPL-SCNC: 32 MMOL/L (ref 21–32)
COMPLEXED PSA SERPL-MCNC: <0.04 NG/ML (ref ?–4)
CREAT BLD-MCNC: 5.03 MG/DL (ref 0.7–1.3)
DEPRECATED RDW RBC AUTO: 54.8 FL (ref 35.1–46.3)
EGFRCR SERPLBLD CKD-EPI 2021: 11 ML/MIN/1.73M2 (ref 60–?)
EOSINOPHIL # BLD AUTO: 0.14 X10(3) UL (ref 0–0.7)
EOSINOPHIL NFR BLD AUTO: 3.6 %
ERYTHROCYTE [DISTWIDTH] IN BLOOD BY AUTOMATED COUNT: 15 % (ref 11–15)
EST. AVERAGE GLUCOSE BLD GHB EST-MCNC: 108 MG/DL (ref 68–126)
FASTING PATIENT LIPID ANSWER: YES
FASTING STATUS PATIENT QL REPORTED: YES
GLOBULIN PLAS-MCNC: 3 G/DL (ref 2–3.5)
GLUCOSE BLD-MCNC: 108 MG/DL (ref 70–99)
HBA1C MFR BLD: 5.4 % (ref ?–5.7)
HCT VFR BLD AUTO: 37.3 % (ref 39–53)
HDLC SERPL-MCNC: 63 MG/DL (ref 40–59)
HGB BLD-MCNC: 11.5 G/DL (ref 13–17.5)
IMM GRANULOCYTES # BLD AUTO: 0.01 X10(3) UL (ref 0–1)
IMM GRANULOCYTES NFR BLD: 0.3 %
LDLC SERPL CALC-MCNC: 15 MG/DL (ref ?–100)
LYMPHOCYTES # BLD AUTO: 0.69 X10(3) UL (ref 1–4)
LYMPHOCYTES NFR BLD AUTO: 18 %
MCH RBC QN AUTO: 31.1 PG (ref 26–34)
MCHC RBC AUTO-ENTMCNC: 30.8 G/DL (ref 31–37)
MCV RBC AUTO: 100.8 FL (ref 80–100)
MONOCYTES # BLD AUTO: 0.46 X10(3) UL (ref 0.1–1)
MONOCYTES NFR BLD AUTO: 12 %
NEUTROPHILS # BLD AUTO: 2.49 X10 (3) UL (ref 1.5–7.7)
NEUTROPHILS # BLD AUTO: 2.49 X10(3) UL (ref 1.5–7.7)
NEUTROPHILS NFR BLD AUTO: 64.8 %
NONHDLC SERPL-MCNC: 42 MG/DL (ref ?–130)
OSMOLALITY SERPL CALC.SUM OF ELEC: 299 MOSM/KG (ref 275–295)
PLATELET # BLD AUTO: 189 10(3)UL (ref 150–450)
POTASSIUM SERPL-SCNC: 4.4 MMOL/L (ref 3.5–5.1)
PROT SERPL-MCNC: 7 G/DL (ref 5.7–8.2)
RBC # BLD AUTO: 3.7 X10(6)UL (ref 3.8–5.8)
SODIUM SERPL-SCNC: 135 MMOL/L (ref 136–145)
TRIGL SERPL-MCNC: 166 MG/DL (ref 30–149)
TSI SER-ACNC: 2.8 UIU/ML (ref 0.55–4.78)
VLDLC SERPL CALC-MCNC: 21 MG/DL (ref 0–30)
WBC # BLD AUTO: 3.8 X10(3) UL (ref 4–11)

## 2025-08-21 PROCEDURE — G0439 PPPS, SUBSEQ VISIT: HCPCS | Performed by: INTERNAL MEDICINE

## 2025-08-21 PROCEDURE — 36415 COLL VENOUS BLD VENIPUNCTURE: CPT | Performed by: INTERNAL MEDICINE

## 2025-08-21 RX ORDER — GABAPENTIN 100 MG/1
100 CAPSULE ORAL 3 TIMES DAILY
COMMUNITY
End: 2025-08-21

## 2025-08-21 RX ORDER — GABAPENTIN 100 MG/1
100 CAPSULE ORAL NIGHTLY PRN
Qty: 90 CAPSULE | Refills: 3 | Status: SHIPPED | OUTPATIENT
Start: 2025-08-21

## 2025-08-21 RX ORDER — QUETIAPINE FUMARATE 25 MG/1
25 TABLET, FILM COATED ORAL NIGHTLY
Qty: 90 TABLET | Refills: 3 | Status: SHIPPED | OUTPATIENT
Start: 2025-08-21

## 2025-08-21 RX ORDER — LISINOPRIL 20 MG/1
20 TABLET ORAL DAILY
COMMUNITY
End: 2025-08-21

## 2025-08-21 RX ORDER — CARVEDILOL 12.5 MG/1
12.5 TABLET ORAL 2 TIMES DAILY
Qty: 180 TABLET | Refills: 3 | Status: SHIPPED | OUTPATIENT
Start: 2025-08-21

## 2025-08-21 RX ORDER — QUETIAPINE FUMARATE 25 MG/1
25 TABLET, FILM COATED ORAL NIGHTLY
COMMUNITY
End: 2025-08-21

## 2025-08-21 RX ORDER — CINACALCET 30 MG/1
30 TABLET, FILM COATED ORAL
Qty: 90 TABLET | Refills: 3 | Status: SHIPPED | OUTPATIENT
Start: 2025-08-21

## 2025-08-21 RX ORDER — CINACALCET 30 MG/1
30 TABLET, FILM COATED ORAL
COMMUNITY
End: 2025-08-21

## 2025-08-21 RX ORDER — CYCLOBENZAPRINE HCL 5 MG
5 TABLET ORAL 3 TIMES DAILY PRN
COMMUNITY
End: 2025-08-21

## 2025-08-21 RX ORDER — AMLODIPINE BESYLATE 5 MG/1
5 TABLET ORAL DAILY
Qty: 90 TABLET | Refills: 3 | Status: SHIPPED | OUTPATIENT
Start: 2025-08-21 | End: 2026-08-16

## 2025-08-21 RX ORDER — CARVEDILOL 12.5 MG/1
12.5 TABLET ORAL 2 TIMES DAILY
COMMUNITY
End: 2025-08-21

## 2025-08-21 RX ORDER — CLONAZEPAM 1 MG/1
1 TABLET ORAL NIGHTLY PRN
Qty: 90 TABLET | Refills: 0 | Status: SHIPPED | OUTPATIENT
Start: 2025-08-21

## 2025-08-21 RX ORDER — LISINOPRIL 20 MG/1
20 TABLET ORAL DAILY
Qty: 90 TABLET | Refills: 3 | Status: SHIPPED | OUTPATIENT
Start: 2025-08-21

## 2025-08-21 RX ORDER — ATORVASTATIN CALCIUM 40 MG/1
40 TABLET, FILM COATED ORAL NIGHTLY
COMMUNITY

## 2025-08-21 RX ORDER — HYDRALAZINE HYDROCHLORIDE 25 MG/1
25 TABLET, FILM COATED ORAL 3 TIMES DAILY
COMMUNITY

## 2025-08-22 ENCOUNTER — OFFICE VISIT (OUTPATIENT)
Dept: RADIATION ONCOLOGY | Facility: HOSPITAL | Age: 75
End: 2025-08-22
Attending: RADIOLOGY

## 2025-08-22 ENCOUNTER — LAB ENCOUNTER (OUTPATIENT)
Dept: LAB | Facility: HOSPITAL | Age: 75
End: 2025-08-22
Attending: INTERNAL MEDICINE

## 2025-08-22 VITALS
RESPIRATION RATE: 16 BRPM | OXYGEN SATURATION: 97 % | WEIGHT: 213 LBS | BODY MASS INDEX: 25 KG/M2 | HEART RATE: 62 BPM | TEMPERATURE: 98 F | SYSTOLIC BLOOD PRESSURE: 156 MMHG | DIASTOLIC BLOOD PRESSURE: 76 MMHG

## 2025-08-22 LAB
BILIRUB UR QL: NEGATIVE
COLOR UR: YELLOW
CREAT UR-SCNC: 38 MG/DL
GLUCOSE UR-MCNC: NORMAL MG/DL
HGB UR QL STRIP.AUTO: NEGATIVE
KETONES UR-MCNC: NEGATIVE MG/DL
LEUKOCYTE ESTERASE UR QL STRIP.AUTO: NEGATIVE
MICROALBUMIN UR-MCNC: 7.6 MG/DL
MICROALBUMIN/CREAT 24H UR-RTO: 200 UG/MG (ref ?–30)
NITRITE UR QL STRIP.AUTO: NEGATIVE
PH UR: 7.5 (ref 5–8)
PROT UR-MCNC: 30 MG/DL
SP GR UR STRIP: 1.01 (ref 1–1.03)
UROBILINOGEN UR STRIP-ACNC: NORMAL

## 2025-08-22 PROCEDURE — 81001 URINALYSIS AUTO W/SCOPE: CPT | Performed by: INTERNAL MEDICINE

## 2025-08-22 PROCEDURE — 82043 UR ALBUMIN QUANTITATIVE: CPT | Performed by: INTERNAL MEDICINE

## 2025-08-22 PROCEDURE — 82570 ASSAY OF URINE CREATININE: CPT | Performed by: INTERNAL MEDICINE

## 2025-08-22 PROCEDURE — 99211 OFF/OP EST MAY X REQ PHY/QHP: CPT

## 2025-08-27 ENCOUNTER — TELEPHONE (OUTPATIENT)
Dept: INTERNAL MEDICINE CLINIC | Facility: CLINIC | Age: 75
End: 2025-08-27

## (undated) DEVICE — LINE MNTR ADLT SET O2 INTMD

## (undated) DEVICE — DRAIN SPONGES,6 PLY: Brand: EXCILON

## (undated) DEVICE — 60 ML SYRINGE REGULAR TIP: Brand: MONOJECT

## (undated) DEVICE — YANKAUER SUCTION INSTRUMENT NO CONTROL VENT, BULB TIP, CLEAR: Brand: YANKAUER

## (undated) DEVICE — KIT PEG 20FR PUL SFTY EN ACCS INIT PLCMNT

## (undated) DEVICE — STERILE LATEX POWDER-FREE SURGICAL GLOVESWITH NITRILE COATING: Brand: PROTEXIS

## (undated) DEVICE — BINDER ABD UNISX 9IN 62IN L AND XL UNIV

## (undated) DEVICE — MEDI-VAC NON-CONDUCTIVE SUCTION TUBING: Brand: CARDINAL HEALTH

## (undated) DEVICE — KIT ENDO ORCAPOD 160/180/190

## (undated) DEVICE — Device: Brand: DUAL NARE NASAL CANNULAE FEMALE LUER CON 7FT O2 TUBE

## (undated) DEVICE — KIT CLEAN ENDOKIT 1.1OZ GOWNX2

## (undated) DEVICE — FOAM LIMB HOLDER: Brand: POSEY

## (undated) DEVICE — ALL PURPOSE SPONGES,NONWOVEN, 4 PLY: Brand: CURITY

## (undated) DEVICE — CONMED SCOPE SAVER BITE BLOCK, 20X27 MM: Brand: SCOPE SAVER

## (undated) DEVICE — MEDI-VAC NON-CONDUCTIVE SUCTION TUBING 6MM X 1.8M (6FT.) L: Brand: CARDINAL HEALTH

## (undated) NOTE — LETTER
[address] [place] [address]  Authorization for Invasive Procedures  Date: 8/18/2023           Time: 0800    I hereby authorize Dr. Rito Ho Dr. [facility], my physician and his/her assistants (if applicable), which may include medical students, residents, and/or fellows, to perform the following surgical operation/ procedure and administer such anesthesia as may be determined necessary by my physician: Ultrasound guided right neck mass biopsy      on Luisito Diop  2. I recognize that during the surgical operation/procedure, unforeseen conditions may necessitate additional or different procedures than those listed above. I, therefore, further authorize and request that the above-named surgeon, assistants, or designees perform such procedures as are, in their judgment, necessary and desirable. 3.   My surgeon/physician has discussed prior to my surgery the potential benefits, risks and side effects of this procedure; the likelihood of achieving goals; and potential problems that might occur during recuperation. They also discussed reasonable alternatives to the procedure, including risks, benefits, and side effects related to the alternatives and risks related to not receiving this procedure. I have had all my questions answered and I acknowledge that no guarantee has been made as to the result that may be obtained. 4.   Should the need arise during my operation/procedure, which includes change of level of care prior to discharge, I also consent to the administration of blood and/or blood products. Further, I understand that despite careful testing and screening of blood or blood products by collecting agencies, I may still be subject to ill effects as a result of receiving a blood transfusion and/or blood products.   The following are some, but not all, of the potential risks that can occur: fever and allergic reactions, hemolytic reactions, transmission of diseases such as Hepatitis, AIDS and Cytomegalovirus (CMV) and fluid overload. In the event that I wish to have an autologous transfusion of my own blood, or a directed donor transfusion, I will discuss this with my physician. Check only if Refusing Blood or Blood Products  I understand refusal of blood or blood products as deemed necessary by my physician may have serious consequences to my condition to include possible death. I hereby assume responsibility for my refusal and release the hospital, its personnel, and my physicians from any responsibility for the consequences of my refusal.         o  Refuse         5. I authorize the use of any specimen, organs, tissues, body parts or foreign objects that may be removed from my body during the operation/procedure for diagnosis, research or teaching purposes and their subsequent disposal by hospital authorities. I also authorize the release of specimen test results and/or written reports to my treating physician on the hospital medical staff or other referring or consulting physicians involved in my care, at the discretion of the Pathologist or my treating physician. 6.   I consent to the photographing or videotaping of the operations or procedures to be performed, including appropriate portions of my body for medical, scientific, or educational purposes, provided my identity is not revealed by the pictures or by descriptive texts accompanying them. If the procedure has been photographed/videotaped, the surgeon will obtain the original picture, image, videotape or CD. The hospital will not be responsible for storage, release or maintenance of the picture, image, tape or CD.    7.   I consent to the presence of a  or observers in the operating room as deemed necessary by my physician or their designees. 8.   I recognize that in the event my procedure results in extended X-Ray/fluoroscopy time, I may develop a skin reaction. 9.  If I have a Do Not Attempt Resuscitation (DNAR) order in place, that status will be suspended while in the operating room, procedural suite, and during the recovery period unless otherwise explicitly stated by me (or a person authorized to consent on my behalf). The surgeon or my attending physician will determine when the applicable recovery period ends for purposes of reinstating the DNAR order. 10. Patients having a sterilization procedure: I understand that if the procedure is successful the results will be permanent and it will therefore be impossible for me to inseminate, conceive, or bear children. I also understand that the procedure is intended to result in sterility, although the result has not been guaranteed. 11. I acknowledge that my physician has explained sedation/analgesia administration to me including the risk and benefits I consent to the administration of sedation/analgesia as may be necessary or desirable in the judgment of my physician.     I CERTIFY THAT I HAVE READ AND FULLY UNDERSTAND THE ABOVE CONSENT TO OPERATION and/or OTHER PROCEDURE.        ____________________________________       _________________________________      ______________________________  Signature of Patient         Signature of Responsible Person        Printed Name of Responsible Person        ____________________________________      _________________________________      ______________________________       Signature of Witness          Relationship to Patient                       Date                                       Time    Patient Name: [facility]     : 1950                 Printed: 2023      Medical Record #: [id number]                      Page 1 of 2          STATEMENT OF PHYSICIAN My signature below affirms that prior to the time of the procedure; I have explained to the patient and/or his/her legal representative, the risks and benefits involved in the proposed treatment and any reasonable alternative to the proposed treatment. I have also explained the risks and benefits involved in refusal of the proposed treatment and alternatives to the proposed treatment and have answered the patient's questions.  If I have a significant financial interest in a co-management agreement or a significant financial interest in any product or implant, or other significant relationship used in this procedure/surgery, I have disclosed this and had a discussion with my patient.     _______________________________________________________________ _____________________________  Adolfo Monroeck of Physician)                                                                                         (Date)                                   (Time)    Patient Name: Josue Gee     : 1950                 Printed: 2023      Medical Record #: [id number]                      Page 2 of 2

## (undated) NOTE — LETTER
[address], [place]  Authorization for Invasive Procedures  Date: 11/07/2023           Time:     I hereby authorize Megan Vargas MD , my physician and his/her assistants (if applicable), which may include medical students, residents, and/or fellows, to perform the following surgical operation/ procedure and administer such anesthesia as may be determined necessary by my physician: esophagogastroduodenoscopy and possible placement of percutaneous endoscopic gastrostomy tube on Luisito Diop  2. I recognize that during the surgical operation/procedure, unforeseen conditions may necessitate additional or different procedures than those listed above. I, therefore, further authorize and request that the above-named surgeon, assistants, or designees perform such procedures as are, in their judgment, necessary and desirable. 3.   My surgeon/physician has discussed prior to my surgery the potential benefits, risks and side effects of this procedure; the likelihood of achieving goals; and potential problems that might occur during recuperation. They also discussed reasonable alternatives to the procedure, including risks, benefits, and side effects related to the alternatives and risks related to not receiving this procedure. I have had all my questions answered and I acknowledge that no guarantee has been made as to the result that may be obtained. 4.   Should the need arise during my operation/procedure, which includes change of level of care prior to discharge, I also consent to the administration of blood and/or blood products. Further, I understand that despite careful testing and screening of blood or blood products by collecting agencies, I may still be subject to ill effects as a result of receiving a blood transfusion and/or blood products.   The following are some, but not all, of the potential risks that can occur: fever and allergic reactions, hemolytic reactions, transmission of diseases such as Hepatitis, AIDS and Cytomegalovirus (CMV) and fluid overload. In the event that I wish to have an autologous transfusion of my own blood, or a directed donor transfusion, I will discuss this with my physician. Check only if Refusing Blood or Blood Products  I understand refusal of blood or blood products as deemed necessary by my physician may have serious consequences to my condition to include possible death. I hereby assume responsibility for my refusal and release the hospital, its personnel, and my physicians from any responsibility for the consequences of my refusal.         o  Refuse         5. I authorize the use of any specimen, organs, tissues, body parts or foreign objects that may be removed from my body during the operation/procedure for diagnosis, research or teaching purposes and their subsequent disposal by hospital authorities. I also authorize the release of specimen test results and/or written reports to my treating physician on the hospital medical staff or other referring or consulting physicians involved in my care, at the discretion of the Pathologist or my treating physician. 6.   I consent to the photographing or videotaping of the operations or procedures to be performed, including appropriate portions of my body for medical, scientific, or educational purposes, provided my identity is not revealed by the pictures or by descriptive texts accompanying them. If the procedure has been photographed/videotaped, the surgeon will obtain the original picture, image, videotape or CD. The hospital will not be responsible for storage, release or maintenance of the picture, image, tape or CD.    7.   I consent to the presence of a  or observers in the operating room as deemed necessary by my physician or their designees.     8.   I recognize that in the event my procedure results in extended X-Ray/fluoroscopy time, I may develop a skin reaction. 9. If I have a Do Not Attempt Resuscitation (DNAR) order in place, that status will be suspended while in the operating room, procedural suite, and during the recovery period unless otherwise explicitly stated by me (or a person authorized to consent on my behalf). The surgeon or my attending physician will determine when the applicable recovery period ends for purposes of reinstating the DNAR order. 10. Patients having a sterilization procedure: I understand that if the procedure is successful the results will be permanent and it will therefore be impossible for me to inseminate, conceive, or bear children. I also understand that the procedure is intended to result in sterility, although the result has not been guaranteed. 11. I acknowledge that my physician has explained sedation/analgesia administration to me including the risk and benefits I consent to the administration of sedation/analgesia as may be necessary or desirable in the judgment of my physician.     I CERTIFY THAT I HAVE READ AND FULLY UNDERSTAND THE ABOVE CONSENT TO OPERATION and/or OTHER PROCEDURE.        ____________________________________       _________________________________      ______________________________  Signature of Patient         Signature of Responsible Person        Printed Name of Responsible Person        ____________________________________      _________________________________      ______________________________       Signature of Witness          Relationship to Patient                       Date                                       Time    Patient Name: Anca Pascual     : 1950                 Printed: 2023      Medical Record #: [id number]                      Page 1 of 2          [place] My signature below affirms that prior to the time of the procedure; I have explained to the patient and/or his/her legal representative, the risks and benefits involved in the proposed treatment and any reasonable alternative to the proposed treatment. I have also explained the risks and benefits involved in refusal of the proposed treatment and alternatives to the proposed treatment and have answered the patient's questions.  If I have a significant financial interest in a co-management agreement or a significant financial interest in any product or implant, or other significant relationship used in this procedure/surgery, I have disclosed this and had a discussion with my patient.     _______________________________________________________________ _____________________________  Kellie Mitchel of Physician)                                                                                         (Date)                                   (Time)    Patient Name: Yuri Barriga     : 1950                 Printed: 2023      Medical Record #: [id number]                      Page 2 of 2

## (undated) NOTE — LETTER
AUTHORIZATION FOR SURGICAL OPERATION OR OTHER PROCEDURE    1. I hereby authorize Elva Alford, and [facility] staff assigned to my case to perform the following operation and/or procedure at the [facility] site:    _______________________________________________________________________________________________    Right tonsil biopsy   _______________________________________________________________________________________________    2.  My physician has explained the nature and purpose of the operation or other procedure, possible alternative methods of treatment, the risks involved, and the possibility of complication to me.  I acknowledge that no guarantee has been made as to the result that may be obtained.  3.  I recognize that, during the course of this operation, or other procedure, unforseen conditions may necessitate additional or different procedure than those listed above.  I, therefore, further authorize and request that the above named physician, his/her physician assistants or designees perform such procedures as are, in his/her professional opinion, necessary and desirable.  4.  Any tissue or organs removed in the operation or other procedure may be disposed of by and at the discretion of the [facility] and [facility].  5.  I understand that in the event of a medical emergency, I will be transported by local paramedics to [facility] or other hospital emergency department.  6.  I certify that I have read and fully understand the above consent to operation and/or other procedure.    7.  I acknowledge that my physician has explained sedation/analgesia administration to me including the risks and benefits.  I consent to the administration of sedation/analgesia as may be necessary or desirable in the judgement of my physician.    Witness signature: ___________________________________________________ Date:  ______/______/_____                     Time:  ________ A.M.  P.M.       Patient Name:  ______________________________________________________  (please print)      Patient signature:  ___________________________________________________             Relationship to Patient:           []  Parent    Responsible person                          []  Spouse  In case of minor or                    [] Other  _____________   Incompetent name:  __________________________________________________                               (please print)      _____________      Responsible person  In case of minor or  Incompetent signature:  _______________________________________________    Statement of Physician  My signature below affirms that prior to the time of the procedure, I have explained to the patient and/or his/her guardian, the risks and benefits involved in the proposed treatment and any reasonable alternative to the proposed treatment.  I have also explained the risks and benefits involved in the refusal of the proposed treatment and have answered the patient's questions.                        Date:  ______/______/_______  Provider                      Signature:  __________________________________________________________       Time:  ___________ A.M    P.M.

## (undated) NOTE — LETTER
[address], [place]  Authorization for Invasive Procedures  1. I hereby authorize Dr. Ad Alfonso , my physician and whomever may be designated as the doctor's assistant, to perform the following operation and/or procedure:   Tunneled di 4. Should the need arise during my operation or immediate post-operative period; I also consent to the administration of blood and/or blood products.  Further, I understand that despite careful testing and screening of blood and blood products, I may still 9. Patients having a sterilization procedure: I understand that if the procedure is successful the results will be permanent and it will therefore be impossible for me to inseminate, conceive or bear children.  I also understand that the procedure is intend

## (undated) NOTE — LETTER
November 24, 2021    [address] [facility]    Dear Emani Melgar: It was a pleasure speaking with you over the phone recently.  To follow up, I wanted to send you some contact information to utilize when you have a question a

## (undated) NOTE — LETTER
Zulema Tang Md  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 Jennifer Boyce MD,      Thank you for referring Saranor Lanes to my practice.

## (undated) NOTE — Clinical Note
Spoke with pt today--offered TCM appt--pt declines--prefers to f/u with specialists only, at this time. Sent TE to office staff as FYI/TCM protocol. Thank you.   Future Appointments 11/20/2023 10:15 AM   Su, Claude Holter, MD       [address] [place] [address] RAD ONC         EMO 11/20/2023 10:30 AM   [facility] RESOURCE               [address] [place] [address] HEM ONC         EMO 11/20/2023 11:30 AM   April Clarke PA-C     [facility] HEM ONC         EMO 11/21/2023 10:00 AM   EM CC INFRN 5              [facility] CHEMO           EMO 11/27/2023 12:00 PM   SEFERINO Comer  [facility]         EC CF

## (undated) NOTE — ED AVS SNAPSHOT
Ronan Ramos   MRN: [id number]    Department:  [facility] Emergency Department   Date of Visit:  9/12/2019           Disclosure     Insurance plans vary and the physician(s) referred by the ER may not be covered by your plan.  Please contact yo within the next three months to obtain basic health screening including reassessment of your blood pressure.     IF THERE IS ANY CHANGE OR WORSENING OF YOUR CONDITION, CALL YOUR PRIMARY CARE PHYSICIAN AT ONCE OR RETURN IMMEDIATELY TO THE EMERGENCY DEPARTMEN

## (undated) NOTE — Clinical Note
Spoke with pt today--TCM appt made for 1/23/2024 with you--pt declined sooner appt d/t weather--sent TE to office staff as FYI/TCM protocol. Thank you.  Future Appointments 1/23/2024  1:30 PM    Rich Ruggiero MD         [id number]             [place] [address] 3/19/2024  10:00 AM   [facility] RESOURCE               [facility] HEM ONC         EMO 3/19/2024  11:00 AM   Justen Wesley MD          [facility] HEM ONC         EMO

## (undated) NOTE — LETTER
[address], [place]  Authorization for Invasive Procedures  1.  I hereby authorize Dr. Tamanna Moore , my physician and whomever may be designated as the doctor's assistant, to perform the following operation and/or procedure:  Cardiac c the event that I wish to have autologous transfusions of my own blood, or a directed donor transfusion, I will discuss this with my physician.      5. I consent to the photographing of the operations or procedures to be performed for the purposes of advanci Responsible person in case of minor or unconscious: _____________________________Relationship: ____________     Witness Signature: ____________________________________________ Date: __________ Time: ___________    Statement of Physician  My signature below

## (undated) NOTE — LETTER
7/26/2023              [address] [place] [address] [address] [address]        [place] [address]         Dear Monica Bardales records indicate that the tests ordered for you by Anum Pride MD  have not been done. TSH W REFLEX TO FREE T4 (Order #[id number]) on 6/22/23     If you have, in fact, already completed the tests or you do not wish to have the tests done, please contact our office at [address] [place] [address]. Otherwise, please proceed with the testing.           Sincerely,    Anum Pride MD  [facility], [facility] [address] [phone]